# Patient Record
Sex: FEMALE | Race: WHITE | NOT HISPANIC OR LATINO | Employment: UNEMPLOYED | URBAN - NONMETROPOLITAN AREA
[De-identification: names, ages, dates, MRNs, and addresses within clinical notes are randomized per-mention and may not be internally consistent; named-entity substitution may affect disease eponyms.]

---

## 2017-01-17 ENCOUNTER — OFFICE VISIT (OUTPATIENT)
Dept: PODIATRY | Facility: CLINIC | Age: 57
End: 2017-01-17

## 2017-01-17 VITALS
HEART RATE: 81 BPM | DIASTOLIC BLOOD PRESSURE: 90 MMHG | WEIGHT: 220 LBS | OXYGEN SATURATION: 94 % | BODY MASS INDEX: 40.48 KG/M2 | HEIGHT: 62 IN | SYSTOLIC BLOOD PRESSURE: 139 MMHG | TEMPERATURE: 98.5 F

## 2017-01-17 DIAGNOSIS — L60.2 ONYCHOGRYPHOSIS: Primary | ICD-10-CM

## 2017-01-17 PROCEDURE — 99203 OFFICE O/P NEW LOW 30 MIN: CPT | Performed by: PODIATRIST

## 2017-01-17 RX ORDER — CYCLOBENZAPRINE HCL 10 MG
TABLET ORAL
Refills: 1 | COMMUNITY
Start: 2016-12-20

## 2017-01-17 RX ORDER — ERGOCALCIFEROL 1.25 MG/1
CAPSULE ORAL
COMMUNITY
Start: 2017-01-16 | End: 2021-07-23

## 2017-01-17 RX ORDER — SIMVASTATIN 20 MG
TABLET ORAL
Refills: 0 | COMMUNITY
Start: 2016-12-15 | End: 2021-07-23 | Stop reason: ALTCHOICE

## 2017-01-17 RX ORDER — FLUTICASONE PROPIONATE 50 MCG
SPRAY, SUSPENSION (ML) NASAL
Refills: 2 | COMMUNITY
Start: 2016-10-25 | End: 2022-08-26

## 2017-01-17 RX ORDER — GABAPENTIN 300 MG/1
CAPSULE ORAL
Refills: 1 | COMMUNITY
Start: 2016-12-20 | End: 2021-10-07

## 2017-01-17 RX ORDER — CETIRIZINE HYDROCHLORIDE 10 MG/1
TABLET ORAL
COMMUNITY
Start: 2017-01-16 | End: 2022-08-26

## 2017-01-17 RX ORDER — MONTELUKAST SODIUM 10 MG/1
TABLET ORAL
COMMUNITY
Start: 2017-01-16 | End: 2021-07-23

## 2017-01-17 RX ORDER — NABUMETONE 500 MG/1
TABLET, FILM COATED ORAL
Refills: 1 | COMMUNITY
Start: 2016-12-20 | End: 2018-05-03
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
## 2018-04-13 ENCOUNTER — TRANSCRIBE ORDERS (OUTPATIENT)
Dept: ORTHOPEDIC SURGERY | Facility: CLINIC | Age: 58
End: 2018-04-13

## 2018-04-13 DIAGNOSIS — G89.29 CHRONIC PAIN OF RIGHT KNEE: Primary | ICD-10-CM

## 2018-04-13 DIAGNOSIS — M25.561 CHRONIC PAIN OF RIGHT KNEE: Primary | ICD-10-CM

## 2018-05-02 DIAGNOSIS — M25.561 RIGHT KNEE PAIN, UNSPECIFIED CHRONICITY: Primary | ICD-10-CM

## 2018-05-03 ENCOUNTER — OFFICE VISIT (OUTPATIENT)
Dept: ORTHOPEDIC SURGERY | Facility: CLINIC | Age: 58
End: 2018-05-03

## 2018-05-03 VITALS — HEIGHT: 62 IN | WEIGHT: 227 LBS | BODY MASS INDEX: 41.77 KG/M2

## 2018-05-03 DIAGNOSIS — M54.10 RADICULOPATHY OF LEG: Primary | ICD-10-CM

## 2018-05-03 DIAGNOSIS — M25.561 RIGHT KNEE PAIN, UNSPECIFIED CHRONICITY: ICD-10-CM

## 2018-05-03 DIAGNOSIS — G62.9 NEUROPATHY: ICD-10-CM

## 2018-05-03 DIAGNOSIS — G89.29 CHRONIC LOW BACK PAIN, UNSPECIFIED BACK PAIN LATERALITY, WITH SCIATICA PRESENCE UNSPECIFIED: ICD-10-CM

## 2018-05-03 DIAGNOSIS — M54.16 LUMBAR RADICULOPATHY: ICD-10-CM

## 2018-05-03 DIAGNOSIS — E11.9 DIET-CONTROLLED DIABETES MELLITUS (HCC): ICD-10-CM

## 2018-05-03 DIAGNOSIS — M54.5 CHRONIC LOW BACK PAIN, UNSPECIFIED BACK PAIN LATERALITY, WITH SCIATICA PRESENCE UNSPECIFIED: ICD-10-CM

## 2018-05-03 PROCEDURE — 99204 OFFICE O/P NEW MOD 45 MIN: CPT | Performed by: ORTHOPAEDIC SURGERY

## 2018-05-03 RX ORDER — HYDROCODONE BITARTRATE AND ACETAMINOPHEN 7.5; 325 MG/1; MG/1
1 TABLET ORAL EVERY 6 HOURS PRN
COMMUNITY
End: 2021-07-23 | Stop reason: ALTCHOICE

## 2018-05-03 RX ORDER — GABAPENTIN 400 MG/1
400 CAPSULE ORAL 3 TIMES DAILY
COMMUNITY

## 2018-05-06 RX ORDER — MELOXICAM 15 MG/1
TABLET ORAL
Qty: 30 TABLET | Refills: 3 | Status: SHIPPED | OUTPATIENT
Start: 2018-05-06 | End: 2021-07-23

## 2018-05-16 DIAGNOSIS — G62.9 NEUROPATHY: ICD-10-CM

## 2018-05-16 DIAGNOSIS — M54.10 RADICULOPATHY OF LEG: ICD-10-CM

## 2018-05-16 DIAGNOSIS — M54.16 LUMBAR RADICULOPATHY: ICD-10-CM

## 2018-05-18 ENCOUNTER — OFFICE VISIT (OUTPATIENT)
Dept: ORTHOPEDIC SURGERY | Facility: CLINIC | Age: 58
End: 2018-05-18

## 2018-05-18 VITALS — WEIGHT: 226 LBS | HEIGHT: 62 IN | BODY MASS INDEX: 41.59 KG/M2

## 2018-05-18 DIAGNOSIS — G62.9 NEUROPATHY: Primary | ICD-10-CM

## 2018-05-18 DIAGNOSIS — M54.10 RADICULOPATHY OF LEG: ICD-10-CM

## 2018-05-18 DIAGNOSIS — M54.16 LUMBAR RADICULOPATHY: ICD-10-CM

## 2018-05-18 DIAGNOSIS — E66.01 MORBID OBESITY WITH BMI OF 40.0-44.9, ADULT (HCC): ICD-10-CM

## 2018-05-18 PROCEDURE — 99213 OFFICE O/P EST LOW 20 MIN: CPT | Performed by: ORTHOPAEDIC SURGERY

## 2018-05-18 NOTE — PROGRESS NOTES
"Stephanie Bradshaw is a 58 y.o. female returns for     Chief Complaint   Patient presents with   • Right Leg - Results     MRI  [facility] 5/14/18       HISTORY OF PRESENT ILLNESS:  Patient complaining of swelling in both knees.  No specific injury.  Occasional numbness and tingling down the side of her leg.  No fever or chills.    CONCURRENT MEDICAL HISTORY:    Past Medical History:   Diagnosis Date   • Controlled type 2 diabetes mellitus without complication, without long-term current use of insulin    • Hyperlipemia, mixed        Allergies   Allergen Reactions   • Penicillins      Patient states she is allergic to all \"cillins\".         Current Outpatient Prescriptions:   •  cetirizine (zyrTEC) 10 MG tablet, , Disp: , Rfl:   •  cyclobenzaprine (FLEXERIL) 10 MG tablet, TK 1 T PO TID, Disp: , Rfl: 1  •  fluticasone (FLONASE) 50 MCG/ACT nasal spray, INSTILL 2 PUFFS INTO THE NOSTRILS D UTD, Disp: , Rfl: 2  •  gabapentin (NEURONTIN) 300 MG capsule, TK 1 C PO TID PRN, Disp: , Rfl: 1  •  gabapentin (NEURONTIN) 400 MG capsule, Take 400 mg by mouth 3 (Three) Times a Day., Disp: , Rfl:   •  HYDROcodone-acetaminophen (NORCO) 7.5-325 MG per tablet, Take 1 tablet by mouth Every 6 (Six) Hours As Needed for Moderate Pain ., Disp: , Rfl:   •  meloxicam (MOBIC) 15 MG tablet, 1 PO Daily with food., Disp: 30 tablet, Rfl: 3  •  montelukast (SINGULAIR) 10 MG tablet, , Disp: , Rfl:   •  simvastatin (ZOCOR) 20 MG tablet, TK 1 T PO HS, Disp: , Rfl: 0  •  VENTOLIN  (90 BASE) MCG/ACT inhaler, , Disp: , Rfl:   •  vitamin D (ERGOCALCIFEROL) 86058 UNITS capsule capsule, , Disp: , Rfl:     No past surgical history on file.    ROS  No fevers or chills.  No chest pain or shortness of air.  No GI or  disturbances.    PHYSICAL EXAMINATION:       Ht 157.5 cm (62\")   Wt 103 kg (226 lb)   BMI 41.34 kg/m²     Physical Exam   Constitutional: She is oriented to person, place, and time. She appears well-developed and well-nourished. "   Neurological: She is alert and oriented to person, place, and time.   Psychiatric: She has a normal mood and affect. Her behavior is normal. Judgment and thought content normal.       GAIT:     [x]  Normal  []  Antalgic    Assistive device: [x]  None  []  Walker     []  Crutches  []  Cane     []  Wheelchair  []  Stretcher    Right Hip Exam     Tenderness   The patient is experiencing no tenderness.         Range of Motion   The patient has normal right hip ROM.    Muscle Strength   Flexion: 4/5     Tests   QUINN: negative  Fadir:  Negative FADIR test    Other   Erythema: absent  Sensation: normal  Pulse: present      Left Hip Exam     Tenderness   The patient is experiencing no tenderness.         Range of Motion   The patient has normal left hip ROM.    Muscle Strength   Flexion: 5/5     Tests   QUINN: negative  Fadir:  Negative FADIR test    Other   Erythema: absent  Sensation: normal  Pulse: present      Back Exam     Comments:  Mild limitation in low back mobility.  Pain and some recreation of symptoms with femoral nerve stretch.  Patellar reflex is less on right than left.                        ASSESSMENT:    Diagnoses and all orders for this visit:    Neuropathy    Radiculopathy of leg    Lumbar radiculopathy    Morbid obesity with BMI of 40.0-44.9, adult          PLAN    Will start PT for ROM/STR exercises.  Also will eval for modalities for pain and swelling control  Possible aquatics - also possible spinal traction.  No surgical lesion identified.      Patient's Body mass index is 41.34 kg/m². BMI is above normal parameters. Recommendations include: exercise counseling and nutrition counseling.      Return if symptoms worsen or fail to improve, for recheck.    Davide Marrufo MD

## 2018-06-07 ENCOUNTER — TRANSCRIBE ORDERS (OUTPATIENT)
Dept: PHYSICAL THERAPY | Facility: HOSPITAL | Age: 58
End: 2018-06-07

## 2018-06-07 DIAGNOSIS — M25.561 RIGHT KNEE PAIN, UNSPECIFIED CHRONICITY: Primary | ICD-10-CM

## 2018-06-12 ENCOUNTER — APPOINTMENT (OUTPATIENT)
Dept: PHYSICAL THERAPY | Facility: HOSPITAL | Age: 58
End: 2018-06-12

## 2018-06-13 ENCOUNTER — HOSPITAL ENCOUNTER (OUTPATIENT)
Dept: PHYSICAL THERAPY | Facility: HOSPITAL | Age: 58
Setting detail: THERAPIES SERIES
Discharge: HOME | End: 2018-06-13

## 2018-06-13 DIAGNOSIS — M25.561 CHRONIC PAIN OF RIGHT KNEE: Primary | ICD-10-CM

## 2018-06-13 DIAGNOSIS — G89.29 CHRONIC PAIN OF RIGHT KNEE: Primary | ICD-10-CM

## 2018-06-13 PROCEDURE — 97161 PT EVAL LOW COMPLEX 20 MIN: CPT | Performed by: PHYSICAL THERAPIST

## 2018-06-13 NOTE — THERAPY EVALUATION
Outpatient Physical Therapy Ortho Initial Evaluation  [facility]     Patient Name: Stephanie Bradshaw  : 1960  MRN: [id number]  Today's Date: 2018      Visit Date: 2018         Past Medical History:   Diagnosis Date   • Controlled type 2 diabetes mellitus without complication, without long-term current use of insulin    • Hyperlipemia, mixed         No past surgical history on file. Unremarkable  Medications (Admitted on 2018)    cetirizine (zyrTEC) 10 MG tablet    cyclobenzaprine (FLEXERIL) 10 MG tablet    fluticasone (FLONASE) 50 MCG/ACT nasal spray    gabapentin (NEURONTIN) 300 MG capsule    gabapentin (NEURONTIN) 400 MG capsule    HYDROcodone-acetaminophen (NORCO) 7.5-325 MG per tablet    meloxicam (MOBIC) 15 MG tablet    montelukast (SINGULAIR) 10 MG tablet    simvastatin (ZOCOR) 20 MG tablet    VENTOLIN  (90 BASE) MCG/ACT inhaler    vitamin D (ERGOCALCIFEROL) 27846 UNITS capsule capsule   ALLERGIES: Penicillins  Visit Dx:     ICD-10-CM ICD-9-CM   1. Chronic pain of right knee M25.561 719.46    G89.29 338.29             Patient History     Row Name 18 1600             History    Chief Complaint Pain  -DD      Type of Pain Knee pain  -DD      Date Current Problem(s) Began --   Mid March  -DD      Brief Description of Current Complaint Awoke with swollen knee. no Hx of injury.   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  -DD      What clinical tests have you had for this problem? MRI;X-ray  -DD      Results of Clinical Tests negative in lumbar and knee, Xrays show some spurs in the lumbar area per pt report.  -DD         Pain     Pain Location Knee  -DD      Pain  at Present 8   no meds  -DD      Pain at Best 4  -DD      Pain at Worst 10  -DD      Pain Frequency Constant/continuous  -DD      Pain Description Tingling;Numbness;Pins and needles  -DD      What Performance Factors Make the Current Problem(s) WORSE? standing and walking  -DD      Is your sleep disturbed? Yes  -DD        User Key  (r) = Recorded By, (t) = Taken By, (c) = Cosigned By    Initials Name Provider Type    DD Isabel Finn, COLE Physical Therapist                PT Ortho     Row Name 06/13/18 1600       Subjective Pain    Able to rate subjective pain? yes  -DD    Pre-Treatment Pain Level 8  -DD       Posture/Observations    Posture/Observations Comments obese female, no AD or brace  -DD       DTR- Lower Quarter Clearing    Patellar tendon (L2-4) 1- Minimal response;Bilateral:  -DD    Achilles tendon (S1-2) Bilateral:;0- No response  -DD       Neural Tension Signs- Lower Quarter Clearing    SLR Bilateral:;Negative  -DD    Prone knee flexion Right:;Positive  -DD       Myotomal Screen- Lower Quarter Clearing    Hip flexion (L2) 4 (Good)  -DD    Knee extension (L3) 5 (Normal)  -DD    Ankle DF (L4) 5 (Normal)  -DD    Knee flexion (S2) 5 (Normal)  -DD       Special Tests/Palpation    Special Tests/Palpation --   MJL tender, crepitus in patella  -DD       General ROM    GENERAL ROM COMMENTS 0-130 bilaterally  -DD       MMT (Manual Muscle Testing)    Additional Documentation --   5/5 quads and distal, Hip flexors 4 R, 4+ L, abd 4 Jesse,   -DD       Sensation    Sensation WNL? WNL  -DD    Light Touch No apparent deficits  -DD    Additional Comments decreased bilaterlly DTRs Patella and achilles  -DD      User Key  (r) = Recorded By, (t) = Taken By, (c) = Cosigned By    Initials Name Provider Type    COLIN Finn PT Physical Therapist                      Therapy Education  Education Details: Lt exercises until Insurance approved.  Given: HEP  Program: New  How Provided: Verbal, Written  Provided  to: Patient  Level of Understanding: Verbalized, Demonstrated           PT OP Goals     Row Name 06/13/18 1601          PT Short Term Goals    STG Date to Achieve 07/04/18  -DD     STG 1 Patient will be independent in home exercise program  -DD     STG 2 Patient will ambulate with nonantalgic gait  -DD     STG 3 Patient had decreased pain in right knee to 5 on numerical analogue scale intermittently  -DD     STG 4 Patient have decreased neural tension in the right lower extremity  -DD     STG 5 Patient will have LEFS score of   30/80  -DD        Long Term Goals    LTG Date to Achieve 07/04/18  -DD     LTG 1 Pt would like to be able to return to work.  -DD        Time Calculation    PT Goal Re-Cert Due Date 07/04/18  -DD       User Key  (r) = Recorded By, (t) = Taken By, (c) = Cosigned By    Initials Name Provider Type    DD Isabel Finn, PT Physical Therapist                PT Assessment/Plan     Row Name 06/13/18 1647          PT Assessment    Functional Limitations Impaired gait;Limitation in home management;Performance in leisure activities;Performance in self-care ADL;Performance in work activities  -DD     Impairments Gait;Endurance;Muscle strength;Pain;Posture;Poor body mechanics  -DD     Assessment Comments Patient presents with chronic knee pain also deals with bilateral neuropathies of the lower extremities prior to onset of this knee pain.  She presents with some weakness and gait abnormality.  She is a good candidate for conservative physical therapy intervention  -DD     Please refer to paper survey for additional self-reported information Yes  -DD     Rehab Potential Good  -DD     Patient/caregiver participated in establishment of treatment plan and goals Yes  -DD     Patient would benefit from skilled therapy intervention Yes  -DD        PT Plan    PT Frequency 2x/week  -DD     Predicted Duration of Therapy Intervention (Therapy Eval) 6 weeks  -DD     Planned CPT's? PT EVAL MOD COMPLELITY:  67988;PT THER PROC EA 15 MIN: 83951;PT MANUAL THERAPY EA 15 MIN: 49328;PT HOT/COLD PACK WC NONMCARE: 26491;PT ELECTRICAL STIM UNATTEND: ;PT AQUATIC THERAPY EA 15 MIN: 61753  -DD     Physical Therapy Interventions (Optional Details) aquatics exercise;home exercise program;manual therapy techniques;modalities;strengthening;stretching  -DD     PT Plan Comments Lower extremity hip core activities for standing endurance and gait activities, will include aquatic activities, and HEP.  Modalities when necessary to include ice and e-stim  -DD       User Key  (r) = Recorded By, (t) = Taken By, (c) = Cosigned By    Initials Name Provider Type    COLIN Finn PT Physical Therapist                  Exercises     Row Name 06/13/18 1600             Subjective Pain    Able to rate subjective pain? yes  -DD      Pre-Treatment Pain Level 8  -DD         Exercise 1    Exercise Name 1 SLR flexion  -DD         Exercise 2    Exercise Name 2 SLR abd  -DD         Exercise 3    Exercise Name 3 supine hamstring stretch  -DD         Exercise 4    Exercise Name 4 supine piriformis stretch  -DD         Exercise 5    Exercise Name 5 LTR  -DD         Exercise 6    Exercise Name 6 clam shells  -DD      Reps 6 10  -DD         Exercise 7    Exercise Name 7 iso add  -DD      Reps 7 10  -DD         Exercise 8    Exercise Name 8 quad sets  -DD      Reps 8 10  -DD        User Key  (r) = Recorded By, (t) = Taken By, (c) = Cosigned By    Initials Name Provider Type    COLIN Finn, COLE Physical Therapist                        Outcome Measure Options: Lower Extremity Functional Scale (LEFS)  Lower Extremity Functional Index  Any of your usual work, housework or school activities: Quite a bit of difficulty  Your usual hobbies, recreational or sporting activities: Quite a bit of difficulty  Getting into or out of the bath: Quite a bit of difficulty  Walking between rooms: A little bit of difficulty  Putting on your shoes or  socks: Extreme difficulty or unable to perform activity  Squatting: Quite a bit of difficulty  Lifting an object, like a bag of groceries from the floor: Extreme difficulty or unable to perform activity  Performing light activities around your home: Quite a bit of difficulty  Performing heavy activities around your home: Extreme difficulty or unable to perform activity  Getting into or out of a car: Extreme difficulty or unable to perform activity  Walking 2 blocks: Extreme difficulty or unable to perform activity  Walking a mile: Extreme difficulty or unable to perform activity  Going up or down 10 stairs (about 1 flight of stairs): Extreme difficulty or unable to perform activity  Standing for 1 hour: Extreme difficulty or unable to perform activity  Sitting for 1 hour: Extreme difficulty or unable to perform activity  Running on even ground: Extreme difficulty or unable to perform activity  Running on uneven ground: Extreme difficulty or unable to perform activity  Making sharp turns while running fast: Extreme difficulty or unable to perform activity  Hopping: Extreme difficulty or unable to perform activity  Rolling over in bed: Quite a bit of difficulty  Total: 9      Time Calculation:     Therapy Suggested Charges     Code   Minutes Charges    None             Start Time: 1600  Stop Time: 1639  Time Calculation (min): 39 min  Total Timed Code Minutes- PT: 0 minute(s)     Therapy Charges for Today     Code Description Service Date Service Provider Modifiers Qty    98092801998 HC PT EVAL LOW COMPLEXITY 3 6/13/2018 Isabel Finn, PT GP 1          PT G-Codes  Outcome Measure Options: Lower Extremity Functional Scale (LEFS)         Isabel Finn, PT, ATC  6/13/2018

## 2018-06-18 ENCOUNTER — APPOINTMENT (OUTPATIENT)
Dept: PHYSICAL THERAPY | Facility: HOSPITAL | Age: 58
End: 2018-06-18

## 2018-06-21 ENCOUNTER — HOSPITAL ENCOUNTER (OUTPATIENT)
Dept: PHYSICAL THERAPY | Facility: HOSPITAL | Age: 58
Setting detail: THERAPIES SERIES
Discharge: HOME | End: 2018-06-21

## 2018-06-21 DIAGNOSIS — M25.561 CHRONIC PAIN OF RIGHT KNEE: Primary | ICD-10-CM

## 2018-06-21 DIAGNOSIS — G89.29 CHRONIC PAIN OF RIGHT KNEE: Primary | ICD-10-CM

## 2018-06-21 PROCEDURE — 97113 AQUATIC THERAPY/EXERCISES: CPT

## 2018-06-21 NOTE — THERAPY TREATMENT NOTE
Outpatient Physical Therapy Ortho Treatment Note  [facility]   Earnestine MARTINEZ Whitney       Patient Name: Stephanie Bradshaw  : 1960  MRN: [id number]  Today's Date: 2018      Visit Date: 2018   Pt reports 6/10 pain pre treatment, 6/10 pain post treatment  Reports 0% of improvement.  Attended 2/2 visits.  /Insurance available: 6 visits 6/15-7/15  Next MD appt: ?? .  Recertification: 2018.    Visit Dx:    ICD-10-CM ICD-9-CM   1. Chronic pain of right knee M25.561 719.46    G89.29 338.29       There is no problem list on file for this patient.       Past Medical History:   Diagnosis Date   • Controlled type 2 diabetes mellitus without complication, without long-term current use of insulin    • Hyperlipemia, mixed         No past surgical history on file.                          PT Assessment/Plan     Row Name 18 1200          PT Assessment    Assessment Comments (P)  Pt displayed good tolerance to initial aquatics visit. Reported decrease in knee pain while in water. Pain returned post-therex. Patient reported increase in back pain with therex.   -        PT Plan    PT Frequency (P)  2x/week  -     Predicted Duration of Therapy Intervention (Therapy Eval) (P)  6 weeks  -     PT Plan Comments (P)  Continue with POC established at initial visit. 1x land/ 1x aquatic weekly. No deep end aquatics due to patient fear.   -       User Key  (r) = Recorded By, (t) = Taken By, (c) = Cosigned By    Initials Name Provider Type     Earnestine MARTINEZ Whitney                     Exercises     Row Name 18 1100             Subjective Comments    Subjective Comments (P)  Patient reports that she is feeling a little better than usual. She had a pain pill this morning.  -         Subjective Pain    Able to rate subjective pain? (P)  yes  -      Pre-Treatment Pain Level (P)  6  -MC      Post-Treatment Pain Level (P)  6  -         Exercise 1    Exercise Name 1 (P)  aqu walk  3 way  -      Time 1 (P)  15 min  -         Exercise 2    Exercise Name 2 (P)  aqu HS curls  -      Reps 2 (P)  20  -      Additional Comments (P)  bilat  -         Exercise 3    Exercise Name 3 (P)  aqu SLR 3 way  -      Sets 3 (P)  2  -      Reps 3 (P)  10  -         Exercise 4    Exercise Name 4 (P)  aqu march in place  -      Reps 4 (P)  20  -         Exercise 5    Exercise Name 5 (P)  aqu CR/TR  -      Reps 5 (P)  20  -         Exercise 6    Exercise Name 6 (P)  aqu heel-toe line walk  -      Reps 6 (P)  2  -MC      Additional Comments (P)  across shallow and back  -        User Key  (r) = Recorded By, (t) = Taken By, (c) = Cosigned By    Initials Name Provider Type     Earnestine Lorenzanagrove                                PT OP Goals     Row Name 06/21/18 1100          PT Short Term Goals    STG Date to Achieve (P)  07/04/18  -     STG 1 (P)  Patient will be independent in home exercise program  -     STG 1 Progress (P)  Not Met  -     STG 2 (P)  Patient will ambulate with nonantalgic gait  -     STG 2 Progress (P)  Not Met  -     STG 3 (P)  Patient had decreased pain in right knee to 5 on numerical analogue scale intermittently  -     STG 3 Progress (P)  Not Met  -     STG 4 (P)  Patient have decreased neural tension in the right lower extremity  -     STG 4 Progress (P)  Not Met  -     STG 5 (P)  Patient will have LEFS score of   30/80  -     STG 5 Progress (P)  Not Met  -        Long Term Goals    LTG Date to Achieve (P)  07/04/18  -     LTG 1 (P)  Pt would like to be able to return to work.  -        Time Calculation    PT Goal Re-Cert Due Date (P)  07/04/18  -       User Key  (r) = Recorded By, (t) = Taken By, (c) = Cosigned By    Initials Name Provider Type     Earnestine Olson           Therapy Education  Education Details: (P) Pt lost HEP sheet, given new one  Given: (P) HEP  Program: (P) Reinforced  How Provided: (P)  Verbal, Written  Provided to: (P) Patient  Level of Understanding: (P) Verbalized              Time Calculation:   Start Time: (P) 1105  Stop Time: (P) 1145  Time Calculation (min): (P) 40 min  Therapy Suggested Charges     Code   Minutes Charges    None           Therapy Charges for Today     Code Description Service Date Service Provider Modifiers Qty    66712839727 HC PT AQUATIC THERAPY EA 15 MIN 6/21/2018 Earnestine Olson GP 3                    Earnestine Olson  6/21/2018

## 2018-08-13 ENCOUNTER — TRANSCRIBE ORDERS (OUTPATIENT)
Dept: PHYSICAL THERAPY | Facility: HOSPITAL | Age: 58
End: 2018-08-13

## 2018-08-13 DIAGNOSIS — M25.561 RIGHT KNEE PAIN, UNSPECIFIED CHRONICITY: Primary | ICD-10-CM

## 2018-08-14 ENCOUNTER — HOSPITAL ENCOUNTER (OUTPATIENT)
Dept: PHYSICAL THERAPY | Facility: HOSPITAL | Age: 58
Setting detail: THERAPIES SERIES
Discharge: HOME | End: 2018-08-14

## 2018-08-14 DIAGNOSIS — M25.561 RIGHT KNEE PAIN, UNSPECIFIED CHRONICITY: Primary | ICD-10-CM

## 2018-08-14 PROCEDURE — 97161 PT EVAL LOW COMPLEX 20 MIN: CPT

## 2018-08-14 NOTE — THERAPY EVALUATION
"    Outpatient Physical Therapy Ortho Initial Evaluation  [facility]     Patient Name: Stephanie Bradshaw  : 1960  MRN: [id number]  Today's Date: 2018      Visit Date: 2018   Recert: 18  Visit:   MD menard/u: HELENA    There is no problem list on file for this patient.       Past Medical History:   Diagnosis Date   • Controlled type 2 diabetes mellitus without complication, without long-term current use of insulin (CMS/[facility])    • Hyperlipemia, mixed         History reviewed. No pertinent surgical history.    Visit Dx:     ICD-10-CM ICD-9-CM   1. Right knee pain, unspecified chronicity M25.561 719.46             Patient History     Row Name 18 1500             History    Chief Complaint Pain  -MW      Type of Pain Knee pain  -MW      Brief Description of Current Complaint The pt is a 59 yo female who comes to PT for knee pain. The pt states her knees have bothered her \"forever\". She states she woke up one day and her R knee was massively swollen. She states that both of her knees swell intermittently. She states the R knee is worse than the L. She states the swelling began 2018. She states they have done both x-rays and MRI for her knees and \"they can't figure out what is wrong\". She states she had an MRI of both her lower back and the knee and there were no significant findings. She states she also has intermittent swelling in her hands and her ankles. She states that her MDs are attributing her knee sxs to her diabetic neuropathy.  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 She is also unable to drive now secondary to not being able to  the pressure she is applying w/ her foot.  -MW      Patient/Caregiver Goals Relieve pain;Return to prior level of function  -MW      Current Tobacco Use no  -MW      Smoking Status current smoker  -MW      Patient's Rating of General Health Good  -MW      Hand " Dominance right-handed  -MW      Occupation/sports/leisure activities unemployed due to knee issues  -MW         Pain     Pain Location Knee  -MW      Pain at Present 9  -MW      Pain at Best 6  -MW      Pain at Worst 10  -MW      Pain Frequency Constant/continuous  -MW      Pain Description Dull;Throbbing  -MW      What Performance Factors Make the Current Problem(s) WORSE? climbing steps, walking, bending, squatting  -MW      What Performance Factors Make the Current Problem(s) BETTER? ice, heat  -MW      Is your sleep disturbed? Yes  -MW      Difficulties with ADL's? yes  -MW      Difficulties with recreational activities? yes  -MW         Fall Risk Assessment    Any falls in the past year: Yes  -MW      Number of falls reported in the last 12 months 8  -MW      Factors that contributed to the fall: --   knees buckled  -MW      Does patient have a fear of falling Yes (comment)  -MW         Daily Activities    Primary Language English  -MW         Safety    Are you being hurt, hit, or frightened by anyone at home or in your life? No  -MW      Are you being neglected by a caregiver No  -MW        User Key  (r) = Recorded By, (t) = Taken By, (c) = Cosigned By    Initials Name Provider Type    MW Oralia Hernandez PT Physical Therapist                PT Ortho     Row Name 08/14/18 1500       Subjective Comments    Subjective Comments See hx  -MW       Precautions and Contraindications    Precautions DM 2, neuropathy  -MW       Posture/Observations    Posture/Observations Comments TTP felt along medial L knee joint and diffusely throughout R knee. Increased swelling noted in B knees.  Ligamentous testing performed and no notable findings were found bilaterally.  Patella mobility hypomobile bilaterally.  -MW       Right Lower Ext    Rt Knee Extension/Flexion AROM 0-125  -MW       Left Lower Ext    Lt Knee Extension/Flexion AROM 0-140  -MW       General Assessment (Manual Muscle Testing)    Comment, General Manual  Muscle Testing (MMT) Assessment MMT as follows: Bilat LE grossly 4/5 for all planes except bilat DF and hip flex 4-/5  -MW       Sensation    Light Touch Partial deficits in the RLE  -MW    Additional Comments L3-5 and S2 dermatome patterns decreased on R LE per pt report  -MW       Gait/Stairs Assessment/Training    Comment (Gait/Stairs) Gait antalgic w/ decreased stance time on R LE and increased bilat trunk sway.  -MW      User Key  (r) = Recorded By, (t) = Taken By, (c) = Cosigned By    Initials Name Provider Type    MW Oralia Hernandez, PT Physical Therapist                                  PT OP Goals     Row Name 08/14/18 1500          PT Short Term Goals    STG 1 STG deferred  -MW        Long Term Goals    LTG Date to Achieve 09/11/18  -MW     LTG 1 Pt will be independent w/ HEP  -MW     LTG 1 Progress New  -MW     LTG 2 Pt will report pain <4/10  -MW     LTG 2 Progress New  -MW     LTG 3 Pt will perform active ther ex >30 min w/o increased knee pain  -MW     LTG 3 Progress New  -MW     LTG 4 Pt will have MMT of B LE 5/5 for all LE planes tested at eval  -MW     LTG 4 Progress New  -MW     LTG 5 Pt will have R knee AROM w/in 4 deg of L knee AROM measured at eval  -MW     LTG 5 Progress New  -MW        Time Calculation    PT Goal Re-Cert Due Date 09/04/18  -MW       User Key  (r) = Recorded By, (t) = Taken By, (c) = Cosigned By    Initials Name Provider Type    MW Oralia Hernandez, PT Physical Therapist                PT Assessment/Plan     Row Name 08/14/18 1500          PT Assessment    Functional Limitations Decreased safety during functional activities;Impaired gait;Impaired locomotion;Limitation in home management;Limitations in community activities;Limitations in functional capacity and performance;Performance in self-care ADL  -MW     Impairments Balance;Edema;Endurance;Gait;Locomotion;Muscle strength;Pain;Range of motion;Sensation  -MW     Assessment Comments The pt presented today for eval and tx of  knee pain and responded well. Sxs/signs consistent w/ pt's referral diagnosis. The pt has decreased ROM, decreased strength, and pain that are impacting their functional ability at this time. The pt has significant gait deviations that seem habitual secondary to pt's chronic knee pain.  The pt would benefit from PT services to address these deficits and to ensure pt's safe recovery to PLOF and improved QOL. Time spent discussing POC expectations this date. HEP implemented today and pt demonstrated understanding of HEP. Based on today's session and pt's motivation to improve, I anticipate she will do well w/ rehab. Secondary to pt's pain w/ prolonged WB activities it is my impression the pt is an appropriate candidate for pool therapy at this time for strength and mobility training.  Pt stating no contraindications to aquatic therapy.   -MW     Please refer to paper survey for additional self-reported information Yes  -MW     Rehab Potential Good  -MW     Patient/caregiver participated in establishment of treatment plan and goals Yes  -MW     Patient would benefit from skilled therapy intervention Yes  -MW        PT Plan    PT Frequency 2x/week  -MW     Predicted Duration of Therapy Intervention (Therapy Eval) 4 weeks  -MW     Planned CPT's? PT EVAL LOW COMPLEXITY: 19834;PT RE-EVAL: 40803;PT THER PROC EA 15 MIN: 10067;PT THER ACT EA 15 MIN: 16854;PT MANUAL THERAPY EA 15 MIN: 33621;PT NEUROMUSC RE-EDUCATION EA 15 MIN: 44692;PT GAIT TRAINING EA 15 MIN: 07521;PT AQUATIC THERAPY EA 15 MIN: 13668;PT SELF CARE/HOME MGMT/TRAIN EA 15: 94429;PT HOT OR COLD PACK TREAT MCARE;PT ELECTRICAL STIM UNATTEND:   -MW     PT Plan Comments Will attempt aquatic therapy 2x/week until next reassessment. At reassessment will determine pt's need for continued therapy services and possible transition to land therapy at that time if indicated.  -MW       User Key  (r) = Recorded By, (t) = Taken By, (c) = Cosigned By    Initials Name  Provider Type    Oralia Rick PT Physical Therapist                Modalities     Row Name 08/14/18 1500             Ice    Ice Applied Yes  -MW      Location B knee  -MW      Rx Minutes 15 mins  -MW      Ice S/P Rx Yes  -MW        User Key  (r) = Recorded By, (t) = Taken By, (c) = Cosigned By    Initials Name Provider Type    Oralia Rick PT Physical Therapist              Exercises     Row Name 08/14/18 1500             Subjective Comments    Subjective Comments See hx  -MW        User Key  (r) = Recorded By, (t) = Taken By, (c) = Cosigned By    Initials Name Provider Type    Oralia Rick PT Physical Therapist                        Outcome Measure Options: Lower Extremity Functional Scale (LEFS)  Lower Extremity Functional Index  Any of your usual work, housework or school activities: Quite a bit of difficulty  Your usual hobbies, recreational or sporting activities: A little bit of difficulty  Getting into or out of the bath: Quite a bit of difficulty  Walking between rooms: Moderate difficulty  Putting on your shoes or socks: Quite a bit of difficulty  Squatting: Quite a bit of difficulty  Lifting an object, like a bag of groceries from the floor: Quite a bit of difficulty  Performing light activities around your home: Moderate difficulty  Performing heavy activities around your home: Quite a bit of difficulty  Getting into or out of a car: Moderate difficulty  Walking 2 blocks: Extreme difficulty or unable to perform activity  Walking a mile: Extreme difficulty or unable to perform activity  Going up or down 10 stairs (about 1 flight of stairs): Quite a bit of difficulty  Standing for 1 hour: Extreme difficulty or unable to perform activity  Sitting for 1 hour: Extreme difficulty or unable to perform activity  Running on even ground: Extreme difficulty or unable to perform activity  Running on uneven ground: Extreme difficulty or unable to perform activity  Making sharp turns while  running fast: Extreme difficulty or unable to perform activity  Hopping: Extreme difficulty or unable to perform activity  Rolling over in bed: Quite a bit of difficulty  Total: 17      Time Calculation:     Therapy Suggested Charges     Code   Minutes Charges    None             Start Time: 1500  Stop Time: 1545  Time Calculation (min): 45 min     Therapy Charges for Today     Code Description Service Date Service Provider Modifiers Qty    9196252 HC PT EVAL LOW COMPLEXITY 3 8/14/2018 Oralia Hernandez, PT GP 1    [id number]  PT THER SUPP EA 15 MIN 8/14/2018 Oralia Hernandez, PT GP 1              Oralia Hernandez PT  8/14/2018

## 2018-09-12 ENCOUNTER — HOSPITAL ENCOUNTER (OUTPATIENT)
Dept: PHYSICAL THERAPY | Facility: HOSPITAL | Age: 58
Setting detail: THERAPIES SERIES
Discharge: HOME | End: 2018-09-12

## 2018-09-12 DIAGNOSIS — G89.29 CHRONIC PAIN OF RIGHT KNEE: ICD-10-CM

## 2018-09-12 DIAGNOSIS — M25.561 RIGHT KNEE PAIN, UNSPECIFIED CHRONICITY: Primary | ICD-10-CM

## 2018-09-12 DIAGNOSIS — M25.561 CHRONIC PAIN OF RIGHT KNEE: ICD-10-CM

## 2018-09-12 PROCEDURE — 97110 THERAPEUTIC EXERCISES: CPT | Performed by: PHYSICAL THERAPIST

## 2018-09-12 PROCEDURE — 97110 THERAPEUTIC EXERCISES: CPT

## 2018-09-12 NOTE — THERAPY PROGRESS REPORT/RE-CERT
Outpatient Physical Therapy Ortho Progress Note  [facility]     Patient Name: Stephanie Bradshaw  : 1960  MRN: [id number]  Today's Date: 2018      Visit Date: 2018      Attendance    Authorized 8 exp    Pre Rx pain 10   Post Rx pain 8   % improvement none   MD follow up PRN   Recert date 10/3/18           Visit Dx:    ICD-10-CM ICD-9-CM   1. Right knee pain, unspecified chronicity M25.561 719.46   2. Chronic pain of right knee M25.561 719.46    G89.29 338.29       There is no problem list on file for this patient.       Past Medical History:   Diagnosis Date   • Controlled type 2 diabetes mellitus without complication, without long-term current use of insulin (CMS/[facility])    • Hyperlipemia, mixed                   PT Ortho     Row Name 18 1500       Subjective Pain    Pre-Treatment Pain Level 10  -DD    Post-Treatment Pain Level 8  -DD    Subjective Pain Comment iced after exercise  -DD       Posture/Observations    Posture/Observations Comments Antalgic gait with no AD.  TTP MJL. Diffuse soft tissue tenderness  -DD       Right Lower Ext    Rt Knee Extension/Flexion AROM Heel slide position 0-88  -DD       MMT (Manual Muscle Testing)    General MMT Comments 4/5 hip flex and abd,  Quad with give way 4/5 due to pain, 4/5 hamstrings.  -DD      User Key  (r) = Recorded By, (t) = Taken By, (c) = Cosigned By    Initials Name Provider Type    DD Isabel Finn PT Physical Therapist                            PT Assessment/Plan     Row Name 18 1519          PT Assessment    Functional Limitations Impaired gait;Limitation in home management;Performance in self-care ADL  -DD     Impairments Gait;Endurance;Muscle strength;Pain;Range of motion  -DD     Assessment Comments Patient presents today for reevaluation and has missed the last 3 weeks of therapy due to transportation issues.  She is ready to restart therapy with aquatics to help strengthen gait and improvement in the  pain.  Patient initiated therapy in June for 2 appointments as well.  We'll start with isometrics this date and schedule aquatic therapy at follow-up treatments  -DD     Please refer to paper survey for additional self-reported information Yes  -DD     Rehab Potential Good  -DD     Patient/caregiver participated in establishment of treatment plan and goals Yes  -DD     Patient would benefit from skilled therapy intervention Yes  -DD        PT Plan    PT Frequency 2x/week  -DD     Predicted Duration of Therapy Intervention (Therapy Eval) 4 weeks  -DD     Planned CPT's? PT THER PROC EA 15 MIN: 89422;PT AQUATIC THERAPY EA 15 MIN: 26070;PT ELECTRICAL STIM UNATTEND: ;PT HOT/COLD PACK WC NONMCARE: 95186  -DD     Physical Therapy Interventions (Optional Details) aquatics exercise;gait training;home exercise program;ROM (Range of Motion);strengthening  -DD     PT Plan Comments Will schedule for aquatic therapy 2 times a week for approximately 3 weeks with reevaluation on progress at that time maintained transition to land exercises as well  -DD       User Key  (r) = Recorded By, (t) = Taken By, (c) = Cosigned By    Initials Name Provider Type    DD Isabel Finn, PT Physical Therapist                Modalities     Row Name 09/12/18 1300             Ice    Ice Applied Yes  -JW      Location R Knee  -JW      Rx Minutes 10 mins  -JW      Ice S/P Rx Yes  -JW        User Key  (r) = Recorded By, (t) = Taken By, (c) = Cosigned By    Initials Name Provider Type    JW Rudy Johnson, PTA Physical Therapy Assistant                Exercises     Row Name 09/12/18 1500 09/12/18 1400          Subjective Pain    Pre-Treatment Pain Level 10  -DD  --     Post-Treatment Pain Level 8  -DD  --     Subjective Pain Comment iced after exercise  -DD  --        Exercise 1    Exercise Name 1  -- QS  -JW     Sets 1  -- 2  -JW     Reps 1  -- 10  -JW        Exercise 2    Exercise Name 2  -- SAQ  -JW     Sets 2  -- 2  -JW     Reps 2  --  7   c/o pain  -JW        Exercise 3    Exercise Name 3  -- SLR w/ assist  -JW     Sets 3  -- 2  -JW     Reps 3  -- 5   c/o pain  -JW        Exercise 4    Exercise Name 4  -- Hip add squeeze  -JW     Sets 4  -- 2  -JW     Reps 4  -- 10  -JW        Exercise 5    Exercise Name 5  -- Ham set  -JW     Sets 5  -- 2  -JW     Reps 5  -- 10  -JW        Exercise 6    Exercise Name 6  -- Hip abd iso  -JW     Sets 6  -- 2  -JW     Reps 6  -- 10  -JW       User Key  (r) = Recorded By, (t) = Taken By, (c) = Cosigned By    Initials Name Provider Type    Isabel Quintana, PT Physical Therapist    Rudy Guzman, PTA Physical Therapy Assistant                               PT OP Goals     Row Name 09/12/18 1345          PT Short Term Goals    STG 1 STG deferred  -DD        Long Term Goals    LTG Date to Achieve 09/11/18  -     LTG 1 Pt will be independent w/ HEP  -DD     LTG 1 Progress New;Ongoing  -DD     LTG 2 Pt will report pain <4/10  -DD     LTG 2 Progress New  -DD     LTG 3 Pt will perform active ther ex >30 min w/o increased knee pain  -DD     LTG 3 Progress New  -DD     LTG 4 Pt will have MMT of B LE 5/5 for all LE planes tested at [facility]     LT 4 Progress New  -DD     LTG 5 Pt will have R knee AROM w/in 4 deg of L knee AROM measured at [facility] 5 Progress New  -       User Key  (r) = Recorded By, (t) = Taken By, (c) = Cosigned By    Initials Name Provider Type    Isabel Quintana, PT Physical Therapist          Therapy Education  Given: HEP  Program: New  How Provided: Verbal, Written  Provided to: Patient  Level of Understanding: Verbalized, Demonstrated    Outcome Measure Options: Lower Extremity Functional Scale (LEFS)  Lower Extremity Functional Index  Any of your usual work, housework or school activities: Quite a bit of difficulty  Your usual hobbies, recreational or sporting activities: Extreme difficulty or unable to perform activity  Getting into or out of the bath: Quite a  bit of difficulty  Walking between rooms: Moderate difficulty  Putting on your shoes or socks: Moderate difficulty  Squatting: Extreme difficulty or unable to perform activity  Lifting an object, like a bag of groceries from the floor: Moderate difficulty  Performing light activities around your home: Quite a bit of difficulty  Performing heavy activities around your home: Extreme difficulty or unable to perform activity  Getting into or out of a car: Moderate difficulty  Walking 2 blocks: Extreme difficulty or unable to perform activity  Walking a mile: Extreme difficulty or unable to perform activity  Going up or down 10 stairs (about 1 flight of stairs): Quite a bit of difficulty  Standing for 1 hour: Extreme difficulty or unable to perform activity  Sitting for 1 hour: Extreme difficulty or unable to perform activity  Running on even ground: Extreme difficulty or unable to perform activity  Running on uneven ground: Extreme difficulty or unable to perform activity  Making sharp turns while running fast: Extreme difficulty or unable to perform activity  Hopping: Extreme difficulty or unable to perform activity  Rolling over in bed: Quite a bit of difficulty  Total: 13      Time Calculation:   Start Time: 1345  Stop Time: 1433  Therapy Suggested Charges     Code   Minutes Charges    None           Therapy Charges for Today     Code Description Service Date Service Provider Modifiers Qty    53810422602 HC PT THER PROC EA 15 MIN 9/12/2018 Isabel Finn, PT GP 1      Jorge Johnson  PTA 2 units exercise    PT G-Codes  Outcome Measure Options: Lower Extremity Functional Scale (LEFS)  Total: 13         Isabel Finn, PT  9/12/2018

## 2018-09-25 ENCOUNTER — APPOINTMENT (OUTPATIENT)
Dept: PHYSICAL THERAPY | Facility: HOSPITAL | Age: 58
End: 2018-09-25

## 2018-09-27 ENCOUNTER — HOSPITAL ENCOUNTER (OUTPATIENT)
Dept: PHYSICAL THERAPY | Facility: HOSPITAL | Age: 58
Setting detail: THERAPIES SERIES
Discharge: HOME | End: 2018-09-27

## 2018-09-27 DIAGNOSIS — M25.561 CHRONIC PAIN OF RIGHT KNEE: ICD-10-CM

## 2018-09-27 DIAGNOSIS — M25.561 RIGHT KNEE PAIN, UNSPECIFIED CHRONICITY: Primary | ICD-10-CM

## 2018-09-27 DIAGNOSIS — G89.29 CHRONIC PAIN OF RIGHT KNEE: ICD-10-CM

## 2018-09-27 PROCEDURE — 97110 THERAPEUTIC EXERCISES: CPT

## 2018-09-27 NOTE — THERAPY TREATMENT NOTE
Outpatient Physical Therapy Ortho Treatment Note  [facility]     Patient Name: Stephanie Bradshaw  : 1960  MRN: [id number]  Today's Date: 2018      Visit Date: 2018  Subjective Improvement: 0%  Visit Number:   3/4  Recert Date:   10/3/18  MD Visit:   PRN  Total Approved Visits:  8 visits from 18 to 10/27/18      Visit Dx:    ICD-10-CM ICD-9-CM   1. Right knee pain, unspecified chronicity M25.561 719.46   2. Chronic pain of right knee M25.561 719.46    G89.29 338.29       There is no problem list on file for this patient.       Past Medical History:   Diagnosis Date   • Controlled type 2 diabetes mellitus without complication, without long-term current use of insulin (CMS/[facility])    • Hyperlipemia, mixed         No past surgical history on file.          PT Ortho     Row Name 18 1800       Precautions and Contraindications    Precautions DM 2, neuropathy  -BB       Posture/Observations    Posture/Observations Comments Antalgic. Used HR entering and exiting pool steps.   -BB      User Key  (r) = Recorded By, (t) = Taken By, (c) = Cosigned By    Initials Name Provider Type    Myesha Griffin PTA Physical Therapy Assistant                            PT Assessment/Plan     Row Name 18 1835          PT Assessment    Assessment Comments Today was first visit since last recheck.  Pt is having transportation issues getting to PT.  Good tolerance to aquatics with no increased pain after.   -BB        PT Plan    PT Frequency 2x/week  -BB     Predicted Duration of Therapy Intervention (Therapy Eval) x 4 weeks  -BB     PT Plan Comments Recheck next week.   -BB       User Key  (r) = Recorded By, (t) = Taken By, (c) = Cosigned By    Initials Name Provider Type    Myesha Griffin PTA Physical Therapy Assistant                    Exercises     Row Name 18 1800             Subjective Comments    Subjective Comments States she has been unable to attend due to transportation  issues.   -BB         Subjective Pain    Able to rate subjective pain? yes  -BB      Pre-Treatment Pain Level 7  -BB      Post-Treatment Pain Level 7  -BB         Exercise 1    Exercise Name 1 AQU FWD/LAT WALK  -BB      Time 1 5 MIN EA  -BB         Exercise 2    Exercise Name 2 AQU CR/TR  -BB      Reps 2 10  -BB         Exercise 3    Exercise Name 3 AQU MS  -BB      Reps 3 10  -BB         Exercise 4    Exercise Name 4 AQU SLR 3 WAY  -BB      Reps 4 10  -BB         Exercise 5    Exercise Name 5 AQU FWD WALK  -BB      Time 5 5  -BB        User Key  (r) = Recorded By, (t) = Taken By, (c) = Cosigned By    Initials Name Provider Type    Myesha Griffin PTA Physical Therapy Assistant                               PT OP Goals     Row Name 09/27/18 1300          PT Short Term Goals    STG 1 STG deferred  -BB        Long Term Goals    LTG Date to Achieve 09/11/18  -     LTG 1 Pt will be independent w/ HEP  -BB     LTG 1 Progress Not Met  -BB     LTG 2 Pt will report pain <4/10  -BB     LTG 2 Progress Not Met  -BB     LTG 3 Pt will perform active ther ex >30 min w/o increased knee pain  -BB     LTG 3 Progress Not Met  -BB     LTG 4 Pt will have MMT of B LE 5/5 for all LE planes tested at [facility]     LT 4 Progress Not Met  -BB     LTG 5 Pt will have R knee AROM w/in 4 deg of L knee AROM measured at [facility]     LT 5 Progress Not Met  -BB        Time Calculation    PT Goal Re-Cert Due Date 10/03/18  -       User Key  (r) = Recorded By, (t) = Taken By, (c) = Cosigned By    Initials Name Provider Type    Myesha Griffin PTA Physical Therapy Assistant                         Time Calculation:   Start Time: 1405 (pt 15 min late)  Stop Time: 1430  Time Calculation (min): 25 min  Total Timed Code Minutes- PT: 25 minute(s)  Therapy Suggested Charges     Code   Minutes Charges    None           Therapy Charges for Today     Code Description Service Date Service Provider Modifiers Qty    97509867909 HC PT THER PROC  EA 15 MIN 9/27/2018 Myesha Coles, PTA GP 2                    Myesha Coles, PTA  9/27/2018

## 2018-10-01 ENCOUNTER — HOSPITAL ENCOUNTER (OUTPATIENT)
Dept: PHYSICAL THERAPY | Facility: HOSPITAL | Age: 58
Setting detail: THERAPIES SERIES
Discharge: HOME | End: 2018-10-01

## 2018-10-01 DIAGNOSIS — G89.29 CHRONIC PAIN OF RIGHT KNEE: ICD-10-CM

## 2018-10-01 DIAGNOSIS — M25.561 CHRONIC PAIN OF RIGHT KNEE: ICD-10-CM

## 2018-10-01 DIAGNOSIS — M25.561 RIGHT KNEE PAIN, UNSPECIFIED CHRONICITY: Primary | ICD-10-CM

## 2018-10-01 PROCEDURE — 97110 THERAPEUTIC EXERCISES: CPT

## 2018-10-01 NOTE — THERAPY PROGRESS REPORT/RE-CERT
Outpatient Physical Therapy Ortho Progress Note  [facility]     Patient Name: Stephanie Bradshaw  : 1960  MRN: [id number]  Today's Date: 10/1/2018      Visit Date: 10/01/2018    There is no problem list on file for this patient.       Past Medical History:   Diagnosis Date   • Controlled type 2 diabetes mellitus without complication, without long-term current use of insulin (CMS/[facility])    • Hyperlipemia, mixed         No past surgical history on file.    Visit Dx:     ICD-10-CM ICD-9-CM   1. Right knee pain, unspecified chronicity M25.561 719.46   2. Chronic pain of right knee M25.561 719.46    G89.29 338.29                 PT Ortho     Row Name 10/01/18 1600       Subjective Comments    Subjective Comments patient states that she has transportation issues and needs to rely on other people to bring her here. Patient has enjoyed her  two aquatic therapy session which left her sore but she felt better overall. Patiuent complain of pain much greater in the right knee but both her knee are weal  -MS       Precautions and Contraindications    Precautions DM 2, neuropathy  -MS       Subjective Pain    Able to rate subjective pain? yes  -MS       Posture/Observations    Posture/Observations Comments slightly antalgic  -MS       Right Lower Ext    Rt Knee Extension/Flexion AROM 0-118  -MS       Left Lower Ext    Lt Knee Extension/Flexion AROM 0-125  -MS       MMT (Manual Muscle Testing)    General MMT Comments right hip flexion 4/5, left hip flexion -4/5; knee ext -4/5 B; knee flexion 4/5 B; ankle dorsiflexion 4+/5 B   -MS       Sensation    Light Touch Partial deficits in the LLE   LLE actualy worse than right, but right knee hurts more  -MS       RLE Quick Girth (cm)    Tib tuberosity 42 cm  -MS    Mid patella 43.5 cm  -MS    Distal thigh 49 cm  -MS       LLE Quick Girth (cm)    Tib tuberosity 42 cm  -MS    Mid patella 42 cm  -MS    Distal thigh 48 cm  -MS      User Key  (r) = Recorded By, (t) = Taken By,  (c) = Cosigned By    Initials Name Provider Type    MS NeumannestrellitakellyromuloJean Paul, PT Physical Therapist                                      PT Assessment/Plan     Row Name 10/01/18 1137          PT Assessment    Assessment Comments Patient has not progressed much due to transportation issues and inability to attend. This issue is now resolved and patient will benefit from additional aquatic therapy session plus the addition of land based sessions to imporve LE strength and AROM  -MS        PT Plan    PT Plan Comments continue with 2 aquatic therapy session for each land based session. Focus on hip/knee/core strenghening  -MS       User Key  (r) = Recorded By, (t) = Taken By, (c) = Cosigned By    Initials Name Provider Type    MS Emyromulo Jean Paul, PT Physical Therapist                  Exercises     Row Name 10/01/18 1600             Subjective Comments    Subjective Comments patient states that she has transportation issues and needs to rely on other people to bring her here. Patient has enjoyed her  two aquatic therapy session which left her sore but she felt better overall. Patiuent complain of pain much greater in the right knee but both her knee are weal  -MS         Subjective Pain    Able to rate subjective pain? yes  -MS      Pre-Treatment Pain Level 8  -MS         Exercise 1    Exercise Name 1 AQU forwards walking  -MS      Time 1 4 min  -MS         Exercise 2    Exercise Name 2 AQU marching in place  -MS      Time 2 4 min  -MS         Exercise 3    Exercise Name 3 AQU standing HS flex  -MS      Reps 3 5  -MS         Exercise 4    Exercise Name 4 AQU LAQ in sitting  -MS      Reps 4 5  -MS         Exercise 5    Exercise Name 5 AQU sitting hip ABD  -MS      Time 5 5  -MS        User Key  (r) = Recorded By, (t) = Taken By, (c) = Cosigned By    Initials Name Provider Type    Jean Paul Joshi, PT Physical Therapist                        Outcome Measure Options: Lower Extremity Functional Scale (LEFS)  Lower  Extremity Functional Index  Any of your usual work, housework or school activities: Quite a bit of difficulty  Your usual hobbies, recreational or sporting activities: Quite a bit of difficulty  Getting into or out of the bath: Quite a bit of difficulty  Walking between rooms: Quite a bit of difficulty  Putting on your shoes or socks: Extreme difficulty or unable to perform activity  Squatting: Extreme difficulty or unable to perform activity  Lifting an object, like a bag of groceries from the floor: Quite a bit of difficulty  Performing light activities around your home: Quite a bit of difficulty  Performing heavy activities around your home: Extreme difficulty or unable to perform activity  Getting into or out of a car: Quite a bit of difficulty  Walking 2 blocks: Extreme difficulty or unable to perform activity  Walking a mile: Extreme difficulty or unable to perform activity  Going up or down 10 stairs (about 1 flight of stairs): Extreme difficulty or unable to perform activity  Standing for 1 hour: Extreme difficulty or unable to perform activity  Sitting for 1 hour: Extreme difficulty or unable to perform activity  Running on even ground: Extreme difficulty or unable to perform activity  Running on uneven ground: Extreme difficulty or unable to perform activity  Making sharp turns while running fast: Extreme difficulty or unable to perform activity  Hopping: Extreme difficulty or unable to perform activity  Rolling over in bed: Quite a bit of difficulty  Total: 8      Time Calculation:     Therapy Suggested Charges     Code   Minutes Charges    None             Start Time: 1600  Stop Time: 1645  Time Calculation (min): 45 min     Therapy Charges for Today     Code Description Service Date Service Provider Modifiers Qty    25691118476 HC PT THER PROC EA 15 MIN 10/1/2018 Jean Paul Pierre, PT GP 3          PT G-Codes  Outcome Measure Options: Lower Extremity Functional Scale (LEFS)  Total: 8         Jean Paul  Gabby, PT  10/1/2018

## 2018-10-04 ENCOUNTER — HOSPITAL ENCOUNTER (OUTPATIENT)
Dept: PHYSICAL THERAPY | Facility: HOSPITAL | Age: 58
Setting detail: THERAPIES SERIES
Discharge: HOME | End: 2018-10-04

## 2018-10-04 DIAGNOSIS — M25.561 RIGHT KNEE PAIN, UNSPECIFIED CHRONICITY: Primary | ICD-10-CM

## 2018-10-04 DIAGNOSIS — G89.29 CHRONIC PAIN OF RIGHT KNEE: ICD-10-CM

## 2018-10-04 DIAGNOSIS — M25.561 CHRONIC PAIN OF RIGHT KNEE: ICD-10-CM

## 2018-10-04 PROCEDURE — 97110 THERAPEUTIC EXERCISES: CPT

## 2018-10-04 NOTE — THERAPY TREATMENT NOTE
Outpatient Physical Therapy Ortho Treatment Note  [facility]     Patient Name: Stephanie Bradshaw  : 1960  MRN: [id number]  Today's Date: 10/4/2018      Visit Date: 10/04/2018     Subjective Improvement: 0%  Attendance:  5/6 (Eval + 8 visits until 10/27/18)  Next MD Visit : PRN  Recert Date:  10/18/18      Therapy Diagnosis:  R Knee Pain         Visit Dx:    ICD-10-CM ICD-9-CM   1. Right knee pain, unspecified chronicity M25.561 719.46   2. Chronic pain of right knee M25.561 719.46    G89.29 338.29       There is no problem list on file for this patient.       Past Medical History:   Diagnosis Date   • Controlled type 2 diabetes mellitus without complication, without long-term current use of insulin (CMS/[facility])    • Hyperlipemia, mixed         No past surgical history on file.          PT Ortho     Row Name 10/04/18 1345       Precautions and Contraindications    Precautions DM 2, neuropathy  -KH       Posture/Observations    Posture/Observations Comments mildly antalgic  -KH    Row Name 10/01/18 1600       Subjective Comments    Subjective Comments patient states that she has transportation issues and needs to rely on other people to bring her here. Patient has enjoyed her  two aquatic therapy session which left her sore but she felt better overall. Patiuent complain of pain much greater in the right knee but both her knee are weal  -MS       Precautions and Contraindications    Precautions DM 2, neuropathy  -MS       Subjective Pain    Able to rate subjective pain? yes  -MS       Posture/Observations    Posture/Observations Comments slightly antalgic  -MS       Right Lower Ext    Rt Knee Extension/Flexion AROM 0-118  -MS       Left Lower Ext    Lt Knee Extension/Flexion AROM 0-125  -MS       MMT (Manual Muscle Testing)    General MMT Comments right hip flexion 4/5, left hip flexion -4/5; knee ext -4/5 B; knee flexion 4/5 B; ankle dorsiflexion 4+/5 B   -MS       Sensation    Light Touch Partial  "deficits in the LLE   LLE actualy worse than right, but right knee hurts more  -MS       RLE Quick Girth (cm)    Tib tuberosity 42 cm  -MS    Mid patella 43.5 cm  -MS    Distal thigh 49 cm  -MS       LLE Quick Girth (cm)    Tib tuberosity 42 cm  -MS    Mid patella 42 cm  -MS    Distal thigh 48 cm  -MS      User Key  (r) = Recorded By, (t) = Taken By, (c) = Cosigned By    Initials Name Provider Type    [place]iana Ghotra PTA Physical Therapy Assistant    MS EmyromuloJean Paul, PT Physical Therapist                            PT Assessment/Plan     Row Name 10/04/18 1343          PT Assessment    Assessment Comments good tolerance to increased aquatic exercises without increased pain noted by tana.   -        PT Plan    PT Frequency 2x/week  -     Predicted Duration of Therapy Intervention (Therapy Eval) 4 weeks  -     PT Plan Comments Continue with aquatics and possbile progression to land base therapy as pt allows;  -       User Key  (r) = Recorded By, (t) = Taken By, (c) = Cosigned By    Initials Name Provider Type    Indiana Ghotra PTA Physical Therapy Assistant                    Exercises     Row Name 10/04/18 3094             Subjective Comments    Subjective Comments Pt reports that she took pain meds this morning. So pain right now not to bad.    -         Subjective Pain    Able to rate subjective pain? yes  -      Pre-Treatment Pain Level 7  -KH      Post-Treatment Pain Level --   \"heavy\"  -         Aquatics    Aquatics performed? Yes  -         Exercise 1    Exercise Name 1 aqu: water walking 3 way  -KH      Time 1 5' each fwd.bkd.lat  -KH         Exercise 2    Exercise Name 2 aqu: CR/TR  -KH      Reps 2 20  -KH         Exercise 3    Exercise Name 3 aqu: Hip 3 way SLR  -KH      Sets 3 2  -KH      Reps 3 10  -KH      Additional Comments bilatera  -KH         Exercise 4    Exercise Name 4 aqu: mini squats  -KH      Reps 4 20  -KH         Exercise 5    Exercise Name 5 aqu: core stab shoulder " 3 way   -      Sets 5 2  -      Reps 5 10  -KH         Exercise 6    Exercise Name 6 aqu: trunk rotation  -      Sets 6 2  -      Reps 6 10  -KH         Exercise 7    Exercise Name 7 aqu: cool down walk   -      Time 7 3'  -        User Key  (r) = Recorded By, (t) = Taken By, (c) = Cosigned By    Initials Name Provider Type     Indiana Jiménez PTA Physical Therapy Assistant                               PT OP Goals     Row Name 10/04/18 1345          PT Short Term Goals    STG 1 STG deferred  -        Long Term Goals    LTG Date to Achieve 09/11/18  -     LTG 1 Pt will be independent w/ HEP  -     LTG 1 Progress Not Met  [facility]     LTG 2 Pt will report pain <4/10  -     LTG 2 Progress Not Met  [facility]     LTG 3 Pt will perform active ther ex >30 min w/o increased knee pain  -     LTG 3 Progress Not Met  -     LTG 4 Pt will have MMT of B LE 5/5 for all LE planes tested at [facility] 4 Progress Not Met  [facility]     LT 5 Pt will have R knee AROM w/in 4 deg of L knee AROM measured at [facility] 5 Progress Not Met  [facility]        Time Calculation    PT Goal Re-Cert Due Date 10/18/18  [facility]       User Key  (r) = Recorded By, (t) = Taken By, (c) = Cosigned By    Initials Name Provider Type    Indiana Ghotra PTA Physical Therapy Assistant                         Time Calculation:   Start Time: 1345  Stop Time: 1423  Time Calculation (min): 38 min  Total Timed Code Minutes- PT: 38 minute(s)  Therapy Suggested Charges     Code   Minutes Charges    None           Therapy Charges for Today     Code Description Service Date Service Provider Modifiers Qty    85840759072 HC PT THER PROC EA 15 MIN 10/4/2018 [place] Jiménez PTA GP 3                    Indiana Jiménez PTA  10/4/2018

## 2018-10-10 ENCOUNTER — HOSPITAL ENCOUNTER (OUTPATIENT)
Dept: PHYSICAL THERAPY | Facility: HOSPITAL | Age: 58
Setting detail: THERAPIES SERIES
Discharge: HOME | End: 2018-10-10

## 2018-10-10 DIAGNOSIS — G89.29 CHRONIC PAIN OF RIGHT KNEE: ICD-10-CM

## 2018-10-10 DIAGNOSIS — M25.561 CHRONIC PAIN OF RIGHT KNEE: ICD-10-CM

## 2018-10-10 DIAGNOSIS — M25.561 RIGHT KNEE PAIN, UNSPECIFIED CHRONICITY: Primary | ICD-10-CM

## 2018-10-10 PROCEDURE — 97113 AQUATIC THERAPY/EXERCISES: CPT

## 2018-10-10 NOTE — THERAPY TREATMENT NOTE
"    Outpatient Physical Therapy Ortho Treatment Note  [facility]   Earnestine Olson       Patient Name: Stephanie Bradshaw  : 1960  MRN: [id number]  Today's Date: 10/10/2018      Visit Date: 10/10/2018   Pt reports 7/10 pain pre treatment, 0/10 pain post treatment  Reports 0% of improvement.  Attended 6/7 visits.  Insurance available: 8 visits -10/27  Next MD appt: PRN  Recertification: 10/18/2018.    Visit Dx:    ICD-10-CM ICD-9-CM   1. Right knee pain, unspecified chronicity M25.561 719.46   2. Chronic pain of right knee M25.561 719.46    G89.29 338.29       There is no problem list on file for this patient.       Past Medical History:   Diagnosis Date   • Controlled type 2 diabetes mellitus without complication, without long-term current use of insulin (CMS/[facility])    • Hyperlipemia, mixed         No past surgical history on file.                          PT Assessment/Plan     Row Name 10/10/18 1300          PT Assessment    Assessment Comments (P)  Good tolerance of aqautics this visit. No pain post therex. Patient reports that pain relief usually lasts a couple of hours   -        PT Plan    PT Frequency (P)  2x/week  -     Predicted Duration of Therapy Intervention (Therapy Eval) (P)  4 weeks  -     PT Plan Comments (P)  Continue with aqauatic therex  -       User Key  (r) = Recorded By, (t) = Taken By, (c) = Cosigned By    Initials Name Provider Type     Earnestine Olson                     Exercises     Row Name 10/10/18 1300             Subjective Comments    Subjective Comments (P)  Patient states that her pain is \"not bad\" today  -         Subjective Pain    Able to rate subjective pain? (P)  yes  -      Pre-Treatment Pain Level (P)  7  -MC      Post-Treatment Pain Level (P)  0  -         Aquatics    Aquatics performed? (P)  Yes  -         Exercise 1    Exercise Name 1 (P)  aqu walk 3 way  -      Time 1 (P)  5 min ea  -         Exercise 2    Exercise " "Name 2 (P)  aqu CR /TR  -MC      Reps 2 (P)  20  -MC         Exercise 3    Exercise Name 3 (P)  aqu SLR 3-way  -MC      Sets 3 (P)  2  -MC      Reps 3 (P)  10  -MC      Additional Comments (P)  bilat  -MC         Exercise 4    Exercise Name 4 (P)  aqu MS  -MC      Reps 4 (P)  20  -MC         Exercise 5    Exercise Name 5 (P)  aqu core stab w/ shoulder 3 way  -MC      Reps 5 (P)  15  -MC         Exercise 6    Exercise Name 6 (P)  aqu float steps   -MC      Reps 6 (P)  15  -MC      Additional Comments (P)  bilat; yellow float  -MC         Exercise 7    Exercise Name 7 (P)  aqu DL balance   -MC      Sets 7 (P)  2  -MC      Time 7 (P)  30'  -MC         Exercise 8    Exercise Name 8 (P)  aqu tandem stance balance  -MC      Sets 8 (P)  2  -MC      Time 8 (P)  30\"  -        User Key  (r) = Recorded By, (t) = Taken By, (c) = Cosigned By    Initials Name Provider Type    Earnestine Olivera                                PT OP Goals     Row Name 10/10/18 1300          PT Short Term Goals    STG 1 (P)  STG deferred  -        Long Term Goals    LTG Date to Achieve (P)  09/11/18  -     LTG 1 (P)  Pt will be independent w/ HEP  -     LTG 1 Progress (P)  Not Met  -     LTG 2 (P)  Pt will report pain <4/10  -     LTG 2 Progress (P)  Not Met  -     LTG 3 (P)  Pt will perform active ther ex >30 min w/o increased knee pain  -     LTG 3 Progress (P)  Not Met  -     LTG 4 (P)  Pt will have MMT of B LE 5/5 for all LE planes tested at [facility]     LT 4 Progress (P)  Not Met  -     LTG 5 (P)  Pt will have R knee AROM w/in 4 deg of L knee AROM measured at [facility] 5 Progress (P)  Not Met  -        Time Calculation    PT Goal Re-Cert Due Date (P)  10/18/18  -       User Key  (r) = Recorded By, (t) = Taken By, (c) = Cosigned By    Initials Name Provider Type    Earnestine Olivera                          Time Calculation:   Start Time: (P) 1305  Stop Time: (P) 1344  Time " Calculation (min): (P) 39 min  Therapy Suggested Charges     Code   Minutes Charges    None           Therapy Charges for Today     Code Description Service Date Service Provider Modifiers Qty    65774536277 HC PT AQUATIC THERAPY EA 15 MIN 10/10/2018 Earnestine Olson GP 3                    Earnestine Olson  10/10/2018

## 2018-10-12 ENCOUNTER — HOSPITAL ENCOUNTER (OUTPATIENT)
Dept: PHYSICAL THERAPY | Facility: HOSPITAL | Age: 58
Setting detail: THERAPIES SERIES
Discharge: HOME | End: 2018-10-12

## 2018-10-12 DIAGNOSIS — M25.561 RIGHT KNEE PAIN, UNSPECIFIED CHRONICITY: Primary | ICD-10-CM

## 2018-10-12 DIAGNOSIS — G89.29 CHRONIC PAIN OF RIGHT KNEE: ICD-10-CM

## 2018-10-12 DIAGNOSIS — M25.561 CHRONIC PAIN OF RIGHT KNEE: ICD-10-CM

## 2018-10-12 PROCEDURE — 97113 AQUATIC THERAPY/EXERCISES: CPT

## 2018-10-12 NOTE — THERAPY TREATMENT NOTE
Outpatient Physical Therapy Ortho Treatment Note  [facility]   Earnestine Olson       Patient Name: Stephanie Bradshaw  : 1960  MRN: [id number]  Today's Date: 10/12/2018      Visit Date: 10/12/2018   Pt reports 7/10 pain pre treatment, 0/10 pain post treatment  Reports 0% of improvement.  Attended 7/8 visits.  Insurance available: 8 visits -10/27  Next MD appt: PRN  Recertification: 10/18/2018.    Visit Dx:    ICD-10-CM ICD-9-CM   1. Right knee pain, unspecified chronicity M25.561 719.46   2. Chronic pain of right knee M25.561 719.46    G89.29 338.29       There is no problem list on file for this patient.       Past Medical History:   Diagnosis Date   • Controlled type 2 diabetes mellitus without complication, without long-term current use of insulin (CMS/[facility])    • Hyperlipemia, mixed         No past surgical history on file.                          PT Assessment/Plan     Row Name 10/12/18 1300          PT Assessment    Assessment Comments (P)  Patient demonstrated difficulty with tandem balance.Good tolerance of aquatic therex with reported no pain after  -        PT Plan    PT Frequency (P)  2x/week  -     Predicted Duration of Therapy Intervention (Therapy Eval) (P)  4 weeks  -     PT Plan Comments (P)  Continue with POC for aquatics. Patient will call beginning of next week to schedule more appt once she knows her ride's work sched  -       User Key  (r) = Recorded By, (t) = Taken By, (c) = Cosigned By    Initials Name Provider Type    Earnestine Olivera                     Exercises     Row Name 10/12/18 1300             Subjective Comments    Subjective Comments (P)  Patient reports that that her pain is about the same.   -         Subjective Pain    Able to rate subjective pain? (P)  yes  -MC      Pre-Treatment Pain Level (P)  7  -MC      Post-Treatment Pain Level (P)  0  -         Aquatics    Aquatics performed? (P)  Yes  -MC         Exercise 1     "Exercise Name 1 (P)  aqu walk 3 way   -      Time 1 (P)  5 min ea   -         Exercise 2    Exercise Name 2 (P)  aqu CR/TR  -MC      Reps 2 (P)  20  -MC         Exercise 3    Exercise Name 3 (P)  aqu MS  -MC      Reps 3 (P)  20  -MC         Exercise 4    Exercise Name 4 (P)  aqu SLR 3 way  -      Sets 4 (P)  2  -MC      Reps 4 (P)  10  -MC         Exercise 5    Exercise Name 5 (P)  aqu HS curls--R only   -MC      Reps 5 (P)  20  -MC         Exercise 6    Exercise Name 6 (P)  aqu float steps   -MC      Reps 6 (P)  20  -MC      Additional Comments (P)  yellow float  -         Exercise 7    Exercise Name 7 (P)  aqu alt march in place   -      Reps 7 (P)  20  -MC         Exercise 8    Exercise Name 8 (P)  aqu tandem stance balance   -      Sets 8 (P)  6  -MC      Time 8 (P)  20\"  -        User Key  (r) = Recorded By, (t) = Taken By, (c) = Cosigned By    Initials Name Provider Type     Earnestine Olson                                PT OP Goals     Row Name 10/12/18 1300          PT Short Term Goals    STG 1 (P)  STG deferred  -        Long Term Goals    LTG Date to Achieve (P)  09/11/18  -     LTG 1 (P)  Pt will be independent w/ HEP  -     LTG 1 Progress (P)  Not Met  -     LTG 2 (P)  Pt will report pain <4/10  -     LTG 2 Progress (P)  Not Met  -     LTG 3 (P)  Pt will perform active ther ex >30 min w/o increased knee pain  -     LTG 3 Progress (P)  Not Met  -     LTG 4 (P)  Pt will have MMT of B LE 5/5 for all LE planes tested at [facility]     LT 4 Progress (P)  Not Met  -     LT 5 (P)  Pt will have R knee AROM w/in 4 deg of L knee AROM measured at [facility] 5 Progress (P)  Not Met  -        Time Calculation    PT Goal Re-Cert Due Date (P)  10/18/18  -       User Key  (r) = Recorded By, (t) = Taken By, (c) = Cosigned By    Initials Name Provider Type    Earnestine Olivera                          Time Calculation:   Start Time: (P) " 1308  Stop Time: (P) 1348  Time Calculation (min): (P) 40 min  Therapy Suggested Charges     Code   Minutes Charges    None           Therapy Charges for Today     Code Description Service Date Service Provider Modifiers Qty    75829165083 HC PT AQUATIC THERAPY EA 15 MIN 10/12/2018 Earnestine Olson GP 3                    Earnestine Olson  10/12/2018

## 2018-10-15 ENCOUNTER — APPOINTMENT (OUTPATIENT)
Dept: PHYSICAL THERAPY | Facility: HOSPITAL | Age: 58
End: 2018-10-15

## 2018-10-17 ENCOUNTER — APPOINTMENT (OUTPATIENT)
Dept: PHYSICAL THERAPY | Facility: HOSPITAL | Age: 58
End: 2018-10-17

## 2018-10-23 ENCOUNTER — HOSPITAL ENCOUNTER (OUTPATIENT)
Dept: PHYSICAL THERAPY | Facility: HOSPITAL | Age: 58
Setting detail: THERAPIES SERIES
Discharge: HOME | End: 2018-10-23

## 2018-10-23 DIAGNOSIS — M25.561 RIGHT KNEE PAIN, UNSPECIFIED CHRONICITY: Primary | ICD-10-CM

## 2018-10-23 DIAGNOSIS — G89.29 CHRONIC PAIN OF RIGHT KNEE: ICD-10-CM

## 2018-10-23 DIAGNOSIS — M25.561 CHRONIC PAIN OF RIGHT KNEE: ICD-10-CM

## 2018-10-23 PROCEDURE — 97110 THERAPEUTIC EXERCISES: CPT

## 2018-10-23 NOTE — THERAPY TREATMENT NOTE
"    Outpatient Physical Therapy Ortho Treatment Note  [facility]     Patient Name: Stephanie Bradshaw  : 1960  MRN: [id number]  Today's Date: 10/23/2018      Visit Date: 10/23/2018  Visits 811. Recert next visit. MD f/u PRN. ?% improvement.   Visit Dx:    ICD-10-CM ICD-9-CM   1. Right knee pain, unspecified chronicity M25.561 719.46   2. Chronic pain of right knee M25.561 719.46    G89.29 338.29       There is no problem list on file for this patient.       Past Medical History:   Diagnosis Date   • Controlled type 2 diabetes mellitus without complication, without long-term current use of insulin (CMS/[facility])    • Hyperlipemia, mixed         No past surgical history on file.                          PT Assessment/Plan     Row Name 10/23/18 0900          PT Assessment    Assessment Comments Fair key of today's treatment. Good demo of exercises dispite pain level. No goals met today.   -JW        PT Plan    PT Frequency 2x/week  -JW     Predicted Duration of Therapy Intervention (Therapy Eval) 4 weeks  -JW     PT Plan Comments Cont per POC  -JW       User Key  (r) = Recorded By, (t) = Taken By, (c) = Cosigned By    Initials Name Provider Type    Rudy Guzman PTA Physical Therapy Assistant                    Exercises     Row Name 10/23/18 0800             Subjective Comments    Subjective Comments Pt c/o continued knee pain, but she reports overall improvement.   -JW         Subjective Pain    Able to rate subjective pain? yes  -JW      Pre-Treatment Pain Level 8  -JW      Post-Treatment Pain Level --   \" still sore \"  -JW         Aquatics    Aquatics performed? Yes  -JW         Exercise 1    Exercise Name 1 AQ: amb fwd/rev  -JW      Time 1 3'  -JW         Exercise 2    Exercise Name 2 AQ: sidestep  -JW      Time 2 3'  -JW         Exercise 3    Exercise Name 3 AQ: marching  -JW      Time 3 3'  -JW         Exercise 4    Exercise Name 4 AQ: HS S on step  -JW      Reps 4 2  -JW      Time 4 30\"  -JW   " "      Exercise 5    Exercise Name 5 AQ: step ups  -      Reps 5 8 ( c/o knee pain )  -JW         Exercise 6    Exercise Name 6 AQ: CR  -JW      Reps 6 20  -JW         Exercise 7    Exercise Name 7 AQ: MS  -JW      Reps 7 20  -JW         Exercise 8    Exercise Name 8 AQ: HS curls  -JW      Reps 8 20  -JW         Exercise 9    Exercise Name 9 AQ: hip 3-way  -JW      Reps 9 10x ea  -JW      Additional Comments green/white float  -JW         Exercise 10    Exercise Name 10 AQ: tandem stance  -JW      Reps 10 2  -JW      Time 10 20\" ea  -JW        User Key  (r) = Recorded By, (t) = Taken By, (c) = Cosigned By    Initials Name Provider Type    Rudy Guzman PTA Physical Therapy Assistant                               PT OP Goals     Row Name 10/23/18 0800          PT Short Term Goals    STG 1 STG deferred  -        Long Term Goals    LTG Date to Achieve 09/11/18  -     LTG 1 Pt will be independent w/ HEP  -     LTG 1 Progress Not Met  -     LTG 2 Pt will report pain <4/10  -     LT 2 Progress Not Met  -     LTG 3 Pt will perform active ther ex >30 min w/o increased knee pain  -     LT 3 Progress Not Met  -     LTG 4 Pt will have MMT of B LE 5/5 for all LE planes tested at [facility] 4 Progress Not Met  -     LT 5 Pt will have R knee AROM w/in 4 deg of L knee AROM measured at [facility] 5 Progress Not Met  -       User Key  (r) = Recorded By, (t) = Taken By, (c) = Cosigned By    Initials Name Provider Type    Rudy Guzman PTA Physical Therapy Assistant                         Time Calculation:   Start Time: 0835  Stop Time: 0915  Time Calculation (min): 40 min  Total Timed Code Minutes- PT: 40 minute(s)  Therapy Suggested Charges     Code   Minutes Charges    None           Therapy Charges for Today     Code Description Service Date Service Provider Modifiers Qty    24939485362 HC PT THER PROC EA 15 MIN 10/23/2018 Rudy Johnson PTA GP 3                    Rudy SORTO. " Alex, TIMMY  10/23/2018

## 2018-10-25 ENCOUNTER — HOSPITAL ENCOUNTER (OUTPATIENT)
Dept: PHYSICAL THERAPY | Facility: HOSPITAL | Age: 58
Setting detail: THERAPIES SERIES
Discharge: HOME | End: 2018-10-25

## 2018-10-25 DIAGNOSIS — G89.29 CHRONIC PAIN OF RIGHT KNEE: ICD-10-CM

## 2018-10-25 DIAGNOSIS — M25.561 RIGHT KNEE PAIN, UNSPECIFIED CHRONICITY: Primary | ICD-10-CM

## 2018-10-25 DIAGNOSIS — M25.561 CHRONIC PAIN OF RIGHT KNEE: ICD-10-CM

## 2018-10-25 PROCEDURE — 97110 THERAPEUTIC EXERCISES: CPT

## 2018-10-25 NOTE — THERAPY PROGRESS REPORT/RE-CERT
Outpatient Physical Therapy Ortho Progress Note  [facility]     Patient Name: Stephanie Bradshaw  : 1960  MRN: [id number]  Today's Date: 10/25/2018      Visit Date: 10/25/2018    There is no problem list on file for this patient.       Past Medical History:   Diagnosis Date   • Controlled type 2 diabetes mellitus without complication, without long-term current use of insulin (CMS/[facility])    • Hyperlipemia, mixed         No past surgical history on file.    Visit Dx:     ICD-10-CM ICD-9-CM   1. Right knee pain, unspecified chronicity M25.561 719.46   2. Chronic pain of right knee M25.561 719.46    G89.29 338.29                 PT Ortho     Row Name 10/25/18 0800       Posture/Observations    Posture/Observations Comments mildly antalgic gait  -MS       Right Lower Ext    Rt Knee Extension/Flexion AROM 0-117  -MS       Left Lower Ext    Lt Knee Extension/Flexion AROM 0-123  -MS       MMT (Manual Muscle Testing)    General MMT Comments right hip flexion in sitting -4/5, L -4/5; right knee flexion ext 4/5, left knee flexion ext 4+/5, ankles 5/5 B.  -MS       Sensation    Light Touch Partial deficits in the RLE;Partial deficits in the LLE   patient reports numbness and tingling that is B and variable  -MS       RLE Quick Girth (cm)    Tib tuberosity 43 cm  -MS    Mid patella 44 cm  -MS    Distal thigh 48 cm  -MS       LLE Quick Girth (cm)    Tib tuberosity 41 cm  -MS    Mid patella 41 cm  -MS    Distal thigh 47 cm  -MS      User Key  (r) = Recorded By, (t) = Taken By, (c) = Cosigned By    Initials Name Provider Type    Jean Paul Joshi, PT Physical Therapist                      Therapy Education  Education Details: 4 exercise HEP given to patient  Given: HEP  Program: New  How Provided: Verbal, Demonstration, Written  Provided to: Patient  Level of Understanding: Verbalized           PT OP Goals     Row Name 10/25/18 0800          PT Short Term Goals    STG 1 STG deferred  -MS        Long Term Goals     LTG Date to Achieve 09/11/18  -MS     LTG 1 Pt will be independent w/ HEP  -MS     LTG 1 Progress Ongoing  -MS     LTG 2 Pt will report pain <4/10  -MS     LTG 2 Progress Not Met  -MS     LTG 3 Pt will perform active ther ex >30 min w/o increased knee pain  -MS     LTG 3 Progress Met  -MS     LTG 4 Pt will have MMT of B LE 5/5 for all LE planes tested at [facility]  -MS     LTG 4 Progress Ongoing  -MS     LTG 5 Pt will have R knee AROM w/in 4 deg of L knee AROM measured at [facility]  -MS     LTG 5 Progress Ongoing  -MS       User Key  (r) = Recorded By, (t) = Taken By, (c) = Cosigned By    Initials Name Provider Type    Jean Paul Joshi, PT Physical Therapist                PT Assessment/Plan     Row Name 10/25/18 0839          PT Assessment    Assessment Comments Almaz presents with minor improvements in knee pain and LE strength after 5+ pool sessions which she tolerated well. Patient's activity tolerance is increasing and she is ready to begin land based therex program supported by HEP.   -MS     Please refer to paper survey for additional self-reported information Yes  -MS     Patient/caregiver participated in establishment of treatment plan and goals Yes  -MS     Patient would benefit from skilled therapy intervention Yes  -MS        PT Plan    PT Frequency 2x/week  -MS     Predicted Duration of Therapy Intervention (Therapy Eval) 6 weeks  -MS     PT Plan Comments Patient should alternate between land-based and aquatic therapy sessions. Continue to expand land-based therex program and reinforce HEP. Use modalities to control pain and inflammation as needed.  -MS       User Key  (r) = Recorded By, (t) = Taken By, (c) = Cosigned By    Initials Name Provider Type    Jean Paul Joshi, PT Physical Therapist                  Exercises     Row Name 10/25/18 0800             Subjective Comments    Subjective Comments Patient reports that she likes the aqautic therapy session but agress with plan to alternative  "between pool and land based therapy for subsequent visits. Patient however reports very little improvement at least in terms of pain  -MS         Subjective Pain    Able to rate subjective pain? yes  -MS      Pre-Treatment Pain Level 8  -MS         Aquatics    Aquatics performed? No  -MS         Exercise 1    Exercise Name 1 PPT  -MS      Reps 1 3  -MS      Time 1 30\"  -MS      Additional Comments HEP  -MS         Exercise 2    Exercise Name 2 ADD ball squeeze  -MS      Sets 2 2  -MS      Reps 2 10  -MS      Time 2 5\" hold  -MS      Additional Comments HEP  -MS         Exercise 3    Exercise Name 3 supine clams  -MS      Sets 3 2  -MS      Reps 3 10  -MS      Time 3 5\" hold  -MS      Additional Comments HEP  -MS         Exercise 4    Exercise Name 4 Calf towel stretch  -MS      Reps 4 3  -MS      Time 4 30\"  -MS      Additional Comments HEP  -MS         Exercise 5    Exercise Name 5 supine HS stretch  -MS      Additional Comments not tolerated due to strap/ CTS; switch to sitting without strap  -MS        User Key  (r) = Recorded By, (t) = Taken By, (c) = Cosigned By    Initials Name Provider Type    Jean Paul Joshi, PT Physical Therapist                        Outcome Measure Options: Lower Extremity Functional Scale (LEFS)  Lower Extremity Functional Index  Any of your usual work, housework or school activities: Quite a bit of difficulty  Your usual hobbies, recreational or sporting activities: Moderate difficulty  Getting into or out of the bath: Quite a bit of difficulty  Walking between rooms: Moderate difficulty  Putting on your shoes or socks: Extreme difficulty or unable to perform activity  Squatting: Extreme difficulty or unable to perform activity  Lifting an object, like a bag of groceries from the floor: Extreme difficulty or unable to perform activity  Performing light activities around your home: Quite a bit of difficulty  Performing heavy activities around your home: Extreme difficulty or " unable to perform activity  Getting into or out of a car: Quite a bit of difficulty  Walking 2 blocks: Extreme difficulty or unable to perform activity  Walking a mile: Extreme difficulty or unable to perform activity  Going up or down 10 stairs (about 1 flight of stairs): Quite a bit of difficulty  Standing for 1 hour: Extreme difficulty or unable to perform activity  Sitting for 1 hour: Extreme difficulty or unable to perform activity  Running on even ground: Extreme difficulty or unable to perform activity  Running on uneven ground: Extreme difficulty or unable to perform activity  Making sharp turns while running fast: Extreme difficulty or unable to perform activity  Hopping: Extreme difficulty or unable to perform activity  Rolling over in bed: Quite a bit of difficulty  Total: 10      Time Calculation:     Therapy Suggested Charges     Code   Minutes Charges    None             Start Time: 0800  Stop Time: 0845  Time Calculation (min): 45 min     Therapy Charges for Today     Code Description Service Date Service Provider Modifiers Qty    89222243229 HC PT THER PROC EA 15 MIN 10/25/2018 Jean Paul Pierre, PT GP 3          PT G-Codes  Outcome Measure Options: Lower Extremity Functional Scale (LEFS)  Total: 10         Jean Paul Pierre, PT  10/25/2018

## 2019-01-10 ENCOUNTER — TRANSCRIBE ORDERS (OUTPATIENT)
Dept: MRI IMAGING | Facility: HOSPITAL | Age: 59
End: 2019-01-10

## 2019-01-10 DIAGNOSIS — M54.2 CERVICALGIA: Primary | ICD-10-CM

## 2021-06-09 DIAGNOSIS — R60.9 PERIPHERAL EDEMA: Primary | ICD-10-CM

## 2021-06-09 DIAGNOSIS — I51.7 CARDIOMEGALY: ICD-10-CM

## 2021-06-09 NOTE — PROGRESS NOTES
LE Edema. I suspect the etiology is {Blank multiple:71377} . There {ARE / ARE NO:91710} signs/symptoms of HF on examination today. There {is/is not:45246} a history of PE/DVT.   Compression stockings  LE venous Duplex  TTE, BNP, TSH, CBC, CMP

## 2021-07-23 ENCOUNTER — OFFICE VISIT (OUTPATIENT)
Dept: CARDIAC SURGERY | Facility: CLINIC | Age: 61
End: 2021-07-23

## 2021-07-23 VITALS
DIASTOLIC BLOOD PRESSURE: 90 MMHG | SYSTOLIC BLOOD PRESSURE: 134 MMHG | BODY MASS INDEX: 48.97 KG/M2 | TEMPERATURE: 98 F | WEIGHT: 276.4 LBS | OXYGEN SATURATION: 96 % | HEIGHT: 63 IN | HEART RATE: 83 BPM

## 2021-07-23 DIAGNOSIS — I89.0 LYMPHEDEMA: ICD-10-CM

## 2021-07-23 DIAGNOSIS — R60.0 LEG EDEMA: ICD-10-CM

## 2021-07-23 DIAGNOSIS — I87.2 VENOUS STASIS DERMATITIS OF BOTH LOWER EXTREMITIES: ICD-10-CM

## 2021-07-23 DIAGNOSIS — E66.01 MORBID OBESITY WITH BMI OF 45.0-49.9, ADULT (HCC): ICD-10-CM

## 2021-07-23 DIAGNOSIS — E78.2 MIXED HYPERLIPIDEMIA: Primary | ICD-10-CM

## 2021-07-23 PROBLEM — R73.03 BORDERLINE DIABETIC: Status: ACTIVE | Noted: 2021-07-23

## 2021-07-23 PROCEDURE — 99204 OFFICE O/P NEW MOD 45 MIN: CPT | Performed by: NURSE PRACTITIONER

## 2021-07-23 NOTE — PROGRESS NOTES
CVTS Office Progress Note     7/23/2021    Stephanie Bradshaw  1960    Chief Complaint:    Chief Complaint   Patient presents with   • Leg Pain   • Leg Swelling       HPI:      PCP:  Jenny Isaacs MD  Cardiology:  Mavis Avelar APRSAMRA    61 y.o. female with Hyperlipidemia(stable, increased risk cardiovascular events), Obesity(uncontrolled, increased risk cardiovascular events), Smoker(uncontrolled, increased risk cardiovascular events) and COPD(stable, increased risk pulmonary complications) borderline diabetes.  Prior surgical history includes tubal ligation  smokes 1 PPD.  Establishes with vascular surgery for evaluation of peripheral edema, symptoms began approx last year, some weight gain during winter, decrease in activity, edema has not improved with conservative measures at home.  Returns today with bilateral venous.  No other associated signs, symptoms or modifying factors.    7/2021 Bilateral venous: Negative for DVT or GSV reflux.  She has deep system reflux bilaterally at the saphenofemoral junction    The following portions of the patient's history were reviewed and updated as appropriate: allergies, current medications, past family history, past medical history, past social history, past surgical history and problem list.  Recent images independently reviewed.  Available laboratory values reviewed.    PMH:  Past Medical History:   Diagnosis Date   • Controlled type 2 diabetes mellitus without complication, without long-term current use of insulin (CMS/[facility])    • Hyperlipemia, mixed      History reviewed. No pertinent surgical history.  Family History   Problem Relation Age of Onset   • COPD Other    • Heart disease Other    • Hypertension Other    • Diabetes Other      Social History     Tobacco Use   • Smoking status: Current Every Day Smoker     Packs/day: 1.00     Types: Cigarettes   • Smokeless tobacco: Never Used   Substance Use Topics   • Alcohol use: Yes   • Drug use: Not on file  "      ALLERGIES:  Allergies   Allergen Reactions   • Penicillins      Patient states she is allergic to all \"cillins\".         MEDICATIONS:    Current Outpatient Medications:   •  cetirizine (zyrTEC) 10 MG tablet, , Disp: , Rfl:   •  cyclobenzaprine (FLEXERIL) 10 MG tablet, TK 1 T PO TID, Disp: , Rfl: 1  •  fluticasone (FLONASE) 50 MCG/ACT nasal spray, INSTILL 2 PUFFS INTO THE NOSTRILS D UTD, Disp: , Rfl: 2  •  Furosemide (LASIX PO), Take  by mouth Daily., Disp: , Rfl:   •  gabapentin (NEURONTIN) 300 MG capsule, TK 1 C PO TID PRN, Disp: , Rfl: 1  •  gabapentin (NEURONTIN) 400 MG capsule, Take 400 mg by mouth 3 (Three) Times a Day., Disp: , Rfl:   •  VENTOLIN  (90 BASE) MCG/ACT inhaler, , Disp: , Rfl:       Review of Systems   Constitutional: Negative for chills, decreased appetite, fever and weight loss.   HENT: Negative for congestion, nosebleeds and sore throat.    Eyes: Negative for blurred vision, visual disturbance and visual halos.   Cardiovascular: Positive for leg swelling. Negative for chest pain and dyspnea on exertion.   Respiratory: Negative for cough, shortness of breath, sputum production and wheezing.    Endocrine: Negative for cold intolerance and polyuria.   Hematologic/Lymphatic: Negative for bleeding problem. Does not bruise/bleed easily.   Skin: Negative for flushing, nail changes and unusual hair distribution.   Musculoskeletal: Positive for arthritis, back pain and joint pain.   Gastrointestinal: Negative for bloating, abdominal pain, hematemesis, melena, nausea and vomiting.   Genitourinary: Negative for flank pain and hematuria.   Neurological: Negative for brief paralysis, difficulty with concentration, focal weakness, light-headedness, loss of balance, numbness, paresthesias and weakness.   Psychiatric/Behavioral: Negative for altered mental status, depression, substance abuse and suicidal ideas.   Allergic/Immunologic: Negative for hives and persistent infections.         Vitals:    " "07/23/21 1400   BP: 134/90   BP Location: Left arm   Pulse: 83   Temp: 98 °F (36.7 °C)   TempSrc: Infrared   SpO2: 96%   Weight: 125 kg (276 lb 6.4 oz)   Height: 158.8 cm (62.5\")     Physical Exam  Constitutional:       Appearance: She is obese.   HENT:      Head: Normocephalic.      Nose: Nose normal.      Mouth/Throat:      Mouth: Mucous membranes are moist.   Eyes:      Pupils: Pupils are equal, round, and reactive to light.   Cardiovascular:      Rate and Rhythm: Normal rate.      Pulses: Normal pulses.   Pulmonary:      Effort: Pulmonary effort is normal.   Abdominal:      General: Abdomen is flat.      Palpations: Abdomen is soft.   Musculoskeletal:         General: Normal range of motion.      Cervical back: Normal range of motion.      Right lower leg: Edema (pitting) present.      Left lower leg: Edema (pittting) present.   Skin:     General: Skin is warm and dry.      Capillary Refill: Capillary refill takes 2 to 3 seconds.      Comments: Venous stasis rash R shin,   Evidence of thickening, hyperpigmentation   Neurological:      General: No focal deficit present.      Mental Status: She is alert and oriented to person, place, and time.   Psychiatric:         Mood and Affect: Mood normal.         Assessment & Plan     Independent Review of Studies  7/2021 Bilateral venous: Negative for DVT or GSV reflux.  She has deep system reflux bilaterally at the saphenofemoral junction    1. Mixed hyperlipidemia  Lipid-lowering therapy has been proven beneficial in patients with cardio-vascular disease. Current guidelines recommend statin treatment for all patients with PAD,CAD and carotid stenosis. Statins are beneficial in preventing cardiovascular events, increasing functional capacity and lower the risk of adverse limb loss in PAD. Statins decrease the progression of plaque formation and may improve peripheral vessel lining, and aid in reversing atherosclerosis.    2. Morbid obesity with BMI of 45.0-49.9, adult " (CMS/[facility])  We have discussed the benefits of weight loss as it relates to long term morbidity and disease prevention.  Interventions discussed included self-monitoring of caloric intake, increasing physical activity/exercise, goal setting, stimulus control, consultation with dietitian, and non-food rewards.      3. Leg edema      4. Venous stasis dermatitis of both lower extremities  Patient placed in bilateral calamine compression wrap to control edema.  Followed by graduated compression with co-band.  Will plan to change justin boots in two days, patient instructed to remove justin-boots prior to visit and shower.  No open lesions or ulcerations noted.      Remove justin boots on Monday, RX sent for JOBST stockings with patient.  Transition to stockings on Monday, return in 3 months for reevaluation, if not improved can consider pneumatic therapy    5. Lymphedema  As above    Detailed discussion regarding risks, benefits, and treatment plan. Images independently reviewed. Patient understands, agrees, and wishes to proceed with plan.       This document has been electronically signed by Dorian Cisse, AGACNP-BC @  On July 23, 2021 15:55 CDT      EMR Dragon/Transcription disclaimer:   Part of this note may be an electronic transcription/translation of spoken language to printed text using the Dragon Dictation System.

## 2021-07-23 NOTE — PATIENT INSTRUCTIONS
Recommend continued use of compression stockings 20-30mmHg daily, may remove at night.  Advised elevation of legs while at rest.  Encouraged daily exercise.      Return in three months, if no improvement with compression stockings may consider pneumatic leg pump therapy.

## 2021-07-26 ENCOUNTER — LAB (OUTPATIENT)
Dept: LAB | Facility: HOSPITAL | Age: 61
End: 2021-07-26

## 2021-07-26 ENCOUNTER — OFFICE VISIT (OUTPATIENT)
Dept: CARDIOLOGY | Facility: CLINIC | Age: 61
End: 2021-07-26

## 2021-07-26 VITALS
HEIGHT: 63 IN | TEMPERATURE: 97.8 F | BODY MASS INDEX: 48.69 KG/M2 | OXYGEN SATURATION: 96 % | SYSTOLIC BLOOD PRESSURE: 142 MMHG | DIASTOLIC BLOOD PRESSURE: 86 MMHG | WEIGHT: 274.8 LBS | HEART RATE: 80 BPM

## 2021-07-26 DIAGNOSIS — R60.9 PERIPHERAL EDEMA: ICD-10-CM

## 2021-07-26 DIAGNOSIS — R03.0 ELEVATED BLOOD PRESSURE READING WITHOUT DIAGNOSIS OF HYPERTENSION: ICD-10-CM

## 2021-07-26 DIAGNOSIS — I51.7 CARDIOMEGALY: Primary | ICD-10-CM

## 2021-07-26 DIAGNOSIS — R06.09 DYSPNEA ON EXERTION: ICD-10-CM

## 2021-07-26 DIAGNOSIS — I51.7 CARDIOMEGALY: ICD-10-CM

## 2021-07-26 LAB
ALBUMIN SERPL-MCNC: 4.1 G/DL (ref 3.5–5.2)
ALBUMIN/GLOB SERPL: 1.5 G/DL
ALP SERPL-CCNC: 79 U/L (ref 39–117)
ALT SERPL W P-5'-P-CCNC: 26 U/L (ref 1–33)
ANION GAP SERPL CALCULATED.3IONS-SCNC: 11.3 MMOL/L (ref 5–15)
AST SERPL-CCNC: 25 U/L (ref 1–32)
BILIRUB SERPL-MCNC: 0.2 MG/DL (ref 0–1.2)
BUN SERPL-MCNC: 6 MG/DL (ref 6–20)
BUN/CREAT SERPL: 10.3 (ref 7–25)
CALCIUM SPEC-SCNC: 9.2 MG/DL (ref 8.6–10.5)
CHLORIDE SERPL-SCNC: 102 MMOL/L (ref 98–107)
CHOLEST SERPL-MCNC: 243 MG/DL (ref 0–200)
CO2 SERPL-SCNC: 26.7 MMOL/L (ref 22–29)
CREAT SERPL-MCNC: 0.58 MG/DL (ref 0.57–1)
DEPRECATED RDW RBC AUTO: 43 FL (ref 37–54)
EOSINOPHIL # BLD MANUAL: 0.08 10*3/MM3 (ref 0–0.4)
EOSINOPHIL NFR BLD MANUAL: 1 % (ref 0.3–6.2)
ERYTHROCYTE [DISTWIDTH] IN BLOOD BY AUTOMATED COUNT: 12.9 % (ref 12.3–15.4)
GFR SERPL CREATININE-BSD FRML MDRD: 106 ML/MIN/1.73
GLOBULIN UR ELPH-MCNC: 2.7 GM/DL
GLUCOSE SERPL-MCNC: 109 MG/DL (ref 65–99)
HCT VFR BLD AUTO: 46 % (ref 34–46.6)
HDLC SERPL-MCNC: 34 MG/DL (ref 40–60)
HGB BLD-MCNC: 16 G/DL (ref 12–15.9)
LDLC SERPL CALC-MCNC: 176 MG/DL (ref 0–100)
LDLC/HDLC SERPL: 5.12 {RATIO}
LYMPHOCYTES # BLD MANUAL: 2.17 10*3/MM3 (ref 0.7–3.1)
LYMPHOCYTES NFR BLD MANUAL: 28 % (ref 19.6–45.3)
LYMPHOCYTES NFR BLD MANUAL: 9 % (ref 5–12)
MCH RBC QN AUTO: 32.1 PG (ref 26.6–33)
MCHC RBC AUTO-ENTMCNC: 34.8 G/DL (ref 31.5–35.7)
MCV RBC AUTO: 92.4 FL (ref 79–97)
MONOCYTES # BLD AUTO: 0.7 10*3/MM3 (ref 0.1–0.9)
NEUTROPHILS # BLD AUTO: 4.81 10*3/MM3 (ref 1.7–7)
NEUTROPHILS NFR BLD MANUAL: 62 % (ref 42.7–76)
NT-PROBNP SERPL-MCNC: 57.8 PG/ML (ref 0–900)
PLAT MORPH BLD: NORMAL
PMV BLD AUTO: 13.3 FL (ref 6–12)
POTASSIUM SERPL-SCNC: 4.4 MMOL/L (ref 3.5–5.2)
PROT SERPL-MCNC: 6.8 G/DL (ref 6–8.5)
QT INTERVAL: 422 MS
QTC INTERVAL: 490 MS
RBC # BLD AUTO: 4.98 10*6/MM3 (ref 3.77–5.28)
RBC MORPH BLD: NORMAL
SODIUM SERPL-SCNC: 140 MMOL/L (ref 136–145)
TRIGL SERPL-MCNC: 175 MG/DL (ref 0–150)
TSH SERPL DL<=0.05 MIU/L-ACNC: 1.29 UIU/ML (ref 0.27–4.2)
VLDLC SERPL-MCNC: 33 MG/DL (ref 5–40)
WBC # BLD AUTO: 7.75 10*3/MM3 (ref 3.4–10.8)
WBC MORPH BLD: NORMAL

## 2021-07-26 PROCEDURE — 80061 LIPID PANEL: CPT

## 2021-07-26 PROCEDURE — 80050 GENERAL HEALTH PANEL: CPT

## 2021-07-26 PROCEDURE — 93000 ELECTROCARDIOGRAM COMPLETE: CPT | Performed by: INTERNAL MEDICINE

## 2021-07-26 PROCEDURE — 83880 ASSAY OF NATRIURETIC PEPTIDE: CPT

## 2021-07-26 PROCEDURE — 36415 COLL VENOUS BLD VENIPUNCTURE: CPT

## 2021-07-26 PROCEDURE — 99214 OFFICE O/P EST MOD 30 MIN: CPT | Performed by: NURSE PRACTITIONER

## 2021-07-26 PROCEDURE — 85007 BL SMEAR W/DIFF WBC COUNT: CPT

## 2021-07-26 NOTE — PROGRESS NOTES
Results (Chief Complaint )      History of Present Illness    Stephanie Bradshaw is a 61-year-old  female with past medical history of hyperlipidemia, type 2 diabetes, obesity, current tobacco use, COPD, peripheral edema/venous stasis. She recently seen ROSANNE Barrett with CT vascular surgery for leg pain and swelling.  Bilateral venous negative for DVT or GSV reflux.  She has deep system reflux bilaterally at the saphenofemoral junction. She was started on compression therapy with Unna boots.    Today patient presents after chest x-ray finding of mild cardiomegaly.  Patient reports she has been having peripheral edema and weeping of her lower extremities, chronic cough however is worse than her baseline, dyspnea on exertion x several weeks and her primary care was concerned this could be related to her heart.  Chest x-ray was clear no evidence of fluid however did reveal mild cardiomegaly.  Patient denies any previous cardiac history of previous cardiac work-up.  She denies chest pain, palpitations, PND, orthopnea.  Her leg edema is improving with diuretics of 20 mg Lasix daily and compression therapy.    EKG today in office showing NSR, RBBB, QRS duration 126 ms. No previous EKG's for comparison.     Past Medical History:   Diagnosis Date   • Controlled type 2 diabetes mellitus without complication, without long-term current use of insulin (CMS/[facility])    • Hyperlipemia, mixed      History reviewed. No pertinent surgical history.  Social History     Socioeconomic History   • Marital status: Single     Spouse name: Not on file   • Number of children: Not on file   • Years of education: Not on file   • Highest education level: Not on file   Tobacco Use   • Smoking status: Current Every Day Smoker     Packs/day: 1.00     Types: Cigarettes   • Smokeless tobacco: Never Used   Substance and Sexual Activity   • Alcohol use: Yes   • Sexual activity: Defer     Family History   Problem Relation Age of Onset   • COPD  "Other    • Heart disease Other    • Hypertension Other    • Diabetes Other        ALLERGIES:  Allergies   Allergen Reactions   • Penicillins      Patient states she is allergic to all \"cillins\".         Review of Systems   Constitutional: Negative for chills, fever, malaise/fatigue and weight gain.   HENT: Negative for nosebleeds and tinnitus.    Eyes: Negative for blurred vision and double vision.   Cardiovascular: Positive for dyspnea on exertion and leg swelling. Negative for chest pain, irregular heartbeat, palpitations and syncope.   Respiratory: Positive for cough and shortness of breath. Negative for sleep disturbances due to breathing and snoring.    Endocrine: Negative for polydipsia, polyphagia and polyuria.   Hematologic/Lymphatic: Negative for bleeding problem. Does not bruise/bleed easily.   Skin: Negative for color change and suspicious lesions.   Musculoskeletal: Negative for falls and myalgias.   Gastrointestinal: Negative for bloating, heartburn and hematochezia.   Genitourinary: Negative for dysuria and hematuria.   Neurological: Negative for dizziness, headaches, seizures, vertigo and weakness.   Psychiatric/Behavioral: Negative for altered mental status and depression. The patient does not have insomnia and is not nervous/anxious.    Allergic/Immunologic: Negative for environmental allergies and persistent infections.       Current Outpatient Medications   Medication Sig Dispense Refill   • cetirizine (zyrTEC) 10 MG tablet      • cyclobenzaprine (FLEXERIL) 10 MG tablet TK 1 T PO TID  1   • fluticasone (FLONASE) 50 MCG/ACT nasal spray INSTILL 2 PUFFS INTO THE NOSTRILS D UTD  2   • Furosemide (LASIX PO) Take  by mouth Daily.     • gabapentin (NEURONTIN) 300 MG capsule TK 1 C PO TID PRN  1   • gabapentin (NEURONTIN) 400 MG capsule Take 400 mg by mouth 3 (Three) Times a Day.     • VENTOLIN  (90 BASE) MCG/ACT inhaler        No current facility-administered medications for this visit. " "      OBJECTIVE:    Physical Exam:   Vitals reviewed.   Constitutional:       General: Not in acute distress.     Appearance: Normal appearance. Well-developed. Not toxic-appearing or diaphoretic.   Eyes:      General: Lids are normal.      Conjunctiva/sclera: Conjunctivae normal.   HENT:      Head: Normocephalic and atraumatic.      Right Ear: External ear normal.      Left Ear: External ear normal.   Neck:      Vascular: No JVD.   Pulmonary:      Effort: Pulmonary effort is normal. No respiratory distress.      Breath sounds: Decreased breath sounds present. No wheezing. No rales.   Chest:      Chest wall: Not tender to palpatation.   Cardiovascular:      5th ICS   Normal rate. Regular rhythm. Normal S1 with normal intensity. s1 greater than s2   Normal S2 with normal intensity.      Murmurs: There is no murmur.      No gallop. No S3 and S4 gallop. No click. No rub.   Pulses:     Intact distal pulses. No decreased pulses.   Edema:     Peripheral edema present.     Pretibial: bilateral 1+ edema of the pretibial area.     Ankle: bilateral 1+ edema of the ankle.     Feet: bilateral 1+ edema of the feet.  Abdominal:      General: Bowel sounds are normal. There is no distension.      Palpations: Abdomen is soft.      Tenderness: There is no abdominal tenderness.   Musculoskeletal:      Cervical back: Normal range of motion and neck supple. Skin:     General: Skin is warm and dry.      Coloration: Skin is not pale.      Findings: No erythema or rash.        Neurological:      Mental Status: Alert and oriented to person, place, and time.      Gait: Gait normal.   Psychiatric:         Behavior: Behavior normal.         Thought Content: Thought content normal.         Judgment: Judgment normal.       Vitals:    07/26/21 1308   BP: 142/86   BP Location: Left arm   Patient Position: Sitting   Cuff Size: Adult   Pulse: 80   Temp: 97.8 °F (36.6 °C)   SpO2: 96%   Weight: 125 kg (274 lb 12.8 oz)   Height: 158.8 cm (62.5\") "       DATA REVIEWED:       No radiology results for the last 30 days.  CXR:     CT:     Labs: BMP, CBC, LIPID, TSH  No results found for: GLUCOSE, CALCIUM, NA, K, CO2, CL, BUN, CREATININE, EGFRIFAFRI, EGFRIFNONA, BCR, ANIONGAP  No results found for: WBC, HGB, HCT, MCV, PLT  No results found for: CHOL  No results found for: TRIG  No results found for: HDL  No components found for: LDLCALC  No results found for: LDL  No results found for: HDLLDLRATIO  No components found for: CHOLHDL  No results found for: TSH  No results found for: PROBNP  EKG:         TTE:     Venous Duplex BLE:  Appears negative for DVT of the lower extremities  · Appears negative for reflux of the GSV bilaterally  · Duplicated LT GSV  · Limited views of calf veins due to obesity and body habitus  · RT SFJ REFLUX 1.97sec  · LT SFJ REFLUX 1.57 sec           The following portions of the patient's history were reviewed and updated as appropriate: allergies, current medications, past family history, past medical history, past social history, past surgical history and problem list.  Old records reviewed and pertinent information is included in the above objective data.     ASSESSMENT/PLAN:       Diagnosis Plan   1. Cardiomegaly  ECG 12 Lead    Adult Transthoracic Echo Complete w/ Color, Spectral and Contrast if Necessary Per Protocol    proBNP   2. Dyspnea on exertion  Adult Transthoracic Echo Complete w/ Color, Spectral and Contrast if Necessary Per Protocol    proBNP   3. Elevated blood pressure reading without diagnosis of hypertension         #1 and #2. Cardiomegaly noted on CXR with associated symptoms of dyspnea on exertion, shortness of breath and leg edema.   EKG today in office showing RBBB    TTE to evaluate for structural HD, there are some symptoms and signs of heart failure upon exam today.   ProBNP today  Lab results and CXR reviewed from PCP office.    #3. Elevated b/p today in office without diagnosis of HTN: 142/86 today. Will continue  to monitor.       Follow up: 1 month              This document has been electronically signed by ROSANNE Swanson on July 26, 2021 13:54 CDT

## 2021-07-27 ENCOUNTER — TELEPHONE (OUTPATIENT)
Dept: CARDIOLOGY | Facility: CLINIC | Age: 61
End: 2021-07-27

## 2021-07-27 DIAGNOSIS — E78.2 MIXED HYPERLIPIDEMIA: ICD-10-CM

## 2021-07-27 DIAGNOSIS — E78.2 MIXED HYPERLIPIDEMIA: Primary | ICD-10-CM

## 2021-07-27 RX ORDER — ATORVASTATIN CALCIUM 20 MG/1
20 TABLET, FILM COATED ORAL NIGHTLY
Qty: 90 TABLET | Refills: 1 | Status: SHIPPED | OUTPATIENT
Start: 2021-07-27 | End: 2022-08-26

## 2021-07-27 RX ORDER — ATORVASTATIN CALCIUM 20 MG/1
20 TABLET, FILM COATED ORAL NIGHTLY
Qty: 90 TABLET | Refills: 1 | Status: SHIPPED | OUTPATIENT
Start: 2021-07-27 | End: 2021-07-27 | Stop reason: SDUPTHER

## 2021-07-27 NOTE — TELEPHONE ENCOUNTER
----- Message from ROSANNE Swanson sent at 7/27/2021  8:21 AM CDT -----  Please call patient with lab results: All labs normal except cholesterol. Total chol 243, high is above 200. Triglycerides are 175, needs to be below 150, LDL is the bad cholestrol needs to be below 100, yours is elevated at 176. Recommend starting cholesterol pill at bedtime.     Will start Atorvastatin 20mg. Recommend she decrease fatty intake of foods and eat more fresh veg./fruit, lean meats when possible.

## 2021-07-29 ENCOUNTER — PATIENT ROUNDING (BHMG ONLY) (OUTPATIENT)
Dept: CARDIOLOGY | Facility: CLINIC | Age: 61
End: 2021-07-29

## 2021-07-29 NOTE — PROGRESS NOTES
July 29, 2021    Hello, may I speak with Stephanie Bradshaw?    My name is Nino De Anda, clinical coordinator    I am  with [facility] CARDIOLOGY [facility] CARDIOLOGY  [facility]   [place] [address] [phone].    Before we get started may I verify your date of birth? 1960    I am calling to officially welcome you to our practice and ask about your recent visit. Is this a good time to talk? yes    Tell me about your visit with us. What things went well?  everything went good       We're always looking for ways to make our patients' experiences even better. Do you have recommendations on ways we may improve?  no    Overall were you satisfied with your first visit to our practice? yes       I appreciate you taking the time to speak with me today. Is there anything else I can do for you? no      Thank you, and have a great day.

## 2021-10-04 ENCOUNTER — OFFICE VISIT (OUTPATIENT)
Dept: CARDIOLOGY | Facility: CLINIC | Age: 61
End: 2021-10-04

## 2021-10-04 VITALS
WEIGHT: 281.6 LBS | SYSTOLIC BLOOD PRESSURE: 120 MMHG | DIASTOLIC BLOOD PRESSURE: 82 MMHG | BODY MASS INDEX: 49.89 KG/M2 | HEIGHT: 63 IN | OXYGEN SATURATION: 98 % | HEART RATE: 102 BPM

## 2021-10-04 DIAGNOSIS — I77.810 ASCENDING AORTA DILATION (HCC): ICD-10-CM

## 2021-10-04 DIAGNOSIS — Q20.8 RIGHT VENTRICULAR CARDIAC ABNORMALITY: ICD-10-CM

## 2021-10-04 DIAGNOSIS — R06.09 DYSPNEA ON EXERTION: ICD-10-CM

## 2021-10-04 DIAGNOSIS — I51.7 CARDIOMEGALY: Primary | ICD-10-CM

## 2021-10-04 DIAGNOSIS — G47.10 HYPERSOMNIA: ICD-10-CM

## 2021-10-04 PROCEDURE — 99214 OFFICE O/P EST MOD 30 MIN: CPT | Performed by: NURSE PRACTITIONER

## 2021-10-04 NOTE — PROGRESS NOTES
Cardiomegaly (Chief Complaint)      History of Present Illness    Stephanie rBadshaw is a 61-year-old  female with past medical history of hyperlipidemia, type 2 diabetes, obesity, current tobacco use, COPD, peripheral edema/venous stasis. She recently seen ROSANNE Barrett with CT vascular surgery for leg pain and swelling.  Bilateral venous negative for DVT or GSV reflux.  She has deep system reflux bilaterally at the saphenofemoral junction. She was started on compression therapy.    Patient presented to cardiology after chest x-ray finding of mild cardiomegaly.  Patient reports she has been having peripheral edema and weeping of her lower extremities, chronic cough however is worse than her baseline, dyspnea on exertion x several weeks and her primary care was concerned this could be related to her heart.  Chest x-ray was clear no evidence of fluid however did reveal mild cardiomegaly.  Patient denies any previous cardiac history of previous cardiac work-up.  She denies chest pain, palpitations, PND, orthopnea.  Her leg edema is improving with diuretics of 20 mg Lasix daily and compression therapy.    EKG today in office showing NSR, RBBB, QRS duration 126 ms. No previous EKG's for comparison.     Since last visit patient completed labs which were fairly unremarkable. Normal proBNP.  Echocardiogram showing normal biventricular function, grade 1 DD, right ventricular cavity is mildly dilated, trace MR, trace TR, there is mild dilation of the proximal ascending aorta at 3.9cm. Patient reports hx of asthma and COPD. She is on ventolin inhaler. Likely has SNEHA which is undiagnosed. She does have hypersomnia and snoring.         Past Medical History:   Diagnosis Date   • Controlled type 2 diabetes mellitus without complication, without long-term current use of insulin (HCC)    • Hyperlipemia, mixed      History reviewed. No pertinent surgical history.  Social History     Socioeconomic History   • Marital  "status: Single     Spouse name: Not on file   • Number of children: Not on file   • Years of education: Not on file   • Highest education level: Not on file   Tobacco Use   • Smoking status: Current Every Day Smoker     Packs/day: 1.00     Types: Cigarettes   • Smokeless tobacco: Never Used   Substance and Sexual Activity   • Alcohol use: Yes     Comment: once in awhile   • Sexual activity: Defer     Family History   Problem Relation Age of Onset   • COPD Other    • Heart disease Other    • Hypertension Other    • Diabetes Other        ALLERGIES:  Allergies   Allergen Reactions   • Penicillins      Patient states she is allergic to all \"cillins\".         Review of Systems   Constitutional: Negative for chills, fever, malaise/fatigue and weight gain.   HENT: Negative for nosebleeds and tinnitus.    Eyes: Negative for blurred vision and double vision.   Cardiovascular: Positive for dyspnea on exertion. Negative for chest pain, irregular heartbeat, leg swelling, palpitations and syncope.   Respiratory: Positive for shortness of breath. Negative for cough, sleep disturbances due to breathing and snoring.    Endocrine: Negative for polydipsia, polyphagia and polyuria.   Hematologic/Lymphatic: Negative for bleeding problem. Does not bruise/bleed easily.   Skin: Negative for color change and suspicious lesions.   Musculoskeletal: Negative for falls and myalgias.   Gastrointestinal: Negative for bloating, heartburn and hematochezia.   Genitourinary: Negative for dysuria and hematuria.   Neurological: Negative for dizziness, headaches, seizures, vertigo and weakness.   Psychiatric/Behavioral: Negative for altered mental status and depression. The patient does not have insomnia and is not nervous/anxious.    Allergic/Immunologic: Negative for environmental allergies and persistent infections.       Current Outpatient Medications   Medication Sig Dispense Refill   • atorvastatin (LIPITOR) 20 MG tablet Take 1 tablet by mouth " Every Night. 90 tablet 1   • cetirizine (zyrTEC) 10 MG tablet      • cyclobenzaprine (FLEXERIL) 10 MG tablet TK 1 T PO TID  1   • fluticasone (FLONASE) 50 MCG/ACT nasal spray INSTILL 2 PUFFS INTO THE NOSTRILS D UTD  2   • Furosemide (LASIX PO) Take  by mouth Daily.     • gabapentin (NEURONTIN) 300 MG capsule TK 1 C PO TID PRN  1   • gabapentin (NEURONTIN) 400 MG capsule Take 400 mg by mouth 3 (Three) Times a Day.     • VENTOLIN  (90 BASE) MCG/ACT inhaler        No current facility-administered medications for this visit.       OBJECTIVE:    Physical Exam:   Vitals reviewed.   Constitutional:       General: Not in acute distress.     Appearance: Normal appearance. Well-developed. Not toxic-appearing or diaphoretic.   Eyes:      General: Lids are normal.      Conjunctiva/sclera: Conjunctivae normal.   HENT:      Head: Normocephalic and atraumatic.      Right Ear: External ear normal.      Left Ear: External ear normal.   Neck:      Vascular: No JVD.   Pulmonary:      Effort: Pulmonary effort is normal. No respiratory distress.      Breath sounds: Decreased breath sounds present. No wheezing. No rales.   Chest:      Chest wall: Not tender to palpatation.   Cardiovascular:      5th ICS   Normal rate. Regular rhythm. Normal S1 with normal intensity. s1 greater than s2   Normal S2 with normal intensity.      Murmurs: There is no murmur.      No gallop. No S3 and S4 gallop. No click. No rub.   Pulses:     Intact distal pulses. No decreased pulses.   Edema:     Peripheral edema absent.   Abdominal:      General: Bowel sounds are normal. There is no distension.      Palpations: Abdomen is soft.      Tenderness: There is no abdominal tenderness.   Musculoskeletal:      Cervical back: Normal range of motion and neck supple. Skin:     General: Skin is warm and dry.      Coloration: Skin is not pale.      Findings: No erythema or rash.        Neurological:      Mental Status: Alert and oriented to person, place, and time.  "     Gait: Gait normal.   Psychiatric:         Behavior: Behavior normal.         Thought Content: Thought content normal.         Judgment: Judgment normal.       Vitals:    10/04/21 1450   BP: 120/82   BP Location: Left arm   Patient Position: Sitting   Cuff Size: Adult   Pulse: 102   SpO2: 98%   Weight: 128 kg (281 lb 9.6 oz)   Height: 158.8 cm (62.52\")       DATA REVIEWED:   Results for orders placed in visit on 09/23/21    Adult Transthoracic Echo Complete w/ Color, Spectral and Contrast if Necessary Per Protocol    Interpretation Summary  · Estimated left ventricular EF = 57% Left ventricular ejection fraction appears to be 56 - 60%. Left ventricular systolic function is normal.  · Left ventricular diastolic function is consistent with (grade I) impaired relaxation.  · The right ventricular cavity is mildly dilated.  · Mild dilation of the proximal aorta is present.      No radiology results for the last 30 days.  CXR:     CT:     Labs: BMP, CBC, LIPID, TSH  Lab Results   Component Value Date    GLUCOSE 109 (H) 07/26/2021    CALCIUM 9.2 07/26/2021     07/26/2021    K 4.4 07/26/2021    CO2 26.7 07/26/2021     07/26/2021    BUN 6 07/26/2021    CREATININE 0.58 07/26/2021    EGFRIFNONA 106 07/26/2021    BCR 10.3 07/26/2021    ANIONGAP 11.3 07/26/2021     Lab Results   Component Value Date    WBC 7.75 07/26/2021    HGB 16.0 (H) 07/26/2021    HCT 46.0 07/26/2021    MCV 92.4 07/26/2021     Lab Results   Component Value Date    CHOL 243 (H) 07/26/2021     Lab Results   Component Value Date    TRIG 175 (H) 07/26/2021     Lab Results   Component Value Date    HDL 34 (L) 07/26/2021     No components found for: LDLCALC  Lab Results   Component Value Date     (H) 07/26/2021     No results found for: HDLLDLRATIO  No components found for: CHOLHDL  Lab Results   Component Value Date    TSH 1.290 07/26/2021     Lab Results   Component Value Date    PROBNP 57.8 07/26/2021     EKG:         TTE:     Venous " Duplex BLE:  Appears negative for DVT of the lower extremities  · Appears negative for reflux of the GSV bilaterally  · Duplicated LT GSV  · Limited views of calf veins due to obesity and body habitus  · RT SFJ REFLUX 1.97sec  · LT SFJ REFLUX 1.57 sec           The following portions of the patient's history were reviewed and updated as appropriate: allergies, current medications, past family history, past medical history, past social history, past surgical history and problem list.  Old records reviewed and pertinent information is included in the above objective data.     ASSESSMENT/PLAN:       Diagnosis Plan   1. Cardiomegaly  Adult Transthoracic Echo Complete w/ Color, Spectral and Contrast if Necessary Per Protocol   2. Dyspnea on exertion     3. Right ventricular cardiac abnormality  Pulmonary Function Test    Ambulatory Referral to Sleep Medicine    Adult Transthoracic Echo Complete w/ Color, Spectral and Contrast if Necessary Per Protocol   4. Hypersomnia  Ambulatory Referral to Sleep Medicine   5. Ascending aorta dilation (HCC)         #1 and #2. Cardiomegaly noted on CXR with associated symptoms of dyspnea on exertion, shortness of breath and leg edema. EKG today in office showing RBBB .  Dyspnea likely multifactorial in nature. Echocardiogram showing normal biventricular function, grade 1 DD, right ventricular cavity is mildly dilated, trace MR, trace TR, there is mild dilation of the proximal acensending aorta at 3.9cm.  -proBNP normal. There is no definitive evidence of CHF on labs, echo or exam.     3. Right ventricular abnormality: mild RVC dilation. RVEF is normal. No evidence of PAH. Have recommended patient for PFT and SNEHA evaluation.     4. Ascending aorta dilation: mild dilation of the proximal ascending aorta at 3.9cm. Patient blood pressure in wnl and currently not on antihypertensive therapy. She will continue lipitor. Have recommended a repeat 2D TTE in 1 year. Then likely will arrange CT  chest if needed.    Follow up: 1 year with echo prior.    Stephanie Bradshaw  reports that she has been smoking cigarettes. She has been smoking about 1.00 pack per day. She has never used smokeless tobacco. I have educated her on the risk of diseases from using tobacco products such as cancer, COPD and heart disease.     I advised her to quit and she is not willing to quit.    I spent 3  minutes counseling the patient.                     This document has been electronically signed by ROSANNE Swanson on October 6, 2021 13:12 CDT

## 2021-10-06 PROBLEM — I77.810 ASCENDING AORTA DILATION (HCC): Status: ACTIVE | Noted: 2021-10-06

## 2021-10-06 PROBLEM — G47.10 HYPERSOMNIA: Status: ACTIVE | Noted: 2021-10-06

## 2021-10-06 PROBLEM — Q20.8 RIGHT VENTRICULAR CARDIAC ABNORMALITY: Status: ACTIVE | Noted: 2021-10-06

## 2021-10-07 ENCOUNTER — OFFICE VISIT (OUTPATIENT)
Dept: SLEEP MEDICINE | Facility: HOSPITAL | Age: 61
End: 2021-10-07

## 2021-10-07 VITALS
HEART RATE: 90 BPM | BODY MASS INDEX: 48.85 KG/M2 | WEIGHT: 275.7 LBS | HEIGHT: 63 IN | SYSTOLIC BLOOD PRESSURE: 154 MMHG | DIASTOLIC BLOOD PRESSURE: 87 MMHG | OXYGEN SATURATION: 96 %

## 2021-10-07 DIAGNOSIS — E66.01 MORBID OBESITY WITH BMI OF 45.0-49.9, ADULT (HCC): ICD-10-CM

## 2021-10-07 DIAGNOSIS — I51.7 CARDIOMEGALY: ICD-10-CM

## 2021-10-07 DIAGNOSIS — G47.30 HYPERSOMNIA WITH SLEEP APNEA: Primary | ICD-10-CM

## 2021-10-07 DIAGNOSIS — G47.10 HYPERSOMNIA WITH SLEEP APNEA: Primary | ICD-10-CM

## 2021-10-07 DIAGNOSIS — R06.09 DYSPNEA ON EXERTION: ICD-10-CM

## 2021-10-07 PROCEDURE — 99203 OFFICE O/P NEW LOW 30 MIN: CPT | Performed by: INTERNAL MEDICINE

## 2021-10-07 NOTE — PROGRESS NOTES
"      [facility]  [address] [place] [address] Drive  [place], [place] [address]  Phone: [phone]  Fax: [phone]      New Patient Sleep Medicine Consultation  Sleep Visit Only    Encounter Date: 10/7/2021         Patient's PCP: Danielle Vaughn APRN  Referring provider: Mavis Avelar APRN  Reason for consultation chief complaint: excessive daytime sleepiness    CHIEF COMPLAINT:  :   Chief Complaint   Patient presents with   • Fatigue     neck----- 17\"   • Unable To Fall Asleep   • Unable To Stay Asleep   • Daytime Sleepiness   • Morning Headaches   • Leg/body Jerks During Sleep   • Dry Mouth   • Snoring   • Frequent Awakenings   • Nocturia        Encounter Date: 10/7/2021           HISTORY OF PRESENT ILLNESS:  Referred by Provider for Sleep Evaluation for SNEHA  Patient was referred for a sleep apnea evaluation, patient with multiple past medical problems with chronic daytime sleepiness for about 15 years, poor to sleep and wake habits, with history of chronic muscle pain and arthritis on Flexeril and gabapentin, shortness of breath on Ventolin HFA in a patient with a heavy smoking history, hyperlipidemia, cardiomegaly with mild right-sided enlargement on diuretics seen recently by cardiologist.    Stephanie Bradshaw is a 61 y.o. female whose bedtime is ~ 10-12 am. She  falls asleep after 15-30 minutes, and is up 2-3 times per night. She wakes up ~ 7am. She endorses 7 hours of sleep. She drinks 1 cups of coffee, No teas, and 2 sodas per day. She drinks No alcoholic beverages per week.    Stephanie Bradshaw admits to snoring, unrestful sleep, gasping during sleep, excessive daytime sleepiness, morning headaches, frequent unexplained arousals, Disturbed or restless sleep, memory loss, choking duing sleep, Up to the bathroom at night, night sweats, difficulty falling asleep, difficulty staying asleep and takes medicine to help go to sleep. She denies cataplexy, sleep paralysis, or hypnagogic " "hallucinations. She does not take sedatives or hypnotics. She has No sleepiness with driving, but She does as a passanger. She naps when She can 120-180 m  She has gained about 24 pounds last 6 months, she does not exercise.    Family history SNEHA: unknown    Brief Social History:  She continues to smoke. Smoking history: smoked 1 ppds for 32 years  Nicotine vaping: No  No POT smoker: No    Occupation:  Housekeeping    PAST MEDICAL AND SURGICAL HISTORIES:    Past Medical History:   Diagnosis Date   • Controlled type 2 diabetes mellitus without complication, without long-term current use of insulin (HCC)    • Hyperlipemia, mixed      No past surgical history on file.    Allergies   Allergen Reactions   • Penicillins      Patient states she is allergic to all \"cillins\".         Family History   Problem Relation Age of Onset   • COPD Other    • Heart disease Other    • Hypertension Other    • Diabetes Other      Social History     Socioeconomic History   • Marital status: Single     Spouse name: Not on file   • Number of children: Not on file   • Years of education: Not on file   • Highest education level: Not on file   Tobacco Use   • Smoking status: Current Every Day Smoker     Packs/day: 1.00     Types: Cigarettes   • Smokeless tobacco: Never Used   Substance and Sexual Activity   • Alcohol use: Yes     Comment: once in awhile   • Sexual activity: Defer       MEDICATIONS:    Current Outpatient Medications:   •  atorvastatin (LIPITOR) 20 MG tablet, Take 1 tablet by mouth Every Night., Disp: 90 tablet, Rfl: 1  •  cetirizine (zyrTEC) 10 MG tablet, , Disp: , Rfl:   •  cyclobenzaprine (FLEXERIL) 10 MG tablet, TK 1 T PO TID, Disp: , Rfl: 1  •  fluticasone (FLONASE) 50 MCG/ACT nasal spray, INSTILL 2 PUFFS INTO THE NOSTRILS D UTD, Disp: , Rfl: 2  •  Furosemide (LASIX PO), Take  by mouth Daily., Disp: , Rfl:   •  gabapentin (NEURONTIN) 400 MG capsule, Take 400 mg by mouth 3 (Three) Times a Day., Disp: , Rfl:   •  VENTOLIN HFA " "108 (90 BASE) MCG/ACT inhaler, , Disp: , Rfl:       Review of Systems   Constitutional: Positive for fatigue.   HENT: Positive for congestion.    Eyes: Negative for pain.   Respiratory: Positive for cough and shortness of breath. Negative for wheezing.    Cardiovascular: Positive for leg swelling.   Gastrointestinal: Negative for blood in stool and nausea.   Endocrine: Negative for polydipsia.   Musculoskeletal: Positive for arthralgias, myalgias, neck pain and neck stiffness.   Skin: Negative for rash.   Allergic/Immunologic: Negative for food allergies.   Neurological: Positive for headaches. Negative for seizures.   Hematological: Does not bruise/bleed easily.   Psychiatric/Behavioral: Positive for sleep disturbance.         OBJECTIVE:    Vitals:    10/07/21 1400   BP: 154/87   Pulse: 90   SpO2: 96%         10/07/21  1400   Weight: 125 kg (275 lb 11.2 oz)     Body mass index is 49.59 kg/m². Patient's Body mass index is 49.59 kg/m².   Neck circumference: 17\"  ESS: 20    PHYSICAL EXAM:          General: Alert. Cooperative. No acute distress.             Head:  Normocephalic. Symmetrical. Atraumatic.              Eyes: Sclera clear. No icterus.              Nose: No septal deviation. No drainage. No nasal Polyps, No Skin Ulcers             Neck:  Trachea midline, No Stridor, No Supraclavicular Adenopathy          Throat: No oral lesions. No thrush. Moist mucous membranes.    Tongue is normal    Dentition is fair       Pharynx: Posterior pharyngeal pillars are narrow    Mallampati score of IV (only hard palate visible)    Pharynx is nonerythematous                     Lungs:  Distant breath sounds, mild increased AP diameter, no tenderness, no wheezing, no crackles            Heart:  Regular rhythm and normal rate. Normal S1 and S2. No murmur.     Abdomen:  Soft, non-tender and non-distended. Normal bowel sounds, obese  Extremities:  Mild pitting edema edema, no Clubbing, No Cyanosis, upper extremity pulses " palpable              Skin: No jaundice or rash, no nasal bridge ulceration           Neuro: Moves all 4 extremities  Psychiatric: Normal mood and affect.      IMPRESSION AND RECOMMENDATIONS:  61-year-old female recently seen by cardiologist, patient with longstanding history of daytime sleepiness with Horn[place] Sleepiness Scale of 20, neck circumference 17, BMI 49.59 with short neck and Mallampati 4, found to have elevated blood pressure without hypertension diagnosis.    1. Hypersomnia with suspected sleep apnea, hypersomnia could be related to undiagnosed sleep disordered breathing, poor to sleep hygiene, medication issues such as gabapentin, insufficient sleep syndrome, obesity, lack of exercise among others.  - Normal TSH  - We will request diagnostic PSG  - Polysomnography 4 or More Parameters; Future    Sleep apnea implications such as including but not limited to: Uncontrolled hypertension, sudden death, massive heart attack, stroke risk, sleep deprived motor vehicle accident, even death has been discussed and patient verbalized understanding.  Printed material regarding sleep better has been provided to the patient  Patient has been advised not to drive when feeling sleepy    2. Morbid obesity with BMI of 45.0-49.9, adult (HCC)  - Polysomnography 4 or More Parameters; Future    3. Dyspnea on exertion multifactorial could be related to lack of exercise, obstructive with disease such as COPD in a patient with a heavy smoking history, diastolic heart failure among others.  Patient has been referred for pulmonary function test as per PCP    4. Cardiomegaly  Followed by cardiologist  Recent echocardiogram with ejection fraction between 56 and 60%    5.  Chronic pain syndrome, on Neurontin diet as per patient information she is just taking as needed and it makes her sleeping    6.  Smoker 1 pack/day for 32 years actively smoking with elevated hematocrit  Strongly advised to quit smoking  Suspected SNEHA COPD  overlapping syndrome  Suspect nocturnal oxygen desaturation    Contraindications to home sleep test: Moderate or severe COPD, Morbid obesity with BMI greater than 50 and Suspected obesity hypoventilation    FOLLOW UP:  Return in about 4 weeks (around 11/4/2021) for Follow up after study.    Thank you for letting us to participate in the care of your patient  Ravi Guillermo MD, FACP, FCCP  Diplomate in Pulmonary & Sleep Medicine    This document has been electronically signed by Ravi Guillermo MD on October 7, 2021 15:26 CDT      Instructions provided as documented in the After Visit Summary.  The patient indicated understanding of the diagnosis and agreed with the plan of care    EMR Dragon/Transcription disclaimer:   Some of this note may be an electronic transcription/translation of spoken language to printed text. The electronic translation of spoken language may permit erroneous, or at times, nonsensical words or phrases to be inadvertently transcribed; Although I have reviewed the note for such errors, some may still exist.      CC: Danielle Vaughn APRN Kelley, Michelle L, APRN

## 2021-10-11 ENCOUNTER — PROCEDURE VISIT (OUTPATIENT)
Dept: PULMONOLOGY | Facility: CLINIC | Age: 61
End: 2021-10-11

## 2021-10-11 DIAGNOSIS — Q20.8 RIGHT VENTRICULAR CARDIAC ABNORMALITY: ICD-10-CM

## 2021-10-11 PROCEDURE — 94060 EVALUATION OF WHEEZING: CPT | Performed by: INTERNAL MEDICINE

## 2021-10-11 PROCEDURE — 94729 DIFFUSING CAPACITY: CPT | Performed by: INTERNAL MEDICINE

## 2021-10-11 PROCEDURE — 94727 GAS DIL/WSHOT DETER LNG VOL: CPT | Performed by: INTERNAL MEDICINE

## 2021-10-11 NOTE — PROGRESS NOTES
Full PFT with bronchodilator performed.     5-6 second exhalation on pre spirometry.   5-6 second exhalation on post spirometry.     4 puffs Albuterol given, tolerated well.     Fair effort and cooperation from patient; pt kept opening her mouth during spirometry.     Ordered by ROSANNE Chew; read by Dr. Leeann Omalley

## 2021-11-05 ENCOUNTER — TELEPHONE (OUTPATIENT)
Dept: CARDIOLOGY | Facility: CLINIC | Age: 61
End: 2021-11-05

## 2021-11-05 DIAGNOSIS — J41.1 MUCOPURULENT CHRONIC BRONCHITIS (HCC): Primary | ICD-10-CM

## 2021-11-05 DIAGNOSIS — J44.9 COPD, MODERATE (HCC): ICD-10-CM

## 2021-11-05 NOTE — TELEPHONE ENCOUNTER
----- Message from ROSANNE Swanson sent at 11/5/2021  1:32 PM CDT -----  Please call patient result PFT results. PFT showing moderate restriction and mildly reduced lung volumes consistent with Stage II COPD. I have called in a maintenance inhaler which she will do 2 puffs every day. Looks like she already has albuterol inhaler PRN. Smoking cessation is encouraged as well.       ----- Message -----  From: Constanza Thompson MA  Sent: 11/5/2021   1:16 PM CDT  To: ROSANNE Swanson      ----- Message -----  From: Kymberly Cruz  Sent: 11/5/2021   1:11 PM CDT  To: Constanza Thompson MA    Pt has asked if someone could call her with her pft results. [phone]

## 2021-11-08 ENCOUNTER — OFFICE VISIT (OUTPATIENT)
Dept: CARDIAC SURGERY | Facility: CLINIC | Age: 61
End: 2021-11-08

## 2021-11-08 VITALS
OXYGEN SATURATION: 98 % | WEIGHT: 280 LBS | DIASTOLIC BLOOD PRESSURE: 68 MMHG | SYSTOLIC BLOOD PRESSURE: 132 MMHG | HEART RATE: 94 BPM | BODY MASS INDEX: 51.53 KG/M2 | HEIGHT: 62 IN

## 2021-11-08 DIAGNOSIS — E66.01 MORBID OBESITY WITH BMI OF 45.0-49.9, ADULT (HCC): Primary | ICD-10-CM

## 2021-11-08 DIAGNOSIS — R60.0 LEG EDEMA: ICD-10-CM

## 2021-11-08 DIAGNOSIS — I87.2 VENOUS STASIS DERMATITIS OF BOTH LOWER EXTREMITIES: ICD-10-CM

## 2021-11-08 PROCEDURE — 99213 OFFICE O/P EST LOW 20 MIN: CPT | Performed by: NURSE PRACTITIONER

## 2021-11-08 RX ORDER — PENTOXIFYLLINE 400 MG/1
400 TABLET, EXTENDED RELEASE ORAL 2 TIMES DAILY
Qty: 60 TABLET | Refills: 5 | Status: SHIPPED | OUTPATIENT
Start: 2021-11-08 | End: 2022-08-29 | Stop reason: SDUPTHER

## 2021-11-08 NOTE — PATIENT INSTRUCTIONS
Recommend continued use of compression stockings 20-30mmHg daily, may remove at night.  Advised elevation of legs while at rest.  Encouraged daily exercise.  If symptoms progress may consider venous reflux study.    RX for farrow wrap given if unable to use compression stockings.      Return in one year or sooner if symptoms do not improve.

## 2021-11-08 NOTE — PROGRESS NOTES
CVTS Office Progress Note     11/8/2021    Stephanie Bradshaw  1960    Chief Complaint:    Chief Complaint   Patient presents with   • Leg Swelling       HPI:      PCP:  Danielle Vaughn APRN  Cardiology:  Mavis LAY    61 y.o. female with Hyperlipidemia(stable, increased risk cardiovascular events), Obesity(uncontrolled, increased risk cardiovascular events), Smoker(uncontrolled, increased risk cardiovascular events) and COPD(stable, increased risk pulmonary complications) borderline diabetes.  Prior surgical history includes tubal ligation  smokes 1 PPD.  Establishes with vascular surgery for evaluation of peripheral edema, symptoms began approx last year, some weight gain during winter, decrease in activity, edema has not improved with conservative measures at home.  Returns today in follow up.  No other associated signs, symptoms or modifying factors.    7/2021 Bilateral venous: Negative for DVT or GSV reflux.  She has deep system reflux bilaterally at the saphenofemoral junction    The following portions of the patient's history were reviewed and updated as appropriate: allergies, current medications, past family history, past medical history, past social history, past surgical history and problem list.  Recent images independently reviewed.  Available laboratory values reviewed.    PMH:  Past Medical History:   Diagnosis Date   • Controlled type 2 diabetes mellitus without complication, without long-term current use of insulin (HCC)    • Hyperlipemia, mixed      History reviewed. No pertinent surgical history.  Family History   Problem Relation Age of Onset   • COPD Other    • Heart disease Other    • Hypertension Other    • Diabetes Other      Social History     Tobacco Use   • Smoking status: Current Every Day Smoker     Packs/day: 1.00     Types: Cigarettes   • Smokeless tobacco: Never Used   Substance Use Topics   • Alcohol use: Yes     Comment: once in awhile   • Drug use: Not on file  "      ALLERGIES:  Allergies   Allergen Reactions   • Penicillins      Patient states she is allergic to all \"cillins\".         MEDICATIONS:    Current Outpatient Medications:   •  atorvastatin (LIPITOR) 20 MG tablet, Take 1 tablet by mouth Every Night., Disp: 90 tablet, Rfl: 1  •  cetirizine (zyrTEC) 10 MG tablet, , Disp: , Rfl:   •  cyclobenzaprine (FLEXERIL) 10 MG tablet, TK 1 T PO TID, Disp: , Rfl: 1  •  fluticasone (FLONASE) 50 MCG/ACT nasal spray, INSTILL 2 PUFFS INTO THE NOSTRILS D UTD, Disp: , Rfl: 2  •  Furosemide (LASIX PO), Take  by mouth Daily., Disp: , Rfl:   •  gabapentin (NEURONTIN) 400 MG capsule, Take 400 mg by mouth 3 (Three) Times a Day., Disp: , Rfl:   •  tiotropium bromide-olodaterol (STIOLTO RESPIMAT) 2.5-2.5 MCG/ACT aerosol solution inhaler, Inhale 2 puffs Daily., Disp: 4 g, Rfl: 11  •  VENTOLIN  (90 BASE) MCG/ACT inhaler, , Disp: , Rfl:   •  pentoxifylline (TRENtal) 400 MG CR tablet, Take 1 tablet by mouth 2 (Two) Times a Day., Disp: 60 tablet, Rfl: 5      Review of Systems   Constitutional: Negative for chills, decreased appetite, fever and weight loss.   HENT: Negative for congestion, nosebleeds and sore throat.    Eyes: Negative for blurred vision, visual disturbance and visual halos.   Cardiovascular: Positive for leg swelling. Negative for chest pain and dyspnea on exertion.   Respiratory: Negative for cough, shortness of breath, sputum production and wheezing.    Endocrine: Negative for cold intolerance and polyuria (improved).   Hematologic/Lymphatic: Negative for bleeding problem. Does not bruise/bleed easily.   Skin: Negative for flushing, nail changes and unusual hair distribution.   Musculoskeletal: Positive for arthritis, back pain and joint pain.   Gastrointestinal: Negative for bloating, abdominal pain, hematemesis, melena, nausea and vomiting.   Genitourinary: Negative for flank pain and hematuria.   Neurological: Negative for brief paralysis, difficulty with " "concentration, focal weakness, light-headedness, loss of balance, numbness, paresthesias and weakness.   Psychiatric/Behavioral: Negative for altered mental status, depression, substance abuse and suicidal ideas.   Allergic/Immunologic: Negative for hives and persistent infections.         Vitals:    11/08/21 1310   BP: 132/68   BP Location: Left arm   Patient Position: Sitting   Cuff Size: Adult   Pulse: 94   SpO2: 98%   Weight: 127 kg (280 lb)   Height: 157.5 cm (62\")     Physical Exam  Constitutional:       Appearance: She is obese.   HENT:      Head: Normocephalic.      Nose: Nose normal.      Mouth/Throat:      Mouth: Mucous membranes are moist.   Eyes:      Pupils: Pupils are equal, round, and reactive to light.   Cardiovascular:      Rate and Rhythm: Normal rate.      Pulses: Normal pulses.   Pulmonary:      Effort: Pulmonary effort is normal.   Abdominal:      General: Abdomen is flat.      Palpations: Abdomen is soft.   Musculoskeletal:         General: Normal range of motion.      Cervical back: Normal range of motion.      Right lower leg: Edema (+2) present.      Left lower leg: Edema (+2) present.   Skin:     General: Skin is warm and dry.      Capillary Refill: Capillary refill takes 2 to 3 seconds.      Comments: Venous stasis rash R shin,   Evidence of thickening, hyperpigmentation   Neurological:      General: No focal deficit present.      Mental Status: She is alert and oriented to person, place, and time.   Psychiatric:         Mood and Affect: Mood normal.         Assessment & Plan     Independent Review of Studies    1. Morbid obesity with BMI of 45.0-49.9, adult (HCC)  We have discussed the benefits of weight loss as it relates to long term morbidity and disease prevention.  Interventions discussed included self-monitoring of caloric intake, increasing physical activity/exercise, goal setting, stimulus control, consultation with dietitian, and non-food rewards.      2. Leg edema  Having " difficulty putting compression stockings on.  We discussed some strategies to assist with this.     If unable to use compression stockings, I have sent in RX for farrow wrap to bilateral lower extremities daily.      Pneumatic therapy reserved for refractory edema despite compression.    3. Venous stasis dermatitis of both lower extremities  Trial trental BID      Detailed discussion regarding risks, benefits, and treatment plan. Images independently reviewed. Patient understands, agrees, and wishes to proceed with plan.       This document has been electronically signed by Dorian Cisse AGACNP-BC @  On November 8, 2021 15:46 CST      EMR Dragon/Transcription disclaimer:   Part of this note may be an electronic transcription/translation of spoken language to printed text using the Dragon Dictation System.

## 2021-11-15 ENCOUNTER — TELEPHONE (OUTPATIENT)
Dept: SLEEP MEDICINE | Facility: HOSPITAL | Age: 61
End: 2021-11-15

## 2021-12-01 ENCOUNTER — APPOINTMENT (OUTPATIENT)
Dept: SLEEP MEDICINE | Facility: HOSPITAL | Age: 61
End: 2021-12-01

## 2022-01-02 ENCOUNTER — HOSPITAL ENCOUNTER (OUTPATIENT)
Dept: SLEEP MEDICINE | Facility: HOSPITAL | Age: 62
Discharge: HOME | End: 2022-01-02

## 2022-08-09 ENCOUNTER — HOSPITAL ENCOUNTER (INPATIENT)
Facility: HOSPITAL | Age: 62
LOS: 10 days | Discharge: HOME HEALTH | End: 2022-08-19
Attending: FAMILY MEDICINE | Admitting: INTERNAL MEDICINE

## 2022-08-09 ENCOUNTER — APPOINTMENT (OUTPATIENT)
Dept: CT IMAGING | Facility: HOSPITAL | Age: 62
End: 2022-08-09

## 2022-08-09 ENCOUNTER — APPOINTMENT (OUTPATIENT)
Dept: GENERAL RADIOLOGY | Facility: HOSPITAL | Age: 62
End: 2022-08-09

## 2022-08-09 DIAGNOSIS — I26.94 MULTIPLE SUBSEGMENTAL PULMONARY EMBOLI WITHOUT ACUTE COR PULMONALE: Primary | ICD-10-CM

## 2022-08-09 DIAGNOSIS — I10 BENIGN HYPERTENSION: ICD-10-CM

## 2022-08-09 DIAGNOSIS — I26.99 ACUTE PULMONARY EMBOLISM, UNSPECIFIED PULMONARY EMBOLISM TYPE, UNSPECIFIED WHETHER ACUTE COR PULMONALE PRESENT: ICD-10-CM

## 2022-08-09 DIAGNOSIS — Z74.09 IMPAIRED MOBILITY AND ADLS: ICD-10-CM

## 2022-08-09 DIAGNOSIS — R31.0 FRANK HEMATURIA: ICD-10-CM

## 2022-08-09 DIAGNOSIS — R09.02 HYPOXIA: ICD-10-CM

## 2022-08-09 DIAGNOSIS — N93.9 VAGINAL BLEEDING: ICD-10-CM

## 2022-08-09 DIAGNOSIS — Z74.09 IMPAIRED FUNCTIONAL MOBILITY, BALANCE, GAIT, AND ENDURANCE: ICD-10-CM

## 2022-08-09 DIAGNOSIS — Z78.9 IMPAIRED MOBILITY AND ADLS: ICD-10-CM

## 2022-08-09 DIAGNOSIS — R06.00 DYSPNEA, UNSPECIFIED TYPE: ICD-10-CM

## 2022-08-09 PROBLEM — L03.90 CELLULITIS: Status: ACTIVE | Noted: 2022-08-09

## 2022-08-09 LAB
ALBUMIN SERPL-MCNC: 2.9 G/DL (ref 3.5–5.2)
ALBUMIN/GLOB SERPL: 1 G/DL
ALP SERPL-CCNC: 94 U/L (ref 39–117)
ALT SERPL W P-5'-P-CCNC: 12 U/L (ref 1–33)
ANION GAP SERPL CALCULATED.3IONS-SCNC: 5 MMOL/L (ref 5–15)
AST SERPL-CCNC: 20 U/L (ref 1–32)
BASOPHILS # BLD AUTO: 0.04 10*3/MM3 (ref 0–0.2)
BASOPHILS NFR BLD AUTO: 0.5 % (ref 0–1.5)
BILIRUB SERPL-MCNC: 1.4 MG/DL (ref 0–1.2)
BUN SERPL-MCNC: 9 MG/DL (ref 8–23)
BUN/CREAT SERPL: 12.9 (ref 7–25)
CALCIUM SPEC-SCNC: 8.4 MG/DL (ref 8.6–10.5)
CHLORIDE SERPL-SCNC: 91 MMOL/L (ref 98–107)
CK SERPL-CCNC: 39 U/L (ref 20–180)
CO2 SERPL-SCNC: 39 MMOL/L (ref 22–29)
CREAT SERPL-MCNC: 0.7 MG/DL (ref 0.57–1)
D-DIMER, QUANTITATIVE (MAD,POW, STR): 2487 NG/ML (FEU) (ref 0–470)
D-LACTATE SERPL-SCNC: 1.8 MMOL/L (ref 0.5–2)
D-LACTATE SERPL-SCNC: 2.1 MMOL/L (ref 0.5–2)
DEPRECATED RDW RBC AUTO: 48.9 FL (ref 37–54)
EGFRCR SERPLBLD CKD-EPI 2021: 99.2 ML/MIN/1.73
EOSINOPHIL # BLD AUTO: 0.02 10*3/MM3 (ref 0–0.4)
EOSINOPHIL NFR BLD AUTO: 0.2 % (ref 0.3–6.2)
ERYTHROCYTE [DISTWIDTH] IN BLOOD BY AUTOMATED COUNT: 14.8 % (ref 12.3–15.4)
FLUAV SUBTYP SPEC NAA+PROBE: NOT DETECTED
FLUBV RNA ISLT QL NAA+PROBE: NOT DETECTED
GLOBULIN UR ELPH-MCNC: 3 GM/DL
GLUCOSE SERPL-MCNC: 189 MG/DL (ref 65–99)
HCT VFR BLD AUTO: 49.4 % (ref 34–46.6)
HGB BLD-MCNC: 15.2 G/DL (ref 12–15.9)
HOLD SPECIMEN: NORMAL
HOLD SPECIMEN: NORMAL
IMM GRANULOCYTES # BLD AUTO: 0.03 10*3/MM3 (ref 0–0.05)
IMM GRANULOCYTES NFR BLD AUTO: 0.4 % (ref 0–0.5)
LYMPHOCYTES # BLD AUTO: 1.46 10*3/MM3 (ref 0.7–3.1)
LYMPHOCYTES NFR BLD AUTO: 17.5 % (ref 19.6–45.3)
MAGNESIUM SERPL-MCNC: 2 MG/DL (ref 1.6–2.4)
MCH RBC QN AUTO: 27.5 PG (ref 26.6–33)
MCHC RBC AUTO-ENTMCNC: 30.8 G/DL (ref 31.5–35.7)
MCV RBC AUTO: 89.5 FL (ref 79–97)
MONOCYTES # BLD AUTO: 0.65 10*3/MM3 (ref 0.1–0.9)
MONOCYTES NFR BLD AUTO: 7.8 % (ref 5–12)
NEUTROPHILS NFR BLD AUTO: 6.13 10*3/MM3 (ref 1.7–7)
NEUTROPHILS NFR BLD AUTO: 73.6 % (ref 42.7–76)
NRBC BLD AUTO-RTO: 0 /100 WBC (ref 0–0.2)
NT-PROBNP SERPL-MCNC: 1544 PG/ML (ref 0–900)
PLATELET # BLD AUTO: 155 10*3/MM3 (ref 140–450)
PMV BLD AUTO: 11.9 FL (ref 6–12)
POTASSIUM SERPL-SCNC: 4.2 MMOL/L (ref 3.5–5.2)
PROT SERPL-MCNC: 5.9 G/DL (ref 6–8.5)
QT INTERVAL: 366 MS
QTC INTERVAL: 450 MS
RBC # BLD AUTO: 5.52 10*6/MM3 (ref 3.77–5.28)
SARS-COV-2 RNA PNL SPEC NAA+PROBE: NOT DETECTED
SODIUM SERPL-SCNC: 135 MMOL/L (ref 136–145)
TROPONIN T SERPL-MCNC: <0.01 NG/ML (ref 0–0.03)
WBC NRBC COR # BLD: 8.33 10*3/MM3 (ref 3.4–10.8)
WHOLE BLOOD HOLD COAG: NORMAL

## 2022-08-09 PROCEDURE — 93005 ELECTROCARDIOGRAM TRACING: CPT

## 2022-08-09 PROCEDURE — 84484 ASSAY OF TROPONIN QUANT: CPT | Performed by: FAMILY MEDICINE

## 2022-08-09 PROCEDURE — 85379 FIBRIN DEGRADATION QUANT: CPT | Performed by: FAMILY MEDICINE

## 2022-08-09 PROCEDURE — G0378 HOSPITAL OBSERVATION PER HR: HCPCS

## 2022-08-09 PROCEDURE — 87040 BLOOD CULTURE FOR BACTERIA: CPT | Performed by: FAMILY MEDICINE

## 2022-08-09 PROCEDURE — 93005 ELECTROCARDIOGRAM TRACING: CPT | Performed by: FAMILY MEDICINE

## 2022-08-09 PROCEDURE — 83735 ASSAY OF MAGNESIUM: CPT | Performed by: FAMILY MEDICINE

## 2022-08-09 PROCEDURE — 85025 COMPLETE CBC W/AUTO DIFF WBC: CPT | Performed by: FAMILY MEDICINE

## 2022-08-09 PROCEDURE — 25010000002 VANCOMYCIN 10 G RECONSTITUTED SOLUTION: Performed by: FAMILY MEDICINE

## 2022-08-09 PROCEDURE — 93010 ELECTROCARDIOGRAM REPORT: CPT | Performed by: INTERNAL MEDICINE

## 2022-08-09 PROCEDURE — 0 IOPAMIDOL PER 1 ML: Performed by: FAMILY MEDICINE

## 2022-08-09 PROCEDURE — 99285 EMERGENCY DEPT VISIT HI MDM: CPT

## 2022-08-09 PROCEDURE — 25010000002 ENOXAPARIN PER 10 MG

## 2022-08-09 PROCEDURE — 80053 COMPREHEN METABOLIC PANEL: CPT | Performed by: FAMILY MEDICINE

## 2022-08-09 PROCEDURE — 82550 ASSAY OF CK (CPK): CPT | Performed by: FAMILY MEDICINE

## 2022-08-09 PROCEDURE — 83605 ASSAY OF LACTIC ACID: CPT | Performed by: FAMILY MEDICINE

## 2022-08-09 PROCEDURE — 71045 X-RAY EXAM CHEST 1 VIEW: CPT

## 2022-08-09 PROCEDURE — 85610 PROTHROMBIN TIME: CPT | Performed by: STUDENT IN AN ORGANIZED HEALTH CARE EDUCATION/TRAINING PROGRAM

## 2022-08-09 PROCEDURE — 85730 THROMBOPLASTIN TIME PARTIAL: CPT | Performed by: STUDENT IN AN ORGANIZED HEALTH CARE EDUCATION/TRAINING PROGRAM

## 2022-08-09 PROCEDURE — 87636 SARSCOV2 & INF A&B AMP PRB: CPT | Performed by: FAMILY MEDICINE

## 2022-08-09 PROCEDURE — 36415 COLL VENOUS BLD VENIPUNCTURE: CPT | Performed by: FAMILY MEDICINE

## 2022-08-09 PROCEDURE — 71275 CT ANGIOGRAPHY CHEST: CPT

## 2022-08-09 PROCEDURE — 83880 ASSAY OF NATRIURETIC PEPTIDE: CPT | Performed by: FAMILY MEDICINE

## 2022-08-09 RX ORDER — TRAMADOL HYDROCHLORIDE 50 MG/1
50 TABLET ORAL EVERY 6 HOURS PRN
Status: DISCONTINUED | OUTPATIENT
Start: 2022-08-09 | End: 2022-08-19 | Stop reason: HOSPADM

## 2022-08-09 RX ORDER — HEPARIN SODIUM 5000 [USP'U]/ML
40 INJECTION, SOLUTION INTRAVENOUS; SUBCUTANEOUS AS NEEDED
Status: DISCONTINUED | OUTPATIENT
Start: 2022-08-09 | End: 2022-08-09

## 2022-08-09 RX ORDER — SODIUM CHLORIDE 0.9 % (FLUSH) 0.9 %
10 SYRINGE (ML) INJECTION AS NEEDED
Status: DISCONTINUED | OUTPATIENT
Start: 2022-08-09 | End: 2022-08-19 | Stop reason: HOSPADM

## 2022-08-09 RX ORDER — SODIUM CHLORIDE 0.9 % (FLUSH) 0.9 %
10 SYRINGE (ML) INJECTION EVERY 12 HOURS SCHEDULED
Status: DISCONTINUED | OUTPATIENT
Start: 2022-08-09 | End: 2022-08-19 | Stop reason: HOSPADM

## 2022-08-09 RX ORDER — ONDANSETRON 2 MG/ML
4 INJECTION INTRAMUSCULAR; INTRAVENOUS EVERY 6 HOURS PRN
Status: DISCONTINUED | OUTPATIENT
Start: 2022-08-09 | End: 2022-08-18 | Stop reason: SDUPTHER

## 2022-08-09 RX ORDER — HEPARIN SODIUM 10000 [USP'U]/100ML
10.7 INJECTION, SOLUTION INTRAVENOUS
Status: DISCONTINUED | OUTPATIENT
Start: 2022-08-09 | End: 2022-08-09

## 2022-08-09 RX ORDER — HEPARIN SODIUM 5000 [USP'U]/ML
10000 INJECTION, SOLUTION INTRAVENOUS; SUBCUTANEOUS AS NEEDED
Status: DISCONTINUED | OUTPATIENT
Start: 2022-08-09 | End: 2022-08-09

## 2022-08-09 RX ORDER — SULFAMETHOXAZOLE AND TRIMETHOPRIM 800; 160 MG/1; MG/1
1 TABLET ORAL EVERY 12 HOURS SCHEDULED
Status: DISCONTINUED | OUTPATIENT
Start: 2022-08-09 | End: 2022-08-10

## 2022-08-09 RX ORDER — ACETAMINOPHEN 325 MG/1
650 TABLET ORAL EVERY 6 HOURS PRN
Status: DISCONTINUED | OUTPATIENT
Start: 2022-08-09 | End: 2022-08-19 | Stop reason: HOSPADM

## 2022-08-09 RX ORDER — ENOXAPARIN SODIUM 150 MG/ML
1 INJECTION SUBCUTANEOUS EVERY 12 HOURS
Status: DISCONTINUED | OUTPATIENT
Start: 2022-08-09 | End: 2022-08-13

## 2022-08-09 RX ORDER — METOCLOPRAMIDE HYDROCHLORIDE 5 MG/ML
10 INJECTION INTRAMUSCULAR; INTRAVENOUS EVERY 8 HOURS PRN
Status: DISCONTINUED | OUTPATIENT
Start: 2022-08-09 | End: 2022-08-19 | Stop reason: HOSPADM

## 2022-08-09 RX ORDER — SODIUM CHLORIDE 0.9 % (FLUSH) 0.9 %
10 SYRINGE (ML) INJECTION AS NEEDED
Status: DISCONTINUED | OUTPATIENT
Start: 2022-08-09 | End: 2022-08-09

## 2022-08-09 RX ADMIN — IOPAMIDOL 80 ML: 755 INJECTION, SOLUTION INTRAVENOUS at 19:21

## 2022-08-09 RX ADMIN — SULFAMETHOXAZOLE AND TRIMETHOPRIM 1 TABLET: 800; 160 TABLET ORAL at 23:53

## 2022-08-09 RX ADMIN — ENOXAPARIN SODIUM 140 MG: 150 INJECTION SUBCUTANEOUS at 23:53

## 2022-08-09 RX ADMIN — Medication 10 ML: at 23:54

## 2022-08-09 RX ADMIN — VANCOMYCIN HYDROCHLORIDE 2500 MG: 10 INJECTION, POWDER, LYOPHILIZED, FOR SOLUTION INTRAVENOUS at 19:49

## 2022-08-09 NOTE — ED NOTES
Patient states she does not currently take medicine and is unsure of what the names are that she is supposed to take. States she has 400mg gabapentin but unable to take them due to making her too drowsy.

## 2022-08-09 NOTE — ED NOTES
Per EMS the patient was picked up at [facility] for resp distress, diagnosed with pneumonia 2 weeks ago, edema bilateral lower legs.   /86, HR 96  O2 88% to 97% after duo neb treatment.

## 2022-08-10 ENCOUNTER — APPOINTMENT (OUTPATIENT)
Dept: ULTRASOUND IMAGING | Facility: HOSPITAL | Age: 62
End: 2022-08-10

## 2022-08-10 LAB
APTT PPP: 26.9 SECONDS (ref 20–40.3)
INR PPP: 1.2 (ref 0.8–1.2)
PROTHROMBIN TIME: 15 SECONDS (ref 11.1–15.3)

## 2022-08-10 PROCEDURE — G0378 HOSPITAL OBSERVATION PER HR: HCPCS

## 2022-08-10 PROCEDURE — 25010000002 LEVOFLOXACIN PER 250 MG: Performed by: INTERNAL MEDICINE

## 2022-08-10 PROCEDURE — 93970 EXTREMITY STUDY: CPT

## 2022-08-10 PROCEDURE — 25010000002 ENOXAPARIN PER 10 MG

## 2022-08-10 RX ORDER — CLINDAMYCIN PHOSPHATE 600 MG/50ML
600 INJECTION INTRAVENOUS EVERY 8 HOURS
Status: DISCONTINUED | OUTPATIENT
Start: 2022-08-10 | End: 2022-08-12

## 2022-08-10 RX ORDER — NYSTATIN 100000 [USP'U]/G
POWDER TOPICAL EVERY 12 HOURS SCHEDULED
Status: DISCONTINUED | OUTPATIENT
Start: 2022-08-10 | End: 2022-08-19 | Stop reason: HOSPADM

## 2022-08-10 RX ORDER — NICOTINE 21 MG/24HR
1 PATCH, TRANSDERMAL 24 HOURS TRANSDERMAL
Status: DISCONTINUED | OUTPATIENT
Start: 2022-08-11 | End: 2022-08-19 | Stop reason: HOSPADM

## 2022-08-10 RX ORDER — NICOTINE 21 MG/24HR
1 PATCH, TRANSDERMAL 24 HOURS TRANSDERMAL
Status: DISCONTINUED | OUTPATIENT
Start: 2022-08-11 | End: 2022-08-10

## 2022-08-10 RX ORDER — LEVOFLOXACIN 5 MG/ML
750 INJECTION, SOLUTION INTRAVENOUS EVERY 24 HOURS
Status: DISCONTINUED | OUTPATIENT
Start: 2022-08-10 | End: 2022-08-12

## 2022-08-10 RX ADMIN — ENOXAPARIN SODIUM 140 MG: 150 INJECTION SUBCUTANEOUS at 12:04

## 2022-08-10 RX ADMIN — LEVOFLOXACIN 750 MG: 5 INJECTION, SOLUTION INTRAVENOUS at 17:45

## 2022-08-10 RX ADMIN — CLINDAMYCIN IN 5 PERCENT DEXTROSE 600 MG: 12 INJECTION, SOLUTION INTRAVENOUS at 17:45

## 2022-08-10 RX ADMIN — SULFAMETHOXAZOLE AND TRIMETHOPRIM 1 TABLET: 800; 160 TABLET ORAL at 08:32

## 2022-08-10 RX ADMIN — Medication 10 ML: at 08:33

## 2022-08-10 RX ADMIN — NYSTATIN: 100000 POWDER TOPICAL at 08:33

## 2022-08-10 NOTE — PLAN OF CARE
Problem: Adult Inpatient Plan of Care  Goal: Plan of Care Review  Outcome: Ongoing, Not Progressing  Flowsheets (Taken 8/10/2022 7748)  Plan of Care Reviewed With: patient  Outcome Evaluation: began education on weight mgt. gave handwritten goals pt agreed upon. RDN staff will continue to monitor.   Goal Outcome Evaluation:  Plan of Care Reviewed With: patient           Outcome Evaluation: began education on weight mgt. gave handwritten goals pt agreed upon. RDN staff will continue to monitor.

## 2022-08-10 NOTE — PROGRESS NOTES
[facility] Medicine Services  INPATIENT PROGRESS NOTE    Length of Stay: 0  Date of Admission: 8/9/2022  Primary Care Physician: Danielle Vaughn APRN    Subjective   Patient seen and evaluated, resting comfortably on oxygen supplementation, and not in distress.         Objective    Temp:  [96.6 °F (35.9 °C)-97.6 °F (36.4 °C)] 97.6 °F (36.4 °C)  Heart Rate:  [78-93] 83  Resp:  [18-22] 18  BP: ()/(57-76) 119/64    Physical Exam:   Temp:  [96.6 °F (35.9 °C)-97.6 °F (36.4 °C)] 97.6 °F (36.4 °C)  Heart Rate:  [78-93] 83  Resp:  [18-22] 18  BP: ()/(57-76) 119/64  Physical Exam    General: NC/AT, appears stated age, alert and oriented x3  HEENT: PERRLA, EOMI, no scleral icterus, no conjunctival injection,   NECK:  Neck is supple, no JVD, no supraclavicular lymphadenopathy  CV: S1 S2 RRR, no murmurs, no gallops, no heaves, pulses palpable.  LUNG: CTA, no wheezing crackles or rhonchi  ABD: Obesed, nondistended, nontender, bowel sounds present, no guarding or rebound, organomegaly not appreciated.   EXT: FROM, erythematous and tender, with weeping, and cellulitic changesPulses palpable  Neuro: CN 2-12, grossly intact,no  focal weakness appreciated  Skin: As mentioned in extremity exam      Results Review:  I have reviewed the labs, radiology results, and diagnostic studies.    Laboratory Data:   Results from last 7 days   Lab Units 08/09/22  1515   SODIUM mmol/L 135*   POTASSIUM mmol/L 4.2   CHLORIDE mmol/L 91*   CO2 mmol/L 39.0*   BUN mg/dL 9   CREATININE mg/dL 0.70   GLUCOSE mg/dL 189*   CALCIUM mg/dL 8.4*   BILIRUBIN mg/dL 1.4*   ALK PHOS U/L 94   ALT (SGPT) U/L 12   AST (SGOT) U/L 20   ANION GAP mmol/L 5.0     CrCl cannot be calculated (Unknown ideal weight.).  Results from last 7 days   Lab Units 08/09/22  1515   MAGNESIUM mg/dL 2.0         Results from last 7 days   Lab Units 08/09/22  1515   WBC 10*3/mm3 8.33   HEMOGLOBIN g/dL 15.2   HEMATOCRIT % 49.4*    PLATELETS 10*3/mm3 155     Results from last 7 days   Lab Units 08/09/22  2358   INR  1.20       Culture Data:   No results found for: BLOODCX  No results found for: URINECX  No results found for: RESPCX  No results found for: WOUNDCX  No results found for: STOOLCX  No components found for: BODYFLD    Radiology Data:   Imaging Results (Last 24 Hours)     Procedure Component Value Units Date/Time    US Venous Doppler Lower Extremity Bilateral (duplex) [[id number]] Collected: 08/10/22 1316     Updated: 08/10/22 1518    Narrative:      ULTRASOUND VENOUS DOPPLER BILATERAL LOWER EXTREMITIES    INDICATION: dvt eval, I26.94 Multiple subsegmental pulmonary  emboli without acute cor pulmonale R09.02 Hypoxemia    COMPARISON: None    TECHNIQUE: Grayscale and color Doppler sonographic images of  bilateral lower extremity veins were obtained.     FINDINGS:  RIGHT:  CFV: Patent, compressible, and augments.  GSV: Patent, compressible, and augments.  SFV: Patent, compressible, and augments.  Popliteal: Patent, compressible, and augments.  Subcutaneous edema is noted.    LEFT:  CFV: Patent, compressible, and augments.  GSV: Patent, compressible, and augments.  SFV: Patent, compressible, and augments.  Popliteal: Noncompressible suggesting thrombus. Thrombus of  heterogeneous echogenicity is visualized in the left popliteal  vein.  There is subcutaneous edema.    Additional Findings: Bilateral lower extremity subcutaneous  edema.      Impression:      1. DVT of left popliteal vein.  2. Bilateral lower extremity edema.    Numerous attempts were made to contact Dr. Burr. The patient is  already fully anticoagulated for PE and report of findings was  given to the patient's nurse Kartik Jesus RN at 3:12 PM on  8/10/2022.    Electronically signed by:  Fouzia Tan MD  8/10/2022 3:12 PM CDT  Workstation: [id number]    CT Angiogram Chest [[id number]] Collected: 08/09/22 1913     Updated: 08/10/22 0817    Addenda:         ADDENDUM    ADDENDUM #1       Findings discussed with referring physician Dr. Barnes at 2030    Electronically signed by:  Gene Stephens MD  8/10/2022 8:15 AM CDT  Workstation: [id number]    Signed: 08/10/22 0815 by Gene Stephens MD    Narrative:      EXAM:  CT CHEST ANGIOGRAPHY WITH IV CONTRAST    ORDERING PROVIDER:  CHANTELL BARAHONA    CLINICAL HISTORY:  Shortness of breath    COMPARISON:      TECHNIQUE:   Chest CT was performed using a high resolution pulmonary  angiogram protocol with 80ml of Isovue 370 contrast and  reformatted in the sagittal and coronal planes.     3-dimensional images also acquired with special processing of the  CT scan data with a specialized workstation for evaluation.    This examination was performed according to our departmental dose  optimization program which includes automated exposure control,  adjustment of the MA and kV according to patient size, and/or use  of iterative reconstruction technique.     FINDINGS:     LUNGS AND PLEURA: Scattered ground glass opacities in the  bilateral lower lobes which may be due to nonspecific  pneumonitis. No focal consolidation or masses.No pleural effusion  or plaques.  Unremarkable interstitium.    HEART: Prominent size and unremarkable configuration. No  pericardial effusion.     MEDIASTINUM AND ADDISON: No significant adenopathy.     AORTA AND GREAT VESSELS: No aneurysm or dissection.     PULMONARY ARTERIES: Bilateral filling defects in branches  supplying the upper lobes, lower lobes and right middle lobe.  There is no evidence of saddle embolism. Main pulmonary artery  measuring 4.4 cm concerning for pulmonary arterial hypertension.    UPPER ABDOMEN: Unremarkable.    MUSCULOSKELETAL:  No aggressive osseous lesion. Unremarkable  vertebral body height and alignment. No lytic or sclerotic  lesion.     EXTRATHORACIC SOFT TISSUES: No axillary adenopathy. No mass.  Unremarkable supraclavicular soft tissues.       Impression:      Bilateral filling defects in  branches supplying the upper lobes,  lower lobes and right middle lobe consistent with pulmonary  embolism. There is no evidence of saddle embolism.   Main pulmonary artery measuring 4.4 cm concerning for pulmonary  arterial hypertension.  Scattered ground glass opacities in the bilateral lower lobes  which may be due to nonspecific pneumonitis.     Electronically signed by:  Gene Stephens MD  8/9/2022 8:10 PM CDT  Workstation: [id number]          I have reviewed the patient's current medications.     Assessment/Plan     Active Hospital Problems    Diagnosis    • Multiple subsegmental pulmonary emboli without acute cor pulmonale (HCC)    • Cellulitis    • Cellulitis    Left Polpilteal DVT        Plan:    Patient Doppler showed DVT of Left leg,   -Continue IV heparin  -Switch abx from po Doyxcycline to IV Clindamycin + Levaquin for weeping B/L lower extremity cellulitis.   -Get wound care consult  -Pain control  -Continue other treatment for comorbid conditions    Consider switching to po anticoagulation after a few days of IV heparin due to significant PE and DVT.           Discharge Planning: Pending improvement and response to treatment.       Fern Burr MD

## 2022-08-10 NOTE — H&P
[facility] Medicine Admission      Date of Admission: 8/9/2022      Primary Care Physician: Danielle Vaughn APRN    Chief Complaint:   Dyspnea    HPI:  3 weeks ago this patient was diagnosed with pneumonia and completed a course of antibiotics, however, patient was less mobile than usual and subsequently developed leg swelling (right greater than left) as well as erythema of her lower limbs bilaterally over the course of the last 2 weeks.  The erythema is more significant on the right leg than the left and affects the anterior and less so the lateral aspect of the distal lower limbs.  Patient denies any chest pain, palpitations, hemoptysis, or other cardiorespiratory symptoms, except dry, nonproductive cough.    Patient's lab work reveals a proBNP of 1544, and albumin of 2.9, total bilirubin 1.4, lactate of 2.1, D-dimer of 2487.  CT angiogram of the chest demonstrates significant burden of bilateral PEs.  There is pulmonary hypertension evident.  There is also concern for pneumonitis.      Concurrent Medical History:  has a past medical history of Controlled type 2 diabetes mellitus without complication, without long-term current use of insulin (HCC), COPD (chronic obstructive pulmonary disease) (HCC), DVT (deep venous thrombosis) ([facility]) (08/2022), Hyperlipemia, mixed, and Pulmonary embolism (HCC) (08/2022).    Past Surgical History:  has a past surgical history that includes Tubal ligation and Cystoscopy (N/A, 8/18/2022).    Family History: family history includes Alcohol abuse in her maternal grandmother; COPD in an other family member; Diabetes in an other family member; Heart disease in an other family member; Hypertension in an other family member; Liver cancer in her maternal grandmother; Lung cancer in her maternal aunt, maternal aunt, and mother; Prostate cancer in her father; Throat cancer in her paternal grandfather; Uterine cancer in her mother.     Social  History:  reports that she has been smoking cigarettes. She has smoked for the past 30.00 years. She has never used smokeless tobacco. She reports current alcohol use. She reports that she does not use drugs.    Allergies:   Allergies   Allergen Reactions   • Penicillins Rash       Medications:   ** Patient is on no medications at home **    Review of Systems:  Review of Systems   Constitutional: Positive for activity change and fatigue. Negative for chills, diaphoresis and fever.   HENT: Negative for congestion, ear pain, rhinorrhea, sinus pressure, sneezing and sore throat.    Respiratory: Positive for cough, shortness of breath and wheezing. Negative for choking and stridor.    Cardiovascular: Positive for leg swelling. Negative for chest pain and palpitations.   Gastrointestinal: Negative for abdominal distention, abdominal pain, constipation, diarrhea, nausea and vomiting.   Genitourinary: Negative for difficulty urinating, dysuria, frequency, hematuria and urgency.   Musculoskeletal: Negative for arthralgias, joint swelling and myalgias.   Skin: Negative for color change, pallor, rash and wound.   Neurological: Negative for dizziness, syncope, light-headedness and headaches.   Psychiatric/Behavioral: Negative for agitation, confusion and hallucinations.      Otherwise complete ROS is negative except as mentioned above.    Physical Exam:   Temp:  [98.4 °F (36.9 °C)] 98.4 °F (36.9 °C)  Heart Rate:  [78-89] 82  Resp:  [20-22] 20  BP: (104-125)/(57-73) 104/57  Physical Exam  HENT:      Head: Normocephalic and atraumatic.      Nose: Nose normal. No congestion or rhinorrhea.      Mouth/Throat:      Mouth: Mucous membranes are moist.      Pharynx: Oropharynx is clear. No oropharyngeal exudate or posterior oropharyngeal erythema.   Eyes:      General:         Right eye: No discharge.         Left eye: No discharge.      Extraocular Movements: Extraocular movements intact.      Conjunctiva/sclera: Conjunctivae normal.       Pupils: Pupils are equal, round, and reactive to light.   Cardiovascular:      Rate and Rhythm: Normal rate and regular rhythm.      Pulses: Normal pulses.      Heart sounds: Normal heart sounds. No murmur heard.    No gallop.   Pulmonary:      Effort: Pulmonary effort is normal. No respiratory distress.      Breath sounds: No stridor. Wheezing (throughout but more pronounced in bases) and rhonchi (bilateral bases, minor) present. No rales.   Abdominal:      General: Abdomen is flat. There is no distension.      Palpations: Abdomen is soft. There is no mass.      Tenderness: There is no abdominal tenderness.      Hernia: No hernia is present.   Musculoskeletal:         General: Tenderness (Rt lower limb) present. No swelling.      Right lower leg: Edema present.      Left lower leg: Edema present.   Skin:     Capillary Refill: Capillary refill takes less than 2 seconds.      Coloration: Skin is not jaundiced.      Findings: Erythema (Rt > Lt) present. No bruising or rash.   Neurological:      General: No focal deficit present.      Mental Status: She is oriented to person, place, and time. Mental status is at baseline.      Cranial Nerves: No cranial nerve deficit.   Psychiatric:         Mood and Affect: Mood normal.         Behavior: Behavior normal.         Thought Content: Thought content normal.         Judgment: Judgment normal.         Results Reviewed:  I have personally reviewed current lab, radiology, and data and agree with results.  Lab Results (last 24 hours)     Procedure Component Value Units Date/Time    aPTT [[id number]] Updated: 08/09/22 2116    Specimen: Blood from Arm, Left     Protime-INR [[id number]] Updated: 08/09/22 2116    Specimen: Blood from Arm, Left     Blood Culture - Blood, Arm, Left [[id number]] Collected: 08/09/22 1944    Specimen: Blood from Arm, Left Updated: 08/09/22 1950    Extra Tubes [[id number]] Collected: 08/09/22 1522    Specimen: Blood Updated: 08/09/22 1932    Narrative:       The following orders were created for panel order Extra Tubes.  Procedure                               Abnormality         Status                     ---------                               -----------         ------                     Gold Top - SST[[id number]]                                   Final result               Gray Top[[id number]]                                         Final result               Light Blue Top[[id number]]                                   Final result                 Please view results for these tests on the individual orders.    Gray Top [[id number]] Collected: 08/09/22 1522    Specimen: Blood Updated: 08/09/22 1932     Extra Tube Hold for add-ons.     Comment: Auto resulted.       STAT Lactic Acid, Reflex [[id number]]  (Normal) Collected: 08/09/22 1831    Specimen: Blood Updated: 08/09/22 1847     Lactate 1.8 mmol/L     D-dimer, Quantitative [[id number]]  (Abnormal) Collected: 08/09/22 1522    Specimen: Blood Updated: 08/09/22 1716     D-Dimer, Quantitative 2,487 ng/mL (FEU)     Narrative:      Dimer values <500 ng/ml FEU are FDA approved as aid in diagnosis of deep venous thrombosis and pulmonary embolism.  This test should not be used in an exclusion strategy with pretest probability alone.    A recent guideline regarding diagnosis for pulmonary thromboembolism recommends an adjusted exclusion criterion of age x 10 ng/ml FEU for patients >50 years of age ([employer] Intern Med 2015; 163: 701-711).      COVID-19 and FLU A/B PCR - Swab, Nasopharynx [[id number]]  (Normal) Collected: 08/09/22 1621    Specimen: Swab from Nasopharynx Updated: 08/09/22 1649     COVID19 Not Detected     Influenza A PCR Not Detected     Influenza B PCR Not Detected    Narrative:      Fact sheet for providers: https://www.fda.gov/media/901490/download    Fact sheet for patients: https://www.fda.gov/media/234788/download    Test performed by PCR.    Gold Top - SST [[id number]] Collected: 08/09/22 1522    Specimen:  Blood Updated: 08/09/22 1633     Extra Tube Hold for add-ons.     Comment: Auto resulted.       Light Blue Top [[id number]] Collected: 08/09/22 1522    Specimen: Blood Updated: 08/09/22 1633     Extra Tube Hold for add-ons.     Comment: Auto resulted       CK [[id number]]  (Normal) Collected: 08/09/22 1515    Specimen: Blood Updated: 08/09/22 1618     Creatine Kinase 39 U/L     Magnesium [[id number]]  (Normal) Collected: 08/09/22 1515    Specimen: Blood Updated: 08/09/22 1618     Magnesium 2.0 mg/dL     Lactic Acid, Plasma [[id number]]  (Abnormal) Collected: 08/09/22 1522    Specimen: Blood Updated: 08/09/22 1614     Lactate 2.1 mmol/L     Comprehensive Metabolic Panel [[id number]]  (Abnormal) Collected: 08/09/22 1515    Specimen: Blood Updated: 08/09/22 1546     Glucose 189 mg/dL      BUN 9 mg/dL      Creatinine 0.70 mg/dL      Sodium 135 mmol/L      Potassium 4.2 mmol/L      Chloride 91 mmol/L      CO2 39.0 mmol/L      Calcium 8.4 mg/dL      Total Protein 5.9 g/dL      Albumin 2.90 g/dL      ALT (SGPT) 12 U/L      AST (SGOT) 20 U/L      Alkaline Phosphatase 94 U/L      Total Bilirubin 1.4 mg/dL      Globulin 3.0 gm/dL      A/G Ratio 1.0 g/dL      BUN/Creatinine Ratio 12.9     Anion Gap 5.0 mmol/L      eGFR 99.2 mL/min/1.73      Comment: National Kidney Foundation and American Society of Nephrology (ASN) Task Force recommended calculation based on the Chronic Kidney Disease Epidemiology Collaboration (CKD-EPI) equation refit without adjustment for race.       Narrative:      GFR Normal >60  Chronic Kidney Disease <60  Kidney Failure <15      Troponin [[id number]]  (Normal) Collected: 08/09/22 1515    Specimen: Blood Updated: 08/09/22 1546     Troponin T <0.010 ng/mL     Narrative:      Troponin T Reference Range:  <= 0.03 ng/mL-   Negative for AMI  >0.03 ng/mL-     Abnormal for myocardial necrosis.  Clinicians would have to utilize clinical acumen, EKG, Troponin and serial changes to determine if it is an Acute  Myocardial Infarction or myocardial injury due to an underlying chronic condition.       Results may be falsely decreased if patient taking Biotin.      BNP [[id number]]  (Abnormal) Collected: 08/09/22 1515    Specimen: Blood Updated: 08/09/22 1544     proBNP 1,544.0 pg/mL     Narrative:      Among patients with dyspnea, NT-proBNP is highly sensitive for the detection of acute congestive heart failure. In addition NT-proBNP of <300 pg/ml effectively rules out acute congestive heart failure with 99% negative predictive value.    Results may be falsely decreased if patient taking Biotin.      CBC & Differential [[id number]]  (Abnormal) Collected: 08/09/22 1515    Specimen: Blood Updated: 08/09/22 1528    Narrative:      The following orders were created for panel order CBC & Differential.  Procedure                               Abnormality         Status                     ---------                               -----------         ------                     CBC Auto Differential[[id number]]        Abnormal            Final result                 Please view results for these tests on the individual orders.    CBC Auto Differential [[id number]]  (Abnormal) Collected: 08/09/22 1515    Specimen: Blood Updated: 08/09/22 1528     WBC 8.33 10*3/mm3      RBC 5.52 10*6/mm3      Hemoglobin 15.2 g/dL      Hematocrit 49.4 %      MCV 89.5 fL      MCH 27.5 pg      MCHC 30.8 g/dL      RDW 14.8 %      RDW-SD 48.9 fl      MPV 11.9 fL      Platelets 155 10*3/mm3      Neutrophil % 73.6 %      Lymphocyte % 17.5 %      Monocyte % 7.8 %      Eosinophil % 0.2 %      Basophil % 0.5 %      Immature Grans % 0.4 %      Neutrophils, Absolute 6.13 10*3/mm3      Lymphocytes, Absolute 1.46 10*3/mm3      Monocytes, Absolute 0.65 10*3/mm3      Eosinophils, Absolute 0.02 10*3/mm3      Basophils, Absolute 0.04 10*3/mm3      Immature Grans, Absolute 0.03 10*3/mm3      nRBC 0.0 /100 WBC         Imaging Results (Last 24 Hours)     Procedure Component  Value Units Date/Time    CT Angiogram Chest [[id number]] Collected: 08/09/22 1913     Updated: 08/09/22 2012    Narrative:      EXAM:  CT CHEST ANGIOGRAPHY WITH IV CONTRAST    ORDERING PROVIDER:  CHANTELL BARAHONA    CLINICAL HISTORY:  Shortness of breath    COMPARISON:      TECHNIQUE:   Chest CT was performed using a high resolution pulmonary  angiogram protocol with 80ml of Isovue 370 contrast and  reformatted in the sagittal and coronal planes.     3-dimensional images also acquired with special processing of the  CT scan data with a specialized workstation for evaluation.    This examination was performed according to our departmental dose  optimization program which includes automated exposure control,  adjustment of the MA and kV according to patient size, and/or use  of iterative reconstruction technique.     FINDINGS:     LUNGS AND PLEURA: Scattered ground glass opacities in the  bilateral lower lobes which may be due to nonspecific  pneumonitis. No focal consolidation or masses.No pleural effusion  or plaques.  Unremarkable interstitium.    HEART: Prominent size and unremarkable configuration. No  pericardial effusion.     MEDIASTINUM AND ADDISON: No significant adenopathy.     AORTA AND GREAT VESSELS: No aneurysm or dissection.     PULMONARY ARTERIES: Bilateral filling defects in branches  supplying the upper lobes, lower lobes and right middle lobe.  There is no evidence of saddle embolism. Main pulmonary artery  measuring 4.4 cm concerning for pulmonary arterial hypertension.    UPPER ABDOMEN: Unremarkable.    MUSCULOSKELETAL:  No aggressive osseous lesion. Unremarkable  vertebral body height and alignment. No lytic or sclerotic  lesion.     EXTRATHORACIC SOFT TISSUES: No axillary adenopathy. No mass.  Unremarkable supraclavicular soft tissues.       Impression:      Bilateral filling defects in branches supplying the upper lobes,  lower lobes and right middle lobe consistent with pulmonary  embolism.  There is no evidence of saddle embolism.   Main pulmonary artery measuring 4.4 cm concerning for pulmonary  arterial hypertension.  Scattered ground glass opacities in the bilateral lower lobes  which may be due to nonspecific pneumonitis.     Electronically signed by:  Gene Stephens MD  8/9/2022 8:10 PM CDT  Workstation: [id number]    XR Chest 1 View [[id number]] Collected: 08/09/22 1453     Updated: 08/09/22 1551    Narrative:      PROCEDURE: XR CHEST 1 VW    VIEWS:Single    INDICATION: Shortness of breath    COMPARISON: CXR: None    FINDINGS:       - lines/tubes: None    - cardiac: Size within normal limits.    - mediastinum: Contour within normal limits.     - lungs: No evidence of a focal air space process. Mild  interstitial prominence and indistinctness.     - pleura: No evidence of  fluid.      - osseous: Unremarkable for age.        Impression:      Interstitial prominence and indistinctness, could represent an  infectious process or mild edema.      Electronically signed by:  Talia Garduno MD  8/9/2022 3:49 PM CDT  Workstation: [id number]            Assessment:    Active Hospital Problems    Diagnosis    • Multiple subsegmental pulmonary emboli without acute cor pulmonale (HCC)    • Cellulitis    • Cellulitis            Plan:  1.  I have stopped the heparin infusion and will switch to Lovenox therapeutic dose we can then transition to an outpatient regimen  2.  Home med reconciliation  3.  Daily lab work  4.  Supplemental oxygen to maintain saturations equal to or greater than 92%  5.  Antibiotics for cellulitis      I confirmed that the patient's Advance Care Plan is present, code status is documented, or surrogate decision maker is listed in the patient's medical record.     I have utilized all available immediate resources to obtain, update, or review the patient's current medications.     I discussed the patient's findings and my recommendations with: The patient    Amandeep Bowen MD    35 minutes of  dedicated clinical time was devoted to this patient's admission H&P and acute medical management    Electronically signed by Amandeep Bowen MD, 08/09/22, 10:24 PM CDT.

## 2022-08-10 NOTE — CONSULTS
"Adult Nutrition  Assessment    Patient Name:  Stephanie Bradshaw  YOB: 1960  MRN: [id number]  Admit Date:  8/9/2022    Assessment Date:  8/10/2022    Comments:  Pt was admitted with multiple subsegmental pulmonary emboli without cor pulmonale. Regular diet.  Pt ate well at noon. Pt complained about constipation, diarrhea, nausea, and vomiting then she had a large bowel movement and she feels better. Received consult for education for bmi. Pt only eats 2 meals per day and drinks sweetened soda and sweet tea. Encouraged pt to eat within 1 hour of awaking in the am to fuel her body and speed up her metabolic rate AND to choose diet soda and choose unsweetened tea at home vs sweet tea in a restaurant. Pt can use Splenda to caleb her tea made at home. Pt agreed to these two goals and RDN wrote them on paper with my phone number and name if pt has questions or needs more info re weight mgt. Provided an handout called \"Healthy Eating for a Healthy Weight\"   RDN staff will continue to monitor.     Reason for Assessment     Row Name 08/10/22 1549          Reason for Assessment    Reason For Assessment physician consult     Diagnosis pulmonary disease     Identified At Risk by Screening Criteria BMI;need for education                Nutrition/Diet History     Row Name 08/10/22 1540          Nutrition/Diet History    Typical Intake (Food/Fluid/EN/PN) pt said her lunch was delicious.     Food Preferences usually drinks sweetened tea from [employer] and regular soda.                  Labs/Tests/Procedures/Meds     Row Name 08/10/22 1544          Labs/Procedures/Meds    Lab Results Reviewed reviewed, pertinent            Medications    Pertinent Medications Reviewed reviewed, pertinent                Physical Findings     Row Name 08/10/22 1544          Physical Findings    Overall Physical Appearance obese                  Nutrition Prescription Ordered     Row Name 08/10/22 9305          Nutrition " Prescription PO    Current PO Diet Regular                Evaluation of Received Nutrient/Fluid Intake     Row Name 08/10/22 1546          PO Evaluation    Number of Meals 1     % PO Intake 100                     Electronically signed by:  Camilla Garcia RD  08/10/22 15:47 CDT

## 2022-08-10 NOTE — PLAN OF CARE
Goal Outcome Evaluation:  Plan of Care Reviewed With: patient        Progress: no change  Outcome Evaluation: new admission, vss, significant BLE edema with blisters, no complaints of pain, lovenox started,resting between care, will continue to monitor

## 2022-08-10 NOTE — PLAN OF CARE
Problem: Adult Inpatient Plan of Care  Goal: Plan of Care Review  Outcome: Ongoing, Progressing  Flowsheets  Taken 8/10/2022 1639 by Kartik Jesus, RN  Progress: no change  Outcome Evaluation: ultrasound showed dvts, no sob reported. continue to monitor. abx changed today, awating wound care  Taken 8/10/2022 1553 by Camilla Garcia RD  Plan of Care Reviewed With: patient  Goal: Patient-Specific Goal (Individualized)  Outcome: Ongoing, Progressing  Goal: Absence of Hospital-Acquired Illness or Injury  Outcome: Ongoing, Progressing  Intervention: Identify and Manage Fall Risk  Recent Flowsheet Documentation  Taken 8/10/2022 1425 by Kartik Jesus RN  Safety Promotion/Fall Prevention: safety round/check completed  Taken 8/10/2022 1024 by Kartik Jesus RN  Safety Promotion/Fall Prevention: safety round/check completed  Taken 8/10/2022 0756 by Kartik Jesus RN  Safety Promotion/Fall Prevention: safety round/check completed  Taken 8/10/2022 0750 by Kartik Jesus RN  Safety Promotion/Fall Prevention: safety round/check completed  Intervention: Prevent and Manage VTE (Venous Thromboembolism) Risk  Recent Flowsheet Documentation  Taken 8/10/2022 0756 by Kartik Jesus RN  VTE Prevention/Management: (lovonex) other (see comments)  Goal: Optimal Comfort and Wellbeing  Outcome: Ongoing, Progressing  Intervention: Provide Person-Centered Care  Recent Flowsheet Documentation  Taken 8/10/2022 0756 by Kartik Jesus RN  Trust Relationship/Rapport: care explained  Goal: Readiness for Transition of Care  Outcome: Ongoing, Progressing     Problem: Fall Injury Risk  Goal: Absence of Fall and Fall-Related Injury  Outcome: Ongoing, Progressing  Intervention: Promote Injury-Free Environment  Recent Flowsheet Documentation  Taken 8/10/2022 1425 by Kartik Jesus RN  Safety Promotion/Fall Prevention: safety round/check completed  Taken 8/10/2022 1024 by Kartik Jesus RN  Safety  Promotion/Fall Prevention: safety round/check completed  Taken 8/10/2022 0756 by Kartik Jesus, RN  Safety Promotion/Fall Prevention: safety round/check completed  Taken 8/10/2022 0750 by Kartik Jesus, RN  Safety Promotion/Fall Prevention: safety round/check completed     Problem: Skin Injury Risk Increased  Goal: Skin Health and Integrity  Outcome: Ongoing, Progressing     Problem: VTE (Venous Thromboembolism)  Goal: VTE (Venous Thromboembolism) Symptom Resolution  Outcome: Ongoing, Progressing  Intervention: Prevent or Manage VTE (Venous Thromboembolism)  Recent Flowsheet Documentation  Taken 8/10/2022 0756 by Kartik Jesus, RN  VTE Prevention/Management: (lovonex) other (see comments)     Problem: Skin or Soft Tissue Infection  Goal: Absence of Infection Signs and Symptoms  Outcome: Ongoing, Progressing   Goal Outcome Evaluation:           Progress: no change  Outcome Evaluation: ultrasound showed dvts, no sob reported. continue to monitor. abx changed today, awating wound care

## 2022-08-10 NOTE — ED PROVIDER NOTES
Subjective   Patient presents to the emergency department with shortness of breath and leg swelling.  Patient was recently diagnosed with a pneumonia 2 weeks ago and was discharged home on oral antibiotics.  She was seen in the clinic today and noted to have an O2 sat in the upper 80s and sent to the emergency department for further assessment.  Patient is normally not oxygen dependent.  In the emergency department today her O2 sats are 85% on arrival, and improved to 93% with 2 L nasal cannula oxygen.      Shortness of Breath  Severity:  Moderate  Duration:  2 weeks  Timing:  Constant  Progression:  Waxing and waning  Chronicity:  Recurrent  Relieved by:  Oxygen  Associated symptoms: no abdominal pain, no chest pain, no cough, no diaphoresis, no ear pain, no fever, no headaches, no neck pain, no rash, no sore throat, no vomiting and no wheezing    Risk factors: obesity    Leg Swelling  Associated symptoms: shortness of breath    Associated symptoms: no abdominal pain, no chest pain, no congestion, no cough, no diarrhea, no ear pain, no fatigue, no fever, no headaches, no myalgias, no nausea, no rash, no rhinorrhea, no sore throat, no vomiting and no wheezing        Review of Systems   Constitutional: Positive for activity change. Negative for appetite change, chills, diaphoresis, fatigue and fever.   HENT: Negative for congestion, ear discharge, ear pain, nosebleeds, rhinorrhea, sinus pressure, sore throat and trouble swallowing.    Eyes: Negative for discharge and redness.   Respiratory: Positive for shortness of breath. Negative for apnea, cough, chest tightness and wheezing.    Cardiovascular: Negative for chest pain.   Gastrointestinal: Negative for abdominal pain, diarrhea, nausea and vomiting.   Endocrine: Negative for polyuria.   Genitourinary: Negative for dysuria, frequency and urgency.   Musculoskeletal: Negative for myalgias and neck pain.   Skin: Positive for color change. Negative for rash.    Allergic/Immunologic: Negative for immunocompromised state.   Neurological: Negative for dizziness, seizures, syncope, weakness, light-headedness and headaches.   Hematological: Negative for adenopathy. Does not bruise/bleed easily.   Psychiatric/Behavioral: Negative for behavioral problems and confusion.   All other systems reviewed and are negative.      Past Medical History:   Diagnosis Date   • COPD (chronic obstructive pulmonary disease) (HCC)    • Hyperlipidemia        Allergies   Allergen Reactions   • Penicillins Rash       History reviewed. No pertinent surgical history.    History reviewed. No pertinent family history.    Social History     Socioeconomic History   • Marital status:    Tobacco Use   • Smoking status: Former Smoker   • Smokeless tobacco: Never Used   Substance and Sexual Activity   • Drug use: Never   • Sexual activity: Defer           Objective   Physical Exam  Vitals and nursing note reviewed.   Constitutional:       Appearance: She is well-developed.   HENT:      Head: Normocephalic and atraumatic.      Nose: Nose normal.   Eyes:      General: No scleral icterus.        Right eye: No discharge.         Left eye: No discharge.      Conjunctiva/sclera: Conjunctivae normal.      Pupils: Pupils are equal, round, and reactive to light.   Neck:      Trachea: No tracheal deviation.   Cardiovascular:      Rate and Rhythm: Normal rate and regular rhythm.      Heart sounds: Normal heart sounds. No murmur heard.  Pulmonary:      Effort: Pulmonary effort is normal. No respiratory distress.      Breath sounds: Normal breath sounds. No stridor. No wheezing or rales.   Abdominal:      General: Bowel sounds are normal. There is no distension.      Palpations: Abdomen is soft. There is no mass.      Tenderness: There is no abdominal tenderness. There is no guarding or rebound.   Musculoskeletal:      Cervical back: Normal range of motion and neck supple.        Legs:       Comments: Bilateral  lower extremity erythema, warmth, tenderness, and edema.   Skin:     General: Skin is warm and dry.      Findings: No erythema or rash.   Neurological:      Mental Status: She is alert and oriented to person, place, and time.      Coordination: Coordination normal.   Psychiatric:         Behavior: Behavior normal.         Thought Content: Thought content normal.         Procedures           ED Course  ED Course as of 08/09/22 2035   Tue Aug 09, 2022   2035 Patient checked out to me by Dr. Key.  Oxygen improved on couple liters nasal cannula.  D-dimer was elevated so CTA chest was obtained.  CTA shows bilateral PEs with no saddle.  No heart strain noted.  Patient started on heparin drip.  Spoke with the on-call hospitalist who agrees to admit.  Hospitalist requests a telemetry bed floor. [BH]      ED Course User Index  [BH] Brando Barnes MD            Results for orders placed or performed during the hospital encounter of 08/09/22   COVID-19 and FLU A/B PCR - Swab, Nasopharynx    Specimen: Nasopharynx; Swab   Result Value Ref Range    COVID19 Not Detected Not Detected - Ref. Range    Influenza A PCR Not Detected Not Detected    Influenza B PCR Not Detected Not Detected   Comprehensive Metabolic Panel    Specimen: Blood   Result Value Ref Range    Glucose 189 (H) 65 - 99 mg/dL    BUN 9 8 - 23 mg/dL    Creatinine 0.70 0.57 - 1.00 mg/dL    Sodium 135 (L) 136 - 145 mmol/L    Potassium 4.2 3.5 - 5.2 mmol/L    Chloride 91 (L) 98 - 107 mmol/L    CO2 39.0 (H) 22.0 - 29.0 mmol/L    Calcium 8.4 (L) 8.6 - 10.5 mg/dL    Total Protein 5.9 (L) 6.0 - 8.5 g/dL    Albumin 2.90 (L) 3.50 - 5.20 g/dL    ALT (SGPT) 12 1 - 33 U/L    AST (SGOT) 20 1 - 32 U/L    Alkaline Phosphatase 94 39 - 117 U/L    Total Bilirubin 1.4 (H) 0.0 - 1.2 mg/dL    Globulin 3.0 gm/dL    A/G Ratio 1.0 g/dL    BUN/Creatinine Ratio 12.9 7.0 - 25.0    Anion Gap 5.0 5.0 - 15.0 mmol/L    eGFR 99.2 >60.0 mL/min/1.73   BNP    Specimen: Blood   Result Value Ref  Range    proBNP 1,544.0 (H) 0.0 - 900.0 pg/mL   Troponin    Specimen: Blood   Result Value Ref Range    Troponin T <0.010 0.000 - 0.030 ng/mL   CBC Auto Differential    Specimen: Blood   Result Value Ref Range    WBC 8.33 3.40 - 10.80 10*3/mm3    RBC 5.52 (H) 3.77 - 5.28 10*6/mm3    Hemoglobin 15.2 12.0 - 15.9 g/dL    Hematocrit 49.4 (H) 34.0 - 46.6 %    MCV 89.5 79.0 - 97.0 fL    MCH 27.5 26.6 - 33.0 pg    MCHC 30.8 (L) 31.5 - 35.7 g/dL    RDW 14.8 12.3 - 15.4 %    RDW-SD 48.9 37.0 - 54.0 fl    MPV 11.9 6.0 - 12.0 fL    Platelets 155 140 - 450 10*3/mm3    Neutrophil % 73.6 42.7 - 76.0 %    Lymphocyte % 17.5 (L) 19.6 - 45.3 %    Monocyte % 7.8 5.0 - 12.0 %    Eosinophil % 0.2 (L) 0.3 - 6.2 %    Basophil % 0.5 0.0 - 1.5 %    Immature Grans % 0.4 0.0 - 0.5 %    Neutrophils, Absolute 6.13 1.70 - 7.00 10*3/mm3    Lymphocytes, Absolute 1.46 0.70 - 3.10 10*3/mm3    Monocytes, Absolute 0.65 0.10 - 0.90 10*3/mm3    Eosinophils, Absolute 0.02 0.00 - 0.40 10*3/mm3    Basophils, Absolute 0.04 0.00 - 0.20 10*3/mm3    Immature Grans, Absolute 0.03 0.00 - 0.05 10*3/mm3    nRBC 0.0 0.0 - 0.2 /100 WBC   Lactic Acid, Plasma    Specimen: Blood   Result Value Ref Range    Lactate 2.1 (C) 0.5 - 2.0 mmol/L   CK    Specimen: Blood   Result Value Ref Range    Creatine Kinase 39 20 - 180 U/L   Magnesium    Specimen: Blood   Result Value Ref Range    Magnesium 2.0 1.6 - 2.4 mg/dL   STAT Lactic Acid, Reflex    Specimen: Blood   Result Value Ref Range    Lactate 1.8 0.5 - 2.0 mmol/L   D-dimer, Quantitative    Specimen: Blood   Result Value Ref Range    D-Dimer, Quantitative 2,487 (H) 0 - 470 ng/mL (FEU)   ECG 12 Lead   Result Value Ref Range    QT Interval 366 ms    QTC Interval 450 ms   Gold Top - SST   Result Value Ref Range    Extra Tube Hold for add-ons.    Gray Top   Result Value Ref Range    Extra Tube Hold for add-ons.    Light Blue Top   Result Value Ref Range    Extra Tube Hold for add-ons.      CT Angiogram Chest   Final Result    Bilateral filling defects in branches supplying the upper lobes,   lower lobes and right middle lobe consistent with pulmonary   embolism. There is no evidence of saddle embolism.    Main pulmonary artery measuring 4.4 cm concerning for pulmonary   arterial hypertension.   Scattered ground glass opacities in the bilateral lower lobes   which may be due to nonspecific pneumonitis.       Electronically signed by:  Gene Stephens MD  8/9/2022 8:10 PM CDT   Workstation: [id number]      XR Chest 1 View   Final Result   Interstitial prominence and indistinctness, could represent an   infectious process or mild edema.         Electronically signed by:  Talia Garduno MD  8/9/2022 3:49 PM CDT   Workstation: [id number]                                          MDM    Final diagnoses:   Multiple subsegmental pulmonary emboli without acute cor pulmonale (HCC)   Hypoxia       ED Disposition  ED Disposition     ED Disposition   Decision to Admit    Condition   --    Comment   Level of Care: Telemetry [5]   Diagnosis: Multiple subsegmental pulmonary emboli without acute cor pulmonale (HCC) [[id number]]   Admitting Physician: KEMAR PARIS [[id number]]   Attending Physician: KEMAR PARIS [[id number]]               No follow-up provider specified.       Medication List      No changes were made to your prescriptions during this visit.          Brando Barnes MD  08/09/22 2035

## 2022-08-11 ENCOUNTER — APPOINTMENT (OUTPATIENT)
Dept: CARDIOLOGY | Facility: HOSPITAL | Age: 62
End: 2022-08-11

## 2022-08-11 ENCOUNTER — APPOINTMENT (OUTPATIENT)
Dept: ULTRASOUND IMAGING | Facility: HOSPITAL | Age: 62
End: 2022-08-11

## 2022-08-11 ENCOUNTER — APPOINTMENT (OUTPATIENT)
Dept: INTERVENTIONAL RADIOLOGY/VASCULAR | Facility: HOSPITAL | Age: 62
End: 2022-08-11

## 2022-08-11 LAB
ANION GAP SERPL CALCULATED.3IONS-SCNC: 5 MMOL/L (ref 5–15)
BASOPHILS # BLD AUTO: 0.04 10*3/MM3 (ref 0–0.2)
BASOPHILS NFR BLD AUTO: 0.5 % (ref 0–1.5)
BUN SERPL-MCNC: 6 MG/DL (ref 8–23)
BUN/CREAT SERPL: 9.8 (ref 7–25)
CALCIUM SPEC-SCNC: 7.9 MG/DL (ref 8.6–10.5)
CHLORIDE SERPL-SCNC: 93 MMOL/L (ref 98–107)
CO2 SERPL-SCNC: 38 MMOL/L (ref 22–29)
CREAT SERPL-MCNC: 0.61 MG/DL (ref 0.57–1)
DEPRECATED RDW RBC AUTO: 49.1 FL (ref 37–54)
EGFRCR SERPLBLD CKD-EPI 2021: 102.5 ML/MIN/1.73
EOSINOPHIL # BLD AUTO: 0.09 10*3/MM3 (ref 0–0.4)
EOSINOPHIL NFR BLD AUTO: 1.1 % (ref 0.3–6.2)
ERYTHROCYTE [DISTWIDTH] IN BLOOD BY AUTOMATED COUNT: 15 % (ref 12.3–15.4)
GLUCOSE SERPL-MCNC: 124 MG/DL (ref 65–99)
HCT VFR BLD AUTO: 43.3 % (ref 34–46.6)
HGB BLD-MCNC: 13.3 G/DL (ref 12–15.9)
IMM GRANULOCYTES # BLD AUTO: 0.03 10*3/MM3 (ref 0–0.05)
IMM GRANULOCYTES NFR BLD AUTO: 0.4 % (ref 0–0.5)
LYMPHOCYTES # BLD AUTO: 1.3 10*3/MM3 (ref 0.7–3.1)
LYMPHOCYTES NFR BLD AUTO: 16.4 % (ref 19.6–45.3)
MCH RBC QN AUTO: 27.3 PG (ref 26.6–33)
MCHC RBC AUTO-ENTMCNC: 30.7 G/DL (ref 31.5–35.7)
MCV RBC AUTO: 88.9 FL (ref 79–97)
MONOCYTES # BLD AUTO: 0.6 10*3/MM3 (ref 0.1–0.9)
MONOCYTES NFR BLD AUTO: 7.6 % (ref 5–12)
NEUTROPHILS NFR BLD AUTO: 5.86 10*3/MM3 (ref 1.7–7)
NEUTROPHILS NFR BLD AUTO: 74 % (ref 42.7–76)
NRBC BLD AUTO-RTO: 0 /100 WBC (ref 0–0.2)
PLATELET # BLD AUTO: 179 10*3/MM3 (ref 140–450)
PMV BLD AUTO: 10.9 FL (ref 6–12)
POTASSIUM SERPL-SCNC: 4.1 MMOL/L (ref 3.5–5.2)
RBC # BLD AUTO: 4.87 10*6/MM3 (ref 3.77–5.28)
SODIUM SERPL-SCNC: 136 MMOL/L (ref 136–145)
WBC NRBC COR # BLD: 7.92 10*3/MM3 (ref 3.4–10.8)

## 2022-08-11 PROCEDURE — 36410 VNPNXR 3YR/> PHY/QHP DX/THER: CPT

## 2022-08-11 PROCEDURE — C1751 CATH, INF, PER/CENT/MIDLINE: HCPCS

## 2022-08-11 PROCEDURE — 25010000002 PERFLUTREN (DEFINITY) 8.476 MG IN SODIUM CHLORIDE (PF) 0.9 % 10 ML INJECTION: Performed by: INTERNAL MEDICINE

## 2022-08-11 PROCEDURE — 25010000002 ENOXAPARIN PER 10 MG

## 2022-08-11 PROCEDURE — 76937 US GUIDE VASCULAR ACCESS: CPT

## 2022-08-11 PROCEDURE — 25010000002 LEVOFLOXACIN PER 250 MG: Performed by: INTERNAL MEDICINE

## 2022-08-11 PROCEDURE — 25010000002 FUROSEMIDE PER 20 MG: Performed by: INTERNAL MEDICINE

## 2022-08-11 PROCEDURE — 80048 BASIC METABOLIC PNL TOTAL CA: CPT | Performed by: INTERNAL MEDICINE

## 2022-08-11 PROCEDURE — 85025 COMPLETE CBC W/AUTO DIFF WBC: CPT | Performed by: INTERNAL MEDICINE

## 2022-08-11 PROCEDURE — 93306 TTE W/DOPPLER COMPLETE: CPT

## 2022-08-11 RX ORDER — FUROSEMIDE 10 MG/ML
60 INJECTION INTRAMUSCULAR; INTRAVENOUS ONCE
Status: COMPLETED | OUTPATIENT
Start: 2022-08-11 | End: 2022-08-11

## 2022-08-11 RX ADMIN — NICOTINE 1 PATCH: 21 PATCH, EXTENDED RELEASE TRANSDERMAL at 00:11

## 2022-08-11 RX ADMIN — CLINDAMYCIN IN 5 PERCENT DEXTROSE 600 MG: 12 INJECTION, SOLUTION INTRAVENOUS at 11:33

## 2022-08-11 RX ADMIN — NYSTATIN: 100000 POWDER TOPICAL at 10:48

## 2022-08-11 RX ADMIN — ENOXAPARIN SODIUM 140 MG: 150 INJECTION SUBCUTANEOUS at 22:19

## 2022-08-11 RX ADMIN — NICOTINE 1 PATCH: 21 PATCH, EXTENDED RELEASE TRANSDERMAL at 22:20

## 2022-08-11 RX ADMIN — ENOXAPARIN SODIUM 140 MG: 150 INJECTION SUBCUTANEOUS at 00:09

## 2022-08-11 RX ADMIN — NYSTATIN: 100000 POWDER TOPICAL at 00:11

## 2022-08-11 RX ADMIN — ENOXAPARIN SODIUM 140 MG: 150 INJECTION SUBCUTANEOUS at 10:48

## 2022-08-11 RX ADMIN — Medication 10 ML: at 22:20

## 2022-08-11 RX ADMIN — LEVOFLOXACIN 750 MG: 5 INJECTION, SOLUTION INTRAVENOUS at 17:22

## 2022-08-11 RX ADMIN — CLINDAMYCIN IN 5 PERCENT DEXTROSE 600 MG: 12 INJECTION, SOLUTION INTRAVENOUS at 18:52

## 2022-08-11 RX ADMIN — PERFLUTREN 4 ML: 6.52 INJECTION, SUSPENSION INTRAVENOUS at 17:05

## 2022-08-11 RX ADMIN — Medication 10 ML: at 00:09

## 2022-08-11 RX ADMIN — CLINDAMYCIN IN 5 PERCENT DEXTROSE 600 MG: 12 INJECTION, SOLUTION INTRAVENOUS at 00:10

## 2022-08-11 RX ADMIN — FUROSEMIDE 60 MG: 10 INJECTION, SOLUTION INTRAMUSCULAR; INTRAVENOUS at 17:22

## 2022-08-11 RX ADMIN — NYSTATIN: 100000 POWDER TOPICAL at 22:20

## 2022-08-11 NOTE — PROGRESS NOTES
[facility] Medicine Services  INPATIENT PROGRESS NOTE    Length of Stay: 0  Date of Admission: 8/9/2022  Primary Care Physician: Danielle Vaughn APRN    Subjective   Patient seen and evaluated today, reports no issues or complaints.        Objective    Temp:  [96.1 °F (35.6 °C)-98.2 °F (36.8 °C)] 98.2 °F (36.8 °C)  Heart Rate:  [] 87  Resp:  [18] 18  BP: (104-127)/(60-75) 124/75    Physical Exam:   Temp:  [96.1 °F (35.6 °C)-98.2 °F (36.8 °C)] 98.2 °F (36.8 °C)  Heart Rate:  [] 87  Resp:  [18] 18  BP: (104-127)/(60-75) 124/75  Physical Exam    General: NC/AT, appears stated age, alert and oriented x3  HEENT: PERRLA, EOMI, no scleral icterus, no conjunctival injection,   NECK:  Neck is supple, no JVD, no supraclavicular lymphadenopathy  CV: S1 S2 RRR, no murmurs, no gallops, no heaves, pulses palpable.  LUNG: CTA, no wheezing crackles or rhonchi  ABD: Obesed, nondistended, nontender, bowel sounds present, no guarding or rebound, organomegaly not appreciated.   EXT: FROM, erythematous and tender, pitting edema with lymphedema, pulses palpable  Neuro: CN 2-12, grossly intact,no  focal weakness appreciated  Skin: As mentioned in extremity exam      Results Review:  I have reviewed the labs, radiology results, and diagnostic studies.    Laboratory Data:   Results from last 7 days   Lab Units 08/11/22  0539 08/09/22  1515   SODIUM mmol/L 136 135*   POTASSIUM mmol/L 4.1 4.2   CHLORIDE mmol/L 93* 91*   CO2 mmol/L 38.0* 39.0*   BUN mg/dL 6* 9   CREATININE mg/dL 0.61 0.70   GLUCOSE mg/dL 124* 189*   CALCIUM mg/dL 7.9* 8.4*   BILIRUBIN mg/dL  --  1.4*   ALK PHOS U/L  --  94   ALT (SGPT) U/L  --  12   AST (SGOT) U/L  --  20   ANION GAP mmol/L 5.0 5.0     Estimated Creatinine Clearance: 134.5 mL/min (by C-G formula based on SCr of 0.61 mg/dL).  Results from last 7 days   Lab Units 08/09/22  1515   MAGNESIUM mg/dL 2.0         Results from last 7 days   Lab Units  08/11/22  0539 08/09/22  1515   WBC 10*3/mm3 7.92 8.33   HEMOGLOBIN g/dL 13.3 15.2   HEMATOCRIT % 43.3 49.4*   PLATELETS 10*3/mm3 179 155     Results from last 7 days   Lab Units 08/09/22  2358   INR  1.20       Culture Data:   Blood Culture   Date Value Ref Range Status   08/09/2022 No growth at 24 hours  Preliminary   08/09/2022 No growth at 24 hours  Preliminary     No results found for: URINECX  No results found for: RESPCX  No results found for: WOUNDCX  No results found for: STOOLCX  No components found for: BODYFLD    Radiology Data:   Imaging Results (Last 24 Hours)     Procedure Component Value Units Date/Time    US Guided Vascular Access [[id number]] Resulted: 08/11/22 1101     Updated: 08/11/22 1122    IR Insert Midline Without Port Pump 5 Plus [[id number]] Resulted: 08/11/22 1119     Updated: 08/11/22 1119    Narrative:      This procedure was auto-finalized with no dictation required.    US Venous Doppler Lower Extremity Bilateral (duplex) [[id number]] Collected: 08/10/22 1316     Updated: 08/10/22 1518    Narrative:      ULTRASOUND VENOUS DOPPLER BILATERAL LOWER EXTREMITIES    INDICATION: dvt eval, I26.94 Multiple subsegmental pulmonary  emboli without acute cor pulmonale R09.02 Hypoxemia    COMPARISON: None    TECHNIQUE: Grayscale and color Doppler sonographic images of  bilateral lower extremity veins were obtained.     FINDINGS:  RIGHT:  CFV: Patent, compressible, and augments.  GSV: Patent, compressible, and augments.  SFV: Patent, compressible, and augments.  Popliteal: Patent, compressible, and augments.  Subcutaneous edema is noted.    LEFT:  CFV: Patent, compressible, and augments.  GSV: Patent, compressible, and augments.  SFV: Patent, compressible, and augments.  Popliteal: Noncompressible suggesting thrombus. Thrombus of  heterogeneous echogenicity is visualized in the left popliteal  vein.  There is subcutaneous edema.    Additional Findings: Bilateral lower extremity subcutaneous  edema.       Impression:      1. DVT of left popliteal vein.  2. Bilateral lower extremity edema.    Numerous attempts were made to contact Dr. Burr. The patient is  already fully anticoagulated for PE and report of findings was  given to the patient's nurse Kartik Jesus RN at 3:12 PM on  8/10/2022.    Electronically signed by:  Fouzia Tan MD  8/10/2022 3:12 PM CDT  Workstation: [id number]          I have reviewed the patient's current medications.     Assessment/Plan     Active Hospital Problems    Diagnosis    • Multiple subsegmental pulmonary emboli without acute cor pulmonale (HCC)    • Cellulitis    • Cellulitis    Left Polpilteal DVT      08/10    Plan:    Patient Doppler showed DVT of Left leg,   -Continue IV heparin  -Switch abx from po Doyxcycline to IV Clindamycin + Levaquin for weeping B/L lower extremity cellulitis.   -Get wound care consult  -Pain control  -Continue other treatment for comorbid conditions    Consider switching to po anticoagulation after a few days of IV heparin due to significant PE and DVT.   ______________________________________________________    08/11    Plan:   - Continue IV heparin, plan to transition to p.o. anticoagulation tomorrow.  - Patient given a dose of Lasix 60 mg x 1 for lower extremity edema.  -We will obtain echocardiogram for CHF evaluation.  - Continue IV antibiotics with clindamycin and Levaquin, bilateral lower extremity cellulitis improving resolution of the weeping.  Possible transition to p.o. antibiotics tomorrow in anticipation for discharge.  -We will wean off oxygen as patient does not require oxygen, will ambulate and evaluate for home oxygen if required.    Continue other home medications management of comorbid condition.  -Possible discharge in 24 to 48 hours if patient continues to improve.        Fern Burr MD

## 2022-08-11 NOTE — PLAN OF CARE
Problem: Adult Inpatient Plan of Care  Goal: Plan of Care Review  Outcome: Ongoing, Not Progressing  Flowsheets (Taken 8/11/2022 1644)  Progress: improving  Outcome Evaluation: vss, echo ordered today, lasix given, oon, continue to monitor , midline place today  Goal: Patient-Specific Goal (Individualized)  Outcome: Ongoing, Not Progressing  Goal: Absence of Hospital-Acquired Illness or Injury  Outcome: Ongoing, Not Progressing  Intervention: Identify and Manage Fall Risk  Recent Flowsheet Documentation  Taken 8/11/2022 0906 by Kartik Jesus, RN  Safety Promotion/Fall Prevention: safety round/check completed  Intervention: Prevent and Manage VTE (Venous Thromboembolism) Risk  Recent Flowsheet Documentation  Taken 8/11/2022 0906 by Kartik Jesus RN  VTE Prevention/Management: (lovonex) other (see comments)  Goal: Optimal Comfort and Wellbeing  Outcome: Ongoing, Not Progressing  Intervention: Provide Person-Centered Care  Recent Flowsheet Documentation  Taken 8/11/2022 0906 by Kartik Jesus RN  Trust Relationship/Rapport: care explained  Goal: Readiness for Transition of Care  Outcome: Ongoing, Not Progressing     Problem: Fall Injury Risk  Goal: Absence of Fall and Fall-Related Injury  Outcome: Ongoing, Not Progressing  Intervention: Promote Injury-Free Environment  Recent Flowsheet Documentation  Taken 8/11/2022 0906 by Kartik Jesus RN  Safety Promotion/Fall Prevention: safety round/check completed     Problem: Skin Injury Risk Increased  Goal: Skin Health and Integrity  Outcome: Ongoing, Not Progressing     Problem: VTE (Venous Thromboembolism)  Goal: VTE (Venous Thromboembolism) Symptom Resolution  Outcome: Ongoing, Not Progressing  Intervention: Prevent or Manage VTE (Venous Thromboembolism)  Recent Flowsheet Documentation  Taken 8/11/2022 0906 by Kartik Jesus RN  VTE Prevention/Management: (lovonex) other (see comments)     Problem: Skin or Soft Tissue Infection  Goal: Absence of  Infection Signs and Symptoms  Outcome: Ongoing, Not Progressing   Goal Outcome Evaluation:           Progress: improving  Outcome Evaluation: vss, echo ordered today, lasix given, oon, continue to monitor , midline place today

## 2022-08-11 NOTE — PROGRESS NOTES
TWO PATIENT IDENTIFIERS WERE USED. THE PATIENT WAS DRAPED WITH A FULL BODY DRAPE AND THE PATIENT'S RIGHT ARM WAS PREPPED WITH CHLORA PREP. ULTRASOUND WAS USED TO LOCALIZE THERIGHT BASILIC VEIN. SUBCUTANEOUS TISSUE AT THE CATHETER SITE WAS INFILTRATED WITH 2% LIDOCAINE. UNDER ULTRASOUND GUIDANCE, THE VEIN WAS ACCESSED WITH A 21 GAUGE  NEEDLE. AN 0.018 WIRE WAS THEN THREADED THROUGH THE NEEDLE. THE 21 GAUGE NEEDLE WAS REMOVED AND A 4 Indonesian SHEATH WAS PLACED OVER THE WIRE INTO THE VEIN.THE MIDLINE CATHETER WAS TRIMMED TO 20CM. THE MIDLINE CATHETER WAS THEN PLACED OVER THE WIRE INTO THE VEIN, THE SHEATH WAS PEELED AWAY, WIRE WAS REMOVED. CATHETER WAS FLUSHED WITH NORMAL SALINE AND CATHETER TIP APPLIED. BIOPATCH PLACED. CATHETER SECURED WITH STAT LOCK AND TEGADERM. PATIENT TOLERATED PROCEDURE WELL. THIS WAS DONE IN THE ANGIOSUITE      IMPRESSION:SUCCESSFUL PLACEMENT OF DUAL LUMEN MIDLINE.           Sheba Gastelum  8/11/2022  11:18 CDT

## 2022-08-11 NOTE — PAYOR COMM NOTE
"  [facility]  Case Managment Extender   Maine Guzmán  (P) [phone]  (F) [phone]      [employer] Provider ID#[id number]     OBV DOS 8- to 08-    Changed to Inpatient 8-  Stephanie Munoz (62 y.o. Female)             Date of Birth   1960    Social Security Number       Address   [address] [place] [address] [place] [address] [place], [place] [id number]    Home Phone   [phone]    MRN   [id number]       Worship   None    Marital Status                               Admission Date   8/9/22    Admission Type   Emergency    Admitting Provider   Fern Burr MD    Attending Provider   Fern Burr MD    Department, Room/Bed   [address], 428/1       Discharge Date       Discharge Disposition       Discharge Destination                               Attending Provider: Fern Burr MD    Allergies: Penicillins    Isolation: None   Infection: COVID (rule out) (08/09/22)   Code Status: CPR   Advance Care Planning Activity    Ht: 157.5 cm (62\")   Wt: 142 kg (313 lb 9.6 oz)    Admission Cmt: None   Principal Problem: None                Active Insurance as of 8/9/2022     Primary Coverage     Payor Plan Insurance Group Employer/Plan Group    [employer] [place] [employer]      Payor Plan Address Payor Plan Phone Number Payor Plan Fax Number Effective Dates    [address] [address] [phone]  8/9/2022 - None Entered    [facility] [address]       Subscriber Name Subscriber Birth Date Member ID       STEPHANIE MUNOZ 1960 [id number]                 Emergency Contacts      (Rel.) Home Phone Work Phone Mobile Phone    Sonia Correia (Friend) [phone] -- [phone]    Hieu Neumann (Son) [phone] -- [phone]               History & Physical      Amandeep Bowen MD at 08/09/22 2223                [facility] ER Medicine Admission      Date of Admission: " 8/9/2022      Primary Care Physician: Danielle Vaughn APRN    Chief Complaint:   Dyspnea    HPI:  3 weeks ago this patient was diagnosed with pneumonia and completed a course of antibiotics, however, patient was less mobile than usual and subsequently developed leg swelling (right greater than left) as well as erythema of her lower limbs bilaterally over the course of the last 2 weeks.  The erythema is more significant on the right leg than the left and affects the anterior and less so the lateral aspect of the distal lower limbs.  Patient denies any chest pain, palpitations, hemoptysis, or other cardiorespiratory symptoms, except dry, nonproductive cough.    Patient's lab work reveals a proBNP of 1544, and albumin of 2.9, total bilirubin 1.4, lactate of 2.1, D-dimer of 2487.  CT angiogram of the chest demonstrates significant burden of bilateral PEs.  There is pulmonary hypertension evident.  There is also concern for pneumonitis.      Concurrent Medical History:  has a past medical history of COPD (chronic obstructive pulmonary disease) (HCC) and Hyperlipidemia.    Past Surgical History:  has no past surgical history on file.    Family History: family history is not on file.     Social History:  reports that she has quit smoking. She has never used smokeless tobacco. She reports that she does not use drugs.    Allergies:   Allergies   Allergen Reactions   • Penicillins Rash       Medications:   Prior to Admission medications    Not on File       Review of Systems:  Review of Systems   Constitutional: Positive for activity change and fatigue. Negative for chills, diaphoresis and fever.   HENT: Negative for congestion, ear pain, rhinorrhea, sinus pressure, sneezing and sore throat.    Respiratory: Positive for cough, shortness of breath and wheezing. Negative for choking and stridor.    Cardiovascular: Positive for leg swelling. Negative for chest pain and palpitations.   Gastrointestinal: Negative for abdominal  distention, abdominal pain, constipation, diarrhea, nausea and vomiting.   Genitourinary: Negative for difficulty urinating, dysuria, frequency, hematuria and urgency.   Musculoskeletal: Negative for arthralgias, joint swelling and myalgias.   Skin: Negative for color change, pallor, rash and wound.   Neurological: Negative for dizziness, syncope, light-headedness and headaches.   Psychiatric/Behavioral: Negative for agitation, confusion and hallucinations.      Otherwise complete ROS is negative except as mentioned above.    Physical Exam:   Temp:  [98.4 °F (36.9 °C)] 98.4 °F (36.9 °C)  Heart Rate:  [78-89] 82  Resp:  [20-22] 20  BP: (104-125)/(57-73) 104/57  Physical Exam  HENT:      Head: Normocephalic and atraumatic.      Nose: Nose normal. No congestion or rhinorrhea.      Mouth/Throat:      Mouth: Mucous membranes are moist.      Pharynx: Oropharynx is clear. No oropharyngeal exudate or posterior oropharyngeal erythema.   Eyes:      General:         Right eye: No discharge.         Left eye: No discharge.      Extraocular Movements: Extraocular movements intact.      Conjunctiva/sclera: Conjunctivae normal.      Pupils: Pupils are equal, round, and reactive to light.   Cardiovascular:      Rate and Rhythm: Normal rate and regular rhythm.      Pulses: Normal pulses.      Heart sounds: Normal heart sounds. No murmur heard.    No gallop.   Pulmonary:      Effort: Pulmonary effort is normal. No respiratory distress.      Breath sounds: No stridor. Wheezing (throughout but more pronounced in bases) and rhonchi (bilateral bases, minor) present. No rales.   Abdominal:      General: Abdomen is flat. There is no distension.      Palpations: Abdomen is soft. There is no mass.      Tenderness: There is no abdominal tenderness.      Hernia: No hernia is present.   Musculoskeletal:         General: Tenderness (Rt lower limb) present. No swelling.      Right lower leg: Edema present.      Left lower leg: Edema present.    Skin:     Capillary Refill: Capillary refill takes less than 2 seconds.      Coloration: Skin is not jaundiced.      Findings: Erythema (Rt > Lt) present. No bruising or rash.   Neurological:      General: No focal deficit present.      Mental Status: She is oriented to person, place, and time. Mental status is at baseline.      Cranial Nerves: No cranial nerve deficit.   Psychiatric:         Mood and Affect: Mood normal.         Behavior: Behavior normal.         Thought Content: Thought content normal.         Judgment: Judgment normal.         Results Reviewed:  I have personally reviewed current lab, radiology, and data and agree with results.  Lab Results (last 24 hours)     Procedure Component Value Units Date/Time    aPTT [[id number]] Updated: 08/09/22 2116    Specimen: Blood from Arm, Left     Protime-INR [[id number]] Updated: 08/09/22 2116    Specimen: Blood from Arm, Left     Blood Culture - Blood, Arm, Left [[id number]] Collected: 08/09/22 1944    Specimen: Blood from Arm, Left Updated: 08/09/22 1950    Extra Tubes [[id number]] Collected: 08/09/22 1522    Specimen: Blood Updated: 08/09/22 1932    Narrative:      The following orders were created for panel order Extra Tubes.  Procedure                               Abnormality         Status                     ---------                               -----------         ------                     Gold Top - SST[[id number]]                                   Final result               Gray Top[[id number]]                                         Final result               Light Blue Top[[id number]]                                   Final result                 Please view results for these tests on the individual orders.    Gray Top [[id number]] Collected: 08/09/22 1522    Specimen: Blood Updated: 08/09/22 1932     Extra Tube Hold for add-ons.     Comment: Auto resulted.       STAT Lactic Acid, Reflex [[id number]]  (Normal) Collected: 08/09/22 1831    Specimen:  Blood Updated: 08/09/22 1847     Lactate 1.8 mmol/L     D-dimer, Quantitative [[id number]]  (Abnormal) Collected: 08/09/22 1522    Specimen: Blood Updated: 08/09/22 1716     D-Dimer, Quantitative 2,487 ng/mL (FEU)     Narrative:      Dimer values <500 ng/ml FEU are FDA approved as aid in diagnosis of deep venous thrombosis and pulmonary embolism.  This test should not be used in an exclusion strategy with pretest probability alone.    A recent guideline regarding diagnosis for pulmonary thromboembolism recommends an adjusted exclusion criterion of age x 10 ng/ml FEU for patients >50 years of age ([employer] Intern Med 2015; 163: 701-711).      COVID-19 and FLU A/B PCR - Swab, Nasopharynx [[id number]]  (Normal) Collected: 08/09/22 1621    Specimen: Swab from Nasopharynx Updated: 08/09/22 1649     COVID19 Not Detected     Influenza A PCR Not Detected     Influenza B PCR Not Detected    Narrative:      Fact sheet for providers: https://www.fda.gov/media/078997/download    Fact sheet for patients: https://www.fda.gov/media/860116/download    Test performed by PCR.    Gold Top - SST [[id number]] Collected: 08/09/22 1522    Specimen: Blood Updated: 08/09/22 1633     Extra Tube Hold for add-ons.     Comment: Auto resulted.       Light Blue Top [[id number]] Collected: 08/09/22 1522    Specimen: Blood Updated: 08/09/22 1633     Extra Tube Hold for add-ons.     Comment: Auto resulted       CK [[id number]]  (Normal) Collected: 08/09/22 1515    Specimen: Blood Updated: 08/09/22 1618     Creatine Kinase 39 U/L     Magnesium [[id number]]  (Normal) Collected: 08/09/22 1515    Specimen: Blood Updated: 08/09/22 1618     Magnesium 2.0 mg/dL     Lactic Acid, Plasma [[id number]]  (Abnormal) Collected: 08/09/22 1522    Specimen: Blood Updated: 08/09/22 1614     Lactate 2.1 mmol/L     Comprehensive Metabolic Panel [[id number]]  (Abnormal) Collected: 08/09/22 1515    Specimen: Blood Updated: 08/09/22 1546     Glucose 189 mg/dL      BUN 9 mg/dL       Creatinine 0.70 mg/dL      Sodium 135 mmol/L      Potassium 4.2 mmol/L      Chloride 91 mmol/L      CO2 39.0 mmol/L      Calcium 8.4 mg/dL      Total Protein 5.9 g/dL      Albumin 2.90 g/dL      ALT (SGPT) 12 U/L      AST (SGOT) 20 U/L      Alkaline Phosphatase 94 U/L      Total Bilirubin 1.4 mg/dL      Globulin 3.0 gm/dL      A/G Ratio 1.0 g/dL      BUN/Creatinine Ratio 12.9     Anion Gap 5.0 mmol/L      eGFR 99.2 mL/min/1.73      Comment: National Kidney Foundation and American Society of Nephrology (ASN) Task Force recommended calculation based on the Chronic Kidney Disease Epidemiology Collaboration (CKD-EPI) equation refit without adjustment for race.       Narrative:      GFR Normal >60  Chronic Kidney Disease <60  Kidney Failure <15      Troponin [[id number]]  (Normal) Collected: 08/09/22 1515    Specimen: Blood Updated: 08/09/22 1546     Troponin T <0.010 ng/mL     Narrative:      Troponin T Reference Range:  <= 0.03 ng/mL-   Negative for AMI  >0.03 ng/mL-     Abnormal for myocardial necrosis.  Clinicians would have to utilize clinical acumen, EKG, Troponin and serial changes to determine if it is an Acute Myocardial Infarction or myocardial injury due to an underlying chronic condition.       Results may be falsely decreased if patient taking Biotin.      BNP [[id number]]  (Abnormal) Collected: 08/09/22 1515    Specimen: Blood Updated: 08/09/22 1544     proBNP 1,544.0 pg/mL     Narrative:      Among patients with dyspnea, NT-proBNP is highly sensitive for the detection of acute congestive heart failure. In addition NT-proBNP of <300 pg/ml effectively rules out acute congestive heart failure with 99% negative predictive value.    Results may be falsely decreased if patient taking Biotin.      CBC & Differential [[id number]]  (Abnormal) Collected: 08/09/22 1515    Specimen: Blood Updated: 08/09/22 1528    Narrative:      The following orders were created for panel order CBC & Differential.  Procedure                                Abnormality         Status                     ---------                               -----------         ------                     CBC Auto Differential[[id number]]        Abnormal            Final result                 Please view results for these tests on the individual orders.    CBC Auto Differential [[id number]]  (Abnormal) Collected: 08/09/22 1515    Specimen: Blood Updated: 08/09/22 1528     WBC 8.33 10*3/mm3      RBC 5.52 10*6/mm3      Hemoglobin 15.2 g/dL      Hematocrit 49.4 %      MCV 89.5 fL      MCH 27.5 pg      MCHC 30.8 g/dL      RDW 14.8 %      RDW-SD 48.9 fl      MPV 11.9 fL      Platelets 155 10*3/mm3      Neutrophil % 73.6 %      Lymphocyte % 17.5 %      Monocyte % 7.8 %      Eosinophil % 0.2 %      Basophil % 0.5 %      Immature Grans % 0.4 %      Neutrophils, Absolute 6.13 10*3/mm3      Lymphocytes, Absolute 1.46 10*3/mm3      Monocytes, Absolute 0.65 10*3/mm3      Eosinophils, Absolute 0.02 10*3/mm3      Basophils, Absolute 0.04 10*3/mm3      Immature Grans, Absolute 0.03 10*3/mm3      nRBC 0.0 /100 WBC         Imaging Results (Last 24 Hours)     Procedure Component Value Units Date/Time    CT Angiogram Chest [[id number]] Collected: 08/09/22 1913     Updated: 08/09/22 2012    Narrative:      EXAM:  CT CHEST ANGIOGRAPHY WITH IV CONTRAST    ORDERING PROVIDER:  CHANTELL BARAHONA    CLINICAL HISTORY:  Shortness of breath    COMPARISON:      TECHNIQUE:   Chest CT was performed using a high resolution pulmonary  angiogram protocol with 80ml of Isovue 370 contrast and  reformatted in the sagittal and coronal planes.     3-dimensional images also acquired with special processing of the  CT scan data with a specialized workstation for evaluation.    This examination was performed according to our departmental dose  optimization program which includes automated exposure control,  adjustment of the MA and kV according to patient size, and/or use  of iterative reconstruction  technique.     FINDINGS:     LUNGS AND PLEURA: Scattered ground glass opacities in the  bilateral lower lobes which may be due to nonspecific  pneumonitis. No focal consolidation or masses.No pleural effusion  or plaques.  Unremarkable interstitium.    HEART: Prominent size and unremarkable configuration. No  pericardial effusion.     MEDIASTINUM AND ADDISON: No significant adenopathy.     AORTA AND GREAT VESSELS: No aneurysm or dissection.     PULMONARY ARTERIES: Bilateral filling defects in branches  supplying the upper lobes, lower lobes and right middle lobe.  There is no evidence of saddle embolism. Main pulmonary artery  measuring 4.4 cm concerning for pulmonary arterial hypertension.    UPPER ABDOMEN: Unremarkable.    MUSCULOSKELETAL:  No aggressive osseous lesion. Unremarkable  vertebral body height and alignment. No lytic or sclerotic  lesion.     EXTRATHORACIC SOFT TISSUES: No axillary adenopathy. No mass.  Unremarkable supraclavicular soft tissues.       Impression:      Bilateral filling defects in branches supplying the upper lobes,  lower lobes and right middle lobe consistent with pulmonary  embolism. There is no evidence of saddle embolism.   Main pulmonary artery measuring 4.4 cm concerning for pulmonary  arterial hypertension.  Scattered ground glass opacities in the bilateral lower lobes  which may be due to nonspecific pneumonitis.     Electronically signed by:  Gene Stephens MD  8/9/2022 8:10 PM CDT  Workstation: [id number]    XR Chest 1 View [[id number]] Collected: 08/09/22 1453     Updated: 08/09/22 1551    Narrative:      PROCEDURE: XR CHEST 1 VW    VIEWS:Single    INDICATION: Shortness of breath    COMPARISON: CXR: None    FINDINGS:       - lines/tubes: None    - cardiac: Size within normal limits.    - mediastinum: Contour within normal limits.     - lungs: No evidence of a focal air space process. Mild  interstitial prominence and indistinctness.     - pleura: No evidence of  fluid.      - osseous:  Unremarkable for age.        Impression:      Interstitial prominence and indistinctness, could represent an  infectious process or mild edema.      Electronically signed by:  Talia Garduno MD  8/9/2022 3:49 PM CDT  Workstation: [id number]            Assessment:    Active Hospital Problems    Diagnosis    • Multiple subsegmental pulmonary emboli without acute cor pulmonale (HCC)    • Cellulitis    • Cellulitis            Plan:  1.  I have stopped the heparin infusion and will switch to Lovenox therapeutic dose we can then transition to an outpatient regimen  2.  Home med reconciliation  3.  Daily lab work  4.  Supplemental oxygen to maintain saturations equal to or greater than 92%  5.  Antibiotics for cellulitis      I confirmed that the patient's Advance Care Plan is present, code status is documented, or surrogate decision maker is listed in the patient's medical record.     I have utilized all available immediate resources to obtain, update, or review the patient's current medications.     I discussed the patient's findings and my recommendations with: The patient    Amandeep Bowen MD    35 minutes of dedicated clinical time was devoted to this patient's admission H&P and acute medical management    Electronically signed by Amandeep Bowen MD, 08/09/22, 10:24 PM CDT.                Electronically signed by Amandeep Bowen MD at 08/09/22 2242          Emergency Department Notes      Temitope Sanchez, RN at 08/09/22 1429        Per EMS the patient was picked up at [facility] for resp distress, diagnosed with pneumonia 2 weeks ago, edema bilateral lower legs.   /86, HR 96  O2 88% to 97% after duo neb treatment.     Electronically signed by Temitope Sanchez RN at 08/09/22 1430     Temitope Sanchez RN at 08/09/22 1544        Patient states she does not currently take medicine and is unsure of what the names are that she is supposed to take. States she has 400mg gabapentin but unable to take them due to  making her too drowsy.     Electronically signed by Temitope Sanchez RN at 08/09/22 1549     Temitope Sanchez RN at 08/09/22 1622        Lab called to collect blood cultures    Electronically signed by Temitope Sanchez RN at 08/09/22 1623     Nayla Washington LPN at 08/09/22 1727        Provider at the bedside.     Electronically signed by Nayla Washington LPN at 08/09/22 1728     Nayla Washington LPN at 08/09/22 1734        Lab called again for blood cultures at this time.     Electronically signed by Nayla Washington LPN at 08/09/22 1735     Brando Barnes MD at 08/09/22 1921          Subjective   Patient presents to the emergency department with shortness of breath and leg swelling.  Patient was recently diagnosed with a pneumonia 2 weeks ago and was discharged home on oral antibiotics.  She was seen in the clinic today and noted to have an O2 sat in the upper 80s and sent to the emergency department for further assessment.  Patient is normally not oxygen dependent.  In the emergency department today her O2 sats are 85% on arrival, and improved to 93% with 2 L nasal cannula oxygen.      Shortness of Breath  Severity:  Moderate  Duration:  2 weeks  Timing:  Constant  Progression:  Waxing and waning  Chronicity:  Recurrent  Relieved by:  Oxygen  Associated symptoms: no abdominal pain, no chest pain, no cough, no diaphoresis, no ear pain, no fever, no headaches, no neck pain, no rash, no sore throat, no vomiting and no wheezing    Risk factors: obesity    Leg Swelling  Associated symptoms: shortness of breath    Associated symptoms: no abdominal pain, no chest pain, no congestion, no cough, no diarrhea, no ear pain, no fatigue, no fever, no headaches, no myalgias, no nausea, no rash, no rhinorrhea, no sore throat, no vomiting and no wheezing        Review of Systems   Constitutional: Positive for activity change. Negative for appetite change, chills, diaphoresis, fatigue and fever.   HENT: Negative for  congestion, ear discharge, ear pain, nosebleeds, rhinorrhea, sinus pressure, sore throat and trouble swallowing.    Eyes: Negative for discharge and redness.   Respiratory: Positive for shortness of breath. Negative for apnea, cough, chest tightness and wheezing.    Cardiovascular: Negative for chest pain.   Gastrointestinal: Negative for abdominal pain, diarrhea, nausea and vomiting.   Endocrine: Negative for polyuria.   Genitourinary: Negative for dysuria, frequency and urgency.   Musculoskeletal: Negative for myalgias and neck pain.   Skin: Positive for color change. Negative for rash.   Allergic/Immunologic: Negative for immunocompromised state.   Neurological: Negative for dizziness, seizures, syncope, weakness, light-headedness and headaches.   Hematological: Negative for adenopathy. Does not bruise/bleed easily.   Psychiatric/Behavioral: Negative for behavioral problems and confusion.   All other systems reviewed and are negative.      Past Medical History:   Diagnosis Date   • COPD (chronic obstructive pulmonary disease) ([facility])    • Hyperlipidemia        Allergies   Allergen Reactions   • Penicillins Rash       History reviewed. No pertinent surgical history.    History reviewed. No pertinent family history.    Social History     Socioeconomic History   • Marital status:    Tobacco Use   • Smoking status: Former Smoker   • Smokeless tobacco: Never Used   Substance and Sexual Activity   • Drug use: Never   • Sexual activity: Defer           Objective   Physical Exam  Vitals and nursing note reviewed.   Constitutional:       Appearance: She is well-developed.   HENT:      Head: Normocephalic and atraumatic.      Nose: Nose normal.   Eyes:      General: No scleral icterus.        Right eye: No discharge.         Left eye: No discharge.      Conjunctiva/sclera: Conjunctivae normal.      Pupils: Pupils are equal, round, and reactive to light.   Neck:      Trachea: No tracheal deviation.   Cardiovascular:       Rate and Rhythm: Normal rate and regular rhythm.      Heart sounds: Normal heart sounds. No murmur heard.  Pulmonary:      Effort: Pulmonary effort is normal. No respiratory distress.      Breath sounds: Normal breath sounds. No stridor. No wheezing or rales.   Abdominal:      General: Bowel sounds are normal. There is no distension.      Palpations: Abdomen is soft. There is no mass.      Tenderness: There is no abdominal tenderness. There is no guarding or rebound.   Musculoskeletal:      Cervical back: Normal range of motion and neck supple.        Legs:       Comments: Bilateral lower extremity erythema, warmth, tenderness, and edema.   Skin:     General: Skin is warm and dry.      Findings: No erythema or rash.   Neurological:      Mental Status: She is alert and oriented to person, place, and time.      Coordination: Coordination normal.   Psychiatric:         Behavior: Behavior normal.         Thought Content: Thought content normal.         Procedures          ED Course  ED Course as of 08/09/22 2035   Tue Aug 09, 2022   2035 Patient checked out to me by Dr. Key.  Oxygen improved on couple liters nasal cannula.  D-dimer was elevated so CTA chest was obtained.  CTA shows bilateral PEs with no saddle.  No heart strain noted.  Patient started on heparin drip.  Spoke with the on-call hospitalist who agrees to admit.  Hospitalist requests a telemetry bed floor. [BH]      ED Course User Index  [BH] Brando Barnes MD            Results for orders placed or performed during the hospital encounter of 08/09/22   COVID-19 and FLU A/B PCR - Swab, Nasopharynx    Specimen: Nasopharynx; Swab   Result Value Ref Range    COVID19 Not Detected Not Detected - Ref. Range    Influenza A PCR Not Detected Not Detected    Influenza B PCR Not Detected Not Detected   Comprehensive Metabolic Panel    Specimen: Blood   Result Value Ref Range    Glucose 189 (H) 65 - 99 mg/dL    BUN 9 8 - 23 mg/dL    Creatinine 0.70 0.57 - 1.00  mg/dL    Sodium 135 (L) 136 - 145 mmol/L    Potassium 4.2 3.5 - 5.2 mmol/L    Chloride 91 (L) 98 - 107 mmol/L    CO2 39.0 (H) 22.0 - 29.0 mmol/L    Calcium 8.4 (L) 8.6 - 10.5 mg/dL    Total Protein 5.9 (L) 6.0 - 8.5 g/dL    Albumin 2.90 (L) 3.50 - 5.20 g/dL    ALT (SGPT) 12 1 - 33 U/L    AST (SGOT) 20 1 - 32 U/L    Alkaline Phosphatase 94 39 - 117 U/L    Total Bilirubin 1.4 (H) 0.0 - 1.2 mg/dL    Globulin 3.0 gm/dL    A/G Ratio 1.0 g/dL    BUN/Creatinine Ratio 12.9 7.0 - 25.0    Anion Gap 5.0 5.0 - 15.0 mmol/L    eGFR 99.2 >60.0 mL/min/1.73   BNP    Specimen: Blood   Result Value Ref Range    proBNP 1,544.0 (H) 0.0 - 900.0 pg/mL   Troponin    Specimen: Blood   Result Value Ref Range    Troponin T <0.010 0.000 - 0.030 ng/mL   CBC Auto Differential    Specimen: Blood   Result Value Ref Range    WBC 8.33 3.40 - 10.80 10*3/mm3    RBC 5.52 (H) 3.77 - 5.28 10*6/mm3    Hemoglobin 15.2 12.0 - 15.9 g/dL    Hematocrit 49.4 (H) 34.0 - 46.6 %    MCV 89.5 79.0 - 97.0 fL    MCH 27.5 26.6 - 33.0 pg    MCHC 30.8 (L) 31.5 - 35.7 g/dL    RDW 14.8 12.3 - 15.4 %    RDW-SD 48.9 37.0 - 54.0 fl    MPV 11.9 6.0 - 12.0 fL    Platelets 155 140 - 450 10*3/mm3    Neutrophil % 73.6 42.7 - 76.0 %    Lymphocyte % 17.5 (L) 19.6 - 45.3 %    Monocyte % 7.8 5.0 - 12.0 %    Eosinophil % 0.2 (L) 0.3 - 6.2 %    Basophil % 0.5 0.0 - 1.5 %    Immature Grans % 0.4 0.0 - 0.5 %    Neutrophils, Absolute 6.13 1.70 - 7.00 10*3/mm3    Lymphocytes, Absolute 1.46 0.70 - 3.10 10*3/mm3    Monocytes, Absolute 0.65 0.10 - 0.90 10*3/mm3    Eosinophils, Absolute 0.02 0.00 - 0.40 10*3/mm3    Basophils, Absolute 0.04 0.00 - 0.20 10*3/mm3    Immature Grans, Absolute 0.03 0.00 - 0.05 10*3/mm3    nRBC 0.0 0.0 - 0.2 /100 WBC   Lactic Acid, Plasma    Specimen: Blood   Result Value Ref Range    Lactate 2.1 (C) 0.5 - 2.0 mmol/L   CK    Specimen: Blood   Result Value Ref Range    Creatine Kinase 39 20 - 180 U/L   Magnesium    Specimen: Blood   Result Value Ref Range    Magnesium  2.0 1.6 - 2.4 mg/dL   STAT Lactic Acid, Reflex    Specimen: Blood   Result Value Ref Range    Lactate 1.8 0.5 - 2.0 mmol/L   D-dimer, Quantitative    Specimen: Blood   Result Value Ref Range    D-Dimer, Quantitative 2,487 (H) 0 - 470 ng/mL (FEU)   ECG 12 Lead   Result Value Ref Range    QT Interval 366 ms    QTC Interval 450 ms   Gold Top - SST   Result Value Ref Range    Extra Tube Hold for add-ons.    Gray Top   Result Value Ref Range    Extra Tube Hold for add-ons.    Light Blue Top   Result Value Ref Range    Extra Tube Hold for add-ons.      CT Angiogram Chest   Final Result   Bilateral filling defects in branches supplying the upper lobes,   lower lobes and right middle lobe consistent with pulmonary   embolism. There is no evidence of saddle embolism.    Main pulmonary artery measuring 4.4 cm concerning for pulmonary   arterial hypertension.   Scattered ground glass opacities in the bilateral lower lobes   which may be due to nonspecific pneumonitis.       Electronically signed by:  Gene Stephens MD  8/9/2022 8:10 PM CDT   Workstation: [id number]      XR Chest 1 View   Final Result   Interstitial prominence and indistinctness, could represent an   infectious process or mild edema.         Electronically signed by:  Talia Garduno MD  8/9/2022 3:49 PM CDT   Workstation: [id number]                                          MDM    Final diagnoses:   Multiple subsegmental pulmonary emboli without acute cor pulmonale (HCC)   Hypoxia       ED Disposition  ED Disposition     ED Disposition   Decision to Admit    Condition   --    Comment   Level of Care: Telemetry [5]   Diagnosis: Multiple subsegmental pulmonary emboli without acute cor pulmonale (HCC) [[id number]]   Admitting Physician: KEMAR PARIS [[id number]]   Attending Physician: KEMAR PARIS [[id number]]               No follow-up provider specified.       Medication List      No changes were made to your prescriptions during this visit.          Cameron  MD Brando  08/09/22 2035      Electronically signed by Brando Barnes MD at 08/09/22 2035     Chela Aguirre, RN at 08/09/22 1949        First set blood cultures collected. ATB hung to not further delay care.    Electronically signed by Chela Aguirre, RN at 08/09/22 1949         Lab Results (last 24 hours)     Procedure Component Value Units Date/Time    Basic Metabolic Panel [[id number]]  (Abnormal) Collected: 08/11/22 0539    Specimen: Blood Updated: 08/11/22 0624     Glucose 124 mg/dL      BUN 6 mg/dL      Creatinine 0.61 mg/dL      Sodium 136 mmol/L      Potassium 4.1 mmol/L      Chloride 93 mmol/L      CO2 38.0 mmol/L      Calcium 7.9 mg/dL      BUN/Creatinine Ratio 9.8     Anion Gap 5.0 mmol/L      eGFR 102.5 mL/min/1.73      Comment: National Kidney Foundation and American Society of Nephrology (ASN) Task Force recommended calculation based on the Chronic Kidney Disease Epidemiology Collaboration (CKD-EPI) equation refit without adjustment for race.       Narrative:      GFR Normal >60  Chronic Kidney Disease <60  Kidney Failure <15      CBC & Differential [[id number]]  (Abnormal) Collected: 08/11/22 0539    Specimen: Blood Updated: 08/11/22 0603    Narrative:      The following orders were created for panel order CBC & Differential.  Procedure                               Abnormality         Status                     ---------                               -----------         ------                     CBC Auto Differential[[id number]]        Abnormal            Final result                 Please view results for these tests on the individual orders.    CBC Auto Differential [[id number]]  (Abnormal) Collected: 08/11/22 0539    Specimen: Blood Updated: 08/11/22 0603     WBC 7.92 10*3/mm3      RBC 4.87 10*6/mm3      Hemoglobin 13.3 g/dL      Hematocrit 43.3 %      MCV 88.9 fL      MCH 27.3 pg      MCHC 30.7 g/dL      RDW 15.0 %      RDW-SD 49.1 fl      MPV 10.9 fL      Platelets 179 10*3/mm3       Neutrophil % 74.0 %      Lymphocyte % 16.4 %      Monocyte % 7.6 %      Eosinophil % 1.1 %      Basophil % 0.5 %      Immature Grans % 0.4 %      Neutrophils, Absolute 5.86 10*3/mm3      Lymphocytes, Absolute 1.30 10*3/mm3      Monocytes, Absolute 0.60 10*3/mm3      Eosinophils, Absolute 0.09 10*3/mm3      Basophils, Absolute 0.04 10*3/mm3      Immature Grans, Absolute 0.03 10*3/mm3      nRBC 0.0 /100 WBC     Blood Culture - Blood, Hand, Right [[id number]]  (Normal) Collected: 08/09/22 2358    Specimen: Blood from Hand, Right Updated: 08/11/22 0017     Blood Culture No growth at 24 hours    Blood Culture - Blood, Arm, Left [[id number]]  (Normal) Collected: 08/09/22 1944    Specimen: Blood from Arm, Left Updated: 08/10/22 2003     Blood Culture No growth at 24 hours        Imaging Results (Last 24 Hours)     Procedure Component Value Units Date/Time    US Guided Vascular Access [[id number]] Resulted: 08/11/22 1101     Updated: 08/11/22 1122    IR Insert Midline Without Port Pump 5 Plus [[id number]] Resulted: 08/11/22 1119     Updated: 08/11/22 1119    Narrative:      This procedure was auto-finalized with no dictation required.    US Venous Doppler Lower Extremity Bilateral (duplex) [[id number]] Collected: 08/10/22 1316     Updated: 08/10/22 1518    Narrative:      ULTRASOUND VENOUS DOPPLER BILATERAL LOWER EXTREMITIES    INDICATION: dvt eval, I26.94 Multiple subsegmental pulmonary  emboli without acute cor pulmonale R09.02 Hypoxemia    COMPARISON: None    TECHNIQUE: Grayscale and color Doppler sonographic images of  bilateral lower extremity veins were obtained.     FINDINGS:  RIGHT:  CFV: Patent, compressible, and augments.  GSV: Patent, compressible, and augments.  SFV: Patent, compressible, and augments.  Popliteal: Patent, compressible, and augments.  Subcutaneous edema is noted.    LEFT:  CFV: Patent, compressible, and augments.  GSV: Patent, compressible, and augments.  SFV: Patent, compressible, and  augments.  Popliteal: Noncompressible suggesting thrombus. Thrombus of  heterogeneous echogenicity is visualized in the left popliteal  vein.  There is subcutaneous edema.    Additional Findings: Bilateral lower extremity subcutaneous  edema.      Impression:      1. DVT of left popliteal vein.  2. Bilateral lower extremity edema.    Numerous attempts were made to contact Dr. Burr. The patient is  already fully anticoagulated for PE and report of findings was  given to the patient's nurse Kartik Jesus RN at 3:12 PM on  8/10/2022.    Electronically signed by:  Fouzia Tan MD  8/10/2022 3:12 PM CDT  Workstation: [id number]        ECG/EMG Results (last 24 hours)     Procedure Component Value Units Date/Time    SCANNED EKG [[id number]] Resulted: 08/09/22     Updated: 08/10/22 2005    SCANNED - TELEMETRY   [[id number]] Resulted: 08/09/22     Updated: 08/11/22 1300    SCANNED - TELEMETRY   [[id number]] Resulted: 08/09/22     Updated: 08/11/22 1306             Physician Progress Notes (last 24 hours)      Fern Burr MD at 08/10/22 1620              [facility] Medicine Services  INPATIENT PROGRESS NOTE    Length of Stay: 0  Date of Admission: 8/9/2022  Primary Care Physician: Danielle Vaughn APRN    Subjective   Patient seen and evaluated, resting comfortably on oxygen supplementation, and not in distress.         Objective    Temp:  [96.6 °F (35.9 °C)-97.6 °F (36.4 °C)] 97.6 °F (36.4 °C)  Heart Rate:  [78-93] 83  Resp:  [18-22] 18  BP: ()/(57-76) 119/64    Physical Exam:   Temp:  [96.6 °F (35.9 °C)-97.6 °F (36.4 °C)] 97.6 °F (36.4 °C)  Heart Rate:  [78-93] 83  Resp:  [18-22] 18  BP: ()/(57-76) 119/64  Physical Exam    General: NC/AT, appears stated age, alert and oriented x3  HEENT: PERRLA, EOMI, no scleral icterus, no conjunctival injection,   NECK:  Neck is supple, no JVD, no supraclavicular lymphadenopathy  CV: S1 S2 RRR, no murmurs, no gallops,  no heaves, pulses palpable.  LUNG: CTA, no wheezing crackles or rhonchi  ABD: Obesed, nondistended, nontender, bowel sounds present, no guarding or rebound, organomegaly not appreciated.   EXT: FROM, erythematous and tender, with weeping, and cellulitic changesPulses palpable  Neuro: CN 2-12, grossly intact,no  focal weakness appreciated  Skin: As mentioned in extremity exam      Results Review:  I have reviewed the labs, radiology results, and diagnostic studies.    Laboratory Data:   Results from last 7 days   Lab Units 08/09/22  1515   SODIUM mmol/L 135*   POTASSIUM mmol/L 4.2   CHLORIDE mmol/L 91*   CO2 mmol/L 39.0*   BUN mg/dL 9   CREATININE mg/dL 0.70   GLUCOSE mg/dL 189*   CALCIUM mg/dL 8.4*   BILIRUBIN mg/dL 1.4*   ALK PHOS U/L 94   ALT (SGPT) U/L 12   AST (SGOT) U/L 20   ANION GAP mmol/L 5.0     CrCl cannot be calculated (Unknown ideal weight.).  Results from last 7 days   Lab Units 08/09/22  1515   MAGNESIUM mg/dL 2.0         Results from last 7 days   Lab Units 08/09/22  1515   WBC 10*3/mm3 8.33   HEMOGLOBIN g/dL 15.2   HEMATOCRIT % 49.4*   PLATELETS 10*3/mm3 155     Results from last 7 days   Lab Units 08/09/22  2358   INR  1.20       Culture Data:   No results found for: BLOODCX  No results found for: URINECX  No results found for: RESPCX  No results found for: WOUNDCX  No results found for: STOOLCX  No components found for: BODYFLD    Radiology Data:   Imaging Results (Last 24 Hours)     Procedure Component Value Units Date/Time    US Venous Doppler Lower Extremity Bilateral (duplex) [[id number]] Collected: 08/10/22 1316     Updated: 08/10/22 1518    Narrative:      ULTRASOUND VENOUS DOPPLER BILATERAL LOWER EXTREMITIES    INDICATION: dvt eval, I26.94 Multiple subsegmental pulmonary  emboli without acute cor pulmonale R09.02 Hypoxemia    COMPARISON: None    TECHNIQUE: Grayscale and color Doppler sonographic images of  bilateral lower extremity veins were obtained.     FINDINGS:  RIGHT:  CFV: Patent,  compressible, and augments.  GSV: Patent, compressible, and augments.  SFV: Patent, compressible, and augments.  Popliteal: Patent, compressible, and augments.  Subcutaneous edema is noted.    LEFT:  CFV: Patent, compressible, and augments.  GSV: Patent, compressible, and augments.  SFV: Patent, compressible, and augments.  Popliteal: Noncompressible suggesting thrombus. Thrombus of  heterogeneous echogenicity is visualized in the left popliteal  vein.  There is subcutaneous edema.    Additional Findings: Bilateral lower extremity subcutaneous  edema.      Impression:      1. DVT of left popliteal vein.  2. Bilateral lower extremity edema.    Numerous attempts were made to contact Dr. Burr. The patient is  already fully anticoagulated for PE and report of findings was  given to the patient's nurse Kartik Jesus RN at 3:12 PM on  8/10/2022.    Electronically signed by:  Fouzia Tan MD  8/10/2022 3:12 PM CDT  Workstation: [id number]    CT Angiogram Chest [[id number]] Collected: 08/09/22 1913     Updated: 08/10/22 0817    Addenda:         ADDENDUM   ADDENDUM #1       Findings discussed with referring physician Dr. Barnes at 2030    Electronically signed by:  Gene Stephens MD  8/10/2022 8:15 AM CDT  Workstation: [id number]    Signed: 08/10/22 0815 by Gene Stephens MD    Narrative:      EXAM:  CT CHEST ANGIOGRAPHY WITH IV CONTRAST    ORDERING PROVIDER:  CHANTELL BARAHONA    CLINICAL HISTORY:  Shortness of breath    COMPARISON:      TECHNIQUE:   Chest CT was performed using a high resolution pulmonary  angiogram protocol with 80ml of Isovue 370 contrast and  reformatted in the sagittal and coronal planes.     3-dimensional images also acquired with special processing of the  CT scan data with a specialized workstation for evaluation.    This examination was performed according to our departmental dose  optimization program which includes automated exposure control,  adjustment of the MA and kV according to patient size,  and/or use  of iterative reconstruction technique.     FINDINGS:     LUNGS AND PLEURA: Scattered ground glass opacities in the  bilateral lower lobes which may be due to nonspecific  pneumonitis. No focal consolidation or masses.No pleural effusion  or plaques.  Unremarkable interstitium.    HEART: Prominent size and unremarkable configuration. No  pericardial effusion.     MEDIASTINUM AND ADDISON: No significant adenopathy.     AORTA AND GREAT VESSELS: No aneurysm or dissection.     PULMONARY ARTERIES: Bilateral filling defects in branches  supplying the upper lobes, lower lobes and right middle lobe.  There is no evidence of saddle embolism. Main pulmonary artery  measuring 4.4 cm concerning for pulmonary arterial hypertension.    UPPER ABDOMEN: Unremarkable.    MUSCULOSKELETAL:  No aggressive osseous lesion. Unremarkable  vertebral body height and alignment. No lytic or sclerotic  lesion.     EXTRATHORACIC SOFT TISSUES: No axillary adenopathy. No mass.  Unremarkable supraclavicular soft tissues.       Impression:      Bilateral filling defects in branches supplying the upper lobes,  lower lobes and right middle lobe consistent with pulmonary  embolism. There is no evidence of saddle embolism.   Main pulmonary artery measuring 4.4 cm concerning for pulmonary  arterial hypertension.  Scattered ground glass opacities in the bilateral lower lobes  which may be due to nonspecific pneumonitis.     Electronically signed by:  Gene Stephens MD  8/9/2022 8:10 PM CDT  Workstation: [id number]          I have reviewed the patient's current medications.     Assessment/Plan     Active Hospital Problems    Diagnosis    • Multiple subsegmental pulmonary emboli without acute cor pulmonale (HCC)    • Cellulitis    • Cellulitis    Left Polpilteal DVT        Plan:    Patient Doppler showed DVT of Left leg,   -Continue IV heparin  -Switch abx from po Doyxcycline to IV Clindamycin + Levaquin for weeping B/L lower extremity cellulitis.   -Get  wound care consult  -Pain control  -Continue other treatment for comorbid conditions    Consider switching to po anticoagulation after a few days of IV heparin due to significant PE and DVT.           Discharge Planning: Pending improvement and response to treatment.       Fern Burr MD    Electronically signed by Fern Burr MD at 08/10/22 1635       Medical Student Notes (last 24 hours)  Notes from 08/10/22 1426 through 08/11/22 1426   No notes of this type exist for this encounter.         Consult Notes (last 24 hours)  Notes from 08/10/22 1426 through 08/11/22 1426   No notes of this type exist for this encounter.

## 2022-08-11 NOTE — PLAN OF CARE
Goal Outcome Evaluation:  Plan of Care Reviewed With: patient        Progress: no change  Outcome Evaluation: vss, no complaints of pain, SOB with exertion, resting between care, would benefit from a midline, will continue to monitor

## 2022-08-12 LAB
BH CV ECHO MEAS - AO MAX PG: 1.59 MMHG
BH CV ECHO MEAS - AO MEAN PG: 1.19 MMHG
BH CV ECHO MEAS - AO V2 MAX: 63 CM/SEC
BH CV ECHO MEAS - AO V2 VTI: 15.3 CM
BH CV ECHO MEAS - AVA(I,D): 3.3 CM2
BH CV ECHO MEAS - EDV(CUBED): 50.1 ML
BH CV ECHO MEAS - ESV(CUBED): 17.2 ML
BH CV ECHO MEAS - FS: 30 %
BH CV ECHO MEAS - IVS/LVPW: 1.14 CM
BH CV ECHO MEAS - IVSD: 1.66 CM
BH CV ECHO MEAS - LA DIMENSION: 4.1 CM
BH CV ECHO MEAS - LAT PEAK E' VEL: 12.7 CM/SEC
BH CV ECHO MEAS - LV MASS(C)D: 221.6 GRAMS
BH CV ECHO MEAS - LV MAX PG: 1.57 MMHG
BH CV ECHO MEAS - LV MEAN PG: 0.89 MMHG
BH CV ECHO MEAS - LV V1 MAX: 62.6 CM/SEC
BH CV ECHO MEAS - LV V1 VTI: 12.9 CM
BH CV ECHO MEAS - LVIDD: 3.7 CM
BH CV ECHO MEAS - LVIDS: 2.6 CM
BH CV ECHO MEAS - LVOT AREA: 3.9 CM2
BH CV ECHO MEAS - LVOT DIAM: 2.24 CM
BH CV ECHO MEAS - LVPWD: 1.46 CM
BH CV ECHO MEAS - MED PEAK E' VEL: 6.5 CM/SEC
BH CV ECHO MEAS - MR MAX PG: 19.9 MMHG
BH CV ECHO MEAS - MR MAX VEL: 223 CM/SEC
BH CV ECHO MEAS - MV A MAX VEL: 54.1 CM/SEC
BH CV ECHO MEAS - MV E MAX VEL: 70.2 CM/SEC
BH CV ECHO MEAS - MV E/A: 1.3
BH CV ECHO MEAS - MV MAX PG: 2.31 MMHG
BH CV ECHO MEAS - MV MEAN PG: 0.87 MMHG
BH CV ECHO MEAS - MV V2 VTI: 18.8 CM
BH CV ECHO MEAS - MVA(VTI): 2.7 CM2
BH CV ECHO MEAS - PA V2 MAX: 83.6 CM/SEC
BH CV ECHO MEAS - PI END-D VEL: 79.3 CM/SEC
BH CV ECHO MEAS - RAP SYSTOLE: 3 MMHG
BH CV ECHO MEAS - RVDD: 3.2 CM
BH CV ECHO MEAS - RVSP: 14.4 MMHG
BH CV ECHO MEAS - SV(LVOT): 50.5 ML
BH CV ECHO MEAS - TR MAX PG: 11.4 MMHG
BH CV ECHO MEAS - TR MAX VEL: 168.7 CM/SEC
BH CV ECHO MEASUREMENTS AVERAGE E/E' RATIO: 7.31
LEFT ATRIUM VOLUME INDEX: 12.2 ML/M2
MAXIMAL PREDICTED HEART RATE: 160 BPM
STJ: 2.7 CM
STRESS TARGET HR: 136 BPM

## 2022-08-12 PROCEDURE — 25010000002 ENOXAPARIN PER 10 MG

## 2022-08-12 PROCEDURE — 25010000002 VANCOMYCIN 10 G RECONSTITUTED SOLUTION: Performed by: INTERNAL MEDICINE

## 2022-08-12 PROCEDURE — 25010000002 FUROSEMIDE PER 20 MG: Performed by: INTERNAL MEDICINE

## 2022-08-12 RX ORDER — FUROSEMIDE 10 MG/ML
40 INJECTION INTRAMUSCULAR; INTRAVENOUS EVERY 8 HOURS
Status: DISCONTINUED | OUTPATIENT
Start: 2022-08-12 | End: 2022-08-14

## 2022-08-12 RX ORDER — FUROSEMIDE 10 MG/ML
40 INJECTION INTRAMUSCULAR; INTRAVENOUS EVERY 12 HOURS
Status: DISCONTINUED | OUTPATIENT
Start: 2022-08-12 | End: 2022-08-12

## 2022-08-12 RX ADMIN — VANCOMYCIN HYDROCHLORIDE 2500 MG: 10 INJECTION, POWDER, LYOPHILIZED, FOR SOLUTION INTRAVENOUS at 17:07

## 2022-08-12 RX ADMIN — Medication 10 ML: at 09:30

## 2022-08-12 RX ADMIN — AZTREONAM 2 G: 2 INJECTION, POWDER, LYOPHILIZED, FOR SOLUTION INTRAMUSCULAR; INTRAVENOUS at 17:06

## 2022-08-12 RX ADMIN — NICOTINE 1 PATCH: 21 PATCH, EXTENDED RELEASE TRANSDERMAL at 23:05

## 2022-08-12 RX ADMIN — CLINDAMYCIN IN 5 PERCENT DEXTROSE 600 MG: 12 INJECTION, SOLUTION INTRAVENOUS at 09:15

## 2022-08-12 RX ADMIN — NYSTATIN: 100000 POWDER TOPICAL at 09:20

## 2022-08-12 RX ADMIN — CLINDAMYCIN IN 5 PERCENT DEXTROSE 600 MG: 12 INJECTION, SOLUTION INTRAVENOUS at 02:06

## 2022-08-12 RX ADMIN — ENOXAPARIN SODIUM 140 MG: 150 INJECTION SUBCUTANEOUS at 09:15

## 2022-08-12 RX ADMIN — Medication 10 ML: at 23:05

## 2022-08-12 RX ADMIN — FUROSEMIDE 40 MG: 10 INJECTION, SOLUTION INTRAMUSCULAR; INTRAVENOUS at 17:07

## 2022-08-12 NOTE — PROGRESS NOTES
[facility] Medicine Services  INPATIENT PROGRESS NOTE    Length of Stay: 1  Date of Admission: 8/9/2022  Primary Care Physician: Danielle Vaughn APRN    Subjective   Patient seen and evaluated today, reports no issues or complaints.  She had good diuresing overnight, but still has significant swelling of the lower extremities.   She also still requiring O2 2L and not much improvement in her cellulitis.         Objective    Temp:  [96.4 °F (35.8 °C)-97.2 °F (36.2 °C)] 97 °F (36.1 °C)  Heart Rate:  [74-88] 84  Resp:  [18-20] 20  BP: (114-130)/(62-72) 130/72    Physical Exam:   Temp:  [96.4 °F (35.8 °C)-97.2 °F (36.2 °C)] 97 °F (36.1 °C)  Heart Rate:  [74-88] 84  Resp:  [18-20] 20  BP: (114-130)/(62-72) 130/72  Physical Exam    General: NC/AT, appears stated age, alert and oriented x3  HEENT: PERRLA, EOMI, no scleral icterus, no conjunctival injection,   NECK:  Neck is supple, no JVD, no supraclavicular lymphadenopathy  CV: S1 S2 RRR, no murmurs, no gallops, no heaves, pulses palpable.  LUNG: CTA, no wheezing crackles or rhonchi  ABD: Obesed, nondistended, nontender, bowel sounds present, no guarding or rebound, organomegaly not appreciated.   EXT: FROM, erythematous and tender, pitting edema with lymphedema, pulses palpable  Neuro: CN 2-12, grossly intact,no  focal weakness appreciated  Skin: As mentioned in extremity exam      Results Review:  I have reviewed the labs, radiology results, and diagnostic studies.    Laboratory Data:   Results from last 7 days   Lab Units 08/11/22  0539 08/09/22  1515   SODIUM mmol/L 136 135*   POTASSIUM mmol/L 4.1 4.2   CHLORIDE mmol/L 93* 91*   CO2 mmol/L 38.0* 39.0*   BUN mg/dL 6* 9   CREATININE mg/dL 0.61 0.70   GLUCOSE mg/dL 124* 189*   CALCIUM mg/dL 7.9* 8.4*   BILIRUBIN mg/dL  --  1.4*   ALK PHOS U/L  --  94   ALT (SGPT) U/L  --  12   AST (SGOT) U/L  --  20   ANION GAP mmol/L 5.0 5.0     Estimated Creatinine Clearance: 134.5  mL/min (by C-G formula based on SCr of 0.61 mg/dL).  Results from last 7 days   Lab Units 08/09/22  1515   MAGNESIUM mg/dL 2.0         Results from last 7 days   Lab Units 08/11/22  0539 08/09/22  1515   WBC 10*3/mm3 7.92 8.33   HEMOGLOBIN g/dL 13.3 15.2   HEMATOCRIT % 43.3 49.4*   PLATELETS 10*3/mm3 179 155     Results from last 7 days   Lab Units 08/09/22  2358   INR  1.20       Culture Data:   Blood Culture   Date Value Ref Range Status   08/09/2022 No growth at 2 days  Preliminary   08/09/2022 No growth at 2 days  Preliminary     No results found for: URINECX  No results found for: RESPCX  No results found for: WOUNDCX  No results found for: STOOLCX  No components found for: BODYFLD    Radiology Data:   Imaging Results (Last 24 Hours)     Procedure Component Value Units Date/Time    US Guided Vascular Access [[id number]] Collected: 08/11/22 1051     Updated: 08/11/22 1608    Narrative:      ULTRASOUND-GUIDED VASCULAR ACCESS    INDICATION: access, I26.94 Multiple subsegmental pulmonary emboli  without acute cor pulmonale R09.02 Hypoxemia    TECHNIQUE: Real-time ultrasound imaging was utilized to visualize  needle entry.     FINDINGS:  Realtime ultrasound imaging was utilized to visualize needle  entry into a patent right basilic vein during midline catheter   placement and a permanent image was stored for permanent  recording and reporting.       Impression:      Imaging obtained during vascular access device placement under  ultrasound guidance.    Electronically signed by:  Fouzia Tan MD  8/11/2022 4:06 PM CDT  Workstation: [id number]          I have reviewed the patient's current medications.     Assessment/Plan     Active Hospital Problems    Diagnosis    • Multiple subsegmental pulmonary emboli without acute cor pulmonale (HCC)    • Cellulitis    • Cellulitis    Left Polpilteal DVT      08/10    Plan:    Patient Doppler showed DVT of Left leg,   -Continue IV heparin  -Switch abx from po Doyxcycline to IV  Clindamycin + Levaquin for weeping B/L lower extremity cellulitis.   -Get wound care consult  -Pain control  -Continue other treatment for comorbid conditions    Consider switching to po anticoagulation after a few days of IV heparin due to significant PE and DVT.   ______________________________________________________    08/11    Plan:   - Continue Lovenox 1mg/kg, plan to transition to p.o. anticoagulation tomorrow.  - Patient given a dose of Lasix 60 mg x 1 for lower extremity edema.  -We will obtain echocardiogram for CHF evaluation.  - Continue IV antibiotics with clindamycin and Levaquin, bilateral lower extremity cellulitis improving resolution of the weeping.  Possible transition to p.o. antibiotics tomorrow in anticipation for discharge.  -We will wean off oxygen as patient does not require oxygen, will ambulate and evaluate for home oxygen if required.        ______________________________________________________    08/12    Plan:   --Patient on Lovenox 1mg/kg q12hrs.   --Patient still requiring O2 2L  --Will change to Vancomycin and Aztreonam, minimal improvement on her cellulitis after 2 days of Clindamycin and Levaquin. Although Bcx is negative thus far.   ---Will increase aggressive diuresing to lasix 40mg TID  ---Perhaps to more days of aggressive treatment, patient might be weaned off O2 and have better response to IV abx and diuresing.   Continue following.    Patient is a full code.     Fern Burr MD

## 2022-08-12 NOTE — PLAN OF CARE
Goal Outcome Evaluation:  Plan of Care Reviewed With: patient        Progress: no change  Outcome Evaluation: vss, good results from lasix, resting good through the night, no complaints of pain, will continue to montior

## 2022-08-12 NOTE — NURSING NOTE
o2 off for 10 min at rest, pso2 dropped to 81% on RA at EOB and no activity, replaced 2L NC back on and pso2 increased to 93% on 2L EOB.

## 2022-08-13 LAB
BACTERIA UR QL AUTO: ABNORMAL /HPF
BILIRUB UR QL STRIP: NEGATIVE
CLARITY UR: ABNORMAL
COLOR UR: ABNORMAL
GLUCOSE BLDC GLUCOMTR-MCNC: 145 MG/DL (ref 70–130)
GLUCOSE BLDC GLUCOMTR-MCNC: 179 MG/DL (ref 70–130)
GLUCOSE BLDC GLUCOMTR-MCNC: 259 MG/DL (ref 70–130)
GLUCOSE UR STRIP-MCNC: NEGATIVE MG/DL
HGB UR QL STRIP.AUTO: ABNORMAL
HYALINE CASTS UR QL AUTO: ABNORMAL /LPF
KETONES UR QL STRIP: NEGATIVE
LEUKOCYTE ESTERASE UR QL STRIP.AUTO: ABNORMAL
NITRITE UR QL STRIP: NEGATIVE
PH UR STRIP.AUTO: 7.5 [PH] (ref 5–9)
POTASSIUM SERPL-SCNC: 4.4 MMOL/L (ref 3.5–5.2)
PROT UR QL STRIP: ABNORMAL
RBC # UR STRIP: ABNORMAL /HPF
REF LAB TEST METHOD: ABNORMAL
SP GR UR STRIP: 1.02 (ref 1–1.03)
SQUAMOUS #/AREA URNS HPF: ABNORMAL /HPF
UROBILINOGEN UR QL STRIP: ABNORMAL
WBC # UR STRIP: ABNORMAL /HPF

## 2022-08-13 PROCEDURE — 25010000002 FUROSEMIDE PER 20 MG: Performed by: INTERNAL MEDICINE

## 2022-08-13 PROCEDURE — 25010000002 ENOXAPARIN PER 10 MG

## 2022-08-13 PROCEDURE — 94799 UNLISTED PULMONARY SVC/PX: CPT

## 2022-08-13 PROCEDURE — 25010000002 VANCOMYCIN 10 G RECONSTITUTED SOLUTION: Performed by: INTERNAL MEDICINE

## 2022-08-13 PROCEDURE — 82962 GLUCOSE BLOOD TEST: CPT

## 2022-08-13 PROCEDURE — 81001 URINALYSIS AUTO W/SCOPE: CPT

## 2022-08-13 PROCEDURE — 94760 N-INVAS EAR/PLS OXIMETRY 1: CPT

## 2022-08-13 PROCEDURE — 84132 ASSAY OF SERUM POTASSIUM: CPT | Performed by: INTERNAL MEDICINE

## 2022-08-13 RX ADMIN — VANCOMYCIN HYDROCHLORIDE 2250 MG: 10 INJECTION, POWDER, LYOPHILIZED, FOR SOLUTION INTRAVENOUS at 16:41

## 2022-08-13 RX ADMIN — Medication 10 ML: at 20:45

## 2022-08-13 RX ADMIN — Medication 10 ML: at 08:09

## 2022-08-13 RX ADMIN — FUROSEMIDE 40 MG: 10 INJECTION, SOLUTION INTRAMUSCULAR; INTRAVENOUS at 00:26

## 2022-08-13 RX ADMIN — NICOTINE 1 PATCH: 21 PATCH, EXTENDED RELEASE TRANSDERMAL at 08:09

## 2022-08-13 RX ADMIN — APIXABAN 10 MG: 5 TABLET, FILM COATED ORAL at 20:45

## 2022-08-13 RX ADMIN — AZTREONAM 1 G: 1 INJECTION, POWDER, LYOPHILIZED, FOR SOLUTION INTRAMUSCULAR; INTRAVENOUS at 00:29

## 2022-08-13 RX ADMIN — FUROSEMIDE 40 MG: 10 INJECTION, SOLUTION INTRAMUSCULAR; INTRAVENOUS at 08:09

## 2022-08-13 RX ADMIN — NYSTATIN: 100000 POWDER TOPICAL at 09:16

## 2022-08-13 RX ADMIN — VANCOMYCIN HYDROCHLORIDE 2250 MG: 10 INJECTION, POWDER, LYOPHILIZED, FOR SOLUTION INTRAVENOUS at 04:37

## 2022-08-13 RX ADMIN — AZTREONAM 1 G: 1 INJECTION, POWDER, LYOPHILIZED, FOR SOLUTION INTRAMUSCULAR; INTRAVENOUS at 09:16

## 2022-08-13 RX ADMIN — NYSTATIN: 100000 POWDER TOPICAL at 20:45

## 2022-08-13 RX ADMIN — FUROSEMIDE 40 MG: 10 INJECTION, SOLUTION INTRAMUSCULAR; INTRAVENOUS at 16:41

## 2022-08-13 RX ADMIN — AZTREONAM 1 G: 1 INJECTION, POWDER, LYOPHILIZED, FOR SOLUTION INTRAMUSCULAR; INTRAVENOUS at 16:41

## 2022-08-13 RX ADMIN — ENOXAPARIN SODIUM 140 MG: 150 INJECTION SUBCUTANEOUS at 09:26

## 2022-08-13 NOTE — PAYOR COMM NOTE
"Stephanie Bradshaw (62 y.o. Female)             Date of Birth   1960    Social Security Number       Address   [address] [place] [address] [place] [address] [place] [address]    Home Phone   [phone]    MRN   [id number]       Zoroastrianism   None    Marital Status                               Admission Date   8/9/22    Admission Type   Emergency    Admitting Provider   Fern Burr MD    Attending Provider   Fern Burr MD    Department, Room/Bed   [address], 320/1       Discharge Date       Discharge Disposition       Discharge Destination                               Attending Provider: Fern Burr MD    Allergies: Penicillins    Isolation: None   Infection: COVID (rule out) (08/09/22)   Code Status: CPR   Advance Care Planning Activity    Ht: 157.5 cm (62\")   Wt: 142 kg (313 lb 9.6 oz)    Admission Cmt: None   Principal Problem: None                Active Insurance as of 8/9/2022     Primary Coverage     Payor Plan Insurance Group Employer/Plan Group    [employer] [place] [employer] [place]ICA[place]      Payor Plan Address Payor Plan Phone Number Payor Plan Fax Number Effective Dates    [address] [address] [phone]  8/9/2022 - None Entered    [facility] [id number]       Subscriber Name Subscriber Birth Date Member ID       STEPHANIE BRADSHAW 1960 [id number]                 Emergency Contacts      (Rel.) Home Phone Work Phone Mobile Phone    Sonia Correia (Friend) [phone] -- [phone]    Hieu Neumann (Son) [phone] -- [phone]            Operative/Procedure Notes (last 24 hours)  Notes from 08/12/22 1854 through 08/13/22 1854   No notes of this type exist for this encounter.            Physician Progress Notes (last 24 hours)      Alphonso Martin MD at 08/13/22 1110              [facility] Medicine Services  INPATIENT PROGRESS NOTE    Length of Stay: 2  Date of Admission: 8/9/2022  Primary " Care Physician: Danielle Vaughn APRN    Subjective     Continues to have leg swelling and redness.  Feels that the swelling has improved.    Review of Systems   All pertinent negatives and positives are as above. All other systems have been reviewed and are negative unless otherwise stated.     Objective    Temp:  [96.2 °F (35.7 °C)-98.2 °F (36.8 °C)] 97.9 °F (36.6 °C)  Heart Rate:  [78-94] 94  Resp:  [18-20] 18  BP: (109-154)/(61-78) 116/61    Physical Exam  Constitutional:       General: She is not in acute distress.  HENT:      Head: Normocephalic and atraumatic.   Eyes:      Extraocular Movements: Extraocular movements intact.   Pulmonary:      Effort: Pulmonary effort is normal. No respiratory distress.   Abdominal:      General: There is no distension.      Tenderness: There is no abdominal tenderness.   Musculoskeletal:      Right lower leg: Edema present.      Left lower leg: Edema present.      Comments: Bilateral lower extremity erythema   Neurological:      General: No focal deficit present.             Results Review:  I have reviewed the labs, radiology results, and diagnostic studies.    Laboratory Data:   Results from last 7 days   Lab Units 08/13/22  0131 08/11/22  0539 08/09/22  1515   SODIUM mmol/L  --  136 135*   POTASSIUM mmol/L 4.4 4.1 4.2   CHLORIDE mmol/L  --  93* 91*   CO2 mmol/L  --  38.0* 39.0*   BUN mg/dL  --  6* 9   CREATININE mg/dL  --  0.61 0.70   GLUCOSE mg/dL  --  124* 189*   CALCIUM mg/dL  --  7.9* 8.4*   BILIRUBIN mg/dL  --   --  1.4*   ALK PHOS U/L  --   --  94   ALT (SGPT) U/L  --   --  12   AST (SGOT) U/L  --   --  20   ANION GAP mmol/L  --  5.0 5.0     Estimated Creatinine Clearance: 134.5 mL/min (by C-G formula based on SCr of 0.61 mg/dL).  Results from last 7 days   Lab Units 08/09/22  1515   MAGNESIUM mg/dL 2.0         Results from last 7 days   Lab Units 08/11/22  0539 08/09/22  1515   WBC 10*3/mm3 7.92 8.33   HEMOGLOBIN g/dL 13.3 15.2   HEMATOCRIT % 43.3 49.4*    PLATELETS 10*3/mm3 179 155     Results from last 7 days   Lab Units 08/09/22  2358   INR  1.20       Culture Data:   No results found for: BLOODCX  No results found for: URINECX  No results found for: RESPCX  No results found for: WOUNDCX  No results found for: STOOLCX  No components found for: BODYFLD    Radiology Data:   Imaging Results (Last 24 Hours)     ** No results found for the last 24 hours. **          I have reviewed the patient's current medications.     Assessment/Plan     Active Hospital Problems    Diagnosis    • Multiple subsegmental pulmonary emboli without acute cor pulmonale (HCC)    • Cellulitis    • Cellulitis        Plan:      Bilateral lower extremity edema.  Continue IV Lasix.    Possible cellulitis.  On vancomycin and aztreonam.  Continues to have bilateral erythema.    DVT.  Left popliteal vein.  On Lovenox.  We will switch her to Eliquis.        The patient was evaluated during the global COVID-19 pandemic, and the diagnosis was suspected/considered upon their initial presentation.  Evaluation, treatment, and testing were consistent with current guidelines for patients who present with complaints or symptoms that may be related to COVID-19.      Alphonso Martin MD    Electronically signed by Alphonso Martin MD at 08/13/22 4080

## 2022-08-13 NOTE — PROGRESS NOTES
"Pharmacokinetics by Pharmacy - Vancomycin    Stephanie Bradshaw is a 62 y.o. female receiving vancomycin 2250 mg IV q12hr (day 2) for SSTI.  Patient is also receiving aztreonam.    Objective:    Temp Readings from Last 1 Encounters:   08/13/22 97.9 °F (36.6 °C) (Temporal)     WBC   Date Value Ref Range Status   08/11/2022 7.92 3.40 - 10.80 10*3/mm3 Final    No results found for: CRP, LACTATE   Creatinine   Date Value Ref Range Status   08/11/2022 0.61 0.57 - 1.00 mg/dL Final       No results found for: VANCOPEAK, VANCOTROUGH, VANCORANDOM    Culture Results:  Microbiology Results (last 10 days)     Procedure Component Value - Date/Time    Blood Culture - Blood, Hand, Right [[id number]]  (Normal) Collected: 08/09/22 2358    Lab Status: Preliminary result Specimen: Blood from Hand, Right Updated: 08/13/22 0015     Blood Culture No growth at 3 days    Blood Culture - Blood, Arm, Left [[id number]]  (Normal) Collected: 08/09/22 1944    Lab Status: Preliminary result Specimen: Blood from Arm, Left Updated: 08/12/22 2000     Blood Culture No growth at 3 days    COVID-19 and FLU A/B PCR - Swab, Nasopharynx [[id number]]  (Normal) Collected: 08/09/22 1621    Lab Status: Final result Specimen: Swab from Nasopharynx Updated: 08/09/22 1649     COVID19 Not Detected     Influenza A PCR Not Detected     Influenza B PCR Not Detected    Narrative:      Fact sheet for providers: https://www.fda.gov/media/187110/download    Fact sheet for patients: https://www.fda.gov/media/593200/download    Test performed by PCR.        No results found for: RESPCX    [Ht: 157.5 cm (62\"); Wt: (!) 142 kg (313 lb 9.6 oz)]   Body mass index is 57.36 kg/m².  Ideal body weight: 50.1 kg (110 lb 7.2 oz)  Adjusted ideal body weight: 87 kg (191 lb 11.4 oz)      Assessment:  • WBCs WNL, afebrile  • Creatinine WNL and appears stable  • Cultures  o 8/9 blood culture NGD3  • AUC and trough goals are 400-600 and 10-20 mcg/mL, respectively.     Plan:  1. Continue " Vancomycin 2250 mg IV q12hrs, estimated AUC and trough of 388 and 8.7 mcg/mL, respectively. However, given patient's size and age expect some accumulation.   2. Vancomycin trough to be collected prior to the 4th dose on 8/14 @0430. Consulted until 8/15/22.  3. Pharmacy will monitor renal function and adjust dose accordingly.    Thank you for this consult.     Mode Byrd [facility]   08/13/22 12:06 CDT

## 2022-08-13 NOTE — PROGRESS NOTES
[facility] Medicine Services  INPATIENT PROGRESS NOTE    Length of Stay: 2  Date of Admission: 8/9/2022  Primary Care Physician: Danielle Vaughn APRN    Subjective     Continues to have leg swelling and redness.  Feels that the swelling has improved.    Review of Systems   All pertinent negatives and positives are as above. All other systems have been reviewed and are negative unless otherwise stated.     Objective    Temp:  [96.2 °F (35.7 °C)-98.2 °F (36.8 °C)] 97.9 °F (36.6 °C)  Heart Rate:  [78-94] 94  Resp:  [18-20] 18  BP: (109-154)/(61-78) 116/61    Physical Exam  Constitutional:       General: She is not in acute distress.  HENT:      Head: Normocephalic and atraumatic.   Eyes:      Extraocular Movements: Extraocular movements intact.   Pulmonary:      Effort: Pulmonary effort is normal. No respiratory distress.   Abdominal:      General: There is no distension.      Tenderness: There is no abdominal tenderness.   Musculoskeletal:      Right lower leg: Edema present.      Left lower leg: Edema present.      Comments: Bilateral lower extremity erythema   Neurological:      General: No focal deficit present.             Results Review:  I have reviewed the labs, radiology results, and diagnostic studies.    Laboratory Data:   Results from last 7 days   Lab Units 08/13/22  0131 08/11/22  0539 08/09/22  1515   SODIUM mmol/L  --  136 135*   POTASSIUM mmol/L 4.4 4.1 4.2   CHLORIDE mmol/L  --  93* 91*   CO2 mmol/L  --  38.0* 39.0*   BUN mg/dL  --  6* 9   CREATININE mg/dL  --  0.61 0.70   GLUCOSE mg/dL  --  124* 189*   CALCIUM mg/dL  --  7.9* 8.4*   BILIRUBIN mg/dL  --   --  1.4*   ALK PHOS U/L  --   --  94   ALT (SGPT) U/L  --   --  12   AST (SGOT) U/L  --   --  20   ANION GAP mmol/L  --  5.0 5.0     Estimated Creatinine Clearance: 134.5 mL/min (by C-G formula based on SCr of 0.61 mg/dL).  Results from last 7 days   Lab Units 08/09/22  1515   MAGNESIUM mg/dL 2.0          Results from last 7 days   Lab Units 08/11/22  0539 08/09/22  1515   WBC 10*3/mm3 7.92 8.33   HEMOGLOBIN g/dL 13.3 15.2   HEMATOCRIT % 43.3 49.4*   PLATELETS 10*3/mm3 179 155     Results from last 7 days   Lab Units 08/09/22  2358   INR  1.20       Culture Data:   No results found for: BLOODCX  No results found for: URINECX  No results found for: RESPCX  No results found for: WOUNDCX  No results found for: STOOLCX  No components found for: BODYFLD    Radiology Data:   Imaging Results (Last 24 Hours)     ** No results found for the last 24 hours. **          I have reviewed the patient's current medications.     Assessment/Plan     Active Hospital Problems    Diagnosis    • Multiple subsegmental pulmonary emboli without acute cor pulmonale (HCC)    • Cellulitis    • Cellulitis        Plan:      Bilateral lower extremity edema.  Continue IV Lasix.    Possible cellulitis.  On vancomycin and aztreonam.  Continues to have bilateral erythema.    Pulmonary embolism/DVT.  Left popliteal vein.  On Lovenox.  We will switch her to Eliquis.        The patient was evaluated during the global COVID-19 pandemic, and the diagnosis was suspected/considered upon their initial presentation.  Evaluation, treatment, and testing were consistent with current guidelines for patients who present with complaints or symptoms that may be related to COVID-19.      Alphonso Martin MD

## 2022-08-13 NOTE — PLAN OF CARE
Goal Outcome Evaluation:   Patient IV antibiotics continued; PO eliquis to start tonight.         Progress: improving

## 2022-08-13 NOTE — NURSING NOTE
Patient urine has been pink since start of shift. Last urination a couple of small clots were noted in the urine. Orders to hold lovenox tonight and obtain UA

## 2022-08-14 LAB
ANION GAP SERPL CALCULATED.3IONS-SCNC: 2 MMOL/L (ref 5–15)
BACTERIA SPEC AEROBE CULT: NORMAL
BASOPHILS # BLD AUTO: 0.05 10*3/MM3 (ref 0–0.2)
BASOPHILS NFR BLD AUTO: 0.8 % (ref 0–1.5)
BUN SERPL-MCNC: 6 MG/DL (ref 8–23)
BUN/CREAT SERPL: 9.8 (ref 7–25)
CALCIUM SPEC-SCNC: 8.2 MG/DL (ref 8.6–10.5)
CHLORIDE SERPL-SCNC: 92 MMOL/L (ref 98–107)
CO2 SERPL-SCNC: 44 MMOL/L (ref 22–29)
CREAT SERPL-MCNC: 0.61 MG/DL (ref 0.57–1)
DEPRECATED RDW RBC AUTO: 48.4 FL (ref 37–54)
EGFRCR SERPLBLD CKD-EPI 2021: 102.5 ML/MIN/1.73
EOSINOPHIL # BLD AUTO: 0.14 10*3/MM3 (ref 0–0.4)
EOSINOPHIL NFR BLD AUTO: 2.1 % (ref 0.3–6.2)
ERYTHROCYTE [DISTWIDTH] IN BLOOD BY AUTOMATED COUNT: 15.1 % (ref 12.3–15.4)
GLUCOSE SERPL-MCNC: 141 MG/DL (ref 65–99)
HCT VFR BLD AUTO: 45.9 % (ref 34–46.6)
HGB BLD-MCNC: 14.1 G/DL (ref 12–15.9)
IMM GRANULOCYTES # BLD AUTO: 0.02 10*3/MM3 (ref 0–0.05)
IMM GRANULOCYTES NFR BLD AUTO: 0.3 % (ref 0–0.5)
LYMPHOCYTES # BLD AUTO: 1.47 10*3/MM3 (ref 0.7–3.1)
LYMPHOCYTES NFR BLD AUTO: 22.3 % (ref 19.6–45.3)
MCH RBC QN AUTO: 27.1 PG (ref 26.6–33)
MCHC RBC AUTO-ENTMCNC: 30.7 G/DL (ref 31.5–35.7)
MCV RBC AUTO: 88.3 FL (ref 79–97)
MONOCYTES # BLD AUTO: 0.73 10*3/MM3 (ref 0.1–0.9)
MONOCYTES NFR BLD AUTO: 11.1 % (ref 5–12)
NEUTROPHILS NFR BLD AUTO: 4.18 10*3/MM3 (ref 1.7–7)
NEUTROPHILS NFR BLD AUTO: 63.4 % (ref 42.7–76)
NRBC BLD AUTO-RTO: 0 /100 WBC (ref 0–0.2)
PLATELET # BLD AUTO: 217 10*3/MM3 (ref 140–450)
PMV BLD AUTO: 10 FL (ref 6–12)
POTASSIUM SERPL-SCNC: 4.1 MMOL/L (ref 3.5–5.2)
RBC # BLD AUTO: 5.2 10*6/MM3 (ref 3.77–5.28)
SODIUM SERPL-SCNC: 138 MMOL/L (ref 136–145)
VANCOMYCIN TROUGH SERPL-MCNC: 18.8 MCG/ML (ref 5–20)
WBC NRBC COR # BLD: 6.59 10*3/MM3 (ref 3.4–10.8)

## 2022-08-14 PROCEDURE — 94760 N-INVAS EAR/PLS OXIMETRY 1: CPT

## 2022-08-14 PROCEDURE — 25010000002 FUROSEMIDE PER 20 MG: Performed by: INTERNAL MEDICINE

## 2022-08-14 PROCEDURE — 80048 BASIC METABOLIC PNL TOTAL CA: CPT | Performed by: INTERNAL MEDICINE

## 2022-08-14 PROCEDURE — 85025 COMPLETE CBC W/AUTO DIFF WBC: CPT | Performed by: INTERNAL MEDICINE

## 2022-08-14 PROCEDURE — 94799 UNLISTED PULMONARY SVC/PX: CPT

## 2022-08-14 PROCEDURE — 80202 ASSAY OF VANCOMYCIN: CPT | Performed by: INTERNAL MEDICINE

## 2022-08-14 PROCEDURE — 25010000002 VANCOMYCIN 10 G RECONSTITUTED SOLUTION: Performed by: INTERNAL MEDICINE

## 2022-08-14 RX ORDER — DOXYCYCLINE 100 MG/1
100 CAPSULE ORAL EVERY 12 HOURS SCHEDULED
Status: DISCONTINUED | OUTPATIENT
Start: 2022-08-14 | End: 2022-08-19 | Stop reason: HOSPADM

## 2022-08-14 RX ORDER — FUROSEMIDE 40 MG/1
40 TABLET ORAL
Status: DISCONTINUED | OUTPATIENT
Start: 2022-08-14 | End: 2022-08-19 | Stop reason: HOSPADM

## 2022-08-14 RX ADMIN — DOXYCYCLINE 100 MG: 100 CAPSULE ORAL at 20:13

## 2022-08-14 RX ADMIN — Medication 10 ML: at 20:13

## 2022-08-14 RX ADMIN — AZTREONAM 1 G: 1 INJECTION, POWDER, LYOPHILIZED, FOR SOLUTION INTRAMUSCULAR; INTRAVENOUS at 00:46

## 2022-08-14 RX ADMIN — AZTREONAM 1 G: 1 INJECTION, POWDER, LYOPHILIZED, FOR SOLUTION INTRAMUSCULAR; INTRAVENOUS at 08:38

## 2022-08-14 RX ADMIN — APIXABAN 10 MG: 5 TABLET, FILM COATED ORAL at 08:38

## 2022-08-14 RX ADMIN — NICOTINE 1 PATCH: 21 PATCH, EXTENDED RELEASE TRANSDERMAL at 05:39

## 2022-08-14 RX ADMIN — FUROSEMIDE 40 MG: 10 INJECTION, SOLUTION INTRAMUSCULAR; INTRAVENOUS at 08:38

## 2022-08-14 RX ADMIN — DOXYCYCLINE 100 MG: 100 CAPSULE ORAL at 11:09

## 2022-08-14 RX ADMIN — Medication 10 ML: at 08:39

## 2022-08-14 RX ADMIN — Medication: at 05:35

## 2022-08-14 RX ADMIN — NYSTATIN: 100000 POWDER TOPICAL at 08:38

## 2022-08-14 RX ADMIN — VANCOMYCIN HYDROCHLORIDE 2250 MG: 10 INJECTION, POWDER, LYOPHILIZED, FOR SOLUTION INTRAVENOUS at 05:39

## 2022-08-14 RX ADMIN — NYSTATIN: 100000 POWDER TOPICAL at 20:13

## 2022-08-14 RX ADMIN — FUROSEMIDE 40 MG: 10 INJECTION, SOLUTION INTRAMUSCULAR; INTRAVENOUS at 00:46

## 2022-08-14 RX ADMIN — FUROSEMIDE 40 MG: 40 TABLET ORAL at 17:25

## 2022-08-14 RX ADMIN — APIXABAN 10 MG: 5 TABLET, FILM COATED ORAL at 20:13

## 2022-08-14 NOTE — PLAN OF CARE
Problem: Adult Inpatient Plan of Care  Goal: Plan of Care Review  Outcome: Ongoing, Progressing  Flowsheets  Taken 8/14/2022 0620 by Lillian Sagastume RN  Outcome Evaluation: Pt vs stable, gave lasix good output 2000+, legs still red and swollen, no pain overnight.  Taken 8/12/2022 0706 by Mira Ni RN  Plan of Care Reviewed With: patient  Goal: Patient-Specific Goal (Individualized)  Outcome: Ongoing, Progressing  Goal: Absence of Hospital-Acquired Illness or Injury  Outcome: Ongoing, Progressing  Intervention: Identify and Manage Fall Risk  Recent Flowsheet Documentation  Taken 8/14/2022 0618 by Lillian Sagastume RN  Safety Promotion/Fall Prevention:   activity supervised   assistive device/personal items within reach   clutter free environment maintained   fall prevention program maintained   safety round/check completed   nonskid shoes/slippers when out of bed  Taken 8/14/2022 0246 by Lillian Sagastume, RN  Safety Promotion/Fall Prevention:   activity supervised   assistive device/personal items within reach   clutter free environment maintained   fall prevention program maintained   safety round/check completed   nonskid shoes/slippers when out of bed  Taken 8/14/2022 0045 by Lillian Sagastume, RN  Safety Promotion/Fall Prevention:   activity supervised   assistive device/personal items within reach   clutter free environment maintained   fall prevention program maintained   safety round/check completed   nonskid shoes/slippers when out of bed  Taken 8/13/2022 2220 by Lillian Sagastume, RN  Safety Promotion/Fall Prevention:   activity supervised   assistive device/personal items within reach   clutter free environment maintained   fall prevention program maintained   safety round/check completed   nonskid shoes/slippers when out of bed  Taken 8/13/2022 2125 by Lillian Sagastume, RN  Safety Promotion/Fall Prevention:   activity supervised   assistive device/personal items within reach    clutter free environment maintained   fall prevention program maintained   safety round/check completed   nonskid shoes/slippers when out of bed  Taken 8/13/2022 2123 by Lillian Sagastume RN  Safety Promotion/Fall Prevention:   activity supervised   clutter free environment maintained   assistive device/personal items within reach   fall prevention program maintained   safety round/check completed   nonskid shoes/slippers when out of bed  Taken 8/13/2022 2025 by Lillian Sagastume RN  Safety Promotion/Fall Prevention:   activity supervised   assistive device/personal items within reach   clutter free environment maintained   fall prevention program maintained   safety round/check completed   nonskid shoes/slippers when out of bed  Taken 8/13/2022 1954 by Lillian Sagastume RN  Safety Promotion/Fall Prevention:   safety round/check completed   nonskid shoes/slippers when out of bed   activity supervised   assistive device/personal items within reach   clutter free environment maintained  Intervention: Prevent Skin Injury  Recent Flowsheet Documentation  Taken 8/14/2022 0618 by Lillian Sagastume RN  Body Position: dangle, side of bed  Taken 8/14/2022 0246 by Lillian Sagastume RN  Body Position:   position changed independently   right   side-lying  Taken 8/14/2022 0045 by Lillian Sagastume RN  Body Position:   position changed independently   right   side-lying  Taken 8/13/2022 2220 by Lillian Sagastume RN  Body Position: dangle, side of bed  Taken 8/13/2022 2025 by Lillian Sagastume RN  Body Position: dangle, side of bed  Intervention: Prevent and Manage VTE (Venous Thromboembolism) Risk  Recent Flowsheet Documentation  Taken 8/13/2022 2125 by Lillian Sagastume RN  Activity Management: activity adjusted per tolerance  VTE Prevention/Management: (eliquis) other (see comments)  Goal: Optimal Comfort and Wellbeing  Outcome: Ongoing, Progressing  Intervention: Provide Person-Centered Care  Recent  Flowsheet Documentation  Taken 8/13/2022 2125 by Lillian Sagastume, RN  Trust Relationship/Rapport:   care explained   questions answered   thoughts/feelings acknowledged  Goal: Readiness for Transition of Care  Outcome: Ongoing, Progressing   Goal Outcome Evaluation:              Outcome Evaluation: Pt vs stable, gave lasix good output 2000+, legs still red and swollen, no pain overnight.

## 2022-08-14 NOTE — PROGRESS NOTES
[facility] Medicine Services  INPATIENT PROGRESS NOTE    Length of Stay: 3  Date of Admission: 8/9/2022  Primary Care Physician: Danielle Vaughn APRN    Subjective     Patient feels that leg swelling is improving.  No shortness of breath.    Review of Systems     All pertinent negatives and positives are as above. All other systems have been reviewed and are negative unless otherwise stated.     Objective    Temp:  [96 °F (35.6 °C)-97.9 °F (36.6 °C)] 97.9 °F (36.6 °C)  Heart Rate:  [84-94] 89  Resp:  [18-22] 20  BP: (112-123)/(61-76) 117/76    Physical Exam  Constitutional:       General: She is not in acute distress.  HENT:      Head: Normocephalic and atraumatic.   Eyes:      Extraocular Movements: Extraocular movements intact.   Pulmonary:      Effort: Pulmonary effort is normal. No respiratory distress.   Abdominal:      General: There is no distension.      Tenderness: There is no abdominal tenderness.   Musculoskeletal:      Right lower leg: Edema present.      Left lower leg: Edema present.      Comments: Bilateral lower extremity erythema   Neurological:      General: No focal deficit present.       Results Review:  I have reviewed the labs, radiology results, and diagnostic studies.    Laboratory Data:   Results from last 7 days   Lab Units 08/14/22  0501 08/13/22  0131 08/11/22  0539 08/09/22  1515   SODIUM mmol/L 138  --  136 135*   POTASSIUM mmol/L 4.1 4.4 4.1 4.2   CHLORIDE mmol/L 92*  --  93* 91*   CO2 mmol/L 44.0*  --  38.0* 39.0*   BUN mg/dL 6*  --  6* 9   CREATININE mg/dL 0.61  --  0.61 0.70   GLUCOSE mg/dL 141*  --  124* 189*   CALCIUM mg/dL 8.2*  --  7.9* 8.4*   BILIRUBIN mg/dL  --   --   --  1.4*   ALK PHOS U/L  --   --   --  94   ALT (SGPT) U/L  --   --   --  12   AST (SGOT) U/L  --   --   --  20   ANION GAP mmol/L 2.0*  --  5.0 5.0     Estimated Creatinine Clearance: 129.6 mL/min (by C-G formula based on SCr of 0.61 mg/dL).  Results from last 7 days    Lab Units 08/09/22  1515   MAGNESIUM mg/dL 2.0         Results from last 7 days   Lab Units 08/14/22  0501 08/11/22  0539 08/09/22  1515   WBC 10*3/mm3 6.59 7.92 8.33   HEMOGLOBIN g/dL 14.1 13.3 15.2   HEMATOCRIT % 45.9 43.3 49.4*   PLATELETS 10*3/mm3 217 179 155     Results from last 7 days   Lab Units 08/09/22  2358   INR  1.20       Culture Data:   No results found for: BLOODCX  No results found for: URINECX  No results found for: RESPCX  No results found for: WOUNDCX  No results found for: STOOLCX  No components found for: BODYFLD    Radiology Data:   Imaging Results (Last 24 Hours)     ** No results found for the last 24 hours. **          I have reviewed the patient's current medications.     Assessment/Plan     Active Hospital Problems    Diagnosis    • Multiple subsegmental pulmonary emboli without acute cor pulmonale (HCC)    • Cellulitis    • Cellulitis        Plan:      Bilateral lower extremity edema.  We will switch to oral Lasix.  Improving.     Possible cellulitis.  On vancomycin and aztreonam.  Continues to have bilateral erythema.  The erythema could be secondary to the lower extremity edema rather than cellulitis.  We will switch her to doxycycline.     Pulmonary embolism/DVT.  Left popliteal vein.  Switched to Eliquis yesterday.      The patient was evaluated during the global COVID-19 pandemic, and the diagnosis was suspected/considered upon their initial presentation.  Evaluation, treatment, and testing were consistent with current guidelines for patients who present with complaints or symptoms that may be related to COVID-19.      Alphonso Martin MD

## 2022-08-14 NOTE — PROGRESS NOTES
"Pharmacokinetics by Pharmacy - Vancomycin    Stephanie Bradshaw is a 62 y.o. female receiving vancomycin 2250 mg IV q12hr (day 3) for SSTI.  Patient is also receiving aztreonam.    Objective:    Temp Readings from Last 1 Encounters:   08/14/22 97.9 °F (36.6 °C) (Infrared)     WBC   Date Value Ref Range Status   08/14/2022 6.59 3.40 - 10.80 10*3/mm3 Final    No results found for: CRP, LACTATE   Creatinine   Date Value Ref Range Status   08/14/2022 0.61 0.57 - 1.00 mg/dL Final       Vancomycin Trough   Date Value Ref Range Status   08/14/2022 18.80 5.00 - 20.00 mcg/mL Final       Culture Results:  Microbiology Results (last 10 days)     Procedure Component Value - Date/Time    Blood Culture - Blood, Hand, Right [[id number]]  (Normal) Collected: 08/09/22 2358    Lab Status: Preliminary result Specimen: Blood from Hand, Right Updated: 08/14/22 0017     Blood Culture No growth at 4 days    Blood Culture - Blood, Arm, Left [[id number]]  (Normal) Collected: 08/09/22 1944    Lab Status: Preliminary result Specimen: Blood from Arm, Left Updated: 08/13/22 2001     Blood Culture No growth at 4 days    COVID-19 and FLU A/B PCR - Swab, Nasopharynx [[id number]]  (Normal) Collected: 08/09/22 1621    Lab Status: Final result Specimen: Swab from Nasopharynx Updated: 08/09/22 1649     COVID19 Not Detected     Influenza A PCR Not Detected     Influenza B PCR Not Detected    Narrative:      Fact sheet for providers: https://www.fda.gov/media/199940/download    Fact sheet for patients: https://www.fda.gov/media/310037/download    Test performed by PCR.        No results found for: RESPCX    [Ht: 157.5 cm (62\"); Wt: 134 kg (294 lb 6 oz)]   Body mass index is 53.84 kg/m².  Ideal body weight: 50.1 kg (110 lb 7.2 oz)  Adjusted ideal body weight: 83.5 kg (184 lb 0.3 oz)      Assessment:  • WBCs WNL, afebrile  • Creatinine WNL and appears stable  • Cultures  o 8/9 blood culture NGD4  • Vancomycin trough resulted at 18.8 mcg/mL today @ 0501 " after 4 doses. This gives a calculated AUC and trough of 887 and 18 mcg/mL, respectively. The AUC is above goal.   • AUC and trough goals are 400-600 and 10-20 mcg/mL, respectively.     Plan:  1. Reduce vancomycin to 1500 mg IV q12hrs, this gives a new estimated AUC and trough of 591 and 12.5 mcg/mL, respectively.   2. Vancomycin levels when clinically indicated. Consulted until 8/15/22.  3. Pharmacy will monitor renal function and adjust dose accordingly.    Thank you for this consult.     Mode Byrd [facility]   08/14/22 08:37 CDT

## 2022-08-14 NOTE — PLAN OF CARE
Goal Outcome Evaluation:   Patient IV antibiotics discontinued today; started on PO doxycycline.         Progress: improving

## 2022-08-14 NOTE — PLAN OF CARE
Problem: Adult Inpatient Plan of Care  Goal: Plan of Care Review  8/14/2022 0624 by Lillian Sagastume RN  Outcome: Ongoing, Progressing  Flowsheets  Taken 8/14/2022 0624 by Lillian Sagastume RN  Progress: improving  Taken 8/12/2022 0706 by Mira Ni RN  Plan of Care Reviewed With: patient  8/14/2022 0620 by Lillian Sagastume RN  Outcome: Ongoing, Progressing  Flowsheets  Taken 8/14/2022 0620 by Lillian Sagastume RN  Outcome Evaluation: Pt vs stable, gave lasix good output 2000+, legs still red and swollen, no pain overnight.  Taken 8/12/2022 0706 by Mira Ni RN  Plan of Care Reviewed With: patient  Goal: Patient-Specific Goal (Individualized)  8/14/2022 0624 by Lillian Sagastume RN  Outcome: Ongoing, Progressing  8/14/2022 0620 by Lillian Sagastume RN  Outcome: Ongoing, Progressing  Goal: Absence of Hospital-Acquired Illness or Injury  8/14/2022 0624 by Lillian Sagastume RN  Outcome: Ongoing, Progressing  8/14/2022 0620 by Lillian Sagastume RN  Outcome: Ongoing, Progressing  Intervention: Identify and Manage Fall Risk  Recent Flowsheet Documentation  Taken 8/14/2022 0618 by Lillian Sagastume RN  Safety Promotion/Fall Prevention:   activity supervised   assistive device/personal items within reach   clutter free environment maintained   fall prevention program maintained   safety round/check completed   nonskid shoes/slippers when out of bed  Taken 8/14/2022 0246 by Lillian Sagastume RN  Safety Promotion/Fall Prevention:   activity supervised   assistive device/personal items within reach   clutter free environment maintained   fall prevention program maintained   safety round/check completed   nonskid shoes/slippers when out of bed  Taken 8/14/2022 0045 by Lillian Sagastume RN  Safety Promotion/Fall Prevention:   activity supervised   assistive device/personal items within reach   clutter free environment maintained   fall prevention program maintained   safety round/check  completed   nonskid shoes/slippers when out of bed  Taken 8/13/2022 2220 by Lillian Sagastume RN  Safety Promotion/Fall Prevention:   activity supervised   assistive device/personal items within reach   clutter free environment maintained   fall prevention program maintained   safety round/check completed   nonskid shoes/slippers when out of bed  Taken 8/13/2022 2125 by Lillian Sagastume RN  Safety Promotion/Fall Prevention:   activity supervised   assistive device/personal items within reach   clutter free environment maintained   fall prevention program maintained   safety round/check completed   nonskid shoes/slippers when out of bed  Taken 8/13/2022 2123 by Lillian Sagastume RN  Safety Promotion/Fall Prevention:   activity supervised   clutter free environment maintained   assistive device/personal items within reach   fall prevention program maintained   safety round/check completed   nonskid shoes/slippers when out of bed  Taken 8/13/2022 2025 by Lillian Sagastume RN  Safety Promotion/Fall Prevention:   activity supervised   assistive device/personal items within reach   clutter free environment maintained   fall prevention program maintained   safety round/check completed   nonskid shoes/slippers when out of bed  Taken 8/13/2022 1954 by Lillian Sagastume RN  Safety Promotion/Fall Prevention:   safety round/check completed   nonskid shoes/slippers when out of bed   activity supervised   assistive device/personal items within reach   clutter free environment maintained  Intervention: Prevent Skin Injury  Recent Flowsheet Documentation  Taken 8/14/2022 0618 by Lillian Sagastume RN  Body Position: dangle, side of bed  Taken 8/14/2022 0246 by Lillian Sagastume RN  Body Position:   position changed independently   right   side-lying  Taken 8/14/2022 0045 by Lillian Sagastume RN  Body Position:   position changed independently   right   side-lying  Taken 8/13/2022 2220 by Lillian Sagastume RN  Body  Position: dangle, side of bed  Taken 8/13/2022 2025 by Lillian Sagastume RN  Body Position: dangle, side of bed  Intervention: Prevent and Manage VTE (Venous Thromboembolism) Risk  Recent Flowsheet Documentation  Taken 8/13/2022 2125 by Lillian Sagastume RN  Activity Management: activity adjusted per tolerance  VTE Prevention/Management: (eliquis) other (see comments)  Goal: Optimal Comfort and Wellbeing  8/14/2022 0624 by Lillian Sagastume RN  Outcome: Ongoing, Progressing  8/14/2022 0620 by Lillian Sagastume RN  Outcome: Ongoing, Progressing  Intervention: Provide Person-Centered Care  Recent Flowsheet Documentation  Taken 8/13/2022 2125 by Lillian Sagastume RN  Trust Relationship/Rapport:   care explained   questions answered   thoughts/feelings acknowledged  Goal: Readiness for Transition of Care  8/14/2022 0624 by Lillian Sagastume RN  Outcome: Ongoing, Progressing  8/14/2022 0620 by Lillian Sagastume RN  Outcome: Ongoing, Progressing   Goal Outcome Evaluation:           Progress: improving  Outcome Evaluation: Pt vs stable, gave lasix good output 2000+, legs still red and swollen, no pain overnight.

## 2022-08-15 ENCOUNTER — APPOINTMENT (OUTPATIENT)
Dept: GENERAL RADIOLOGY | Facility: HOSPITAL | Age: 62
End: 2022-08-15

## 2022-08-15 LAB
BACTERIA SPEC AEROBE CULT: NORMAL
BACTERIA UR QL AUTO: ABNORMAL /HPF
BILIRUB UR QL STRIP: NEGATIVE
CLARITY UR: ABNORMAL
COLOR UR: ABNORMAL
GLUCOSE UR STRIP-MCNC: NEGATIVE MG/DL
HGB UR QL STRIP.AUTO: ABNORMAL
HYALINE CASTS UR QL AUTO: ABNORMAL /LPF
KETONES UR QL STRIP: NEGATIVE
LEUKOCYTE ESTERASE UR QL STRIP.AUTO: ABNORMAL
NITRITE UR QL STRIP: NEGATIVE
PH UR STRIP.AUTO: 8.5 [PH] (ref 5–9)
PROT UR QL STRIP: ABNORMAL
RBC # UR STRIP: ABNORMAL /HPF
REF LAB TEST METHOD: ABNORMAL
SP GR UR STRIP: 1.01 (ref 1–1.03)
SQUAMOUS #/AREA URNS HPF: ABNORMAL /HPF
UROBILINOGEN UR QL STRIP: ABNORMAL
WBC # UR STRIP: ABNORMAL /HPF

## 2022-08-15 PROCEDURE — 74018 RADEX ABDOMEN 1 VIEW: CPT

## 2022-08-15 PROCEDURE — 81001 URINALYSIS AUTO W/SCOPE: CPT | Performed by: FAMILY MEDICINE

## 2022-08-15 PROCEDURE — 25010000002 CEFTRIAXONE PER 250 MG: Performed by: FAMILY MEDICINE

## 2022-08-15 RX ADMIN — APIXABAN 10 MG: 5 TABLET, FILM COATED ORAL at 09:14

## 2022-08-15 RX ADMIN — FUROSEMIDE 40 MG: 40 TABLET ORAL at 09:14

## 2022-08-15 RX ADMIN — DOXYCYCLINE 100 MG: 100 CAPSULE ORAL at 19:48

## 2022-08-15 RX ADMIN — APIXABAN 10 MG: 5 TABLET, FILM COATED ORAL at 19:48

## 2022-08-15 RX ADMIN — CEFTRIAXONE SODIUM 2 G: 2 INJECTION, POWDER, FOR SOLUTION INTRAMUSCULAR; INTRAVENOUS at 13:15

## 2022-08-15 RX ADMIN — Medication 1 APPLICATION: at 20:33

## 2022-08-15 RX ADMIN — Medication 10 ML: at 09:15

## 2022-08-15 RX ADMIN — DOXYCYCLINE 100 MG: 100 CAPSULE ORAL at 09:19

## 2022-08-15 RX ADMIN — Medication 10 ML: at 21:46

## 2022-08-15 RX ADMIN — NYSTATIN: 100000 POWDER TOPICAL at 19:48

## 2022-08-15 RX ADMIN — NYSTATIN: 100000 POWDER TOPICAL at 09:14

## 2022-08-15 RX ADMIN — FUROSEMIDE 40 MG: 40 TABLET ORAL at 16:55

## 2022-08-15 RX ADMIN — Medication 10 ML: at 19:49

## 2022-08-15 NOTE — PROGRESS NOTES
"Nutrition Services    Patient Name:  Stephanie Bradshaw  YOB: 1960  MRN: [id number]  Admit Date:  8/9/2022    Pt agreed for RDN to come in. Pt said her meals are good, she is eating plenty, and she is getting what she likes to eat \"delicious\". Pt did remember one goal of the two goals discussed she and I made  on a previous visit during this stay-drink diet soda and tea with artifical sweetener. RDN staff will continue to monitor.    Electronically signed by:  Camilla Garcia RD  08/15/22 14:55 CDT   "

## 2022-08-15 NOTE — PROGRESS NOTES
[facility]  INPATIENT PROGRESS NOTE    Length of Stay: 4  Date of Admission: 8/9/2022  Primary Care Physician: Danielle Vaughn APRN    Subjective   Chief Complaint: Shortness of breath/hematuria  HPI: Patient states that her breathing feels about the same.  She also notes that she is having pain in her back whenever she has to urinate and states that this is new since being on Lasix.  She also notes that she has had hematuria since being at the hospital after starting anticoagulation.  She notes some occasional chills but denies any other current concerns.    Review of Systems   Constitutional: Positive for chills. Negative for fever.   HENT: Negative for congestion.    Respiratory: Positive for shortness of breath.    Cardiovascular: Negative for chest pain and palpitations.   Gastrointestinal: Negative for abdominal pain.   Genitourinary: Positive for flank pain and hematuria.   Musculoskeletal: Positive for back pain.   Skin: Negative.    Neurological: Negative.    Psychiatric/Behavioral: Negative.    All other systems reviewed and are negative.     All pertinent negatives and positives are as above. All other systems have been reviewed and are negative unless otherwise stated.     Objective    Temp:  [97.1 °F (36.2 °C)-97.8 °F (36.6 °C)] 97.2 °F (36.2 °C)  Heart Rate:  [83-91] 84  Resp:  [15-18] 18  BP: (108-143)/(55-96) 108/55    Physical Exam  Constitutional:       General: She is not in acute distress.     Appearance: She is not toxic-appearing.   HENT:      Head: Normocephalic and atraumatic.      Right Ear: External ear normal.      Left Ear: External ear normal.      Mouth/Throat:      Mouth: Mucous membranes are moist.      Pharynx: Oropharynx is clear.   Eyes:      Conjunctiva/sclera: Conjunctivae normal.   Cardiovascular:      Rate and Rhythm: Normal rate and regular rhythm.      Pulses: Normal pulses.      Heart sounds: Normal heart sounds.    Pulmonary:      Comments: Diminished but otherwise clear breath sounds bilaterally  Abdominal:      General: Bowel sounds are normal.      Palpations: Abdomen is soft.      Tenderness: There is no abdominal tenderness. There is left CVA tenderness.   Musculoskeletal:      Right lower leg: Edema present.      Left lower leg: Edema present.      Comments: Erythema bilateral lower extremities noted, there does not appear to be any increased warmth though.   Skin:     General: Skin is warm and dry.      Capillary Refill: Capillary refill takes less than 2 seconds.      Findings: Erythema present.   Neurological:      General: No focal deficit present.      Mental Status: She is alert and oriented to person, place, and time. Mental status is at baseline.   Psychiatric:         Behavior: Behavior normal.         Thought Content: Thought content normal.         Results Review:  I have reviewed the labs, radiology results, and diagnostic studies.    Laboratory Data:   Results from last 7 days   Lab Units 08/14/22  0501 08/13/22  0131 08/11/22  0539 08/09/22  1515   SODIUM mmol/L 138  --  136 135*   POTASSIUM mmol/L 4.1 4.4 4.1 4.2   CHLORIDE mmol/L 92*  --  93* 91*   CO2 mmol/L 44.0*  --  38.0* 39.0*   BUN mg/dL 6*  --  6* 9   CREATININE mg/dL 0.61  --  0.61 0.70   GLUCOSE mg/dL 141*  --  124* 189*   CALCIUM mg/dL 8.2*  --  7.9* 8.4*   BILIRUBIN mg/dL  --   --   --  1.4*   ALK PHOS U/L  --   --   --  94   ALT (SGPT) U/L  --   --   --  12   AST (SGOT) U/L  --   --   --  20   ANION GAP mmol/L 2.0*  --  5.0 5.0     Estimated Creatinine Clearance: 129.6 mL/min (by C-G formula based on SCr of 0.61 mg/dL).  Results from last 7 days   Lab Units 08/09/22  1515   MAGNESIUM mg/dL 2.0         Results from last 7 days   Lab Units 08/14/22  0501 08/11/22  0539 08/09/22  1515   WBC 10*3/mm3 6.59 7.92 8.33   HEMOGLOBIN g/dL 14.1 13.3 15.2   HEMATOCRIT % 45.9 43.3 49.4*   PLATELETS 10*3/mm3 217 179 155     Results from last 7 days   Lab  Units 08/09/22  2358   INR  1.20       Culture Data:   No results found for: BLOODCX  No results found for: URINECX  No results found for: RESPCX  No results found for: WOUNDCX  No results found for: STOOLCX  No components found for: BODYFLD    Radiology Data:   Imaging Results (Last 24 Hours)     Procedure Component Value Units Date/Time    XR Abdomen KUB [[id number]] Resulted: 08/15/22 1129     Updated: 08/15/22 1137          I have reviewed the patient's current medications.     Assessment/Plan     Active Problems:    Multiple subsegmental pulmonary emboli without acute cor pulmonale (HCC)    Cellulitis    Cellulitis  Bilateral lower extremity edema  Left popliteal vein DVT  Hematuria    Plan:  - Continue anticoagulation with p.o. Eliquis  - Continue supplemental oxygen wean as tolerated  - She did have some pretty jonnathan hematuria in her bedside commode during my evaluation  - Check KUB to see if there is any signs of kidney stone  - Repeat urinalysis for signs of possible infection  - Continue doxycycline for cellulitic changes of her lower extremities and start Rocephin for urine coverage  - Discussed with urology and they will also see, appreciate their help in advance  - Continue home medications as appropriate  - DVT/PE prophylaxis/treatment with Eliquis  - CODE STATUS: Full      I confirmed that the patient's Advance Care Plan is present, code status is documented, or surrogate decision maker is listed in the patient's medical record.     Discharge Planning: In process.    Dexter Mclean MD

## 2022-08-15 NOTE — MEDICAL STUDENT
"    [facility]  INPATIENT PROGRESS NOTE    Length of Stay: 4  Date of Admission: 8/9/2022  Primary Care Physician: Danielle Vaughn APRN    Subjective   Chief Complaint: shortness of breath and leg swelling  HPI:  JULIO CÉSAR is a 63 yo female who presented with a PE.and leg swelling. She says she is feeling \"about the same as yesterday\" now.  She says as long as she has her oxygen on she will have no trouble breathing. In the past week she did report some palpitations but none today. The patient also reports pain in both feet, continued blood in urine, and pain her back while urinating since starting Lasix.      Review of Systems   (+) occasional fever/chills, occasional wheezing (-) confusion, lightheadedness, chest pain, syncope, palpitations, SOB       All pertinent negatives and positives are as above. All other systems have been reviewed and are negative unless otherwise stated.     Objective    Temp:  [97.1 °F (36.2 °C)-98 °F (36.7 °C)] 97.2 °F (36.2 °C)  Heart Rate:  [83-91] 84  Resp:  [15-20] 18  BP: (108-143)/(55-96) 108/55    Physical Exam  Gen: awake, alert, oriented x3  Cardio: heart was RRR  Pulm: decreased breath sounds  Skin: redness and peeling of skin on both feet, both legs were edematous, with the left being more so     Results Review:  I have reviewed the labs, radiology results, and diagnostic studies.    Laboratory Data:   Results from last 7 days   Lab Units 08/14/22  0501 08/13/22  0131 08/11/22  0539 08/09/22  1515   SODIUM mmol/L 138  --  136 135*   POTASSIUM mmol/L 4.1 4.4 4.1 4.2   CHLORIDE mmol/L 92*  --  93* 91*   CO2 mmol/L 44.0*  --  38.0* 39.0*   BUN mg/dL 6*  --  6* 9   CREATININE mg/dL 0.61  --  0.61 0.70   GLUCOSE mg/dL 141*  --  124* 189*   CALCIUM mg/dL 8.2*  --  7.9* 8.4*   BILIRUBIN mg/dL  --   --   --  1.4*   ALK PHOS U/L  --   --   --  94   ALT (SGPT) U/L  --   --   --  12   AST (SGOT) U/L  --   --   --  20   ANION GAP mmol/L " 2.0*  --  5.0 5.0     Estimated Creatinine Clearance: 129.6 mL/min (by C-G formula based on SCr of 0.61 mg/dL).  Results from last 7 days   Lab Units 08/09/22  1515   MAGNESIUM mg/dL 2.0         Results from last 7 days   Lab Units 08/14/22  0501 08/11/22  0539 08/09/22  1515   WBC 10*3/mm3 6.59 7.92 8.33   HEMOGLOBIN g/dL 14.1 13.3 15.2   HEMATOCRIT % 45.9 43.3 49.4*   PLATELETS 10*3/mm3 217 179 155     Results from last 7 days   Lab Units 08/09/22  2358   INR  1.20       Culture Data:   No results found for: BLOODCX  No results found for: URINECX  No results found for: RESPCX  No results found for: WOUNDCX  No results found for: STOOLCX  No components found for: BODYFLD    Radiology Data:   Imaging Results (Last 24 Hours)     ** No results found for the last 24 hours. **          I have reviewed the patient's current medications.     Assessment/Plan     Active Problems:    Multiple subsegmental pulmonary emboli without acute cor pulmonale (HCC)    Cellulitis    Cellulitis    Possible Cellulitis    -Switched to doxycycline, the redness and swelling could be from lower extremity edema   -Await cultures for growth    PE/DVT  -Continue Eliquis treatment     Bilateral lower extremity edema   -Continue lasix, eGFR and creatine normal      Hematuria   -EVIE screening   -Consult nephro.       Dago Grewal, Medical Student

## 2022-08-15 NOTE — PLAN OF CARE
Problem: Adult Inpatient Plan of Care  Goal: Plan of Care Review  Outcome: Ongoing, Progressing  Flowsheets  Taken 8/15/2022 0247 by Lillian Sagastume RN  Progress: improving  Outcome Evaluation: pt vs stable, on oral lasix and antibiotic now, no acute events overnight.  Taken 8/12/2022 0706 by Mira Ni RN  Plan of Care Reviewed With: patient  Goal: Patient-Specific Goal (Individualized)  Outcome: Ongoing, Progressing  Goal: Absence of Hospital-Acquired Illness or Injury  Outcome: Ongoing, Progressing  Intervention: Identify and Manage Fall Risk  Recent Flowsheet Documentation  Taken 8/15/2022 0226 by Lillian Sagastume RN  Safety Promotion/Fall Prevention:   activity supervised   assistive device/personal items within reach   clutter free environment maintained   fall prevention program maintained   safety round/check completed   nonskid shoes/slippers when out of bed  Taken 8/14/2022 2215 by Lillian Sagastume RN  Safety Promotion/Fall Prevention:   activity supervised   assistive device/personal items within reach   clutter free environment maintained   fall prevention program maintained   safety round/check completed   nonskid shoes/slippers when out of bed  Taken 8/14/2022 2119 by Lillian Sagastume RN  Safety Promotion/Fall Prevention:   activity supervised   assistive device/personal items within reach   clutter free environment maintained   fall prevention program maintained   safety round/check completed   nonskid shoes/slippers when out of bed  Taken 8/14/2022 2012 by Lillian Sagastume RN  Safety Promotion/Fall Prevention:   activity supervised   assistive device/personal items within reach   clutter free environment maintained   fall prevention program maintained   safety round/check completed   nonskid shoes/slippers when out of bed  Taken 8/14/2022 1914 by Lillian Sagastume, RN  Safety Promotion/Fall Prevention:   activity supervised   assistive device/personal items within reach    clutter free environment maintained   fall prevention program maintained   safety round/check completed   nonskid shoes/slippers when out of bed  Intervention: Prevent Skin Injury  Recent Flowsheet Documentation  Taken 8/15/2022 0226 by Lillian Sagastume RN  Body Position: sitting up in bed  Taken 8/14/2022 2215 by Lillian Sagastume RN  Body Position:   position changed independently   dangle, side of bed  Taken 8/14/2022 2012 by Lillian Sagastume RN  Body Position: sitting up in bed  Intervention: Prevent and Manage VTE (Venous Thromboembolism) Risk  Recent Flowsheet Documentation  Taken 8/14/2022 1914 by Lillian Sagastume RN  Activity Management: activity adjusted per tolerance  VTE Prevention/Management: (rliquis) other (see comments)  Goal: Optimal Comfort and Wellbeing  Outcome: Ongoing, Progressing  Intervention: Provide Person-Centered Care  Recent Flowsheet Documentation  Taken 8/14/2022 1914 by Lillian Sagastume RN  Trust Relationship/Rapport:   care explained   questions answered   thoughts/feelings acknowledged  Goal: Readiness for Transition of Care  Outcome: Ongoing, Progressing   Goal Outcome Evaluation:           Progress: improving  Outcome Evaluation: pt vs stable, on oral lasix and antibiotic now, no acute events overnight.

## 2022-08-16 ENCOUNTER — APPOINTMENT (OUTPATIENT)
Dept: CT IMAGING | Facility: HOSPITAL | Age: 62
End: 2022-08-16

## 2022-08-16 ENCOUNTER — APPOINTMENT (OUTPATIENT)
Dept: ULTRASOUND IMAGING | Facility: HOSPITAL | Age: 62
End: 2022-08-16

## 2022-08-16 LAB
GLUCOSE BLDC GLUCOMTR-MCNC: 221 MG/DL (ref 70–130)
GLUCOSE BLDC GLUCOMTR-MCNC: 229 MG/DL (ref 70–130)
GLUCOSE BLDC GLUCOMTR-MCNC: 242 MG/DL (ref 70–130)
GLUCOSE BLDC GLUCOMTR-MCNC: 247 MG/DL (ref 70–130)

## 2022-08-16 PROCEDURE — 25010000002 CEFTRIAXONE PER 250 MG: Performed by: FAMILY MEDICINE

## 2022-08-16 PROCEDURE — 25010000002 IOPAMIDOL 61 % SOLUTION: Performed by: INTERNAL MEDICINE

## 2022-08-16 PROCEDURE — 0 DIATRIZOATE MEGLUMINE & SODIUM PER 1 ML: Performed by: INTERNAL MEDICINE

## 2022-08-16 PROCEDURE — 94760 N-INVAS EAR/PLS OXIMETRY 1: CPT

## 2022-08-16 PROCEDURE — 76830 TRANSVAGINAL US NON-OB: CPT

## 2022-08-16 PROCEDURE — 74177 CT ABD & PELVIS W/CONTRAST: CPT

## 2022-08-16 PROCEDURE — 82962 GLUCOSE BLOOD TEST: CPT

## 2022-08-16 PROCEDURE — 94799 UNLISTED PULMONARY SVC/PX: CPT

## 2022-08-16 PROCEDURE — 97162 PT EVAL MOD COMPLEX 30 MIN: CPT

## 2022-08-16 RX ADMIN — IOPAMIDOL 90 ML: 612 INJECTION, SOLUTION INTRAVENOUS at 16:37

## 2022-08-16 RX ADMIN — FUROSEMIDE 40 MG: 40 TABLET ORAL at 08:34

## 2022-08-16 RX ADMIN — NYSTATIN: 100000 POWDER TOPICAL at 09:13

## 2022-08-16 RX ADMIN — DOXYCYCLINE 100 MG: 100 CAPSULE ORAL at 20:29

## 2022-08-16 RX ADMIN — Medication 1 APPLICATION: at 08:30

## 2022-08-16 RX ADMIN — FUROSEMIDE 40 MG: 40 TABLET ORAL at 17:40

## 2022-08-16 RX ADMIN — Medication 10 ML: at 20:29

## 2022-08-16 RX ADMIN — DIATRIZOATE MEGLUMINE AND DIATRIZOATE SODIUM 30 ML: 660; 100 LIQUID ORAL; RECTAL at 14:36

## 2022-08-16 RX ADMIN — Medication 1 APPLICATION: at 20:29

## 2022-08-16 RX ADMIN — NYSTATIN: 100000 POWDER TOPICAL at 20:00

## 2022-08-16 RX ADMIN — APIXABAN 10 MG: 5 TABLET, FILM COATED ORAL at 08:34

## 2022-08-16 RX ADMIN — DOXYCYCLINE 100 MG: 100 CAPSULE ORAL at 08:41

## 2022-08-16 RX ADMIN — NICOTINE 1 PATCH: 21 PATCH, EXTENDED RELEASE TRANSDERMAL at 08:34

## 2022-08-16 RX ADMIN — APIXABAN 10 MG: 5 TABLET, FILM COATED ORAL at 20:29

## 2022-08-16 RX ADMIN — CEFTRIAXONE SODIUM 2 G: 2 INJECTION, POWDER, FOR SOLUTION INTRAMUSCULAR; INTRAVENOUS at 12:40

## 2022-08-16 RX ADMIN — Medication 10 ML: at 08:41

## 2022-08-16 NOTE — PLAN OF CARE
Goal Outcome Evaluation:  Plan of Care Reviewed With: patient        Progress: improving  Outcome Evaluation: pt weight decreasing.  pt hair remains matted despite RN working on tangles for over an hour.  pt states it just got out of control over 3 weeks ago. pt states she is feeling better.

## 2022-08-16 NOTE — CONSULTS
[facility]   Consult Note    Patient Name: Stephanie Bradshaw  : 1960  MRN: [id number]  Primary Care Physician:  Danielle Vaughn APRN  Referring Physician: Isaiah Key MD  Date of admission: 2022    Inpatient Urology Consult  Consult performed by: She Preston APRN  Consult ordered by: Dexter Mclean MD        Subjective   Subjective     Reason for Consult/ Chief Complaint: hematuria    Shortness of Breath  Associated symptoms include abdominal pain and leg swelling.   Leg Swelling  Associated symptoms include abdominal pain and chills.     Stephanie Bradshaw is a 62 y.o. female consulted to Urology for hematuria, says she has flank pain, low back pain with suprapubic cramping, urgent desire to urinate and cannot tell if she has spots of bloody urine or bloody vaginal discharge. She is a previous tobacco user, medical history of COPD, evaluated in the ED for shortness of breath and leg swelling recently treated in the last 2 weeks for pneumonia, admitted for pulmonary emboli, DVT, cellulitis.     Review of Systems   Constitutional: Positive for chills.   Respiratory: Positive for shortness of breath.    Cardiovascular: Positive for leg swelling.   Gastrointestinal: Positive for abdominal pain.   Genitourinary: Positive for flank pain and hematuria.   Musculoskeletal: Positive for back pain.        Personal History     Past Medical History:   Diagnosis Date   • COPD (chronic obstructive pulmonary disease) (HCC)    • Hyperlipidemia        History reviewed. No pertinent surgical history.    Family History: family history is not on file. Otherwise pertinent FHx was reviewed and not pertinent to current issue.    Social History:  reports that she has quit smoking. Her smoking use included cigarettes. She has a 60.00 pack-year smoking history. She has never used smokeless tobacco. She reports current alcohol use. She reports that she does not use drugs.    Home Medications:         Allergies:  Allergies   Allergen Reactions   • Penicillins Rash       Objective    Objective     Vitals:  Temp:  [96.4 °F (35.8 °C)-98 °F (36.7 °C)] 98 °F (36.7 °C)  Heart Rate:  [87-95] 87  Resp:  [18] 18  BP: (114-128)/(56-79) 127/66  Flow (L/min):  [3] 3    Physical Exam  Constitutional:       General: She is not in acute distress.     Appearance: She is obese.   HENT:      Head: Normocephalic and atraumatic.      Right Ear: External ear normal.      Left Ear: External ear normal.      Mouth/Throat:      Mouth: Mucous membranes are moist.   Eyes:      General:         Right eye: No discharge.         Left eye: No discharge.      Pupils: Pupils are equal, round, and reactive to light.   Cardiovascular:      Pulses: Normal pulses.   Pulmonary:      Effort: Pulmonary effort is normal.   Abdominal:      Palpations: Abdomen is soft.      Tenderness: There is no abdominal tenderness.   Musculoskeletal:         General: No tenderness, deformity or signs of injury.   Skin:     General: Skin is warm and dry.      Coloration: Skin is not jaundiced or pale.      Findings: No bruising.   Neurological:      General: No focal deficit present.      Mental Status: She is alert and oriented to person, place, and time. Mental status is at baseline.   Psychiatric:         Mood and Affect: Mood normal.         Behavior: Behavior normal.         Thought Content: Thought content normal.         Result Review    Result Review:  I have personally reviewed the results from the time of this admission to 8/16/2022 12:09 CDT and agree with these findings:  [x]  Laboratory list / accordion  [x]  Microbiology  [x]  Radiology  []  EKG/Telemetry   []  Cardiology/Vascular   []  Pathology  []  Old records  []  Other:  Most notable findings include:     Imaging Results (Last 48 Hours)     Procedure Component Value Units Date/Time    US Non-ob Transvaginal [[id number]] Collected: 08/16/22 1020     Updated: 08/16/22 1202    Narrative:         Transvaginal pelvic ultrasound non-OB complete    HISTORY: Vaginal bleeding.    Transvaginal ultrasound of the pelvis was performed.    COMPARISON: None.    FINDINGS:  Examination limited by patient's body habitus.    The uterus is normal in position, shape and size.  The uterus measures 6.76 cm in length by 3.88 cm AP by 4.50 cm  transverse.  Uterine volume 61.83 mL.  Endometrium not well delineated.  Small nabothian cysts.    Ovaries obscured by bowel gas.  No adnexal mass.  No free fluid.      Impression:      CONCLUSION:  Examination limited by patient's body habitus.  No uterine masses identified.  Endometrium not well delineated.  Ovaries obscured by bowel gas.    [id number]    Electronically signed by:  Jeremy Calvert MD  8/16/2022 11:59 AM  CDT Workstation: [email or web address]    XR Abdomen KUB [[id number]] Collected: 08/15/22 1129     Updated: 08/15/22 1800    Narrative:      Exam:  KUB    History:  Hematuria. Left flank pain.    Supine film of the abdomen was obtained.    Comparison:  None    Small to moderate amount retained feces in the colon.  No mechanical bowel obstruction.  No organomegaly.  Phleboliths right hemipelvis.  2.3 cm and smaller left gluteal calcifications.  No acute osseous abnormality.  Degenerative disc disease lumbar spine.  Degenerative changes pubic symphysis.      Impression:      Conclusion:  No definite renal or ureteral calculi identified.  Small to moderate amount retained feces in the colon.    [id number]    Electronically signed by:  Jeremy Calvert MD  8/15/2022 5:58 PM CDT  Workstation: [id number]        Lab Results (last 48 hours)     Procedure Component Value Units Date/Time    POC Glucose Once [[id number]]  (Abnormal) Collected: 08/16/22 1009    Specimen: Blood Updated: 08/16/22 1028     Glucose 242 mg/dL      Comment: RN NotifiedOperator: [id number] HOMA Florez ID: [id number]       POC Glucose Once [[id number]]  (Abnormal) Collected: 08/16/22 0602    Specimen: Blood Updated: 08/16/22  0647     Glucose 221 mg/dL      Comment: RN NotifiedOperator: [id number] JOSESITO Rodriguez ID: [id number]       Urinalysis, Microscopic Only - Urine, Clean Catch [[id number]]  (Abnormal) Collected: 08/15/22 1129    Specimen: Urine, Clean Catch Updated: 08/15/22 1148     RBC, UA Too Numerous to Count /HPF      WBC, UA 3-5 /HPF      Comment: Urine culture not indicated.        Bacteria, UA None Seen /HPF      Squamous Epithelial Cells, UA 0-2 /HPF      Hyaline Casts, UA None Seen /LPF      Methodology Automated Microscopy    Urinalysis With Culture If Indicated - Urine, Clean Catch [[id number]]  (Abnormal) Collected: 08/15/22 1129    Specimen: Urine, Clean Catch Updated: 08/15/22 1148     Color, UA [place]     Appearance, UA Cloudy     pH, UA 8.5     Specific Gravity, UA 1.015     Glucose, UA Negative     Ketones, UA Negative     Bilirubin, UA Negative     Blood, UA Large (3+)     Protein, UA 30 mg/dL (1+)     Leuk Esterase, UA Small (1+)     Nitrite, UA Negative     Urobilinogen, UA 1.0 E.U./dL    Narrative:      In absence of clinical symptoms, the presence of pyuria, bacteria, and/or nitrites on the urinalysis result does not correlate with infection.    Blood Culture - Blood, Hand, Right [[id number]]  (Normal) Collected: 08/09/22 2358    Specimen: Blood from Hand, Right Updated: 08/15/22 0017     Blood Culture No growth at 5 days    Blood Culture - Blood, Arm, Left [[id number]]  (Normal) Collected: 08/09/22 1944    Specimen: Blood from Arm, Left Updated: 08/14/22 2002     Blood Culture No growth at 5 days            Assessment & Plan   Assessment / Plan     Brief Patient Summary:  Stephanie Bradshaw is a 62 y.o. female who has jonnathan hematuria, KUB no stones  -Estimated Creatinine Clearance: 128.4 mL/min (by C-G formula based on SCr of 0.61 mg/dL).      Active Hospital Problems:  Active Hospital Problems    Diagnosis    • Multiple subsegmental pulmonary emboli without acute cor pulmonale (HCC)    • Cellulitis    •  Cellulitis      Plan:   CT abdomen/pelvis with possible cystoscopy next    She Preston, APRN

## 2022-08-16 NOTE — NURSING NOTE
This RN spent over and hour working on mats in hair.  Managed to untangle bottom of hair to waist.  Hair is matted to scalp

## 2022-08-16 NOTE — PLAN OF CARE
Goal Outcome Evaluation:  Plan of Care Reviewed With: patient           Outcome Evaluation: Initial PT evaluation complete.  Patient is asleep but arousable and cooperative, c/o fatigue.  She requires SPV with bed mobility, CGA with transfers and gait, ambulating 5'x1 with FWW, fatigues quite easily.  Patient's bed soiled with blood.  Linens changed, RN notified.  Patient has a 4WW at home.  She will likely need HHPT upon return home, alone. Goals established, continue skilled I/P PT.

## 2022-08-16 NOTE — PROGRESS NOTES
[facility] Medicine Services  INPATIENT PROGRESS NOTE    Length of Stay: 5  Date of Admission: 8/9/2022  Primary Care Physician: Danielle Vaughn APRN    Subjective   Chief Complaint: Shortness of breath/hematuria  HPI: Patient seen and examined, she feels some better today.  She notes that she does feel like she is having cramping similar to menstrual cramps and states that she feels like she is noticing some blood from her vaginal vault as well.    Review of Systems   Constitutional: Positive for chills. Negative for fever.   HENT: Negative for congestion.    Respiratory: Positive for shortness of breath.    Cardiovascular: Negative for chest pain and palpitations.   Gastrointestinal: Negative for abdominal pain.   Genitourinary: Positive for flank pain and hematuria.   Musculoskeletal: Positive for back pain.   Skin: Negative.    Neurological: Negative.    Psychiatric/Behavioral: Negative.    All other systems reviewed and are negative.     All pertinent negatives and positives are as above. All other systems have been reviewed and are negative unless otherwise stated.     Objective    Temp:  [96.4 °F (35.8 °C)-98 °F (36.7 °C)] 98 °F (36.7 °C)  Heart Rate:  [87-95] 87  Resp:  [18] 18  BP: (114-128)/(56-79) 127/66    Physical Exam  Constitutional:       General: She is not in acute distress.     Appearance: She is not toxic-appearing.   HENT:      Head: Normocephalic and atraumatic.      Right Ear: External ear normal.      Left Ear: External ear normal.      Mouth/Throat:      Mouth: Mucous membranes are moist.      Pharynx: Oropharynx is clear.   Eyes:      Conjunctiva/sclera: Conjunctivae normal.   Cardiovascular:      Rate and Rhythm: Normal rate and regular rhythm.      Pulses: Normal pulses.      Heart sounds: Normal heart sounds.   Pulmonary:      Comments: Diminished but otherwise clear breath sounds bilaterally  Abdominal:      General: Bowel sounds are normal.       Palpations: Abdomen is soft.      Tenderness: There is no abdominal tenderness. There is left CVA tenderness.   Musculoskeletal:      Right lower leg: Edema present.      Left lower leg: Edema present.      Comments: Erythema bilateral lower extremities noted, there does not appear to be any increased warmth though.   Skin:     General: Skin is warm and dry.      Capillary Refill: Capillary refill takes less than 2 seconds.      Findings: Erythema present.   Neurological:      General: No focal deficit present.      Mental Status: She is alert and oriented to person, place, and time. Mental status is at baseline.   Psychiatric:         Behavior: Behavior normal.         Thought Content: Thought content normal.         Results Review:  I have reviewed the labs, radiology results, and diagnostic studies.    Laboratory Data:   Results from last 7 days   Lab Units 08/14/22  0501 08/13/22  0131 08/11/22  0539 08/09/22  1515   SODIUM mmol/L 138  --  136 135*   POTASSIUM mmol/L 4.1 4.4 4.1 4.2   CHLORIDE mmol/L 92*  --  93* 91*   CO2 mmol/L 44.0*  --  38.0* 39.0*   BUN mg/dL 6*  --  6* 9   CREATININE mg/dL 0.61  --  0.61 0.70   GLUCOSE mg/dL 141*  --  124* 189*   CALCIUM mg/dL 8.2*  --  7.9* 8.4*   BILIRUBIN mg/dL  --   --   --  1.4*   ALK PHOS U/L  --   --   --  94   ALT (SGPT) U/L  --   --   --  12   AST (SGOT) U/L  --   --   --  20   ANION GAP mmol/L 2.0*  --  5.0 5.0     Estimated Creatinine Clearance: 128.4 mL/min (by C-G formula based on SCr of 0.61 mg/dL).  Results from last 7 days   Lab Units 08/09/22  1515   MAGNESIUM mg/dL 2.0         Results from last 7 days   Lab Units 08/14/22  0501 08/11/22  0539 08/09/22  1515   WBC 10*3/mm3 6.59 7.92 8.33   HEMOGLOBIN g/dL 14.1 13.3 15.2   HEMATOCRIT % 45.9 43.3 49.4*   PLATELETS 10*3/mm3 217 179 155     Results from last 7 days   Lab Units 08/09/22  2358   INR  1.20       Culture Data:   No results found for: BLOODCX  No results found for: URINECX  No results found  for: RESPCX  No results found for: WOUNDCX  No results found for: STOOLCX  No components found for: BODYFLD    Radiology Data:   Imaging Results (Last 24 Hours)     Procedure Component Value Units Date/Time    US Non-ob Transvaginal [[id number]] Collected: 08/16/22 1020     Updated: 08/16/22 1202    Narrative:        Transvaginal pelvic ultrasound non-OB complete    HISTORY: Vaginal bleeding.    Transvaginal ultrasound of the pelvis was performed.    COMPARISON: None.    FINDINGS:  Examination limited by patient's body habitus.    The uterus is normal in position, shape and size.  The uterus measures 6.76 cm in length by 3.88 cm AP by 4.50 cm  transverse.  Uterine volume 61.83 mL.  Endometrium not well delineated.  Small nabothian cysts.    Ovaries obscured by bowel gas.  No adnexal mass.  No free fluid.      Impression:      CONCLUSION:  Examination limited by patient's body habitus.  No uterine masses identified.  Endometrium not well delineated.  Ovaries obscured by bowel gas.    [id number]    Electronically signed by:  Jeremy Calvert MD  8/16/2022 11:59 AM  CDT Workstation: [id number]    XR Abdomen KUB [[id number]] Collected: 08/15/22 1129     Updated: 08/15/22 1800    Narrative:      Exam:  KUB    History:  Hematuria. Left flank pain.    Supine film of the abdomen was obtained.    Comparison:  None    Small to moderate amount retained feces in the colon.  No mechanical bowel obstruction.  No organomegaly.  Phleboliths right hemipelvis.  2.3 cm and smaller left gluteal calcifications.  No acute osseous abnormality.  Degenerative disc disease lumbar spine.  Degenerative changes pubic symphysis.      Impression:      Conclusion:  No definite renal or ureteral calculi identified.  Small to moderate amount retained feces in the colon.    [id number]    Electronically signed by:  Jeremy Calvert MD  8/15/2022 5:58 PM CDT  Workstation: [id number]          I have reviewed the patient's current medications.     Assessment/Plan     Active  Problems:    Multiple subsegmental pulmonary emboli without acute cor pulmonale (HCC)    Cellulitis    Cellulitis  Bilateral lower extremity edema  Left popliteal vein DVT  Hematuria    Plan:  - Continue anticoagulation with p.o. Eliquis  - Continue supplemental oxygen wean as tolerated  - Hematuria did appear to be somewhat improved today  - KUB negative for signs of stone  - Repeat urinalysis for signs of possible infection  - Continue doxycycline for cellulitic changes of her lower extremities and continue Rocephin for urine coverage for now  - Appreciate urology assistance  - Check transvaginal ultrasound for any signs of endometrial thickening or tumor, pending results may need further evaluation with CT scan or GYN consultation  - Continue home medications as appropriate  - DVT/PE prophylaxis/treatment with Eliquis  - CODE STATUS: Full      I confirmed that the patient's Advance Care Plan is present, code status is documented, or surrogate decision maker is listed in the patient's medical record.     Discharge Planning: In process.    Dexter Mclean MD

## 2022-08-16 NOTE — THERAPY EVALUATION
Patient Name: Stephanie Bradshaw  : 1960    MRN: [id number]                              Today's Date: 2022       Admit Date: 2022    Visit Dx:     ICD-10-CM ICD-9-CM   1. Multiple subsegmental pulmonary emboli without acute cor pulmonale (HCC)  I26.94 415.19   2. Hypoxia  R09.02 799.02   3. Dyspnea, unspecified type  R06.00 786.09   4. Acute pulmonary embolism, unspecified pulmonary embolism type, unspecified whether acute cor pulmonale present (HCC)  I26.99 415.19   5. Benign hypertension  I10 401.1   6. Impaired functional mobility, balance, gait, and endurance  Z74.09 V49.89     Patient Active Problem List   Diagnosis   • Multiple subsegmental pulmonary emboli without acute cor pulmonale (HCC)   • Cellulitis   • Cellulitis     Past Medical History:   Diagnosis Date   • COPD (chronic obstructive pulmonary disease) ([facility])    • Hyperlipidemia      History reviewed. No pertinent surgical history.   General Information     Row Name 22 1345          Physical Therapy Time and Intention    Document Type evaluation  -CZ     Mode of Treatment physical therapy  -CZ     Row Name 22 1345          General Information    Patient Profile Reviewed yes  -CZ     Prior Level of Function independent:;all household mobility  -CZ     Existing Precautions/Restrictions oxygen therapy device and L/min;fall  -CZ     Row Name 22 134          Living Environment    People in Home alone  -CZ     Row Name 22 1345          Home Main Entrance    Number of Stairs, Main Entrance one  -CZ     Stair Railings, Main Entrance none  -CZ     Row Name 22 1345          Stairs Within Home, Primary    Stairs, Within Home, Primary Apartment, lives alone, has 4WW which patient started using recently.  -CZ     Number of Stairs, Within Home, Primary one  -CZ     Row Name 22 1345          Cognition    Orientation Status (Cognition) oriented x 4  -CZ     Row Name 22 134          Safety Issues, Functional  Mobility    Impairments Affecting Function (Mobility) strength;endurance/activity tolerance;balance  -CZ           User Key  (r) = Recorded By, (t) = Taken By, (c) = Cosigned By    Initials Name Provider Type    Rodolfo Holliday, PT Physical Therapist               Mobility     Row Name 08/16/22 1345          Bed Mobility    Bed Mobility supine-sit;sit-supine  -CZ     Supine-Sit [place] (Bed Mobility) supervision  -CZ     Sit-Supine [place] (Bed Mobility) supervision  -CZ     Assistive Device (Bed Mobility) head of bed elevated;bed rails  -CZ     Row Name 08/16/22 1345          Sit-Stand Transfer    Sit-Stand [place] (Transfers) contact guard  -CZ     Assistive Device (Sit-Stand Transfers) walker, front-wheeled  -CZ     Row Name 08/16/22 1345          Gait/Stairs (Locomotion)    [place] Level (Gait) contact guard  -CZ     Assistive Device (Gait) walker, front-wheeled  -CZ     Distance in Feet (Gait) 5' x 1.  -CZ           User Key  (r) = Recorded By, (t) = Taken By, (c) = Cosigned By    Initials Name Provider Type    CZ Rodolfo Oliveira, PT Physical Therapist               Obj/Interventions     Row Name 08/16/22 1345          Range of Motion Comprehensive    General Range of Motion bilateral lower extremity ROM WFL  -CZ     Comment, General Range of Motion Except B hip flexion limited by obese abdomen.  -CZ     Row Name 08/16/22 1345          Strength Comprehensive (MMT)    General Manual Muscle Testing (MMT) Assessment other (see comments)  -     Comment, General Manual Muscle Testing (MMT) Assessment BLEs: 3+/5 grossly.  -     Row Name 08/16/22 1345          Sensory Assessment (Somatosensory)    Sensory Assessment (Somatosensory) other (see comments)  B LE neuropathy.  -CZ           User Key  (r) = Recorded By, (t) = Taken By, (c) = Cosigned By    Initials Name Provider Type    Rodolfo Holliday, PT Physical Therapist               Goals/Plan     Row Name 08/16/22 1343          Bed  Mobility Goal 1 (PT)    Activity/Assistive Device (Bed Mobility Goal 1, PT) sit to supine/supine to sit  -CZ     [place] Level/Cues Needed (Bed Mobility Goal 1, PT) independent  -CZ     Time Frame (Bed Mobility Goal 1, PT) by discharge  -CZ     Strategies/Barriers (Bed Mobility Goal 1, PT) HOB flat, no bed rails.  -CZ     Progress/Outcomes (Bed Mobility Goal 1, PT) goal not met  -CZ     Row Name 08/16/22 8453          Transfer Goal 1 (PT)    Activity/Assistive Device (Transfer Goal 1, PT) sit-to-stand/stand-to-sit;bed-to-chair/chair-to-bed;walker, rolling  -CZ     [place] Level/Cues Needed (Transfer Goal 1, PT) modified independence  -CZ     Time Frame (Transfer Goal 1, PT) by discharge  -CZ     Strategies/Barriers (Transfers Goal 1, PT) Fatigues easily.  -CZ     Progress/Outcome (Transfer Goal 1, PT) goal not met  -CZ     Row Name 08/16/22 4381          Gait Training Goal 1 (PT)    Activity/Assistive Device (Gait Training Goal 1, PT) walker, rolling  -CZ     [place] Level (Gait Training Goal 1, PT) modified independence  -CZ     Distance (Gait Training Goal 1, PT) 50'x2.  -CZ     Time Frame (Gait Training Goal 1, PT) by discharge  -CZ     Strategies/Barriers (Gait Training Goal 1, PT) Fatigues easily.  -CZ     Progress/Outcome (Gait Training Goal 1, PT) goal not met  -CZ     Row Name 08/16/22 1830          Problem Specific Goal 1 (PT)    Problem Specific Goal 1 (PT) Score 24/28 on [place]etti fall risk assessment.  -CZ     Time Frame (Problem Specific Goal 1, PT) by discharge  -CZ     Strategies/Barriers (Problem Specific Goal 1, PT) Fatigues easily.  -CZ     Progress/Outcome (Problem Specific Goal 1, PT) goal not met  -CZ     Row Name 08/16/22 1342          Therapy Assessment/Plan (PT)    Planned Therapy Interventions (PT) balance training;bed mobility training;gait training;patient/family education;transfer training;stair training;strengthening;stretching;ROM (range of motion)  -CZ           User Key   (r) = Recorded By, (t) = Taken By, (c) = Cosigned By    Initials Name Provider Type    CZ Rodolfo Oliveira, PT Physical Therapist               Clinical Impression     Row Name 08/16/22 1345          Pain    Pretreatment Pain Rating 0/10 - no pain  -CZ     Posttreatment Pain Rating 0/10 - no pain  -CZ     Row Name 08/16/22 1349          Plan of Care Review    Plan of Care Reviewed With patient  -CZ     Outcome Evaluation Initial PT evaluation complete.  Patient is asleep but arousable and cooperative, c/o fatigue.  She requires SPV with bed mobility, CGA with transfers and gait, ambulating 5'x1 with FWW, fatigues quite easily.  Patient's bed soiled with blood.  Linens changed, RN notified.  Patient has a 4WW at home.  She will likely need HHPT upon return home, alone. Goals established, continue skilled I/P PT.  -     Row Name 08/16/22 1342          Therapy Assessment/Plan (PT)    Rehab Potential (PT) good, to achieve stated therapy goals  -CZ     Criteria for Skilled Interventions Met (PT) yes;skilled treatment is necessary  -CZ     Therapy Frequency (PT) other (see comments)  5-7 days/week  -CZ     Row Name 08/16/22 1345          Vital Signs    Pre Systolic BP Rehab 141  -CZ     Pre Treatment Diastolic BP 76  -CZ     Pretreatment Heart Rate (beats/min) 93  -CZ     Pre SpO2 (%) 94  -CZ     O2 Delivery Pre Treatment nasal cannula  3 LPM, no home O2  -CZ     Pre Patient Position Sitting  -CZ     Row Name 08/16/22 1346          Positioning and Restraints    Pre-Treatment Position in bed  -CZ     Post Treatment Position bed  -CZ     In Bed side lying right;call light within reach;encouraged to call for assist;exit alarm on  -CZ           User Key  (r) = Recorded By, (t) = Taken By, (c) = Cosigned By    Initials Name Provider Type    CZ Rodolfo Oliveira, PT Physical Therapist               Outcome Measures     Row Name 08/16/22 1099          How much help from another person do you currently need...    Turning from  your back to your side while in flat bed without using bedrails? 3  -CZ     Moving from lying on back to sitting on the side of a flat bed without bedrails? 3  -CZ     Moving to and from a bed to a chair (including a wheelchair)? 3  -CZ     Standing up from a chair using your arms (e.g., wheelchair, bedside chair)? 3  -CZ     Climbing 3-5 steps with a railing? 3  -CZ     To walk in hospital room? 3  -CZ     AM-PAC 6 Clicks Score (PT) 18  -CZ     Highest level of mobility 6 --> Walked 10 steps or more  -     Row Name 08/16/22 7885          Functional Assessment    Outcome Measure Options AM-PAC 6 Clicks Basic Mobility (PT)  -           User Key  (r) = Recorded By, (t) = Taken By, (c) = Cosigned By    Initials Name Provider Type    CZ Rodolfo Oliveira, PT Physical Therapist                             Physical Therapy Education                 Title: PT OT SLP Therapies (In Progress)     Topic: Physical Therapy (In Progress)     Point: Mobility training (Done)     Learning Progress Summary           Patient Acceptance, E, VU,NR by  at 8/16/2022 1431    Comment: PT POC, goals, use of walker.                   Point: Home exercise program (Not Started)     Learner Progress:  Not documented in this visit.          Point: Body mechanics (Not Started)     Learner Progress:  Not documented in this visit.          Point: Precautions (Not Started)     Learner Progress:  Not documented in this visit.                      User Key     Initials Effective Dates Name Provider Type Discipline     06/16/21 -  Rodolfo Oliveira, PT Physical Therapist PT              PT Recommendation and Plan  Planned Therapy Interventions (PT): balance training, bed mobility training, gait training, patient/family education, transfer training, stair training, strengthening, stretching, ROM (range of motion)  Plan of Care Reviewed With: patient  Outcome Evaluation: Initial PT evaluation complete.  Patient is asleep but arousable and  cooperative, c/o fatigue.  She requires SPV with bed mobility, CGA with transfers and gait, ambulating 5'x1 with FWW, fatigues quite easily.  Patient's bed soiled with blood.  Linens changed, RN notified.  Patient has a 4WW at home.  She will likely need HHPT upon return home, alone. Goals established, continue skilled I/P PT.     Time Calculation:    PT Charges     Row Name 08/16/22 1438             Time Calculation    Start Time 1345  -CZ      Stop Time 1439  -CZ      Time Calculation (min) 54 min  -CZ      PT Received On 08/16/22  -CZ      PT Goal Re-Cert Due Date 08/29/22  -CZ              Untimed Charges    PT Eval/Re-eval Minutes 54  -CZ              Total Minutes    Untimed Charges Total Minutes 54  -CZ       Total Minutes 54  -CZ            User Key  (r) = Recorded By, (t) = Taken By, (c) = Cosigned By    Initials Name Provider Type    CZ Rodolfo Oliveira, PT Physical Therapist              Therapy Charges for Today     Code Description Service Date Service Provider Modifiers Qty    04237643502 HC PT EVAL MOD COMPLEXITY 4 8/16/2022 Rodolfo Oliveira, PT GP 1          PT G-Codes  Outcome Measure Options: AM-PAC 6 Clicks Basic Mobility (PT)  AM-PAC 6 Clicks Score (PT): 18    Rodolfo Oliveira PT  8/16/2022

## 2022-08-16 NOTE — SIGNIFICANT NOTE
08/16/22 1616   OTHER   Discipline occupational therapist   Rehab Time/Intention   Session Not Performed other (see comments)  (Transport taking patient to CT. Will f/u tomorrow.)   Recommendation   OT - Next Appointment 08/17/22

## 2022-08-16 NOTE — PAYOR COMM NOTE
"    Ruby Brooks RN [facility]  [phone]     Phone  [phone]      Fax  Cont. Stay review      Stephanie Bradshaw (62 y.o. Female)             Date of Birth   1960    Social Security Number       Address   [address] [place] [address] [address] [place] [address]    Home Phone   [phone]    MRN   [id number]       Anabaptist   None    Marital Status                               Admission Date   8/9/22    Admission Type   Emergency    Admitting Provider   Fern Burr MD    Attending Provider   Fern Burr MD    Department, Room/Bed   [facility] [place], 320/1       Discharge Date       Discharge Disposition       Discharge Destination                               Attending Provider: Fern Burr MD    Allergies: Penicillins    Isolation: None   Infection: None   Code Status: CPR   Advance Care Planning Activity    Ht: 157.5 cm (62\")   Wt: 132 kg (290 lb 6.4 oz)    Admission Cmt: None   Principal Problem: None                Active Insurance as of 8/9/2022     Primary Coverage     Payor Plan Insurance Group Employer/Plan Group    [employer] [place] [employer] [place]ICA[place]      Payor Plan Address Payor Plan Phone Number Payor Plan Fax Number Effective Dates    [address] [address] [phone]  8/9/2022 - None Entered    [facility] [address]       Subscriber Name Subscriber Birth Date Member ID       STEPHANIE BRADSHAW 1960 [id number]                 Emergency Contacts      (Rel.) Home Phone Work Phone Mobile Phone    Sonia Correia (Friend) [phone] -- [phone]    Hieu Neumann (Son) [phone] -- [phone]            Vital Signs (last day)     Date/Time Temp Temp src Pulse Resp BP Patient Position SpO2    08/16/22 0743 98 (36.7) Temporal 89 18 125/66 Sitting 95    08/16/22 0727 -- -- 88 -- -- -- --    08/16/22 0328 97.3 (36.3) Oral 88 18 125/79 Lying 96    08/15/22 2328 -- -- 89 -- -- -- --    " 08/15/22 2326 97.6 (36.4) Oral 89 18 117/69 Sitting 96    08/15/22 1921 97.7 (36.5) Oral 90 18 128/67 Lying 93    08/15/22 1518 96.4 (35.8) Oral 92 18 114/56 Lying 92    08/15/22 1514 -- -- 95 -- -- -- --    08/15/22 0737 97.2 (36.2) Oral 84 18 108/55 Lying 95    08/15/22 0726 -- -- 86 -- -- -- --    08/15/22 0301 97.1 (36.2) Infrared 83 15 121/96 Sitting 90    08/15/22 0109 -- -- 86 -- -- -- --          Oxygen Therapy (last day)     Date/Time SpO2 Device (Oxygen Therapy) Flow (L/min) Oxygen Concentration (%) ETCO2 (mmHg)    08/16/22 0743 95 nasal cannula 3 -- --    08/16/22 0328 96 nasal cannula 3 -- --    08/15/22 2326 96 nasal cannula 3 -- --    08/15/22 1948 -- nasal cannula 3 -- --    08/15/22 1921 93 nasal cannula -- -- --    08/15/22 1518 92 nasal cannula 3 -- --    08/15/22 0737 95 nasal cannula 3 -- --    08/15/22 0301 90 -- -- -- --          Current Facility-Administered Medications   Medication Dose Route Frequency Provider Last Rate Last Admin   • acetaminophen (TYLENOL) tablet 650 mg  650 mg Oral Q6H Amandeep Coleman MD       • apixaban (ELIQUIS) tablet 10 mg  10 mg Oral Q12H Alphonso Martin MD   10 mg at 08/16/22 0834    Followed by   • [START ON 8/20/2022] apixaban (ELIQUIS) tablet 5 mg  5 mg Oral Q12H Alphonso Martin MD       • [place] ointment ointment 1 application  1 application Topical BID Dexter Mclean MD   1 application at 08/16/22 0830   • cefTRIAXone (ROCEPHIN) 2 g/100 mL 0.9% NS IVPB (MBP)  2 g Intravenous Q24H Dexter Mclean MD   2 g at 08/15/22 1315   • doxycycline (MONODOX) capsule 100 mg  100 mg Oral Q12H Alphonso Martin MD   100 mg at 08/16/22 0841   • furosemide (LASIX) tablet 40 mg  40 mg Oral BID Alphonso Martin MD   40 mg at 08/16/22 0834   • HYDROmorphone (DILAUDID) injection 0.5 mg  0.5 mg Intravenous Q2H PRN Amandeep Bowen MD       • metoclopramide (REGLAN) injection 10 mg  10 mg Intravenous Q8H PRN Amandeep Bowen MD       •  nicotine (NICODERM CQ) 21 MG/24HR patch 1 patch  1 patch Transdermal Q24H Davide Marrufo MD   1 patch at 08/16/22 0834   • nystatin (MYCOSTATIN) powder   Topical Q12H Amandeep Bowen MD   Given at 08/16/22 0913   • ondansetron (ZOFRAN) injection 4 mg  4 mg Intravenous Q6H PRN Amandeep Bowen MD       • sodium chloride 0.9 % flush 10 mL  10 mL Intravenous PRN Isaiah Key MD       • sodium chloride 0.9 % flush 10 mL  10 mL Intravenous Q12H Amandeep Bowen MD   10 mL at 08/16/22 0841   • sodium chloride 0.9 % flush 10 mL  10 mL Intravenous PRN Amandeep Bowen MD       • traMADol (ULTRAM) tablet 50 mg  50 mg Oral Q6H PRN Dexter Mclean MD            Physician Progress Notes (last 24 hours)      Dexter Mclean MD at 08/15/22 1251              [facility]  INPATIENT PROGRESS NOTE    Length of Stay: 4  Date of Admission: 8/9/2022  Primary Care Physician: Danielle Vaughn APRN    Subjective   Chief Complaint: Shortness of breath/hematuria  HPI: Patient states that her breathing feels about the same.  She also notes that she is having pain in her back whenever she has to urinate and states that this is new since being on Lasix.  She also notes that she has had hematuria since being at the hospital after starting anticoagulation.  She notes some occasional chills but denies any other current concerns.    Review of Systems   Constitutional: Positive for chills. Negative for fever.   HENT: Negative for congestion.    Respiratory: Positive for shortness of breath.    Cardiovascular: Negative for chest pain and palpitations.   Gastrointestinal: Negative for abdominal pain.   Genitourinary: Positive for flank pain and hematuria.   Musculoskeletal: Positive for back pain.   Skin: Negative.    Neurological: Negative.    Psychiatric/Behavioral: Negative.    All other systems reviewed and are negative.     All pertinent negatives  and positives are as above. All other systems have been reviewed and are negative unless otherwise stated.     Objective    Temp:  [97.1 °F (36.2 °C)-97.8 °F (36.6 °C)] 97.2 °F (36.2 °C)  Heart Rate:  [83-91] 84  Resp:  [15-18] 18  BP: (108-143)/(55-96) 108/55    Physical Exam  Constitutional:       General: She is not in acute distress.     Appearance: She is not toxic-appearing.   HENT:      Head: Normocephalic and atraumatic.      Right Ear: External ear normal.      Left Ear: External ear normal.      Mouth/Throat:      Mouth: Mucous membranes are moist.      Pharynx: Oropharynx is clear.   Eyes:      Conjunctiva/sclera: Conjunctivae normal.   Cardiovascular:      Rate and Rhythm: Normal rate and regular rhythm.      Pulses: Normal pulses.      Heart sounds: Normal heart sounds.   Pulmonary:      Comments: Diminished but otherwise clear breath sounds bilaterally  Abdominal:      General: Bowel sounds are normal.      Palpations: Abdomen is soft.      Tenderness: There is no abdominal tenderness. There is left CVA tenderness.   Musculoskeletal:      Right lower leg: Edema present.      Left lower leg: Edema present.      Comments: Erythema bilateral lower extremities noted, there does not appear to be any increased warmth though.   Skin:     General: Skin is warm and dry.      Capillary Refill: Capillary refill takes less than 2 seconds.      Findings: Erythema present.   Neurological:      General: No focal deficit present.      Mental Status: She is alert and oriented to person, place, and time. Mental status is at baseline.   Psychiatric:         Behavior: Behavior normal.         Thought Content: Thought content normal.         Results Review:  I have reviewed the labs, radiology results, and diagnostic studies.    Laboratory Data:   Results from last 7 days   Lab Units 08/14/22  0501 08/13/22  0131 08/11/22  0539 08/09/22  1515   SODIUM mmol/L 138  --  136 135*   POTASSIUM mmol/L 4.1 4.4 4.1 4.2   CHLORIDE  mmol/L 92*  --  93* 91*   CO2 mmol/L 44.0*  --  38.0* 39.0*   BUN mg/dL 6*  --  6* 9   CREATININE mg/dL 0.61  --  0.61 0.70   GLUCOSE mg/dL 141*  --  124* 189*   CALCIUM mg/dL 8.2*  --  7.9* 8.4*   BILIRUBIN mg/dL  --   --   --  1.4*   ALK PHOS U/L  --   --   --  94   ALT (SGPT) U/L  --   --   --  12   AST (SGOT) U/L  --   --   --  20   ANION GAP mmol/L 2.0*  --  5.0 5.0     Estimated Creatinine Clearance: 129.6 mL/min (by C-G formula based on SCr of 0.61 mg/dL).  Results from last 7 days   Lab Units 08/09/22  1515   MAGNESIUM mg/dL 2.0         Results from last 7 days   Lab Units 08/14/22  0501 08/11/22  0539 08/09/22  1515   WBC 10*3/mm3 6.59 7.92 8.33   HEMOGLOBIN g/dL 14.1 13.3 15.2   HEMATOCRIT % 45.9 43.3 49.4*   PLATELETS 10*3/mm3 217 179 155     Results from last 7 days   Lab Units 08/09/22  2358   INR  1.20       Culture Data:   No results found for: BLOODCX  No results found for: URINECX  No results found for: RESPCX  No results found for: WOUNDCX  No results found for: STOOLCX  No components found for: BODYFLD    Radiology Data:   Imaging Results (Last 24 Hours)     Procedure Component Value Units Date/Time    XR Abdomen KUB [[id number]] Resulted: 08/15/22 1129     Updated: 08/15/22 1137          I have reviewed the patient's current medications.     Assessment/Plan     Active Problems:    Multiple subsegmental pulmonary emboli without acute cor pulmonale (HCC)    Cellulitis    Cellulitis  Bilateral lower extremity edema  Left popliteal vein DVT  Hematuria    Plan:  - Continue anticoagulation with p.o. Eliquis  - Continue supplemental oxygen wean as tolerated  - She did have some pretty jonnathan hematuria in her bedside commode during my evaluation  - Check KUB to see if there is any signs of kidney stone  - Repeat urinalysis for signs of possible infection  - Continue doxycycline for cellulitic changes of her lower extremities and start Rocephin for urine coverage  - Discussed with urology and they will also  see, appreciate their help in advance  - Continue home medications as appropriate  - DVT/PE prophylaxis/treatment with Eliquis  - CODE STATUS: Full      I confirmed that the patient's Advance Care Plan is present, code status is documented, or surrogate decision maker is listed in the patient's medical record.     Discharge Planning: In process.    Dexter Mclean MD      Electronically signed by Dexter Mclean MD at 08/15/22 1417       Medical Student Notes (last 24 hours)  Notes from 08/15/22 1126 through 08/16/22 1126   No notes of this type exist for this encounter.         Consult Notes (last 24 hours)  Notes from 08/15/22 1126 through 08/16/22 1126   No notes of this type exist for this encounter.

## 2022-08-17 LAB
ALBUMIN SERPL-MCNC: 3.3 G/DL (ref 3.5–5.2)
ALBUMIN/GLOB SERPL: 1.1 G/DL
ALP SERPL-CCNC: 74 U/L (ref 39–117)
ALT SERPL W P-5'-P-CCNC: 34 U/L (ref 1–33)
ANION GAP SERPL CALCULATED.3IONS-SCNC: 4 MMOL/L (ref 5–15)
AST SERPL-CCNC: 77 U/L (ref 1–32)
BASOPHILS # BLD AUTO: 0.06 10*3/MM3 (ref 0–0.2)
BASOPHILS NFR BLD AUTO: 1 % (ref 0–1.5)
BILIRUB SERPL-MCNC: 0.6 MG/DL (ref 0–1.2)
BUN SERPL-MCNC: 6 MG/DL (ref 8–23)
BUN/CREAT SERPL: 12 (ref 7–25)
CALCIUM SPEC-SCNC: 8.7 MG/DL (ref 8.6–10.5)
CHLORIDE SERPL-SCNC: 95 MMOL/L (ref 98–107)
CO2 SERPL-SCNC: 40 MMOL/L (ref 22–29)
CREAT SERPL-MCNC: 0.5 MG/DL (ref 0.57–1)
DEPRECATED RDW RBC AUTO: 50.4 FL (ref 37–54)
EGFRCR SERPLBLD CKD-EPI 2021: 107.5 ML/MIN/1.73
EOSINOPHIL # BLD AUTO: 0.12 10*3/MM3 (ref 0–0.4)
EOSINOPHIL NFR BLD AUTO: 2.1 % (ref 0.3–6.2)
ERYTHROCYTE [DISTWIDTH] IN BLOOD BY AUTOMATED COUNT: 15.3 % (ref 12.3–15.4)
GLOBULIN UR ELPH-MCNC: 3 GM/DL
GLUCOSE BLDC GLUCOMTR-MCNC: 190 MG/DL (ref 70–130)
GLUCOSE BLDC GLUCOMTR-MCNC: 202 MG/DL (ref 70–130)
GLUCOSE SERPL-MCNC: 190 MG/DL (ref 65–99)
HCT VFR BLD AUTO: 45.7 % (ref 34–46.6)
HGB BLD-MCNC: 13.8 G/DL (ref 12–15.9)
IMM GRANULOCYTES # BLD AUTO: 0.02 10*3/MM3 (ref 0–0.05)
IMM GRANULOCYTES NFR BLD AUTO: 0.3 % (ref 0–0.5)
LYMPHOCYTES # BLD AUTO: 1.48 10*3/MM3 (ref 0.7–3.1)
LYMPHOCYTES NFR BLD AUTO: 25.8 % (ref 19.6–45.3)
MCH RBC QN AUTO: 27 PG (ref 26.6–33)
MCHC RBC AUTO-ENTMCNC: 30.2 G/DL (ref 31.5–35.7)
MCV RBC AUTO: 89.3 FL (ref 79–97)
MONOCYTES # BLD AUTO: 0.59 10*3/MM3 (ref 0.1–0.9)
MONOCYTES NFR BLD AUTO: 10.3 % (ref 5–12)
NEUTROPHILS NFR BLD AUTO: 3.47 10*3/MM3 (ref 1.7–7)
NEUTROPHILS NFR BLD AUTO: 60.5 % (ref 42.7–76)
NRBC BLD AUTO-RTO: 0 /100 WBC (ref 0–0.2)
PLATELET # BLD AUTO: 225 10*3/MM3 (ref 140–450)
PMV BLD AUTO: 11.2 FL (ref 6–12)
POTASSIUM SERPL-SCNC: 4.6 MMOL/L (ref 3.5–5.2)
PROT SERPL-MCNC: 6.3 G/DL (ref 6–8.5)
RBC # BLD AUTO: 5.12 10*6/MM3 (ref 3.77–5.28)
SODIUM SERPL-SCNC: 139 MMOL/L (ref 136–145)
WBC NRBC COR # BLD: 5.74 10*3/MM3 (ref 3.4–10.8)

## 2022-08-17 PROCEDURE — 80053 COMPREHEN METABOLIC PANEL: CPT | Performed by: FAMILY MEDICINE

## 2022-08-17 PROCEDURE — 97110 THERAPEUTIC EXERCISES: CPT

## 2022-08-17 PROCEDURE — 82962 GLUCOSE BLOOD TEST: CPT

## 2022-08-17 PROCEDURE — 85025 COMPLETE CBC W/AUTO DIFF WBC: CPT | Performed by: FAMILY MEDICINE

## 2022-08-17 PROCEDURE — 97166 OT EVAL MOD COMPLEX 45 MIN: CPT

## 2022-08-17 PROCEDURE — 97116 GAIT TRAINING THERAPY: CPT

## 2022-08-17 PROCEDURE — 25010000002 CEFTRIAXONE PER 250 MG: Performed by: FAMILY MEDICINE

## 2022-08-17 RX ADMIN — TRAMADOL HYDROCHLORIDE 50 MG: 50 TABLET, COATED ORAL at 11:35

## 2022-08-17 RX ADMIN — TRAMADOL HYDROCHLORIDE 50 MG: 50 TABLET, COATED ORAL at 21:52

## 2022-08-17 RX ADMIN — APIXABAN 10 MG: 5 TABLET, FILM COATED ORAL at 08:43

## 2022-08-17 RX ADMIN — NYSTATIN: 100000 POWDER TOPICAL at 21:53

## 2022-08-17 RX ADMIN — DOXYCYCLINE 100 MG: 100 CAPSULE ORAL at 08:43

## 2022-08-17 RX ADMIN — APIXABAN 10 MG: 5 TABLET, FILM COATED ORAL at 21:52

## 2022-08-17 RX ADMIN — FUROSEMIDE 40 MG: 40 TABLET ORAL at 17:51

## 2022-08-17 RX ADMIN — CEFTRIAXONE SODIUM 2 G: 2 INJECTION, POWDER, FOR SOLUTION INTRAMUSCULAR; INTRAVENOUS at 11:35

## 2022-08-17 RX ADMIN — Medication 10 ML: at 21:54

## 2022-08-17 RX ADMIN — NYSTATIN: 100000 POWDER TOPICAL at 08:47

## 2022-08-17 RX ADMIN — FUROSEMIDE 40 MG: 40 TABLET ORAL at 08:43

## 2022-08-17 RX ADMIN — Medication 10 ML: at 08:47

## 2022-08-17 RX ADMIN — Medication 1 APPLICATION: at 08:44

## 2022-08-17 RX ADMIN — DOXYCYCLINE 100 MG: 100 CAPSULE ORAL at 21:52

## 2022-08-17 RX ADMIN — NICOTINE 1 PATCH: 21 PATCH, EXTENDED RELEASE TRANSDERMAL at 08:46

## 2022-08-17 RX ADMIN — Medication 1 APPLICATION: at 21:53

## 2022-08-17 RX ADMIN — TRAMADOL HYDROCHLORIDE 50 MG: 50 TABLET, COATED ORAL at 05:19

## 2022-08-17 NOTE — THERAPY EVALUATION
Acute Care - Occupational Therapy Initial Evaluation  [facility]     Patient Name: Stephanie Bradshaw  : 1960  MRN: [id number]  Today's Date: 2022  Onset of Illness/Injury or Date of Surgery: 22  Date of Referral to OT: 22  Referring Physician: Vinay SHAH MD    Admit Date: 2022       ICD-10-CM ICD-9-CM   1. Multiple subsegmental pulmonary emboli without acute cor pulmonale (HCC)  I26.94 415.19   2. Hypoxia  R09.02 799.02   3. Dyspnea, unspecified type  R06.00 786.09   4. Acute pulmonary embolism, unspecified pulmonary embolism type, unspecified whether acute cor pulmonale present (HCC)  I26.99 415.19   5. Benign hypertension  I10 401.1   6. Impaired functional mobility, balance, gait, and endurance  Z74.09 V49.89   7. Impaired mobility and ADLs  Z74.09 V49.89    Z78.9      Patient Active Problem List   Diagnosis   • Multiple subsegmental pulmonary emboli without acute cor pulmonale (HCC)   • Cellulitis   • Cellulitis     Past Medical History:   Diagnosis Date   • COPD (chronic obstructive pulmonary disease) (HCC)    • Hyperlipidemia      History reviewed. No pertinent surgical history.      OT ASSESSMENT FLOWSHEET (last 12 hours)     OT Evaluation and Treatment     Row Name 22 1053                   OT Time and Intention    Subjective Information complains of;pain  -KH        Document Type evaluation  -KH        Mode of Treatment individual therapy;occupational therapy  -KH        Patient Effort good  -KH        Symptoms Noted During/After Treatment none  -KH                  General Information    Patient Profile Reviewed yes  -KH        Onset of Illness/Injury or Date of Surgery 22  -KH        Referring Physician Vinay SHAH MD  -KH        Patient/Family/Caregiver Comments/Observations no family present  -KH        General Observations of Patient IV, O2  -KH        Prior Level of Function independent:;all household mobility;ADL's  -KH        Equipment Currently Used at  Home walker, rolling  -        Existing Precautions/Restrictions oxygen therapy device and L/min;fall  -        Risks Reviewed patient:  -        Benefits Reviewed patient:  -                  Living Environment    Current Living Arrangements home  -        Home Accessibility stairs to enter home  -        People in Home alone  -        Primary Care Provided by self  -                  Home Main Entrance    Number of Stairs, Main Entrance one  -        Stair Railings, Main Entrance none  -                  Stairs Within Home, Primary    Number of Stairs, Within Home, Primary none  -                  Home Use of Assistive/Adaptive Equipment    Equipment Currently Used at Home walker, rolling  -                  Pain Assessment    Pretreatment Pain Rating 4/10  -        Posttreatment Pain Rating 4/10  -        Pain Location - Side/Orientation Bilateral  -        Pain Location lower  -        Pain Location - extremity  -        Pain Intervention(s) Repositioned  -                  Cognition    Orientation Status (Cognition) oriented x 4  -                  Range of Motion (ROM)    Range of Motion bilateral upper extremities;ROM is [facility]                  Range of Motion Comprehensive    General Range of Motion bilateral upper extremity ROM [facility]                  Strength Comprehensive (MMT)    Comment, General Manual Muscle Testing (MMT) Assessment BUE grossly 4/5  -                  Activities of Daily Living    BADL Assessment/Intervention lower body dressing  -                  Lower Body Dressing Assessment/Training    [place] Level (Lower Body Dressing) don;doff;socks;maximum assist (25% patient effort)  -                  BADL Safety/Performance    Impairments, BADL Safety/Performance endurance/activity tolerance;pain;strength  -                  Bed Mobility    Bed Mobility supine-sit;sit-supine  -        Supine-Sit [place] (Bed Mobility) standby  assist  -        Sit-Supine [place] (Bed Mobility) standby assist  -                  Functional Mobility    Functional Mobility- Ind. Level contact guard assist  -                  Transfer Assessment/Treatment    Transfers sit-stand transfer;stand-sit transfer  -                  Transfers    Sit-Stand [place] (Transfers) contact guard  -        Stand-Sit [place] (Transfers) contact guard  -                  Plan of Care Review    Plan of Care Reviewed With patient  -        Progress improving  -                  Vital Signs    Pretreatment Heart Rate (beats/min) 88  -KH        Posttreatment Heart Rate (beats/min) 90  -KH        Pre SpO2 (%) 98  -KH        O2 Delivery Pre Treatment supplemental O2  -KH        Post SpO2 (%) 97  -KH        O2 Delivery Post Treatment supplemental O2  -KH                  Positioning and Restraints    Pre-Treatment Position in bed  -        Post Treatment Position bed  -KH        In Bed notified nsg;fowlers;call light within reach;encouraged to call for assist;exit alarm on  -                  Therapy Assessment/Plan (OT)    Date of Referral to OT 08/09/22  -        Patient/Family Therapy Goal Statement (OT) return home  -        OT Diagnosis impaired mobility and ADLs  -        Rehab Potential (OT) good, to achieve stated therapy goals  -        Criteria for Skilled Therapeutic Interventions Met (OT) yes;meets criteria;skilled treatment is necessary  -        Therapy Frequency (OT) other (see comments)  3-7x/wk  -        Predicted Duration of Therapy Intervention (OT) until OT goals met or d/c facility  -        Problem List (OT) mobility;strength;pain  -        Activity Limitations Related to Problem List (OT) unable to ambulate safely;unable to transfer safely;BADLs not performed adequately or safely;IADLs not performed adequately or safely  -        Planned Therapy Interventions (OT) activity tolerance training;adaptive  equipment training;BADL retraining;functional balance retraining;IADL retraining;patient/caregiver education/training;ROM/therapeutic exercise;strengthening exercise;transfer/mobility retraining  -                  Evaluation Complexity (OT)    Review Occupational Profile/Medical/Therapy History Complexity expanded/moderate complexity  -        Assessment, Occupational Performance/Identification of Deficit Complexity 3-5 performance deficits  -        Clinical Decision Making Complexity (OT) detailed assessment/moderate complexity  -        Overall Complexity of Evaluation (OT) moderate complexity  -                  Therapy Plan Review/Discharge Plan (OT)    Therapy Plan Review (OT) evaluation/treatment results reviewed;care plan/treatment goals reviewed;risks/benefits reviewed;current/potential barriers reviewed;participants voiced agreement with care plan;participants included;patient  -        Equipment Needs Upon Discharge (OT) shower chair  -        Anticipated Discharge Disposition (OT) home with assist;home with home health  -                  OT Goals    Transfer Goal Selection (OT) transfer, OT goal 1  -KH        Bathing Goal Selection (OT) bathing, OT goal 1  -KH        Dressing Goal Selection (OT) dressing, OT goal 1  -        Toileting Goal Selection (OT) toileting, OT goal 1  -KH        Activity Tolerance Goal Selection (OT) activity tolerance, OT goal 1  -KH                  Transfer Goal 1 (OT)    Activity/Assistive Device (Transfer Goal 1, OT) sit-to-stand/stand-to-sit;bed-to-chair/chair-to-bed;toilet  -        [place] Level/Cues Needed (Transfer Goal 1, OT) independent  -KH        Time Frame (Transfer Goal 1, OT) long term goal (LTG);by discharge  -        Progress/Outcome (Transfer Goal 1, OT) goal not met  -                  Bathing Goal 1 (OT)    Activity/Device (Bathing Goal 1, OT) lower body bathing  -        [place] Level/Cues Needed (Bathing Goal 1, OT)  modified independence  -KH        Time Frame (Bathing Goal 1, OT) long term goal (LTG);by discharge  -KH        Progress/Outcomes (Bathing Goal 1, OT) goal not met  -KH                  Dressing Goal 1 (OT)    Activity/Device (Dressing Goal 1, OT) lower body dressing  -KH        [place]/Cues Needed (Dressing Goal 1, OT) modified independence  -KH        Time Frame (Dressing Goal 1, OT) long term goal (LTG);by discharge  -KH        Progress/Outcome (Dressing Goal 1, OT) goal not met  -KH                  Toileting Goal 1 (OT)    Activity/Device (Toileting Goal 1, OT) toileting skills, all  -KH        [place] Level/Cues Needed (Toileting Goal 1, OT) independent  -KH        Time Frame (Toileting Goal 1, OT) long term goal (LTG);by discharge  -KH        Progress/Outcome (Toileting Goal 1, OT) goal not met  -KH                   Activity Tolerance Goal 1 (OT)    Activity Level (Endurance Goal 1, OT) 15 min activity;O2 sat >/ equal to 88%  -KH        Time Frame (Activity Tolerance Goal 1, OT) long term goal (LTG);by discharge  -KH        Progress/Outcome (Activity Tolerance Goal 1, OT) goal not met  -KH              User Key  (r) = Recorded By, (t) = Taken By, (c) = Cosigned By    Initials Name Effective Dates    Naa Chase, OT 06/16/21 -                  Occupational Therapy Education                 Title: PT OT SLP Therapies (In Progress)     Topic: Occupational Therapy (In Progress)     Point: ADL training (Done)     Description:   Instruct learner(s) on proper safety adaptation and remediation techniques during self care or transfers.   Instruct in proper use of assistive devices.              Learning Progress Summary           Patient Acceptance, E, VU by KELSIE at 8/17/2022 9627    Comment: Educated pt on fall prevention and safety with ADLs                   Point: Home exercise program (Not Started)     Description:   Instruct learner(s) on appropriate technique for monitoring, assisting and/or  progressing therapeutic exercises/activities.              Learner Progress:  Not documented in this visit.          Point: Precautions (Done)     Description:   Instruct learner(s) on prescribed precautions during self-care and functional transfers.              Learning Progress Summary           Patient Acceptance, E, VU by KELSIE at 8/17/2022 1253    Comment: Educated pt on fall prevention and safety with ADLs                   Point: Body mechanics (Not Started)     Description:   Instruct learner(s) on proper positioning and spine alignment during self-care, functional mobility activities and/or exercises.              Learner Progress:  Not documented in this visit.                      User Key     Initials Effective Dates Name Provider Type Discipline    KELSIE 06/16/21 -  Naa Padilla OT Occupational Therapist OT                  OT Recommendation and Plan  Planned Therapy Interventions (OT): activity tolerance training, adaptive equipment training, BADL retraining, functional balance retraining, IADL retraining, patient/caregiver education/training, ROM/therapeutic exercise, strengthening exercise, transfer/mobility retraining  Therapy Frequency (OT): other (see comments) (3-7x/wk)  Plan of Care Review  Plan of Care Reviewed With: patient  Progress: improving  Outcome Evaluation: RN okay'd OT eval this date. Pt sitting EOB upon arrival and agreeable. Ox4. Reports she lives alone in a 1 level apt with 1 step to enter. Has tub/shower. Reports IND with all ADLs and mobility prior to admission. Sit<>stand CG. Pt took steps with CG and no LOB noted. Pt requiring MaxA to drew socks. Pt in the bed end of assessment. Needs met and bed alarm on. RN notified. Pt would benefit from continued skilled OT services to improve IND/safety with ADLs and to improve functional act tolerance. Rec home with assist as needed and home therapy at d/c.  Plan of Care Reviewed With: patient  Outcome Evaluation: RN okay'd OT eval this  date. Pt sitting EOB upon arrival and agreeable. Ox4. Reports she lives alone in a 1 level apt with 1 step to enter. Has tub/shower. Reports IND with all ADLs and mobility prior to admission. Sit<>stand CG. Pt took steps with CG and no LOB noted. Pt requiring MaxA to drew socks. Pt in the bed end of assessment. Needs met and bed alarm on. RN notified. Pt would benefit from continued skilled OT services to improve IND/safety with ADLs and to improve functional act tolerance. Rec home with assist as needed and home therapy at d/c.     Outcome Measures     Row Name 08/17/22 1053             How much help from another is currently needed...    Putting on and taking off regular lower body clothing? 2  -KH      Bathing (including washing, rinsing, and drying) 2  -KH      Toileting (which includes using toilet bed pan or urinal) 3  -KH      Putting on and taking off regular upper body clothing 4  -KH      Taking care of personal grooming (such as brushing teeth) 4  -KH      Eating meals 4  -KH      AM-PAC 6 Clicks Score (OT) 19  -KH              Functional Assessment    Outcome Measure Options AM-PAC 6 Clicks Daily Activity (OT)  -            User Key  (r) = Recorded By, (t) = Taken By, (c) = Cosigned By    Initials Name Provider Type    Naa Chase OT Occupational Therapist                Time Calculation:    Time Calculation- OT     Row Name 08/17/22 1255             Time Calculation- OT    OT Start Time 1053  -      OT Stop Time 1118  -      OT Time Calculation (min) 25 min  -      OT Received On 08/17/22  -      OT Goal Re-Cert Due Date 08/30/22  -            User Key  (r) = Recorded By, (t) = Taken By, (c) = Cosigned By    Initials Name Provider Type    Naa Chase OT Occupational Therapist              Therapy Charges for Today     Code Description Service Date Service Provider Modifiers Qty    [id number]  OT EVAL MOD COMPLEXITY 2 8/17/2022 Naa Padilla OT GO 1                Naa Padilla, OT  8/17/2022

## 2022-08-17 NOTE — MEDICAL STUDENT
"    [facility] Medicine Services  INPATIENT PROGRESS NOTE    Length of Stay: 6  Date of Admission: 8/9/2022  Primary Care Physician: Danielle Vaughn APRN    Subjective   Chief Complaint: Shotness of breath/ hematuria     HPI:  KD stated she was feeling \"about the same today\". She stated that her pain in her feet have lessened and are slightly less swollen. There is still a pain in her back when she urinates and she is still experiencing hematuria. She is not having any shortness of breath or chest pain. States that her shortness of breath is even getting better without oxygen.     Review of Systems   (+) chills, curine changes   (-) night sweats, change in vision, tinnitus, chest pain, palpitations, SOB, pain on inspiration, bruising       All pertinent negatives and positives are as above. All other systems have been reviewed and are negative unless otherwise stated.     Objective    Temp:  [97.9 °F (36.6 °C)-98.2 °F (36.8 °C)] 98.2 °F (36.8 °C)  Heart Rate:  [85-94] 94  Resp:  [16-18] 16  BP: (120-166)/(66-87) 120/69    Physical Exam  Constitutional:       Appearance: Normal appearance.   HENT:      Head: Normocephalic and atraumatic.   Eyes:      Extraocular Movements: Extraocular movements intact.   Cardiovascular:      Rate and Rhythm: Normal rate and regular rhythm.      Heart sounds: Normal heart sounds.   Pulmonary:      Effort: Pulmonary effort is normal.      Breath sounds: Normal breath sounds.   Musculoskeletal:         General: Normal range of motion.   Neurological:      General: No focal deficit present.      Mental Status: She is alert and oriented to person, place, and time.   Psychiatric:         Behavior: Behavior normal.         Thought Content: Thought content normal.         Judgment: Judgment normal.     Feet: left foot/lower leg red and edematous, right foot/lower leg red and edematous, warm but not hot         Results Review:  I have reviewed the " labs, radiology results, and diagnostic studies.    Laboratory Data:   Results from last 7 days   Lab Units 08/17/22  0642 08/14/22  0501 08/13/22  0131 08/11/22  0539   SODIUM mmol/L 139 138  --  136   POTASSIUM mmol/L 4.6 4.1 4.4 4.1   CHLORIDE mmol/L 95* 92*  --  93*   CO2 mmol/L 40.0* 44.0*  --  38.0*   BUN mg/dL 6* 6*  --  6*   CREATININE mg/dL 0.50* 0.61  --  0.61   GLUCOSE mg/dL 190* 141*  --  124*   CALCIUM mg/dL 8.7 8.2*  --  7.9*   BILIRUBIN mg/dL 0.6  --   --   --    ALK PHOS U/L 74  --   --   --    ALT (SGPT) U/L 34*  --   --   --    AST (SGOT) U/L 77*  --   --   --    ANION GAP mmol/L 4.0* 2.0*  --  5.0     Estimated Creatinine Clearance: 155.1 mL/min (A) (by C-G formula based on SCr of 0.5 mg/dL (L)).          Results from last 7 days   Lab Units 08/17/22  0642 08/14/22  0501 08/11/22  0539   WBC 10*3/mm3 5.74 6.59 7.92   HEMOGLOBIN g/dL 13.8 14.1 13.3   HEMATOCRIT % 45.7 45.9 43.3   PLATELETS 10*3/mm3 225 217 179           Culture Data:   No results found for: BLOODCX  No results found for: URINECX  No results found for: RESPCX  No results found for: WOUNDCX  No results found for: STOOLCX  No components found for: BODYFLD    Radiology Data:   Imaging Results (Last 24 Hours)     Procedure Component Value Units Date/Time    CT Abdomen Pelvis With Contrast [[id number]] Collected: 08/16/22 1630     Updated: 08/16/22 1815    Narrative:      CT abdomen and pelvis with contrast    TECHNIQUE: Axial images were taken through the abdomen and pelvis  after the administration of IV contrast. All CT scans at this  facility use dose modulation, iterative reconstruction, and/or  weight based dosing when appropriate to reduce radiation dose to  as low as reasonably achievable    HISTORY: hematuria/vaginal bleeding, I26.94 Multiple subsegmental  pulmonary emboli without acute cor pulmonale R09.02 Hypoxemia  R06.00 Dyspnea, unspecified I26.99 Other pulmonary embolism  without acute cor pulmonale I10 Essential (primary)  hypertension  Z74.09 Other reduced mobility    COMPARISON:None    FINDINGS:    Lung bases:    Mild atelectatic changes in the right lung base. Small hiatal  hernia.    ABDOMEN:    There is diffuse fatty infiltration of the liver. The liver is  enlarged measuring 19 cm in length. There is no evidence for mass  or intrahepatic biliary ductal dilatation. The gallbladder  appears unremarkable. There is no evidence for gallbladder wall  thickening or pericholecystic fluid. The adrenal glands, pancreas  and spleen are normal. The kidneys appear unremarkable. There is  no evidence for hydronephrosis or stone.    Multiple diverticuli seen extending off portions of the  descending colon and sigmoid colon, there is no evidence for  diverticulitis. There is no bowel obstruction. The appendix is  normal. There is mild thickening of the mucosa of the left colon  and sigmoid colon could be decompression versus mild acute  colitis.    Pelvis:    The aorta is normal in caliber. There is no evidence for  pathologically enlarged adenopathy.    The bladder appears unremarkable. There is no free air or free  fluid.    Mild to moderate degenerative changes and degenerative disc  disease in lumbar spine. There are no acute bony abnormalities.  Mild skin thickening on the abdominal wall mostly in the  suprapubic region with mild subcutaneous fat stranding could be  related to generalized edema however superimposed infectious  process such as cellulitis is also a consideration, correlate  clinically.      Impression:      Impression:  1.  Mild skin thickening on the abdominal wall mostly in the  suprapubic region with mild subcutaneous fat stranding could be  related to generalized edema however superimposed infectious  process such as cellulitis is also a consideration, correlate  clinically.  2.  Mild thickening of the mucosa of the left colon and sigmoid  colon could be decompression versus mild acute colitis.  3.  Hepatomegaly with  diffuse fatty infiltration of the liver.  4.  Small hiatal hernia.    Electronically signed by:  Linda Owen MD, MELIDA  8/16/2022  6:13 PM CDT Workstation: [id number]    US Non-ob Transvaginal [[id number]] Collected: 08/16/22 1020     Updated: 08/16/22 1202    Narrative:        Transvaginal pelvic ultrasound non-OB complete    HISTORY: Vaginal bleeding.    Transvaginal ultrasound of the pelvis was performed.    COMPARISON: None.    FINDINGS:  Examination limited by patient's body habitus.    The uterus is normal in position, shape and size.  The uterus measures 6.76 cm in length by 3.88 cm AP by 4.50 cm  transverse.  Uterine volume 61.83 mL.  Endometrium not well delineated.  Small nabothian cysts.    Ovaries obscured by bowel gas.  No adnexal mass.  No free fluid.      Impression:      CONCLUSION:  Examination limited by patient's body habitus.  No uterine masses identified.  Endometrium not well delineated.  Ovaries obscured by bowel gas.    [id number]    Electronically signed by:  Jeremy Calvert MD  8/16/2022 11:59 AM  CDT Workstation: [id number]          I have reviewed the patient's current medications.     Assessment/Plan     Active Problems:    Multiple subsegmental pulmonary emboli without acute cor pulmonale (HCC)    Cellulitis    Cellulitis    Multiple subsegmental pulmonary emboli without acute cor pulmonale   -SOB is improving and there have been no acute changes.    - Continue apixaban for anticoagulation and clot progression   -Continue supplemental oxygen as needed    Cellulitis   -There is no warmth, WBC has not been elevated, never had a fever  -Discontinue doxycycline    -Continue bagbalm ointment and furosemide, skin on feet and legs have improved since starting this treatment     Hematuria   -No renal stone found on KUB, patient stated no change in red color of urine  -Urinalysis again showed RBCs and proteinuria   -Refer to nephro, possible intrinsic renal pathology   -Consider EVIE  -Continue Rocephin  for urine coverage    -Transvaginal ultrasound showed the endometrium was not well delineated, possible adenomyosis   -Refer to OB/GYN  Dago Grewal, Medical Student     Patient seen and examined in conjunction with medical student, please refer to my note for pertinent details.    Dexter Mclean MD

## 2022-08-17 NOTE — PLAN OF CARE
Goal Outcome Evaluation:  Plan of Care Reviewed With: patient        Progress: improving  Outcome Evaluation: Pt. was sba for bed mobility and cga for transfers and gait this tx.  Pt. showed improved mobility and amb. 20' x 2 with RW and seated rest break between gait trips.  Pt. had some soreness with gait and decreased gait speed.  Pt. performed seated and standing therex and seated EOB post tx. with all needs met.

## 2022-08-17 NOTE — PROGRESS NOTES
"   LOS: 6 days   Patient Care Team:  Danielle Vaughn APRN as PCP - General (Family Medicine)    Subjective     Subjective:  Symptoms:  Stable.  (Ulises hematuria persists).    Diet:  No vomiting.        History taken from: patient chart    Objective     Vital Signs  Temp:  [97.9 °F (36.6 °C)-98.2 °F (36.8 °C)] 98.2 °F (36.8 °C)  Heart Rate:  [60-94] 60  Resp:  [16-18] 16  BP: (120-166)/(60-87) 122/60    Objective:  General Appearance:  In no acute distress.    Vital signs: (most recent): Blood pressure 122/60, pulse 60, temperature 98.2 °F (36.8 °C), temperature source Temporal, resp. rate 16, height 157.5 cm (62\"), weight 130 kg (287 lb), SpO2 96 %.  Vital signs are normal.  No fever.    Output: Producing urine.    Lungs:  She is not in respiratory distress.    Neurological: Patient is alert.    Pupils:  Pupils are equal, round, and reactive to light.    Skin:  Warm and dry.              Results Review:    Lab Results (last 24 hours)     Procedure Component Value Units Date/Time    POC Glucose Once [[id number]]  (Abnormal) Collected: 08/17/22 1001    Specimen: Blood Updated: 08/17/22 1030     Glucose 202 mg/dL      Comment: Result Not ConfirmedOperator: [id number] HOMA Florez ID: [id number]       POC Glucose Once [[id number]]  (Abnormal) Collected: 08/17/22 0604    Specimen: Blood Updated: 08/17/22 1030     Glucose 190 mg/dL      Comment: RN NotifiedOperator: [id number] WARNER Nevarez ID: [id number]       Comprehensive Metabolic Panel [[id number]]  (Abnormal) Collected: 08/17/22 0642    Specimen: Blood Updated: 08/17/22 0714     Glucose 190 mg/dL      BUN 6 mg/dL      Creatinine 0.50 mg/dL      Sodium 139 mmol/L      Potassium 4.6 mmol/L      Chloride 95 mmol/L      CO2 40.0 mmol/L      Calcium 8.7 mg/dL      Total Protein 6.3 g/dL      Albumin 3.30 g/dL      ALT (SGPT) 34 U/L      AST (SGOT) 77 U/L      Alkaline Phosphatase 74 U/L      Total Bilirubin 0.6 mg/dL      Globulin 3.0 gm/dL      " A/G Ratio 1.1 g/dL      BUN/Creatinine Ratio 12.0     Anion Gap 4.0 mmol/L      eGFR 107.5 mL/min/1.73      Comment: National Kidney Foundation and American Society of Nephrology (ASN) Task Force recommended calculation based on the Chronic Kidney Disease Epidemiology Collaboration (CKD-EPI) equation refit without adjustment for race.       Narrative:      GFR Normal >60  Chronic Kidney Disease <60  Kidney Failure <15      CBC & Differential [[id number]]  (Abnormal) Collected: 08/17/22 0642    Specimen: Blood Updated: 08/17/22 0654    Narrative:      The following orders were created for panel order CBC & Differential.  Procedure                               Abnormality         Status                     ---------                               -----------         ------                     CBC Auto Differential[[id number]]        Abnormal            Final result                 Please view results for these tests on the individual orders.    CBC Auto Differential [[id number]]  (Abnormal) Collected: 08/17/22 0642    Specimen: Blood Updated: 08/17/22 0654     WBC 5.74 10*3/mm3      RBC 5.12 10*6/mm3      Hemoglobin 13.8 g/dL      Hematocrit 45.7 %      MCV 89.3 fL      MCH 27.0 pg      MCHC 30.2 g/dL      RDW 15.3 %      RDW-SD 50.4 fl      MPV 11.2 fL      Platelets 225 10*3/mm3      Neutrophil % 60.5 %      Lymphocyte % 25.8 %      Monocyte % 10.3 %      Eosinophil % 2.1 %      Basophil % 1.0 %      Immature Grans % 0.3 %      Neutrophils, Absolute 3.47 10*3/mm3      Lymphocytes, Absolute 1.48 10*3/mm3      Monocytes, Absolute 0.59 10*3/mm3      Eosinophils, Absolute 0.12 10*3/mm3      Basophils, Absolute 0.06 10*3/mm3      Immature Grans, Absolute 0.02 10*3/mm3      nRBC 0.0 /100 WBC     POC Glucose Once [[id number]]  (Abnormal) Collected: 08/16/22 1924    Specimen: Blood Updated: 08/16/22 2011     Glucose 247 mg/dL      Comment: RN NotifiedOperator: [id number] WARNER KINCAIDMeter ID: [id number]       POC Glucose  Once [[id number]]  (Abnormal) Collected: 08/16/22 1559    Specimen: Blood Updated: 08/16/22 1639     Glucose 229 mg/dL      Comment: RN NotifiedOperator: [id number] HOMA Florez ID: [id number]            Imaging Results (Last 24 Hours)     Procedure Component Value Units Date/Time    CT Abdomen Pelvis With Contrast [[id number]] Collected: 08/16/22 1630     Updated: 08/16/22 1815    Narrative:      CT abdomen and pelvis with contrast    TECHNIQUE: Axial images were taken through the abdomen and pelvis  after the administration of IV contrast. All CT scans at this  facility use dose modulation, iterative reconstruction, and/or  weight based dosing when appropriate to reduce radiation dose to  as low as reasonably achievable    HISTORY: hematuria/vaginal bleeding, I26.94 Multiple subsegmental  pulmonary emboli without acute cor pulmonale R09.02 Hypoxemia  R06.00 Dyspnea, unspecified I26.99 Other pulmonary embolism  without acute cor pulmonale I10 Essential (primary) hypertension  Z74.09 Other reduced mobility    COMPARISON:None    FINDINGS:    Lung bases:    Mild atelectatic changes in the right lung base. Small hiatal  hernia.    ABDOMEN:    There is diffuse fatty infiltration of the liver. The liver is  enlarged measuring 19 cm in length. There is no evidence for mass  or intrahepatic biliary ductal dilatation. The gallbladder  appears unremarkable. There is no evidence for gallbladder wall  thickening or pericholecystic fluid. The adrenal glands, pancreas  and spleen are normal. The kidneys appear unremarkable. There is  no evidence for hydronephrosis or stone.    Multiple diverticuli seen extending off portions of the  descending colon and sigmoid colon, there is no evidence for  diverticulitis. There is no bowel obstruction. The appendix is  normal. There is mild thickening of the mucosa of the left colon  and sigmoid colon could be decompression versus mild acute  colitis.    Pelvis:    The aorta is  normal in caliber. There is no evidence for  pathologically enlarged adenopathy.    The bladder appears unremarkable. There is no free air or free  fluid.    Mild to moderate degenerative changes and degenerative disc  disease in lumbar spine. There are no acute bony abnormalities.  Mild skin thickening on the abdominal wall mostly in the  suprapubic region with mild subcutaneous fat stranding could be  related to generalized edema however superimposed infectious  process such as cellulitis is also a consideration, correlate  clinically.      Impression:      Impression:  1.  Mild skin thickening on the abdominal wall mostly in the  suprapubic region with mild subcutaneous fat stranding could be  related to generalized edema however superimposed infectious  process such as cellulitis is also a consideration, correlate  clinically.  2.  Mild thickening of the mucosa of the left colon and sigmoid  colon could be decompression versus mild acute colitis.  3.  Hepatomegaly with diffuse fatty infiltration of the liver.  4.  Small hiatal hernia.    Electronically signed by:  Linda Owen MD, MELIDA  8/16/2022  6:13 PM CDT Workstation: [id number]           I reviewed the patient's new clinical results.  I reviewed the patient's new imaging results and agree with the interpretation.  I reviewed the patient's other test results and agree with the interpretation      Assessment & Plan       Multiple subsegmental pulmonary emboli without acute cor pulmonale (HCC)    Cellulitis    Cellulitis      Assessment & Plan    Consulted to Urology for hematuria, KUB negative for stone, CT abdomen/pelvis showed no acute findings in the kidneys/ureters/bladder. UA x2 show TNTC RBCs.   -Estimated Creatinine Clearance: 155.1 mL/min (A) (by C-G formula based on SCr of 0.5 mg/dL (L)).    Plan:  Cystoscopy tomorrow, NPO after midnight.     ROSANNE Koehler  08/17/22  14:48 CDT

## 2022-08-17 NOTE — THERAPY TREATMENT NOTE
Acute Care - Physical Therapy Treatment Note  [facility]     Patient Name: Stephanie Bradshaw  : 1960  MRN: [id number]  Today's Date: 2022   Onset of Illness/Injury or Date of Surgery: 22  Visit Dx:     ICD-10-CM ICD-9-CM   1. Multiple subsegmental pulmonary emboli without acute cor pulmonale (HCC)  I26.94 415.19   2. Hypoxia  R09.02 799.02   3. Dyspnea, unspecified type  R06.00 786.09   4. Acute pulmonary embolism, unspecified pulmonary embolism type, unspecified whether acute cor pulmonale present (HCC)  I26.99 415.19   5. Benign hypertension  I10 401.1   6. Impaired functional mobility, balance, gait, and endurance  Z74.09 V49.89   7. Impaired mobility and ADLs  Z74.09 V49.89    Z78.9      Patient Active Problem List   Diagnosis   • Multiple subsegmental pulmonary emboli without acute cor pulmonale (HCC)   • Cellulitis   • Cellulitis     Past Medical History:   Diagnosis Date   • COPD (chronic obstructive pulmonary disease) (HCC)    • Hyperlipidemia      History reviewed. No pertinent surgical history.  PT Assessment (last 12 hours)     PT Evaluation and Treatment     Row Name 22          Physical Therapy Time and Intention    Subjective Information no complaints  -CA     Document Type therapy note (daily note)  -CA     Mode of Treatment individual therapy;physical therapy  -CA     Row Name 22          General Information    Existing Precautions/Restrictions oxygen therapy device and L/min  -CA     Row Name 22          Pain    Pretreatment Pain Rating /10  -CA     Posttreatment Pain Rating /10  -CA     Pain Location - Side/Orientation Bilateral  -CA     Pain Location lower  -CA     Pain Location - back  -CA     Row Name 22          Cognition    Orientation Status (Cognition) oriented x 4  -CA     Row Name 22          Bed Mobility    Bed Mobility supine-sit  -CA     Supine-Sit [place] (Bed Mobility) standby assist  -CA     Row  Name 08/17/22 0919          Transfers    Transfers sit-stand transfer;stand-sit transfer  -CA     Sit-Stand [place] (Transfers) contact guard  -CA     Stand-Sit [place] (Transfers) contact guard  -CA     Row Name 08/17/22 0919          Sit-Stand Transfer    Assistive Device (Sit-Stand Transfers) walker, front-wheeled  -CA     Row Name 08/17/22 0919          Stand-Sit Transfer    Assistive Device (Stand-Sit Transfers) walker, front-wheeled  -CA     Row Name 08/17/22 0919          Gait/Stairs (Locomotion)    [place] Level (Gait) contact guard  -CA     Assistive Device (Gait) walker, front-wheeled  -CA     Distance in Feet (Gait) 20' x 2  -CA     Pattern (Gait) step-through  -CA     Deviations/Abnormal Patterns (Gait) stride length decreased;gait speed decreased  -CA     Row Name 08/17/22 0919          Motor Skills    Therapeutic Exercise hip;knee;ankle  -CA     Row Name 08/17/22 0919          Hip (Therapeutic Exercise)    Hip (Therapeutic Exercise) AROM (active range of motion)  -CA     Hip AROM (Therapeutic Exercise) bilateral;flexion;extension;standing  -CA     Row Name 08/17/22 0919          Knee (Therapeutic Exercise)    Knee (Therapeutic Exercise) AROM (active range of motion)  -CA     Knee AROM (Therapeutic Exercise) bilateral;LAQ (long arc quad)  -CA     Row Name 08/17/22 0919          Ankle (Therapeutic Exercise)    Ankle (Therapeutic Exercise) AROM (active range of motion)  -CA     Ankle AROM (Therapeutic Exercise) bilateral;dorsiflexion;plantarflexion  -CA     Row Name 08/17/22 0919          Positioning and Restraints    Pre-Treatment Position in bed  -CA     Post Treatment Position bed  -CA     In Bed sitting EOB;call light within reach;encouraged to call for assist  -CA           User Key  (r) = Recorded By, (t) = Taken By, (c) = Cosigned By    Initials Name Provider Type    CA Bruno Caldwell, PTA Physical Therapist Assistant                Physical Therapy Education                 Title: PT  OT SLP Therapies (In Progress)     Topic: Physical Therapy (In Progress)     Point: Mobility training (Done)     Learning Progress Summary           Patient Acceptance, E, VU,NR by KRISTOPHER at 8/16/2022 0431    Comment: PT POC, goals, use of walker.                   Point: Home exercise program (Not Started)     Learner Progress:  Not documented in this visit.          Point: Body mechanics (Not Started)     Learner Progress:  Not documented in this visit.          Point: Precautions (Not Started)     Learner Progress:  Not documented in this visit.                      User Key     Initials Effective Dates Name Provider Type Discipline     06/16/21 -  Rodolfo Oliveira, PT Physical Therapist PT              PT Recommendation and Plan     Plan of Care Reviewed With: patient  Progress: improving  Outcome Evaluation: Pt. was sba for bed mobility and cga for transfers and gait this tx.  Pt. showed improved mobility and amb. 20' x 2 with RW and seated rest break between gait trips.  Pt. had some soreness with gait and decreased gait speed.  Pt. performed seated and standing therex and seated EOB post tx. with all needs met.   Outcome Measures     Row Name 08/17/22 1053 08/17/22 0919          How much help from another person do you currently need...    Turning from your back to your side while in flat bed without using bedrails? -- 3  -CA     Moving from lying on back to sitting on the side of a flat bed without bedrails? -- 3  -CA     Moving to and from a bed to a chair (including a wheelchair)? -- 3  -CA     Standing up from a chair using your arms (e.g., wheelchair, bedside chair)? -- 3  -CA     Climbing 3-5 steps with a railing? -- 3  -CA     To walk in hospital room? -- 3  -CA     AM-PAC 6 Clicks Score (PT) -- 18  -CA            How much help from another is currently needed...    Putting on and taking off regular lower body clothing? 2  -KH --     Bathing (including washing, rinsing, and drying) 2  -KH --      Toileting (which includes using toilet bed pan or urinal) 3  -KH --     Putting on and taking off regular upper body clothing 4  -KH --     Taking care of personal grooming (such as brushing teeth) 4  -KH --     Eating meals 4  -KH --     AM-PAC 6 Clicks Score (OT) 19  -KH --            Functional Assessment    Outcome Measure Options AM-PAC 6 Clicks Daily Activity (OT)  -KH AM-PAC 6 Clicks Basic Mobility (PT)  -CA           User Key  (r) = Recorded By, (t) = Taken By, (c) = Cosigned By    Initials Name Provider Type    Bruno Magaña PTA Physical Therapist Assistant    Naa Chase, OT Occupational Therapist                 Time Calculation:    PT Charges     Row Name 08/17/22 [facility]             Time Calculation    Start Time 0919  -CA      Stop Time 0944  -CA      Time Calculation (min) 25 min  -CA      PT Received On 08/17/22  -CA              Time Calculation- PT    Total Timed Code Minutes- PT 25 minute(s)  -CA            User Key  (r) = Recorded By, (t) = Taken By, (c) = Cosigned By    Initials Name Provider Type    Bruno Magaña PTA Physical Therapist Assistant              Therapy Charges for Today     Code Description Service Date Service Provider Modifiers Qty    18428282424 HC GAIT TRAINING EA 15 MIN 8/17/2022 Bruno Caldwell PTA GP 1    14324560947 HC PT THER PROC EA 15 MIN 8/17/2022 Bruno Caldwell PTA GP 1          PT G-Codes  Outcome Measure Options: AM-PAC 6 Clicks Daily Activity (OT)  AM-PAC 6 Clicks Score (PT): 18  AM-PAC 6 Clicks Score (OT): 19    Bruno Caldwell PTA  8/17/2022

## 2022-08-17 NOTE — THERAPY TREATMENT NOTE
Acute Care - Physical Therapy Treatment Note  [facility]     Patient Name: Stephanie Bradshaw  : 1960  MRN: [id number]  Today's Date: 2022   Onset of Illness/Injury or Date of Surgery: 22  Visit Dx:     ICD-10-CM ICD-9-CM   1. Multiple subsegmental pulmonary emboli without acute cor pulmonale (HCC)  I26.94 415.19   2. Hypoxia  R09.02 799.02   3. Dyspnea, unspecified type  R06.00 786.09   4. Acute pulmonary embolism, unspecified pulmonary embolism type, unspecified whether acute cor pulmonale present (HCC)  I26.99 415.19   5. Benign hypertension  I10 401.1   6. Impaired functional mobility, balance, gait, and endurance  Z74.09 V49.89   7. Impaired mobility and ADLs  Z74.09 V49.89    Z78.9      Patient Active Problem List   Diagnosis   • Multiple subsegmental pulmonary emboli without acute cor pulmonale (HCC)   • Cellulitis   • Cellulitis     Past Medical History:   Diagnosis Date   • COPD (chronic obstructive pulmonary disease) (HCC)    • Hyperlipidemia      History reviewed. No pertinent surgical history.  PT Assessment (last 12 hours)     PT Evaluation and Treatment     Row Name 22          Physical Therapy Time and Intention    Subjective Information no complaints  -CA     Document Type therapy note (daily note)  -CA     Mode of Treatment individual therapy;physical therapy  -CA     Row Name 22          General Information    Existing Precautions/Restrictions oxygen therapy device and L/min  -CA     Row Name 22          Pain    Pretreatment Pain Rating 4/10  -CA     Pain Location - Side/Orientation Bilateral  -CA     Pain Location lower  -CA     Pain Location - back  -CA     Row Name 22          Cognition    Orientation Status (Cognition) oriented x 4  -CA           User Key  (r) = Recorded By, (t) = Taken By, (c) = Cosigned By    Initials Name Provider Type    Bruno Magaña PTA Physical Therapist Assistant                Physical Therapy  Education                 Title: PT OT SLP Therapies (In Progress)     Topic: Physical Therapy (In Progress)     Point: Mobility training (Done)     Learning Progress Summary           Patient Acceptance, E, VU,NR by  at 8/16/2022 1431    Comment: PT POC, goals, use of walker.                   Point: Home exercise program (Not Started)     Learner Progress:  Not documented in this visit.          Point: Body mechanics (Not Started)     Learner Progress:  Not documented in this visit.          Point: Precautions (Not Started)     Learner Progress:  Not documented in this visit.                      User Key     Initials Effective Dates Name Provider Type Discipline     06/16/21 -  Rodolfo Oliveira, PT Physical Therapist PT              PT Recommendation and Plan         Outcome Measures     Row Name 08/17/22 1053             How much help from another is currently needed...    Putting on and taking off regular lower body clothing? 2  -KH      Bathing (including washing, rinsing, and drying) 2  -KH      Toileting (which includes using toilet bed pan or urinal) 3  -KH      Putting on and taking off regular upper body clothing 4  -KH      Taking care of personal grooming (such as brushing teeth) 4  -KH      Eating meals 4  -KH      AM-PAC 6 Clicks Score (OT) 19  -KH              Functional Assessment    Outcome Measure Options AM-PAC 6 Clicks Daily Activity (OT)  -KH            User Key  (r) = Recorded By, (t) = Taken By, (c) = Cosigned By    Initials Name Provider Type    Naa Chase, OT Occupational Therapist                 Time Calculation:    PT Charges     Row Name 08/17/22 1257             Time Calculation    Start Time 0846  -CA      Stop Time 0910  -CA      Time Calculation (min) 24 min  -CA      PT Received On 08/17/22  -CA              Time Calculation- PT    Total Timed Code Minutes- PT 24 minute(s)  -CA            User Key  (r) = Recorded By, (t) = Taken By, (c) = Cosigned By    Initials Name  Provider Type    CA Bruno Caldwell PTA Physical Therapist Assistant              Therapy Charges for Today     Code Description Service Date Service Provider Modifiers Qty    16189110347 HC PT THER PROC EA 15 MIN 8/17/2022 Bruno Caldwell PTA GP 2          PT G-Codes  Outcome Measure Options: AM-PAC 6 Clicks Daily Activity (OT)  AM-PAC 6 Clicks Score (PT): 18  AM-PAC 6 Clicks Score (OT): 19    Bruno Caldwell PTA  8/17/2022

## 2022-08-17 NOTE — PROGRESS NOTES
[facility] Medicine Services  INPATIENT PROGRESS NOTE    Length of Stay: 6  Date of Admission: 8/9/2022  Primary Care Physician: Danielle Vaughn APRN    Subjective   Chief Complaint: Shortness of breath/hematuria  HPI: Breathing better, denies any new complaints and overall feeling improved.  Hematuria continues.     Review of Systems   Constitutional: Positive for chills. Negative for fever.   HENT: Negative for congestion.    Respiratory: Positive for shortness of breath.    Cardiovascular: Negative for chest pain and palpitations.   Gastrointestinal: Negative for abdominal pain.   Genitourinary: Positive for flank pain and hematuria.   Musculoskeletal: Positive for back pain.   Skin: Negative.    Neurological: Negative.    Psychiatric/Behavioral: Negative.    All other systems reviewed and are negative.     All pertinent negatives and positives are as above. All other systems have been reviewed and are negative unless otherwise stated.     Objective    Temp:  [97.9 °F (36.6 °C)-98.2 °F (36.8 °C)] 98.2 °F (36.8 °C)  Heart Rate:  [85-94] 90  Resp:  [16-18] 16  BP: (120-166)/(60-87) 122/60    Physical Exam  Constitutional:       General: She is not in acute distress.     Appearance: She is not toxic-appearing.   HENT:      Head: Normocephalic and atraumatic.      Right Ear: External ear normal.      Left Ear: External ear normal.      Mouth/Throat:      Mouth: Mucous membranes are moist.      Pharynx: Oropharynx is clear.   Eyes:      Conjunctiva/sclera: Conjunctivae normal.   Cardiovascular:      Rate and Rhythm: Normal rate and regular rhythm.      Pulses: Normal pulses.      Heart sounds: Normal heart sounds.   Pulmonary:      Comments: Diminished but otherwise clear breath sounds bilaterally  Abdominal:      General: Bowel sounds are normal.      Palpations: Abdomen is soft.      Tenderness: There is no abdominal tenderness. There is left CVA tenderness.    Musculoskeletal:      Right lower leg: Edema present.      Left lower leg: Edema present.      Comments: Erythema bilateral lower extremities noted, there does not appear to be any increased warmth though.   Skin:     General: Skin is warm and dry.      Capillary Refill: Capillary refill takes less than 2 seconds.      Findings: Erythema present.   Neurological:      General: No focal deficit present.      Mental Status: She is alert and oriented to person, place, and time. Mental status is at baseline.   Psychiatric:         Behavior: Behavior normal.         Thought Content: Thought content normal.         Results Review:  I have reviewed the labs, radiology results, and diagnostic studies.    Laboratory Data:   Results from last 7 days   Lab Units 08/17/22  0642 08/14/22  0501 08/13/22  0131 08/11/22  0539   SODIUM mmol/L 139 138  --  136   POTASSIUM mmol/L 4.6 4.1 4.4 4.1   CHLORIDE mmol/L 95* 92*  --  93*   CO2 mmol/L 40.0* 44.0*  --  38.0*   BUN mg/dL 6* 6*  --  6*   CREATININE mg/dL 0.50* 0.61  --  0.61   GLUCOSE mg/dL 190* 141*  --  124*   CALCIUM mg/dL 8.7 8.2*  --  7.9*   BILIRUBIN mg/dL 0.6  --   --   --    ALK PHOS U/L 74  --   --   --    ALT (SGPT) U/L 34*  --   --   --    AST (SGOT) U/L 77*  --   --   --    ANION GAP mmol/L 4.0* 2.0*  --  5.0     Estimated Creatinine Clearance: 155.1 mL/min (A) (by C-G formula based on SCr of 0.5 mg/dL (L)).          Results from last 7 days   Lab Units 08/17/22  0642 08/14/22  0501 08/11/22  0539   WBC 10*3/mm3 5.74 6.59 7.92   HEMOGLOBIN g/dL 13.8 14.1 13.3   HEMATOCRIT % 45.7 45.9 43.3   PLATELETS 10*3/mm3 225 217 179           Culture Data:   No results found for: BLOODCX  No results found for: URINECX  No results found for: RESPCX  No results found for: WOUNDCX  No results found for: STOOLCX  No components found for: BODYFLD    Radiology Data:   Imaging Results (Last 24 Hours)     Procedure Component Value Units Date/Time    CT Abdomen Pelvis With Contrast  [[id number]] Collected: 08/16/22 1630     Updated: 08/16/22 1815    Narrative:      CT abdomen and pelvis with contrast    TECHNIQUE: Axial images were taken through the abdomen and pelvis  after the administration of IV contrast. All CT scans at this  facility use dose modulation, iterative reconstruction, and/or  weight based dosing when appropriate to reduce radiation dose to  as low as reasonably achievable    HISTORY: hematuria/vaginal bleeding, I26.94 Multiple subsegmental  pulmonary emboli without acute cor pulmonale R09.02 Hypoxemia  R06.00 Dyspnea, unspecified I26.99 Other pulmonary embolism  without acute cor pulmonale I10 Essential (primary) hypertension  Z74.09 Other reduced mobility    COMPARISON:None    FINDINGS:    Lung bases:    Mild atelectatic changes in the right lung base. Small hiatal  hernia.    ABDOMEN:    There is diffuse fatty infiltration of the liver. The liver is  enlarged measuring 19 cm in length. There is no evidence for mass  or intrahepatic biliary ductal dilatation. The gallbladder  appears unremarkable. There is no evidence for gallbladder wall  thickening or pericholecystic fluid. The adrenal glands, pancreas  and spleen are normal. The kidneys appear unremarkable. There is  no evidence for hydronephrosis or stone.    Multiple diverticuli seen extending off portions of the  descending colon and sigmoid colon, there is no evidence for  diverticulitis. There is no bowel obstruction. The appendix is  normal. There is mild thickening of the mucosa of the left colon  and sigmoid colon could be decompression versus mild acute  colitis.    Pelvis:    The aorta is normal in caliber. There is no evidence for  pathologically enlarged adenopathy.    The bladder appears unremarkable. There is no free air or free  fluid.    Mild to moderate degenerative changes and degenerative disc  disease in lumbar spine. There are no acute bony abnormalities.  Mild skin thickening on the abdominal wall  mostly in the  suprapubic region with mild subcutaneous fat stranding could be  related to generalized edema however superimposed infectious  process such as cellulitis is also a consideration, correlate  clinically.      Impression:      Impression:  1.  Mild skin thickening on the abdominal wall mostly in the  suprapubic region with mild subcutaneous fat stranding could be  related to generalized edema however superimposed infectious  process such as cellulitis is also a consideration, correlate  clinically.  2.  Mild thickening of the mucosa of the left colon and sigmoid  colon could be decompression versus mild acute colitis.  3.  Hepatomegaly with diffuse fatty infiltration of the liver.  4.  Small hiatal hernia.    Electronically signed by:  Linda Owen MD, MELIDA  8/16/2022  6:13 PM CDT Workstation: [id number]          I have reviewed the patient's current medications.     Assessment/Plan     Active Problems:    Multiple subsegmental pulmonary emboli without acute cor pulmonale (HCC)    Cellulitis    Cellulitis  Bilateral lower extremity edema  Left popliteal vein DVT  Hematuria    Plan:  - Continue anticoagulation with p.o. Eliquis  - Continue supplemental oxygen wean as tolerated  - KUB negative for signs of stone  - Continue doxycycline for cellulitic changes of her lower extremities and continue Rocephin for urine coverage for now  - Appreciate urology assistance  - CT A/P and transvaginal US reviewed. No changes to current plan.   - Appreciate Urology assistance.   - Continue home medications as appropriate  - DVT/PE prophylaxis/treatment with Eliquis  - CODE STATUS: Full      I confirmed that the patient's Advance Care Plan is present, code status is documented, or surrogate decision maker is listed in the patient's medical record.     Discharge Planning: In process.    Dexter Mclean MD

## 2022-08-18 ENCOUNTER — ANESTHESIA EVENT (OUTPATIENT)
Dept: PERIOP | Facility: HOSPITAL | Age: 62
End: 2022-08-18

## 2022-08-18 ENCOUNTER — ANESTHESIA (OUTPATIENT)
Dept: PERIOP | Facility: HOSPITAL | Age: 62
End: 2022-08-18

## 2022-08-18 PROBLEM — R31.0 FRANK HEMATURIA: Status: ACTIVE | Noted: 2022-08-09

## 2022-08-18 LAB
GLUCOSE BLDC GLUCOMTR-MCNC: 169 MG/DL (ref 70–130)
GLUCOSE BLDC GLUCOMTR-MCNC: 198 MG/DL (ref 70–130)
GLUCOSE BLDC GLUCOMTR-MCNC: 224 MG/DL (ref 70–130)

## 2022-08-18 PROCEDURE — 82962 GLUCOSE BLOOD TEST: CPT

## 2022-08-18 PROCEDURE — 0TJB8ZZ INSPECTION OF BLADDER, VIA NATURAL OR ARTIFICIAL OPENING ENDOSCOPIC: ICD-10-PCS | Performed by: UROLOGY

## 2022-08-18 PROCEDURE — 25010000002 CEFTRIAXONE PER 250 MG: Performed by: FAMILY MEDICINE

## 2022-08-18 PROCEDURE — 25010000002 MIDAZOLAM PER 1 MG

## 2022-08-18 PROCEDURE — 94799 UNLISTED PULMONARY SVC/PX: CPT

## 2022-08-18 PROCEDURE — 25010000002 FENTANYL CITRATE (PF) 50 MCG/ML SOLUTION

## 2022-08-18 PROCEDURE — 97116 GAIT TRAINING THERAPY: CPT

## 2022-08-18 PROCEDURE — 25010000002 PROPOFOL 10 MG/ML EMULSION

## 2022-08-18 PROCEDURE — 97530 THERAPEUTIC ACTIVITIES: CPT

## 2022-08-18 RX ORDER — SODIUM CHLORIDE, SODIUM GLUCONATE, SODIUM ACETATE, POTASSIUM CHLORIDE, AND MAGNESIUM CHLORIDE 526; 502; 368; 37; 30 MG/100ML; MG/100ML; MG/100ML; MG/100ML; MG/100ML
INJECTION, SOLUTION INTRAVENOUS CONTINUOUS PRN
Status: DISCONTINUED | OUTPATIENT
Start: 2022-08-18 | End: 2022-08-18 | Stop reason: SURG

## 2022-08-18 RX ORDER — ONDANSETRON 4 MG/1
4 TABLET, FILM COATED ORAL EVERY 6 HOURS PRN
Status: DISCONTINUED | OUTPATIENT
Start: 2022-08-18 | End: 2022-08-19 | Stop reason: HOSPADM

## 2022-08-18 RX ORDER — DEXTROSE AND SODIUM CHLORIDE 5; .45 G/100ML; G/100ML
100 INJECTION, SOLUTION INTRAVENOUS CONTINUOUS
Status: DISCONTINUED | OUTPATIENT
Start: 2022-08-18 | End: 2022-08-19 | Stop reason: HOSPADM

## 2022-08-18 RX ORDER — ONDANSETRON 2 MG/ML
4 INJECTION INTRAMUSCULAR; INTRAVENOUS EVERY 6 HOURS PRN
Status: DISCONTINUED | OUTPATIENT
Start: 2022-08-18 | End: 2022-08-19 | Stop reason: HOSPADM

## 2022-08-18 RX ORDER — PROPOFOL 10 MG/ML
VIAL (ML) INTRAVENOUS AS NEEDED
Status: DISCONTINUED | OUTPATIENT
Start: 2022-08-18 | End: 2022-08-18 | Stop reason: SURG

## 2022-08-18 RX ORDER — NALOXONE HCL 0.4 MG/ML
0.4 VIAL (ML) INJECTION
Status: DISCONTINUED | OUTPATIENT
Start: 2022-08-18 | End: 2022-08-19 | Stop reason: HOSPADM

## 2022-08-18 RX ORDER — MIDAZOLAM HYDROCHLORIDE 1 MG/ML
INJECTION INTRAMUSCULAR; INTRAVENOUS AS NEEDED
Status: DISCONTINUED | OUTPATIENT
Start: 2022-08-18 | End: 2022-08-18 | Stop reason: SURG

## 2022-08-18 RX ORDER — LIDOCAINE HYDROCHLORIDE 20 MG/ML
JELLY TOPICAL AS NEEDED
Status: DISCONTINUED | OUTPATIENT
Start: 2022-08-18 | End: 2022-08-18 | Stop reason: HOSPADM

## 2022-08-18 RX ORDER — FENTANYL CITRATE 50 UG/ML
INJECTION, SOLUTION INTRAMUSCULAR; INTRAVENOUS AS NEEDED
Status: DISCONTINUED | OUTPATIENT
Start: 2022-08-18 | End: 2022-08-18 | Stop reason: SURG

## 2022-08-18 RX ADMIN — Medication 10 ML: at 20:30

## 2022-08-18 RX ADMIN — DOXYCYCLINE 100 MG: 100 CAPSULE ORAL at 20:30

## 2022-08-18 RX ADMIN — Medication 1 APPLICATION: at 20:30

## 2022-08-18 RX ADMIN — FUROSEMIDE 40 MG: 40 TABLET ORAL at 08:38

## 2022-08-18 RX ADMIN — PROPOFOL 50 MG: 10 INJECTION, EMULSION INTRAVENOUS at 14:25

## 2022-08-18 RX ADMIN — FENTANYL CITRATE 50 MCG: 50 INJECTION INTRAMUSCULAR; INTRAVENOUS at 14:21

## 2022-08-18 RX ADMIN — FUROSEMIDE 40 MG: 40 TABLET ORAL at 16:43

## 2022-08-18 RX ADMIN — SODIUM CHLORIDE, SODIUM GLUCONATE, SODIUM ACETATE, POTASSIUM CHLORIDE, AND MAGNESIUM CHLORIDE: 526; 502; 368; 37; 30 INJECTION, SOLUTION INTRAVENOUS at 14:18

## 2022-08-18 RX ADMIN — APIXABAN 10 MG: 5 TABLET, FILM COATED ORAL at 20:30

## 2022-08-18 RX ADMIN — Medication 10 ML: at 08:38

## 2022-08-18 RX ADMIN — DEXTROSE AND SODIUM CHLORIDE 100 ML/HR: 5; 450 INJECTION, SOLUTION INTRAVENOUS at 15:20

## 2022-08-18 RX ADMIN — NYSTATIN: 100000 POWDER TOPICAL at 20:30

## 2022-08-18 RX ADMIN — MIDAZOLAM HYDROCHLORIDE 1 MG: 1 INJECTION, SOLUTION INTRAMUSCULAR; INTRAVENOUS at 14:17

## 2022-08-18 RX ADMIN — TRAMADOL HYDROCHLORIDE 50 MG: 50 TABLET, COATED ORAL at 08:46

## 2022-08-18 RX ADMIN — PROPOFOL 70 MG: 10 INJECTION, EMULSION INTRAVENOUS at 14:26

## 2022-08-18 RX ADMIN — NICOTINE 1 PATCH: 21 PATCH, EXTENDED RELEASE TRANSDERMAL at 08:38

## 2022-08-18 RX ADMIN — Medication 1 APPLICATION: at 08:39

## 2022-08-18 RX ADMIN — CEFTRIAXONE SODIUM 2 G: 2 INJECTION, POWDER, FOR SOLUTION INTRAMUSCULAR; INTRAVENOUS at 11:04

## 2022-08-18 RX ADMIN — APIXABAN 10 MG: 5 TABLET, FILM COATED ORAL at 08:38

## 2022-08-18 RX ADMIN — NYSTATIN: 100000 POWDER TOPICAL at 08:41

## 2022-08-18 RX ADMIN — DOXYCYCLINE 100 MG: 100 CAPSULE ORAL at 08:38

## 2022-08-18 NOTE — PLAN OF CARE
Goal Outcome Evaluation:  Plan of Care Reviewed With: patient        Progress: improving  Outcome Evaluation: pt. sitting up EOB upon entry and agreeable to PT this am.  Pt. was sba for transfers and gait this tx.  Pt. amb. 22' x 2 with RW with a seated rest break between gait trips and c/o of just soreness in feet.  Pt. performed standing and seated LE therex and sitting up EOB with breakfast post tx.  No new goals met this tx.

## 2022-08-18 NOTE — ANESTHESIA POSTPROCEDURE EVALUATION
Patient: Stephanie Bradshaw    Procedure Summary     Date: 08/18/22 Room / Location: [facility] OR 02 / [facility] OR    Anesthesia Start: 1418 Anesthesia Stop: 1445    Procedure: CYSTOSCOPY (N/A Urethra) Diagnosis:       Ulises hematuria      (Ulises hematuria [R31.0])    Surgeons: Davide Moore MD Provider: Sergio Bell MD    Anesthesia Type: MAC, general ASA Status: 4          Anesthesia Type: MAC, general    Vitals  No vitals data found for the desired time range.          Post Anesthesia Care and Evaluation    Patient location during evaluation: PHASE II  Patient participation: complete - patient cannot participate  Level of consciousness: sleepy but conscious  Pain score: 0  Pain management: adequate    Airway patency: patent  Anesthetic complications: No anesthetic complications  PONV Status: none  Cardiovascular status: acceptable  Respiratory status: acceptable and room air  Hydration status: acceptable    Comments: Vital signs:    HR: 89  BP: 120/67  SpO2: 91  RR: 12  Temp: 97.7  No anesthesia care post op

## 2022-08-18 NOTE — THERAPY TREATMENT NOTE
Acute Care - Physical Therapy Treatment Note  [facility]     Patient Name: Stephanie Bradshaw  : 1960  MRN: [id number]  Today's Date: 2022   Onset of Illness/Injury or Date of Surgery: 22  Visit Dx:     ICD-10-CM ICD-9-CM   1. Multiple subsegmental pulmonary emboli without acute cor pulmonale (HCC)  I26.94 415.19   2. Hypoxia  R09.02 799.02   3. Dyspnea, unspecified type  R06.00 786.09   4. Acute pulmonary embolism, unspecified pulmonary embolism type, unspecified whether acute cor pulmonale present (HCC)  I26.99 415.19   5. Benign hypertension  I10 401.1   6. Impaired functional mobility, balance, gait, and endurance  Z74.09 V49.89   7. Impaired mobility and ADLs  Z74.09 V49.89    Z78.9    8. Ulises hematuria  R31.0 599.71     Patient Active Problem List   Diagnosis   • Multiple subsegmental pulmonary emboli without acute cor pulmonale (HCC)   • Cellulitis   • Cellulitis   • Ulises hematuria     Past Medical History:   Diagnosis Date   • COPD (chronic obstructive pulmonary disease) ([facility])    • Hyperlipidemia      History reviewed. No pertinent surgical history.  PT Assessment (last 12 hours)     PT Evaluation and Treatment     Row Name 22          Physical Therapy Time and Intention    Subjective Information no complaints;fatigue  -CA     Document Type therapy note (daily note)  -CA     Mode of Treatment individual therapy;physical therapy  -CA     Row Name 22          Pain    Pretreatment Pain Rating 0/10 - no pain  -CA     Posttreatment Pain Rating 0/10 - no pain  -CA     Pre/Posttreatment Pain Comment pt. stated she was just sore  -CA     Row Name 22          Cognition    Orientation Status (Cognition) oriented x 4  -CA     Row Name 22          Bed Mobility    Comment, (Bed Mobility) pt. sitting up eob upon entry  -CA     Row Name 22          Transfers    Transfers sit-stand transfer;stand-sit transfer  -CA     Sit-Stand [place]  (Transfers) standby assist  -CA     Stand-Sit [place] (Transfers) standby assist  -CA     Row Name 08/18/22 0802          Sit-Stand Transfer    Assistive Device (Sit-Stand Transfers) walker, front-wheeled  -CA     Row Name 08/18/22 0802          Stand-Sit Transfer    Assistive Device (Stand-Sit Transfers) walker, front-wheeled  -CA     Row Name 08/18/22 0802          Gait/Stairs (Locomotion)    [place] Level (Gait) standby assist  -CA     Assistive Device (Gait) walker, front-wheeled  -CA     Distance in Feet (Gait) 22' x 2  -CA     Pattern (Gait) step-through  -CA     Deviations/Abnormal Patterns (Gait) stride length decreased;gait speed decreased  -CA     Row Name 08/18/22 0802          Motor Skills    Therapeutic Exercise hip;ankle  -CA     Row Name 08/18/22 0802          Hip (Therapeutic Exercise)    Hip (Therapeutic Exercise) AROM (active range of motion)  -CA     Hip AROM (Therapeutic Exercise) bilateral;flexion;extension;standing  -CA     Row Name 08/18/22 0802          Ankle (Therapeutic Exercise)    Ankle (Therapeutic Exercise) AROM (active range of motion)  -CA     Ankle AROM (Therapeutic Exercise) bilateral;dorsiflexion;plantarflexion  -CA     Row Name 08/18/22 0802          Vital Signs    Pre Patient Position Sitting  -CA     Intra Patient Position Standing  -CA     Post Patient Position Sitting  -CA     Row Name 08/18/22 0802          Positioning and Restraints    Pre-Treatment Position in bed  -CA     Post Treatment Position bed  -CA     In Bed sitting;call light within reach;encouraged to call for assist  -CA           User Key  (r) = Recorded By, (t) = Taken By, (c) = Cosigned By    Initials Name Provider Type    Bruno Magaña PTA Physical Therapist Assistant                Physical Therapy Education                 Title: PT OT SLP Therapies (In Progress)     Topic: Physical Therapy (In Progress)     Point: Mobility training (Done)     Learning Progress Summary           Patient  Acceptance, E, VU,NR by  at 8/16/2022 1431    Comment: PT POC, goals, use of walker.                   Point: Home exercise program (Not Started)     Learner Progress:  Not documented in this visit.          Point: Body mechanics (Not Started)     Learner Progress:  Not documented in this visit.          Point: Precautions (Not Started)     Learner Progress:  Not documented in this visit.                      User Key     Initials Effective Dates Name Provider Type Discipline     06/16/21 -  Rodolfo Oliveira, PT Physical Therapist PT              PT Recommendation and Plan     Plan of Care Reviewed With: patient  Progress: improving  Outcome Evaluation: pt. sitting up EOB upon entry and agreeable to PT this am.  Pt. was sba for transfers and gait this tx.  Pt. amb. 22' x 2 with RW with a seated rest break between gait trips and c/o of just soreness in feet.  Pt. performed standing and seated LE therex and sitting up EOB with breakfast post tx.  No new goals met this tx.   Outcome Measures     Row Name 08/18/22 0802 08/17/22 1053 08/17/22 0919       How much help from another person do you currently need...    Turning from your back to your side while in flat bed without using bedrails? 3  -CA -- 3  -CA    Moving from lying on back to sitting on the side of a flat bed without bedrails? 3  -CA -- 3  -CA    Moving to and from a bed to a chair (including a wheelchair)? 3  -CA -- 3  -CA    Standing up from a chair using your arms (e.g., wheelchair, bedside chair)? 3  -CA -- 3  -CA    Climbing 3-5 steps with a railing? 3  -CA -- 3  -CA    To walk in hospital room? 3  -CA -- 3  -CA    AM-PAC 6 Clicks Score (PT) 18  -CA -- 18  -CA       How much help from another is currently needed...    Putting on and taking off regular lower body clothing? -- 2  -KH --    Bathing (including washing, rinsing, and drying) -- 2  -KH --    Toileting (which includes using toilet bed pan or urinal) -- 3  -KH --    Putting on and taking off  regular upper body clothing -- 4  -KH --    Taking care of personal grooming (such as brushing teeth) -- 4  -KH --    Eating meals -- 4  -KH --    AM-PAC 6 Clicks Score (OT) -- 19  -KH --       Functional Assessment    Outcome Measure Options AM-PAC 6 Clicks Basic Mobility (PT)  -CA AM-PAC 6 Clicks Daily Activity (OT)  -KH AM-PAC 6 Clicks Basic Mobility (PT)  -CA          User Key  (r) = Recorded By, (t) = Taken By, (c) = Cosigned By    Initials Name Provider Type    Bruno Magaña PTA Physical Therapist Assistant    Naa Chase, OT Occupational Therapist                 Time Calculation:    PT Charges     Row Name 08/18/22 1506             Time Calculation    Start Time 0802  -CA      Stop Time 0826  -CA      Time Calculation (min) 24 min  -CA      PT Received On 08/18/22  -CA              Time Calculation- PT    Total Timed Code Minutes- PT 24 minute(s)  -CA            User Key  (r) = Recorded By, (t) = Taken By, (c) = Cosigned By    Initials Name Provider Type    Bruno Magaña PTA Physical Therapist Assistant              Therapy Charges for Today     Code Description Service Date Service Provider Modifiers Qty    72656339488 HC GAIT TRAINING EA 15 MIN 8/17/2022 Bruno Caldwell, TIMMY GP 1    09951293018 HC PT THER PROC EA 15 MIN 8/17/2022 Bruno Caldwell, TIMMY GP 1    51609393783 HC GAIT TRAINING EA 15 MIN 8/18/2022 Bruno Caldwell, TIMMY GP 1    01683643289 HC PT THERAPEUTIC ACT EA 15 MIN 8/18/2022 Bruno Caldwell, TIMMY GP 1          PT G-Codes  Outcome Measure Options: AM-PAC 6 Clicks Basic Mobility (PT)  AM-PAC 6 Clicks Score (PT): 18  AM-PAC 6 Clicks Score (OT): 19    Bruno Caldwell PTA  8/18/2022

## 2022-08-18 NOTE — OP NOTE
CYSTOSCOPY  Procedure Note    Stephanie Bradshaw  8/18/2022    Pre-op Diagnosis:   Ulises hematuria [R31.0]    Post-op Diagnosis:     Post-Op Diagnosis Codes:     * Ulises hematuria [R31.0]    Procedure(s):  CYSTOSCOPY    Surgeon(s):  Davide Moore MD    Anesthesia: Choice    Staff:   Circulator: Janice Pierce RN  Scrub Person: Sheba Vargas  Assistant: Eve Luciano CSA    Assistant: Eve Luciano CSA was responsible for performing the following activities: Irrigation and their skilled assistance was necessary for the success of this case.     Estimated Blood Loss: none    Specimens:                None      Drains: * No LDAs found *    Findings: No tumors of the bladder or stones    Complications: None    Indications: Same    Description of Procedure: Patient brought the operating suite placed in dorsolithotomy position sterile prep and drape genitalia in routine fashion I passed a 22 [place] scope into the bladder no signs of any tumors or calculi or infection bladder was drained scope was removed patient taken recovery    Davide Moore MD     Date: 8/18/2022  Time: 14:38 CDT

## 2022-08-18 NOTE — PROGRESS NOTES
LOS: 7 days     Patient Care Team:  Danielle Vaughn APRN as PCP - General (Family Medicine)      Subjective   Urine clear  Hematuria    Objective       Vital Signs  Temp:  [96.7 °F (35.9 °C)-98.3 °F (36.8 °C)] 96.9 °F (36.1 °C)  Heart Rate:  [81-97] 83  Resp:  [16-18] 18  BP: (121-143)/(64-83) 121/64    Physical Exam:        General Appearance:   No acute distress     Respiratory:    UNLABORED RESPIRATIONS.     Abdomen:     SOFT.       Genitourinary:  Urine clear     Rectal:     DEFERRED       Results Review:       Imaging Results (Last 24 Hours)     ** No results found for the last 24 hours. **        Lab Results (last 24 hours)     Procedure Component Value Units Date/Time    POC Glucose Once [[id number]]  (Abnormal) Collected: 08/17/22 2000    Specimen: Blood Updated: 08/18/22 0622     Glucose 224 mg/dL      Comment: RN NotifiedOperator: [id number] WARNER KINCAIDCentral New York Psychiatric Centercarli ID: [id number]               I reviewed the patient's new clinical results.  I reviewed the patient's new imaging results and agree with the interpretation.  I reviewed the patient's other test results and agree with the interpretation        Assessment:    Hematuria    Plan:     Cystoscopy risk and benefits of been discussed      Davide Moore MD  08/18/22  13:59 CDT

## 2022-08-18 NOTE — PROGRESS NOTES
"   LOS: 7 days   Patient Care Team:  Danielle Vaughn APRN as PCP - General (Family Medicine)    Subjective     Subjective:  Symptoms:  Stable.  (Hematuria but no clots).    Diet:  No vomiting.        History taken from: patient chart    Objective     Vital Signs  Temp:  [96.7 °F (35.9 °C)-98.3 °F (36.8 °C)] 97.8 °F (36.6 °C)  Heart Rate:  [60-97] 86  Resp:  [16] 16  BP: (123-143)/(68-83) 143/82    Objective:  General Appearance:  In no acute distress.    Vital signs: (most recent): Blood pressure 143/82, pulse 86, temperature 97.8 °F (36.6 °C), temperature source Temporal, resp. rate 16, height 157.5 cm (62\"), weight 131 kg (288 lb 12.8 oz), SpO2 94 %.  Vital signs are normal.  No fever.    Output: Producing urine.    Lungs:  She is not in respiratory distress.    Neurological: Patient is alert.    Pupils:  Pupils are equal, round, and reactive to light.    Skin:  Warm and dry.              Results Review:    Lab Results (last 24 hours)     Procedure Component Value Units Date/Time    POC Glucose Once [[id number]]  (Abnormal) Collected: 08/17/22 2000    Specimen: Blood Updated: 08/18/22 0622     Glucose 224 mg/dL      Comment: RN NotifiedOperator: [id number] WARNER KINCAIDNewYork-Presbyterian Lower Manhattan Hospitaler ID: [id number]            Imaging Results (Last 24 Hours)     ** No results found for the last 24 hours. **           I reviewed the patient's new clinical results.  I reviewed the patient's new imaging results and agree with the interpretation.  I reviewed the patient's other test results and agree with the interpretation      Assessment & Plan       Ulises hematuria    Multiple subsegmental pulmonary emboli without acute cor pulmonale (HCC)    Cellulitis    Cellulitis      Assessment & Plan    Consulted to Urology for hematuria, KUB negative for stone, CT abdomen/pelvis showed no acute findings in the kidneys/ureters/bladder. UA x2 show TNTC RBCs.   -Estimated Creatinine Clearance: 155.8 mL/min (A) (by C-G formula based on SCr of 0.5 " mg/dL (L)).    Plan:  NPO for possible cystoscopy this afternoon with Dr. Teresa Preston, ROSANNE  08/18/22  11:59 CDT

## 2022-08-18 NOTE — PROGRESS NOTES
[facility] Medicine Services  INPATIENT PROGRESS NOTE    Length of Stay: 7  Date of Admission: 8/9/2022  Primary Care Physician: Danielle Vaughn APRN    Subjective   Chief Complaint: Shortness of breath/hematuria  HPI: Doing well with no current concerns.  Denies difficulties with breathing.     Review of Systems   Constitutional: Negative for chills and fever.   HENT: Negative for congestion.    Respiratory: Negative for shortness of breath.    Cardiovascular: Negative for chest pain and palpitations.   Gastrointestinal: Negative for abdominal pain.   Genitourinary: Positive for hematuria. Negative for flank pain.   Musculoskeletal: Negative for back pain.   Skin: Negative.    Neurological: Negative.    Psychiatric/Behavioral: Negative.    All other systems reviewed and are negative.     All pertinent negatives and positives are as above. All other systems have been reviewed and are negative unless otherwise stated.     Objective    Temp:  [96.7 °F (35.9 °C)-98.3 °F (36.8 °C)] 97.8 °F (36.6 °C)  Heart Rate:  [60-97] 85  Resp:  [16] 16  BP: (122-136)/(60-83) 131/81    Physical Exam  Constitutional:       General: She is not in acute distress.     Appearance: She is not toxic-appearing.   HENT:      Head: Normocephalic and atraumatic.      Right Ear: External ear normal.      Left Ear: External ear normal.      Mouth/Throat:      Mouth: Mucous membranes are moist.      Pharynx: Oropharynx is clear.   Eyes:      Conjunctiva/sclera: Conjunctivae normal.   Cardiovascular:      Rate and Rhythm: Normal rate and regular rhythm.      Pulses: Normal pulses.      Heart sounds: Normal heart sounds.   Pulmonary:      Comments: Diminished but otherwise clear breath sounds bilaterally  Abdominal:      General: Bowel sounds are normal.      Palpations: Abdomen is soft.      Tenderness: There is no abdominal tenderness. There is left CVA tenderness.   Musculoskeletal:      Right lower  leg: Edema present.      Left lower leg: Edema present.      Comments: Erythema bilateral lower extremities noted but overall improved, there does not appear to be any increased warmth though.   Skin:     General: Skin is warm and dry.      Capillary Refill: Capillary refill takes less than 2 seconds.      Findings: Erythema present.   Neurological:      General: No focal deficit present.      Mental Status: She is alert and oriented to person, place, and time. Mental status is at baseline.   Psychiatric:         Behavior: Behavior normal.         Thought Content: Thought content normal.         Results Review:  I have reviewed the labs, radiology results, and diagnostic studies.    Laboratory Data:   Results from last 7 days   Lab Units 08/17/22  0642 08/14/22  0501 08/13/22  0131   SODIUM mmol/L 139 138  --    POTASSIUM mmol/L 4.6 4.1 4.4   CHLORIDE mmol/L 95* 92*  --    CO2 mmol/L 40.0* 44.0*  --    BUN mg/dL 6* 6*  --    CREATININE mg/dL 0.50* 0.61  --    GLUCOSE mg/dL 190* 141*  --    CALCIUM mg/dL 8.7 8.2*  --    BILIRUBIN mg/dL 0.6  --   --    ALK PHOS U/L 74  --   --    ALT (SGPT) U/L 34*  --   --    AST (SGOT) U/L 77*  --   --    ANION GAP mmol/L 4.0* 2.0*  --      Estimated Creatinine Clearance: 155.8 mL/min (A) (by C-G formula based on SCr of 0.5 mg/dL (L)).          Results from last 7 days   Lab Units 08/17/22  0642 08/14/22  0501   WBC 10*3/mm3 5.74 6.59   HEMOGLOBIN g/dL 13.8 14.1   HEMATOCRIT % 45.7 45.9   PLATELETS 10*3/mm3 225 217           Culture Data:   No results found for: BLOODCX  No results found for: URINECX  No results found for: RESPCX  No results found for: WOUNDCX  No results found for: STOOLCX  No components found for: BODYFLD    Radiology Data:   Imaging Results (Last 24 Hours)     ** No results found for the last 24 hours. **          I have reviewed the patient's current medications.     Assessment/Plan     Principal Problem:    Ulises hematuria  Active Problems:    Multiple  subsegmental pulmonary emboli without acute cor pulmonale (HCC)    Cellulitis    Cellulitis  Bilateral lower extremity edema  Left popliteal vein DVT  Hematuria    Plan:  - Continue anticoagulation with p.o. Eliquis  - Continue supplemental oxygen and wean as tolerated  - KUB negative for signs of stone  - Continue doxycycline for cellulitic changes of her lower extremities and continue Rocephin for urine coverage for now  - Appreciate urology assistance  - CT A/P and transvaginal US reviewed. No changes to current plan.   - Cystoscopy planned for later in the day.    - Continue home medications as appropriate  - Possibly home tomorrow vs Saturday.   - DVT/PE prophylaxis/treatment with Eliquis  - CODE STATUS: Full      I confirmed that the patient's Advance Care Plan is present, code status is documented, or surrogate decision maker is listed in the patient's medical record.     Discharge Planning: In process.    Dexter Mclean MD

## 2022-08-18 NOTE — ANESTHESIA PREPROCEDURE EVALUATION
Anesthesia Evaluation     Patient summary reviewed and Nursing notes reviewed   no history of anesthetic complications:  NPO Solid Status: > 8 hours  NPO Liquid Status: > 4 hours           Airway   Mallampati: II  TM distance: >3 FB  Neck ROM: full  possible difficult intubation and Large neck circumference  Dental    (+) edentulous    Pulmonary     breath sounds clear to auscultation  (+) pulmonary embolism, a smoker Former, COPD moderate, decreased breath sounds,   (-) shortness of breath    ROS comment: - lines/tubes: None    - cardiac: Size within normal limits.    - mediastinum: Contour within normal limits.     - lungs: No evidence of a focal air space process. Mild  interstitial prominence and indistinctness.     - pleura: No evidence of  fluid.      - osseous: Unremarkable for age.      IMPRESSION:  Interstitial prominence and indistinctness, could represent an  infectious process or mild edema.        Electronically signed by:  Talia Garduno MD  8/9/2022 3:49 PM CDT  Cardiovascular     ECG reviewed  PT is on anticoagulation therapy  Rhythm: regular  Rate: normal    (+) CABRAL, DVT resolved, hyperlipidemia,   (-) angina, murmur    ROS comment: Sinus rhythm with Premature supraventricular complexes  Low voltage QRS  Incomplete right bundle branch block  NS STT changes  Abnormal ECG  No previous ECGs available     Referred By:            Confirmed By: SAIM TIRADO MD· The right atrial cavity is mild to moderately dilated.  · The right ventricular cavity is mildly dilated.  · Left ventricular wall thickness is consistent with borderline concentric hypertrophy.  · Left ventricular ejection fraction appears to be 56 - 60%.         Neuro/Psych- negative ROS  GI/Hepatic/Renal/Endo    (+) morbid obesity,      Musculoskeletal (-) negative ROS    Abdominal   (+) obese,    Substance History - negative use     OB/GYN negative ob/gyn ROS         Other - negative ROS           Phys Exam Other: Right upper arm PIC line               Anesthesia Plan    ASA 4     MAC and general   total IV anesthesia  intravenous induction     Anesthetic plan, risks, benefits, and alternatives have been provided, discussed and informed consent has been obtained with: patient.        CODE STATUS:    Level Of Support Discussed With: Patient  Code Status (Patient has no pulse and is not breathing): CPR (Attempt to Resuscitate)  Medical Interventions (Patient has pulse or is breathing): Full Support  Comments: But the patient will continue to discuss with family and may change decision  Release to patient: Routine Release

## 2022-08-19 ENCOUNTER — HOME HEALTH ADMISSION (OUTPATIENT)
Dept: HOME HEALTH SERVICES | Facility: HOME HEALTHCARE | Age: 62
End: 2022-08-19

## 2022-08-19 ENCOUNTER — READMISSION MANAGEMENT (OUTPATIENT)
Dept: CALL CENTER | Facility: HOSPITAL | Age: 62
End: 2022-08-19

## 2022-08-19 VITALS
TEMPERATURE: 97.8 F | RESPIRATION RATE: 18 BRPM | BODY MASS INDEX: 53.18 KG/M2 | WEIGHT: 289 LBS | OXYGEN SATURATION: 94 % | HEIGHT: 62 IN | DIASTOLIC BLOOD PRESSURE: 73 MMHG | SYSTOLIC BLOOD PRESSURE: 143 MMHG | HEART RATE: 86 BPM

## 2022-08-19 LAB
ANION GAP SERPL CALCULATED.3IONS-SCNC: 5 MMOL/L (ref 5–15)
BACTERIA UR QL AUTO: ABNORMAL /HPF
BASOPHILS # BLD AUTO: 0.05 10*3/MM3 (ref 0–0.2)
BASOPHILS NFR BLD AUTO: 0.9 % (ref 0–1.5)
BILIRUB UR QL STRIP: NEGATIVE
BUN SERPL-MCNC: 7 MG/DL (ref 8–23)
BUN/CREAT SERPL: 14.9 (ref 7–25)
CALCIUM SPEC-SCNC: 8.6 MG/DL (ref 8.6–10.5)
CHLORIDE SERPL-SCNC: 93 MMOL/L (ref 98–107)
CLARITY UR: CLEAR
CO2 SERPL-SCNC: 37 MMOL/L (ref 22–29)
COLOR UR: YELLOW
CREAT SERPL-MCNC: 0.47 MG/DL (ref 0.57–1)
DEPRECATED RDW RBC AUTO: 50.6 FL (ref 37–54)
EGFRCR SERPLBLD CKD-EPI 2021: 109.1 ML/MIN/1.73
EOSINOPHIL # BLD AUTO: 0.17 10*3/MM3 (ref 0–0.4)
EOSINOPHIL NFR BLD AUTO: 3 % (ref 0.3–6.2)
ERYTHROCYTE [DISTWIDTH] IN BLOOD BY AUTOMATED COUNT: 15.4 % (ref 12.3–15.4)
GLUCOSE BLDC GLUCOMTR-MCNC: 223 MG/DL (ref 70–130)
GLUCOSE SERPL-MCNC: 195 MG/DL (ref 65–99)
GLUCOSE UR STRIP-MCNC: NEGATIVE MG/DL
HCT VFR BLD AUTO: 45.7 % (ref 34–46.6)
HGB BLD-MCNC: 13.8 G/DL (ref 12–15.9)
HGB UR QL STRIP.AUTO: ABNORMAL
HYALINE CASTS UR QL AUTO: ABNORMAL /LPF
IMM GRANULOCYTES # BLD AUTO: 0.01 10*3/MM3 (ref 0–0.05)
IMM GRANULOCYTES NFR BLD AUTO: 0.2 % (ref 0–0.5)
KETONES UR QL STRIP: NEGATIVE
LEUKOCYTE ESTERASE UR QL STRIP.AUTO: NEGATIVE
LYMPHOCYTES # BLD AUTO: 1.61 10*3/MM3 (ref 0.7–3.1)
LYMPHOCYTES NFR BLD AUTO: 28 % (ref 19.6–45.3)
MCH RBC QN AUTO: 27.2 PG (ref 26.6–33)
MCHC RBC AUTO-ENTMCNC: 30.2 G/DL (ref 31.5–35.7)
MCV RBC AUTO: 90.1 FL (ref 79–97)
MONOCYTES # BLD AUTO: 0.59 10*3/MM3 (ref 0.1–0.9)
MONOCYTES NFR BLD AUTO: 10.3 % (ref 5–12)
NEUTROPHILS NFR BLD AUTO: 3.31 10*3/MM3 (ref 1.7–7)
NEUTROPHILS NFR BLD AUTO: 57.6 % (ref 42.7–76)
NITRITE UR QL STRIP: NEGATIVE
NRBC BLD AUTO-RTO: 0 /100 WBC (ref 0–0.2)
PH UR STRIP.AUTO: 7 [PH] (ref 5–9)
PLATELET # BLD AUTO: 163 10*3/MM3 (ref 140–450)
PMV BLD AUTO: 11 FL (ref 6–12)
POTASSIUM SERPL-SCNC: 4 MMOL/L (ref 3.5–5.2)
PROT UR QL STRIP: NEGATIVE
RBC # BLD AUTO: 5.07 10*6/MM3 (ref 3.77–5.28)
RBC # UR STRIP: ABNORMAL /HPF
REF LAB TEST METHOD: ABNORMAL
SODIUM SERPL-SCNC: 135 MMOL/L (ref 136–145)
SP GR UR STRIP: 1.01 (ref 1–1.03)
SQUAMOUS #/AREA URNS HPF: ABNORMAL /HPF
UROBILINOGEN UR QL STRIP: ABNORMAL
WBC # UR STRIP: ABNORMAL /HPF
WBC NRBC COR # BLD: 5.74 10*3/MM3 (ref 3.4–10.8)

## 2022-08-19 PROCEDURE — 85025 COMPLETE CBC W/AUTO DIFF WBC: CPT | Performed by: UROLOGY

## 2022-08-19 PROCEDURE — 97535 SELF CARE MNGMENT TRAINING: CPT

## 2022-08-19 PROCEDURE — 25010000002 CEFTRIAXONE PER 250 MG: Performed by: FAMILY MEDICINE

## 2022-08-19 PROCEDURE — 81001 URINALYSIS AUTO W/SCOPE: CPT | Performed by: NURSE PRACTITIONER

## 2022-08-19 PROCEDURE — 80048 BASIC METABOLIC PNL TOTAL CA: CPT | Performed by: UROLOGY

## 2022-08-19 RX ORDER — DOXYCYCLINE 100 MG/1
100 CAPSULE ORAL EVERY 12 HOURS SCHEDULED
Qty: 3 CAPSULE | Refills: 0 | Status: SHIPPED | OUTPATIENT
Start: 2022-08-19 | End: 2022-08-21

## 2022-08-19 RX ORDER — FUROSEMIDE 40 MG/1
40 TABLET ORAL 2 TIMES DAILY
Qty: 60 TABLET | Refills: 0 | Status: SHIPPED | OUTPATIENT
Start: 2022-08-19 | End: 2022-09-18

## 2022-08-19 RX ADMIN — CEFTRIAXONE SODIUM 2 G: 2 INJECTION, POWDER, FOR SOLUTION INTRAMUSCULAR; INTRAVENOUS at 11:21

## 2022-08-19 RX ADMIN — DOXYCYCLINE 100 MG: 100 CAPSULE ORAL at 08:15

## 2022-08-19 RX ADMIN — TRAMADOL HYDROCHLORIDE 50 MG: 50 TABLET, COATED ORAL at 08:21

## 2022-08-19 RX ADMIN — NYSTATIN: 100000 POWDER TOPICAL at 08:16

## 2022-08-19 RX ADMIN — Medication 1 APPLICATION: at 08:16

## 2022-08-19 RX ADMIN — FUROSEMIDE 40 MG: 40 TABLET ORAL at 08:15

## 2022-08-19 RX ADMIN — TRAMADOL HYDROCHLORIDE 50 MG: 50 TABLET, COATED ORAL at 02:07

## 2022-08-19 RX ADMIN — Medication 10 ML: at 08:15

## 2022-08-19 RX ADMIN — DEXTROSE AND SODIUM CHLORIDE 100 ML/HR: 5; 450 INJECTION, SOLUTION INTRAVENOUS at 02:00

## 2022-08-19 RX ADMIN — NICOTINE 1 PATCH: 21 PATCH, EXTENDED RELEASE TRANSDERMAL at 08:14

## 2022-08-19 RX ADMIN — APIXABAN 10 MG: 5 TABLET, FILM COATED ORAL at 08:15

## 2022-08-19 NOTE — THERAPY TREATMENT NOTE
Acute Care - Physical Therapy Treatment Note  [facility]     Patient Name: Stephanie Bradshaw  : 1960  MRN: [id number]  Today's Date: 2022   Onset of Illness/Injury or Date of Surgery: 22  Visit Dx:     ICD-10-CM ICD-9-CM   1. Multiple subsegmental pulmonary emboli without acute cor pulmonale (HCC)  I26.94 415.19   2. Hypoxia  R09.02 799.02   3. Dyspnea, unspecified type  R06.00 786.09   4. Acute pulmonary embolism, unspecified pulmonary embolism type, unspecified whether acute cor pulmonale present (HCC)  I26.99 415.19   5. Benign hypertension  I10 401.1   6. Impaired functional mobility, balance, gait, and endurance  Z74.09 V49.89   7. Impaired mobility and ADLs  Z74.09 V49.89    Z78.9    8. Ulises hematuria  R31.0 599.71   9. Vaginal bleeding  N93.9 623.8     Patient Active Problem List   Diagnosis   • Multiple subsegmental pulmonary emboli without acute cor pulmonale (HCC)   • Cellulitis   • Cellulitis   • Ulises hematuria     Past Medical History:   Diagnosis Date   • COPD (chronic obstructive pulmonary disease) (HCC)    • Hyperlipidemia      History reviewed. No pertinent surgical history.  PT Assessment (last 12 hours)     PT Evaluation and Treatment     Row Name 22 1308          Physical Therapy Time and Intention    Subjective Information no complaints  -     Document Type therapy note (daily note)  -     Mode of Treatment individual therapy;physical therapy  -     Comment pt sitting EOB, to d/c home, agreeable to HEP  -     Row Name 22 1308          General Information    Patient Profile Reviewed yes  -     Row Name 22 1308          Pain    Pretreatment Pain Rating 0/10 - no pain  -JW     Posttreatment Pain Rating 0/10 - no pain  -     Row Name 22 1308          Cognition    Orientation Status (Cognition) oriented x 4  -     Row Name 22 1308          Plan of Care Review    Plan of Care Reviewed With patient  -JW     Outcome Evaluation pt  sitting EOB, to d/c home, agreeable to HEP instruction and review of home safety, HEP reviewed and issued of the following: AP, LAQ, hip flexion (sitting or standing), hip abd/add (sitting or standing), hip add iso, pt demonstrated each ther ex and voiced understanding  -     Row Name 08/19/22 1308          Vital Signs    Pre Patient Position Sitting  -JW     Post Patient Position Sitting  -     Row Name 08/19/22 1308          Positioning and Restraints    Pre-Treatment Position in bed  -JW     Post Treatment Position bed  -JW     In Bed sitting EOB;call light within reach;encouraged to call for assist  -     Row Name 08/19/22 1308          Bed Mobility Goal 1 (PT)    Activity/Assistive Device (Bed Mobility Goal 1, PT) sit to supine/supine to sit  -JW     Gonzales Level/Cues Needed (Bed Mobility Goal 1, PT) independent  -JW     Time Frame (Bed Mobility Goal 1, PT) by discharge  -JW     Strategies/Barriers (Bed Mobility Goal 1, PT) HOB flat, no bed rails.  -JW     Progress/Outcomes (Bed Mobility Goal 1, PT) goal not met  -     Row Name 08/19/22 1308          Transfer Goal 1 (PT)    Activity/Assistive Device (Transfer Goal 1, PT) sit-to-stand/stand-to-sit;bed-to-chair/chair-to-bed;walker, rolling  -JW     Gonzales Level/Cues Needed (Transfer Goal 1, PT) modified independence  -JW     Time Frame (Transfer Goal 1, PT) by discharge  -JW     Strategies/Barriers (Transfers Goal 1, PT) Fatigues easily.  -JW     Progress/Outcome (Transfer Goal 1, PT) goal not met  -     Row Name 08/19/22 1308          Gait Training Goal 1 (PT)    Activity/Assistive Device (Gait Training Goal 1, PT) walker, rolling  -JW     Gonzales Level (Gait Training Goal 1, PT) modified independence  -JW     Distance (Gait Training Goal 1, PT) 50'x2.  -JW     Time Frame (Gait Training Goal 1, PT) by discharge  -JW     Strategies/Barriers (Gait Training Goal 1, PT) Fatigues easily.  -JW     Progress/Outcome (Gait Training Goal 1, PT)  goal not met  -JW     Row Name 08/19/22 1308          Problem Specific Goal 1 (PT)    Problem Specific Goal 1 (PT) Score 24/28 on Tinetti fall risk assessment.  -JW     Time Frame (Problem Specific Goal 1, PT) by discharge  -     Strategies/Barriers (Problem Specific Goal 1, PT) Fatigues easily.  -     Progress/Outcome (Problem Specific Goal 1, PT) goal not met  -           User Key  (r) = Recorded By, (t) = Taken By, (c) = Cosigned By    Initials Name Provider Type    JW Kat Quevedo, PTA Physical Therapist Assistant                Physical Therapy Education                 Title: PT OT SLP Therapies (In Progress)     Topic: Physical Therapy (In Progress)     Point: Mobility training (Done)     Learning Progress Summary           Patient Acceptance, E, VU,NR by  at 8/16/2022 1431    Comment: PT POC, goals, use of walker.                   Point: Home exercise program (Not Started)     Learner Progress:  Not documented in this visit.          Point: Body mechanics (Not Started)     Learner Progress:  Not documented in this visit.          Point: Precautions (Not Started)     Learner Progress:  Not documented in this visit.                      User Key     Initials Effective Dates Name Provider Type Discipline     06/16/21 -  Rodolfo Oliveira, PT Physical Therapist PT              PT Recommendation and Plan     Plan of Care Reviewed With: patient  Outcome Evaluation: pt sitting EOB, to d/c home, agreeable to HEP instruction and review of home safety, HEP reviewed and issued of the following: AP, LAQ, hip flexion (sitting or standing), hip abd/add (sitting or standing), hip add iso, pt demonstrated each ther ex and voiced understanding   Outcome Measures     Row Name 08/19/22 1300 08/18/22 0802 08/17/22 1053       How much help from another person do you currently need...    Turning from your back to your side while in flat bed without using bedrails? 3  -JW 3  -CA --    Moving from lying on back  to sitting on the side of a flat bed without bedrails? 3  - 3  -CA --    Moving to and from a bed to a chair (including a wheelchair)? 3  - 3  -CA --    Standing up from a chair using your arms (e.g., wheelchair, bedside chair)? 3  - 3  -CA --    Climbing 3-5 steps with a railing? 3  - 3  -CA --    To walk in hospital room? 3  - 3  -CA --    AM-PAC 6 Clicks Score (PT) 18  - 18  -CA --       How much help from another is currently needed...    Putting on and taking off regular lower body clothing? -- -- 2  -KH    Bathing (including washing, rinsing, and drying) -- -- 2  -KH    Toileting (which includes using toilet bed pan or urinal) -- -- 3  -KH    Putting on and taking off regular upper body clothing -- -- 4  -KH    Taking care of personal grooming (such as brushing teeth) -- -- 4  -KH    Eating meals -- -- 4  -KH    AM-PAC 6 Clicks Score (OT) -- -- 19  -KH       Functional Assessment    Outcome Measure Options AM-PAC 6 Clicks Basic Mobility (PT)  - AM-PAC 6 Clicks Basic Mobility (PT)  -CA AM-PAC 6 Clicks Daily Activity (OT)  -    Row Name 08/17/22 0919             How much help from another person do you currently need...    Turning from your back to your side while in flat bed without using bedrails? 3  -CA      Moving from lying on back to sitting on the side of a flat bed without bedrails? 3  -CA      Moving to and from a bed to a chair (including a wheelchair)? 3  -CA      Standing up from a chair using your arms (e.g., wheelchair, bedside chair)? 3  -CA      Climbing 3-5 steps with a railing? 3  -CA      To walk in hospital room? 3  -CA      AM-PAC 6 Clicks Score (PT) 18  -CA              Functional Assessment    Outcome Measure Options AM-PAC 6 Clicks Basic Mobility (PT)  -CA            User Key  (r) = Recorded By, (t) = Taken By, (c) = Cosigned By    Initials Name Provider Type    Kat Boyd PTA Physical Therapist Assistant    CA [place], Bruno T, PTA Physical Therapist Assistant     Naa Chase, OT Occupational Therapist                 Time Calculation:    PT Charges     Row Name 08/19/22 1308             Time Calculation    Start Time 1308  -      Stop Time 1322  -      Time Calculation (min) 14 min  -      PT Received On 08/19/22  -              Time Calculation- PT    Total Timed Code Minutes- PT 14 minute(s)  -            User Key  (r) = Recorded By, (t) = Taken By, (c) = Cosigned By    Initials Name Provider Type    Kat Boyd, PTA Physical Therapist Assistant              Therapy Charges for Today     Code Description Service Date Service Provider Modifiers Qty    14598497682 HC PT THER SUPP EA 15 MIN 8/19/2022 Kat Quevedo, PTA GP 1    72542507547 HC PT SELF CARE/MGMT/TRAIN EA 15 MIN 8/19/2022 Kat Quevedo, PTA GP 1          PT G-Codes  Outcome Measure Options: AM-PAC 6 Clicks Basic Mobility (PT)  AM-PAC 6 Clicks Score (PT): 18  AM-PAC 6 Clicks Score (OT): 21    Kat Quevedo PTA  8/19/2022

## 2022-08-19 NOTE — DISCHARGE PLACEMENT REQUEST
"Stephanie Bradshaw (62 y.o. Female)             Date of Birth   1960    Social Security Number       Address   [address] [place] [address]  [address] [address]    Home Phone   [phone]    MRN   [id number]       Congregation   None    Marital Status                               Admission Date   8/9/22    Admission Type   Emergency    Admitting Provider   Fern Burr MD    Attending Provider   Fern Burr MD    Department, Room/Bed   [address], 320/1       Discharge Date       Discharge Disposition       Discharge Destination                               Attending Provider: Fern Burr MD    Allergies: Penicillins    Isolation: None   Infection: None   Code Status: CPR   Advance Care Planning Activity    Ht: 157.5 cm (62\")   Wt: 131 kg (289 lb)    Admission Cmt: None   Principal Problem: Ulises hematuria [R31.0] More...                 Active Insurance as of 8/9/2022     Primary Coverage     Payor Plan Insurance Group Employer/Plan Group    [employer] [place] [employer] [place]      Payor Plan Address Payor Plan Phone Number Payor Plan Fax Number Effective Dates    [address] [address] [phone]  8/9/2022 - None Entered    [facility] [id number]       Subscriber Name Subscriber Birth Date Member ID       STEPHANIE BRADSHAW 1960 [id number]                 Emergency Contacts      (Rel.) Home Phone Work Phone Mobile Phone    Sonia Correia (Friend) [phone] -- [phone]    Hieu Neumann (Son) [phone] -- [phone]              "

## 2022-08-19 NOTE — SIGNIFICANT NOTE
08/19/22 1129   OTHER   Discipline physical therapy assistant   Rehab Time/Intention   Session Not Performed patient/family declined treatment  (pt sleeping soundly, easily awakens, pt on RA 87%, worked on PLB and up to 91%, pt states she just walked a little w/ nsg to check O2, check back in the PM)

## 2022-08-19 NOTE — PLAN OF CARE
Goal Outcome Evaluation:  Plan of Care Reviewed With: patient           Outcome Evaluation: pt sitting EOB, to d/c home, agreeable to HEP instruction and review of home safety, HEP reviewed and issued of the following: AP, LAQ, hip flexion (sitting or standing), hip abd/add (sitting or standing), hip add iso, pt demonstrated each ther ex and voiced understanding

## 2022-08-19 NOTE — PROGRESS NOTES
"   LOS: 8 days   Patient Care Team:  Danielle Vaughn APRN as PCP - General (Family Medicine)    Subjective     Subjective:  Symptoms:  Stable.  (Says she thinks blood is coming from vagina not bladder but she can't be sure).        History taken from: patient chart    Objective     Vital Signs  Temp:  [96.9 °F (36.1 °C)-98 °F (36.7 °C)] 97.8 °F (36.6 °C)  Heart Rate:  [82-97] 86  Resp:  [18] 18  BP: (114-143)/(50-81) 143/73    Objective:  General Appearance:  In no acute distress.    Vital signs: (most recent): Blood pressure 143/73, pulse 86, temperature 97.8 °F (36.6 °C), temperature source Temporal, resp. rate 18, height 157.5 cm (62\"), weight 131 kg (289 lb), SpO2 94 %.  Vital signs are normal.  No fever.    Output: Producing urine and producing stool.    Lungs:  She is not in respiratory distress.    Neurological: Patient is alert.    Pupils:  Pupils are equal, round, and reactive to light.    Skin:  Warm and dry.              Results Review:    Lab Results (last 24 hours)     Procedure Component Value Units Date/Time    Urinalysis With Microscopic - Urine, Clean Catch [[id number]]  (Abnormal) Collected: 08/19/22 1022    Specimen: Urine, Clean Catch Updated: 08/19/22 1032    Narrative:      The following orders were created for panel order Urinalysis With Microscopic - Urine, Clean Catch.  Procedure                               Abnormality         Status                     ---------                               -----------         ------                     Urinalysis without micro...[[id number]]  Abnormal            Final result               Urinalysis, Microscopic ...[[id number]]  Abnormal            Final result                 Please view results for these tests on the individual orders.    Urinalysis, Microscopic Only - Urine, Clean Catch [[id number]]  (Abnormal) Collected: 08/19/22 1022    Specimen: Urine, Clean Catch Updated: 08/19/22 1032     RBC, UA Too Numerous to Count /HPF      WBC, UA 0-2 /HPF  "     Bacteria, UA None Seen /HPF      Squamous Epithelial Cells, UA 0-2 /HPF      Hyaline Casts, UA 0-2 /LPF      Methodology Automated Microscopy    Urinalysis without microscopic (no culture) - Urine, Clean Catch [[id number]]  (Abnormal) Collected: 08/19/22 1022    Specimen: Urine, Clean Catch Updated: 08/19/22 1031     Color, UA Yellow     Appearance, UA Clear     pH, UA 7.0     Specific Gravity, UA 1.009     Glucose, UA Negative     Ketones, UA Negative     Bilirubin, UA Negative     Blood, UA Large (3+)     Protein, UA Negative     Leuk Esterase, UA Negative     Nitrite, UA Negative     Urobilinogen, UA 0.2 E.U./dL    Basic Metabolic Panel [[id number]]  (Abnormal) Collected: 08/19/22 0608    Specimen: Blood Updated: 08/19/22 0644     Glucose 195 mg/dL      BUN 7 mg/dL      Creatinine 0.47 mg/dL      Sodium 135 mmol/L      Potassium 4.0 mmol/L      Chloride 93 mmol/L      CO2 37.0 mmol/L      Calcium 8.6 mg/dL      BUN/Creatinine Ratio 14.9     Anion Gap 5.0 mmol/L      eGFR 109.1 mL/min/1.73      Comment: National Kidney Foundation and American Society of Nephrology (ASN) Task Force recommended calculation based on the Chronic Kidney Disease Epidemiology Collaboration (CKD-EPI) equation refit without adjustment for race.       Narrative:      GFR Normal >60  Chronic Kidney Disease <60  Kidney Failure <15      CBC & Differential [[id number]]  (Abnormal) Collected: 08/19/22 0608    Specimen: Blood Updated: 08/19/22 0638    Narrative:      The following orders were created for panel order CBC & Differential.  Procedure                               Abnormality         Status                     ---------                               -----------         ------                     CBC Auto Differential[[id number]]        Abnormal            Final result                 Please view results for these tests on the individual orders.    CBC Auto Differential [[id number]]  (Abnormal) Collected: 08/19/22 0608    Specimen:  Blood Updated: 08/19/22 0638     WBC 5.74 10*3/mm3      RBC 5.07 10*6/mm3      Hemoglobin 13.8 g/dL      Hematocrit 45.7 %      MCV 90.1 fL      MCH 27.2 pg      MCHC 30.2 g/dL      RDW 15.4 %      RDW-SD 50.6 fl      MPV 11.0 fL      Platelets 163 10*3/mm3      Neutrophil % 57.6 %      Lymphocyte % 28.0 %      Monocyte % 10.3 %      Eosinophil % 3.0 %      Basophil % 0.9 %      Immature Grans % 0.2 %      Neutrophils, Absolute 3.31 10*3/mm3      Lymphocytes, Absolute 1.61 10*3/mm3      Monocytes, Absolute 0.59 10*3/mm3      Eosinophils, Absolute 0.17 10*3/mm3      Basophils, Absolute 0.05 10*3/mm3      Immature Grans, Absolute 0.01 10*3/mm3      nRBC 0.0 /100 WBC     POC Glucose Once [[id number]]  (Abnormal) Collected: 08/18/22 0607    Specimen: Blood Updated: 08/18/22 2030     Glucose 169 mg/dL      Comment: RN NotifiedOperator: [id number] WARNER Nevarez ID: [id number]       POC Glucose Once [[id number]]  (Abnormal) Collected: 08/17/22 1611    Specimen: Blood Updated: 08/18/22 2029     Glucose 198 mg/dL      Comment: Result Not ConfirmedOperator: [id number] HOMA Florez ID: [id number]            Imaging Results (Last 24 Hours)     ** No results found for the last 24 hours. **           I reviewed the patient's new clinical results.  I reviewed the patient's new imaging results and agree with the interpretation.  I reviewed the patient's other test results and agree with the interpretation      Assessment & Plan       Ulises hematuria    Multiple subsegmental pulmonary emboli without acute cor pulmonale (HCC)    Cellulitis    Cellulitis      Assessment & Plan    POD 1 cystoscopy, negative findings.   Consulted to Urology for hematuria, KUB negative for stone, CT abdomen/pelvis showed no acute findings in the kidneys/ureters/bladder. UA x2 show TNTC RBCs.   -Estimated Creatinine Clearance: 165.8 mL/min (A) (by C-G formula based on SCr of 0.47 mg/dL (L)).    Plan:  Straight cath for micro  UA  Follow up Urology outpatient    She Preston, APRN  08/19/22  13:04 CDT

## 2022-08-19 NOTE — PROGRESS NOTES
Nutrition Services    Patient Name:  Stephanie Bradshaw  YOB: 1960  MRN: [id number]  Admit Date:  8/9/2022    Spoke with  Nursing re pt's renal diet restrictions. Pt said she is going home today and at the time of this documentation pt has been dismissed. Pt said she does have diabetes and does limit her added salt. Will send info home with pt on the low sodium diet. We discussed eating three meals per day and avoiding beverages with added sugar previously. Pt's BUN/creat were below normal limits. Pt said she makes meals ahead and freezes them for easy access and she limits foods that contain carbohydrate. Encouraged pt to request a referral for our outpatient dietitian when she is feeling better to learn about carb counting. Pt said she would. She has been eating well here and compliments the meals. Pt is d/c to home today.    Electronically signed by:  Camilla Garcia RD  08/19/22 15:56 CDT

## 2022-08-19 NOTE — PLAN OF CARE
Problem: Adult Inpatient Plan of Care  Goal: Plan of Care Review  Outcome: Ongoing, Progressing  Flowsheets  Taken 8/19/2022 1014  Progress: improving  Plan of Care Reviewed With: patient  Taken 8/19/2022 0840  Progress: improving  Outcome Evaluation: Pt sitting EOB upon arrival into room. Agreed to OT. UB and LB bathing and dressing SBA. Grooming Independent. Sit to stand, stand to sit SBA. Pt sitting EOB upon exit from room. Continue OT POC   Goal Outcome Evaluation:  Plan of Care Reviewed With: patient        Progress: improving  Outcome Evaluation: Pt sitting EOB upon arrival into room. Agreed to OT. UB and LB bathing and dressing SBA. Grooming Independent. Sit to stand, stand to sit SBA. Pt sitting EOB upon exit from room. Continue OT POC

## 2022-08-19 NOTE — THERAPY TREATMENT NOTE
Patient Name: Stephanie Bradshaw  : 1960    MRN: [id number]                              Today's Date: 2022       Admit Date: 2022    Visit Dx:     ICD-10-CM ICD-9-CM   1. Multiple subsegmental pulmonary emboli without acute cor pulmonale (HCC)  I26.94 415.19   2. Hypoxia  R09.02 799.02   3. Dyspnea, unspecified type  R06.00 786.09   4. Acute pulmonary embolism, unspecified pulmonary embolism type, unspecified whether acute cor pulmonale present (HCC)  I26.99 415.19   5. Benign hypertension  I10 401.1   6. Impaired functional mobility, balance, gait, and endurance  Z74.09 V49.89   7. Impaired mobility and ADLs  Z74.09 V49.89    Z78.9    8. Ulises hematuria  R31.0 599.71     Patient Active Problem List   Diagnosis   • Multiple subsegmental pulmonary emboli without acute cor pulmonale (HCC)   • Cellulitis   • Cellulitis   • Ulises hematuria     Past Medical History:   Diagnosis Date   • COPD (chronic obstructive pulmonary disease) (HCC)    • Hyperlipidemia      History reviewed. No pertinent surgical history.   General Information     Row Name 22 0840          OT Time and Intention    Document Type therapy note (daily note)  -LW     Mode of Treatment individual therapy;occupational therapy  -LW     Row Name 22 0840          General Information    Existing Precautions/Restrictions oxygen therapy device and L/min  -LW     Row Name 22 0840          Cognition    Orientation Status (Cognition) oriented x 4  -LW     Row Name 22 0840          Safety Issues, Functional Mobility    Impairments Affecting Function (Mobility) balance;endurance/activity tolerance;strength  -LW           User Key  (r) = Recorded By, (t) = Taken By, (c) = Cosigned By    Initials Name Provider Type    LW Sultana Nguyen COTA Occupational Therapist Assistant                 Mobility/ADL's     Row Name 22 0840          Transfers    Transfers sit-stand transfer;stand-sit transfer  -LW     Sit-Stand  [place] (Transfers) standby assist  -LW     Stand-Sit [place] (Transfers) standby assist  -LW     Row Name 08/19/22 0840          Sit-Stand Transfer    Assistive Device (Sit-Stand Transfers) other (see comments)  No AD  -LW     Row Name 08/19/22 0840          Stand-Sit Transfer    Assistive Device (Stand-Sit Transfers) other (see comments)  No AD  -LW     Row Name 08/19/22 0840          Activities of Daily Living    BADL Assessment/Intervention bathing;upper body dressing;lower body dressing;grooming  -LW     Row Name 08/19/22 0840          Bathing Assessment/Intervention    [place] Level (Bathing) bathing skills;lower body;upper body;standby assist  -LW     Position (Bathing) edge of bed sitting  -LW     Row Name 08/19/22 0840          Upper Body Dressing Assessment/Training    [place] Level (Upper Body Dressing) doff;don;standby assist  Hospital gown  -LW     Position (Upper Body Dressing) edge of bed sitting  -LW     Row Name 08/19/22 0840          Lower Body Dressing Assessment/Training    [place] Level (Lower Body Dressing) doff;don;socks;standby assist  -LW     Position (Lower Body Dressing) edge of bed sitting  -LW     Row Name 08/19/22 0840          Grooming Assessment/Training    [place] Level (Grooming) hair care, combing/brushing;oral care regimen;wash face, hands;independent  -LW     Position (Grooming) edge of bed sitting  -LW           User Key  (r) = Recorded By, (t) = Taken By, (c) = Cosigned By    Initials Name Provider Type    Sultana Tejeda COTA Occupational Therapist Assistant               Obj/Interventions    No documentation.                Goals/Plan     Row Name 08/19/22 0840          Transfer Goal 1 (OT)    Activity/Assistive Device (Transfer Goal 1, OT) sit-to-stand/stand-to-sit;bed-to-chair/chair-to-bed;toilet  -LW     [place] Level/Cues Needed (Transfer Goal 1, OT) independent  -LW     Time Frame (Transfer Goal 1, OT) long term goal (LTG);by  discharge  -LW     Progress/Outcome (Transfer Goal 1, OT) goal not met  -LW     Row Name 08/19/22 0840          Bathing Goal 1 (OT)    Activity/Device (Bathing Goal 1, OT) lower body bathing  -LW     [place] Level/Cues Needed (Bathing Goal 1, OT) modified independence  -LW     Time Frame (Bathing Goal 1, OT) long term goal (LTG);by discharge  -LW     Progress/Outcomes (Bathing Goal 1, OT) goal not met  -LW     Row Name 08/19/22 0840          Dressing Goal 1 (OT)    Activity/Device (Dressing Goal 1, OT) lower body dressing  -LW     [place]/Cues Needed (Dressing Goal 1, OT) modified independence  -LW     Time Frame (Dressing Goal 1, OT) long term goal (LTG);by discharge  -LW     Progress/Outcome (Dressing Goal 1, OT) goal not met  -LW     Row Name 08/19/22 0840          Toileting Goal 1 (OT)    Activity/Device (Toileting Goal 1, OT) toileting skills, all  -LW     [place] Level/Cues Needed (Toileting Goal 1, OT) independent  -LW     Time Frame (Toileting Goal 1, OT) long term goal (LTG);by discharge  -LW     Progress/Outcome (Toileting Goal 1, OT) goal not met  -     Row Name 08/19/22 0840           Activity Tolerance Goal 1 (OT)    Activity Level (Endurance Goal 1, OT) 15 min activity;O2 sat >/ equal to 88%  -LW     Progress/Outcome (Activity Tolerance Goal 1, OT) goal not met  -LW           User Key  (r) = Recorded By, (t) = Taken By, (c) = Cosigned By    Initials Name Provider Type    LW Sultana Nguyen COTA Occupational Therapist Assistant               Clinical Impression     Row Name 08/19/22 0840          Pain Assessment    Pretreatment Pain Rating 0/10 - no pain  -LW     Posttreatment Pain Rating 0/10 - no pain  -LW     Pain Location - Side/Orientation Bilateral  -LW     Pain Location lower  -LW     Pain Location - extremity  -LW     Pain Intervention(s) Repositioned  -LW     Row Name 08/19/22 0840          Plan of Care Review    Progress improving  -LW     Outcome Evaluation Pt sitting EOB  upon arrival into room. Agreed to OT. UB and LB bathing and dressing SBA. Grooming Independent. Sit to stand, stand to sit SBA. Pt sitting EOB upon exit from room. Continue OT POC  -LW     Row Name 08/19/22 0840          Positioning and Restraints    Pre-Treatment Position in bed  -LW     Post Treatment Position bed  -LW     In Bed sitting EOB;call light within reach;encouraged to call for assist;exit alarm on  -LW           User Key  (r) = Recorded By, (t) = Taken By, (c) = Cosigned By    Initials Name Provider Type    Sultana Tejeda COTA Occupational Therapist Assistant               Outcome Measures     Row Name 08/19/22 0830          How much help from another is currently needed...    Putting on and taking off regular lower body clothing? 3  -LW     Bathing (including washing, rinsing, and drying) 3  -LW     Toileting (which includes using toilet bed pan or urinal) 3  -LW     Putting on and taking off regular upper body clothing 4  -LW     Taking care of personal grooming (such as brushing teeth) 4  -LW     Eating meals 4  -LW     AM-PAC 6 Clicks Score (OT) 21  -LW           User Key  (r) = Recorded By, (t) = Taken By, (c) = Cosigned By    Initials Name Provider Type    Sultana Tejeda COTA Occupational Therapist Assistant                Occupational Therapy Education                 Title: PT OT SLP Therapies (In Progress)     Topic: Occupational Therapy (Done)     Point: ADL training (Done)     Description:   Instruct learner(s) on proper safety adaptation and remediation techniques during self care or transfers.   Instruct in proper use of assistive devices.              Learning Progress Summary           Patient Acceptance, E,TB, VU by BRIELLE at 8/19/2022 1014    Acceptance, E, VU by KELSIE at 8/17/2022 1253    Comment: Educated pt on fall prevention and safety with ADLs                   Point: Home exercise program (Done)     Description:   Instruct learner(s) on appropriate technique for monitoring,  assisting and/or progressing therapeutic exercises/activities.              Learning Progress Summary           Patient Acceptance, E,TB, VU by  at 8/19/2022 1014                   Point: Precautions (Done)     Description:   Instruct learner(s) on prescribed precautions during self-care and functional transfers.              Learning Progress Summary           Patient Acceptance, E,TB, VU by  at 8/19/2022 1014    Acceptance, E, VU by  at 8/17/2022 1253    Comment: Educated pt on fall prevention and safety with ADLs                   Point: Body mechanics (Done)     Description:   Instruct learner(s) on proper positioning and spine alignment during self-care, functional mobility activities and/or exercises.              Learning Progress Summary           Patient Acceptance, E,TB, VU by  at 8/19/2022 1014                               User Key     Initials Effective Dates Name Provider Type Discipline     06/16/21 -  Sultana Nguyen COTA Occupational Therapist Assistant OT     06/16/21 -  Naa Padilla OT Occupational Therapist OT              OT Recommendation and Plan     Plan of Care Review  Plan of Care Reviewed With: patient  Progress: improving  Outcome Evaluation: Pt sitting EOB upon arrival into room. Agreed to OT. UB and LB bathing and dressing SBA. Grooming Independent. Sit to stand, stand to sit SBA. Pt sitting EOB upon exit from room. Continue OT POC     Time Calculation:    Time Calculation- OT     Row Name 08/19/22 0840             Time Calculation- OT    OT Start Time 0840  -LW      OT Stop Time 0940  -LW      OT Time Calculation (min) 60 min  -LW      Total Timed Code Minutes- OT 60 minute(s)  -LW      OT Received On 08/19/22  -LW              Timed Charges    02380 - OT Self Care/Mgmt Minutes 60  -LW              Total Minutes    Timed Charges Total Minutes 60  -LW       Total Minutes 60  -LW            User Key  (r) = Recorded By, (t) = Taken By, (c) = Cosigned By    Initials Name  Provider Type    LW Sultana Nguyen COTA Occupational Therapist Assistant              Therapy Charges for Today     Code Description Service Date Service Provider Modifiers Qty    [id number] HC OT SELF CARE/MGMT/TRAIN EA 15 MIN 8/19/2022 Sultana Nguyen COTA GO 4               VEINTA Rendon  8/19/2022

## 2022-08-19 NOTE — DISCHARGE SUMMARY
[facility] Medicine Services  DISCHARGE SUMMARY       Date of Admission: 8/9/2022  Date of Discharge:  8/19/2022  Primary Care Physician: Danielle Vaughn APRN    Presenting Problem/History of Present Illness:  Hypoxia [R09.02]  Multiple subsegmental pulmonary emboli without acute cor pulmonale (HCC) [I26.94]    Final Discharge Diagnoses:  Active Hospital Problems    Diagnosis    • **Ulises hematuria      Added automatically from request for surgery [id number]     • Multiple subsegmental pulmonary emboli without acute cor pulmonale (HCC)    • Cellulitis    • Cellulitis        Consults:   Consults     Date and Time Order Name Status Description    8/15/2022 11:06 AM Inpatient Urology Consult Completed     8/10/2022  4:24 PM Inpatient Wound Care MD Consult            Procedures Performed: Procedure(s):  CYSTOSCOPY                Pertinent Test Results:   Lab Results (most recent)     Procedure Component Value Units Date/Time    Urinalysis With Microscopic - Urine, Clean Catch [[id number]]  (Abnormal) Collected: 08/19/22 1022    Specimen: Urine, Clean Catch Updated: 08/19/22 1032    Narrative:      The following orders were created for panel order Urinalysis With Microscopic - Urine, Clean Catch.  Procedure                               Abnormality         Status                     ---------                               -----------         ------                     Urinalysis without micro...[[id number]]  Abnormal            Final result               Urinalysis, Microscopic ...[[id number]]  Abnormal            Final result                 Please view results for these tests on the individual orders.    Urinalysis, Microscopic Only - Urine, Clean Catch [[id number]]  (Abnormal) Collected: 08/19/22 1022    Specimen: Urine, Clean Catch Updated: 08/19/22 1032     RBC, UA Too Numerous to Count /HPF      WBC, UA 0-2 /HPF      Bacteria, UA None Seen /HPF      Squamous Epithelial Cells,  UA 0-2 /HPF      Hyaline Casts, UA 0-2 /LPF      Methodology Automated Microscopy    Urinalysis without microscopic (no culture) - Urine, Clean Catch [[id number]]  (Abnormal) Collected: 08/19/22 1022    Specimen: Urine, Clean Catch Updated: 08/19/22 1031     Color, UA Yellow     Appearance, UA Clear     pH, UA 7.0     Specific Gravity, UA 1.009     Glucose, UA Negative     Ketones, UA Negative     Bilirubin, UA Negative     Blood, UA Large (3+)     Protein, UA Negative     Leuk Esterase, UA Negative     Nitrite, UA Negative     Urobilinogen, UA 0.2 E.U./dL    Basic Metabolic Panel [[id number]]  (Abnormal) Collected: 08/19/22 0608    Specimen: Blood Updated: 08/19/22 0644     Glucose 195 mg/dL      BUN 7 mg/dL      Creatinine 0.47 mg/dL      Sodium 135 mmol/L      Potassium 4.0 mmol/L      Chloride 93 mmol/L      CO2 37.0 mmol/L      Calcium 8.6 mg/dL      BUN/Creatinine Ratio 14.9     Anion Gap 5.0 mmol/L      eGFR 109.1 mL/min/1.73      Comment: National Kidney Foundation and American Society of Nephrology (ASN) Task Force recommended calculation based on the Chronic Kidney Disease Epidemiology Collaboration (CKD-EPI) equation refit without adjustment for race.       Narrative:      GFR Normal >60  Chronic Kidney Disease <60  Kidney Failure <15      CBC & Differential [[id number]]  (Abnormal) Collected: 08/19/22 0608    Specimen: Blood Updated: 08/19/22 0638    Narrative:      The following orders were created for panel order CBC & Differential.  Procedure                               Abnormality         Status                     ---------                               -----------         ------                     CBC Auto Differential[[id number]]        Abnormal            Final result                 Please view results for these tests on the individual orders.    CBC Auto Differential [[id number]]  (Abnormal) Collected: 08/19/22 0608    Specimen: Blood Updated: 08/19/22 0638     WBC 5.74 10*3/mm3      RBC 5.07  10*6/mm3      Hemoglobin 13.8 g/dL      Hematocrit 45.7 %      MCV 90.1 fL      MCH 27.2 pg      MCHC 30.2 g/dL      RDW 15.4 %      RDW-SD 50.6 fl      MPV 11.0 fL      Platelets 163 10*3/mm3      Neutrophil % 57.6 %      Lymphocyte % 28.0 %      Monocyte % 10.3 %      Eosinophil % 3.0 %      Basophil % 0.9 %      Immature Grans % 0.2 %      Neutrophils, Absolute 3.31 10*3/mm3      Lymphocytes, Absolute 1.61 10*3/mm3      Monocytes, Absolute 0.59 10*3/mm3      Eosinophils, Absolute 0.17 10*3/mm3      Basophils, Absolute 0.05 10*3/mm3      Immature Grans, Absolute 0.01 10*3/mm3      nRBC 0.0 /100 WBC     POC Glucose Once [[id number]]  (Abnormal) Collected: 08/18/22 0607    Specimen: Blood Updated: 08/18/22 2030     Glucose 169 mg/dL      Comment: RN NotifiedOperator: [id number] WARNER Roque ID: [id number]       POC Glucose Once [[id number]]  (Abnormal) Collected: 08/17/22 1611    Specimen: Blood Updated: 08/18/22 2029     Glucose 198 mg/dL      Comment: Result Not ConfirmedOperator: [id number] HOMA STOVALLVishla ID: [id number]       Comprehensive Metabolic Panel [[id number]]  (Abnormal) Collected: 08/17/22 0642    Specimen: Blood Updated: 08/17/22 0714     Glucose 190 mg/dL      BUN 6 mg/dL      Creatinine 0.50 mg/dL      Sodium 139 mmol/L      Potassium 4.6 mmol/L      Chloride 95 mmol/L      CO2 40.0 mmol/L      Calcium 8.7 mg/dL      Total Protein 6.3 g/dL      Albumin 3.30 g/dL      ALT (SGPT) 34 U/L      AST (SGOT) 77 U/L      Alkaline Phosphatase 74 U/L      Total Bilirubin 0.6 mg/dL      Globulin 3.0 gm/dL      A/G Ratio 1.1 g/dL      BUN/Creatinine Ratio 12.0     Anion Gap 4.0 mmol/L      eGFR 107.5 mL/min/1.73      Comment: National Kidney Foundation and American Society of Nephrology (ASN) Task Force recommended calculation based on the Chronic Kidney Disease Epidemiology Collaboration (CKD-EPI) equation refit without adjustment for race.       Narrative:      GFR Normal >60  Chronic Kidney  Disease <60  Kidney Failure <15      CBC & Differential [[id number]]  (Abnormal) Collected: 08/17/22 0642    Specimen: Blood Updated: 08/17/22 0654    Narrative:      The following orders were created for panel order CBC & Differential.  Procedure                               Abnormality         Status                     ---------                               -----------         ------                     CBC Auto Differential[[id number]]        Abnormal            Final result                 Please view results for these tests on the individual orders.    CBC Auto Differential [[id number]]  (Abnormal) Collected: 08/17/22 0642    Specimen: Blood Updated: 08/17/22 0654     WBC 5.74 10*3/mm3      RBC 5.12 10*6/mm3      Hemoglobin 13.8 g/dL      Hematocrit 45.7 %      MCV 89.3 fL      MCH 27.0 pg      MCHC 30.2 g/dL      RDW 15.3 %      RDW-SD 50.4 fl      MPV 11.2 fL      Platelets 225 10*3/mm3      Neutrophil % 60.5 %      Lymphocyte % 25.8 %      Monocyte % 10.3 %      Eosinophil % 2.1 %      Basophil % 1.0 %      Immature Grans % 0.3 %      Neutrophils, Absolute 3.47 10*3/mm3      Lymphocytes, Absolute 1.48 10*3/mm3      Monocytes, Absolute 0.59 10*3/mm3      Eosinophils, Absolute 0.12 10*3/mm3      Basophils, Absolute 0.06 10*3/mm3      Immature Grans, Absolute 0.02 10*3/mm3      nRBC 0.0 /100 WBC     Urinalysis, Microscopic Only - Urine, Clean Catch [[id number]]  (Abnormal) Collected: 08/15/22 1129    Specimen: Urine, Clean Catch Updated: 08/15/22 1148     RBC, UA Too Numerous to Count /HPF      WBC, UA 3-5 /HPF      Comment: Urine culture not indicated.        Bacteria, UA None Seen /HPF      Squamous Epithelial Cells, UA 0-2 /HPF      Hyaline Casts, UA None Seen /LPF      Methodology Automated Microscopy    Urinalysis With Culture If Indicated - Urine, Clean Catch [[id number]]  (Abnormal) Collected: 08/15/22 1129    Specimen: Urine, Clean Catch Updated: 08/15/22 1148     Color, UA Romeo     Appearance, UA  Cloudy     pH, UA 8.5     Specific Gravity, UA 1.015     Glucose, UA Negative     Ketones, UA Negative     Bilirubin, UA Negative     Blood, UA Large (3+)     Protein, UA 30 mg/dL (1+)     Leuk Esterase, UA Small (1+)     Nitrite, UA Negative     Urobilinogen, UA 1.0 E.U./dL    Narrative:      In absence of clinical symptoms, the presence of pyuria, bacteria, and/or nitrites on the urinalysis result does not correlate with infection.    Blood Culture - Blood, Hand, Right [[id number]]  (Normal) Collected: 08/09/22 2358    Specimen: Blood from Hand, Right Updated: 08/15/22 0017     Blood Culture No growth at 5 days    Blood Culture - Blood, Arm, Left [[id number]]  (Normal) Collected: 08/09/22 1944    Specimen: Blood from Arm, Left Updated: 08/14/22 2002     Blood Culture No growth at 5 days    Basic Metabolic Panel [[id number]]  (Abnormal) Collected: 08/14/22 0501    Specimen: Blood Updated: 08/14/22 0527     Glucose 141 mg/dL      BUN 6 mg/dL      Creatinine 0.61 mg/dL      Sodium 138 mmol/L      Potassium 4.1 mmol/L      Chloride 92 mmol/L      CO2 44.0 mmol/L      Calcium 8.2 mg/dL      BUN/Creatinine Ratio 9.8     Anion Gap 2.0 mmol/L      eGFR 102.5 mL/min/1.73      Comment: National Kidney Foundation and American Society of Nephrology (ASN) Task Force recommended calculation based on the Chronic Kidney Disease Epidemiology Collaboration (CKD-EPI) equation refit without adjustment for race.       Narrative:      GFR Normal >60  Chronic Kidney Disease <60  Kidney Failure <15      Vancomycin, Trough [[id number]]  (Normal) Collected: 08/14/22 0501    Specimen: Blood Updated: 08/14/22 0526     Vancomycin Trough 18.80 mcg/mL     Potassium [[id number]]  (Normal) Collected: 08/13/22 0131    Specimen: Blood Updated: 08/13/22 0215     Potassium 4.4 mmol/L      Comment: Slight hemolysis detected by analyzer. Results may be affected.       Urinalysis With Microscopic - Urine, Clean Catch [[id number]]  (Abnormal) Collected:  08/13/22 0035    Specimen: Urine, Clean Catch Updated: 08/13/22 0049    Narrative:      The following orders were created for panel order Urinalysis With Microscopic - Urine, Clean Catch.  Procedure                               Abnormality         Status                     ---------                               -----------         ------                     Urinalysis without micro...[[id number]]  Abnormal            Final result               Urinalysis, Microscopic ...[[id number]]  Abnormal            Final result                 Please view results for these tests on the individual orders.    Urinalysis without microscopic (no culture) - Urine, Clean Catch [[id number]]  (Abnormal) Collected: 08/13/22 0035    Specimen: Urine, Clean Catch Updated: 08/13/22 0043     Color, UA Red     Appearance, UA Turbid     pH, UA 7.5     Specific Gravity, UA 1.017     Glucose, UA Negative     Ketones, UA Negative     Bilirubin, UA Negative     Blood, UA Large (3+)     Protein, UA 30 mg/dL (1+)     Leuk Esterase, UA Small (1+)     Nitrite, UA Negative     Urobilinogen, UA 1.0 E.U./dL    Protime-INR [[id number]]  (Normal) Collected: 08/09/22 2358    Specimen: Blood from Arm, Left Updated: 08/10/22 0044     Protime 15.0 Seconds      INR 1.20    Narrative:      Therapeutic range for most indications is 2.0-3.0 INR,  or 2.5-3.5 for mechanical heart valves.    aPTT [[id number]]  (Normal) Collected: 08/09/22 2358    Specimen: Blood from Arm, Left Updated: 08/10/22 0044     PTT 26.9 seconds     Narrative:      The recommended Heparin therapeutic range is 68-97 seconds.    Extra Tubes [[id number]] Collected: 08/09/22 1522    Specimen: Blood Updated: 08/09/22 1932    Narrative:      The following orders were created for panel order Extra Tubes.  Procedure                               Abnormality         Status                     ---------                               -----------         ------                     Gold Top - [facility][[id number]]                                    Final result               Gray Top[[id number]]                                         Final result               Light Blue Top[[id number]]                                   Final result                 Please view results for these tests on the individual orders.    Gray Top [[id number]] Collected: 08/09/22 1522    Specimen: Blood Updated: 08/09/22 1932     Extra Tube Hold for add-ons.     Comment: Auto resulted.       STAT Lactic Acid, Reflex [[id number]]  (Normal) Collected: 08/09/22 1831    Specimen: Blood Updated: 08/09/22 1847     Lactate 1.8 mmol/L     D-dimer, Quantitative [[id number]]  (Abnormal) Collected: 08/09/22 1522    Specimen: Blood Updated: 08/09/22 1716     D-Dimer, Quantitative 2,487 ng/mL (FEU)     Narrative:      Dimer values <500 ng/ml FEU are FDA approved as aid in diagnosis of deep venous thrombosis and pulmonary embolism.  This test should not be used in an exclusion strategy with pretest probability alone.    A recent guideline regarding diagnosis for pulmonary thromboembolism recommends an adjusted exclusion criterion of age x 10 ng/ml FEU for patients >50 years of age ([employer] Intern Med 2015; 163: 701-711).      COVID-19 and FLU A/B PCR - Swab, Nasopharynx [[id number]]  (Normal) Collected: 08/09/22 1621    Specimen: Swab from Nasopharynx Updated: 08/09/22 1649     COVID19 Not Detected     Influenza A PCR Not Detected     Influenza B PCR Not Detected    Narrative:      Fact sheet for providers: https://www.fda.gov/media/259568/download    Fact sheet for patients: https://www.fda.gov/media/575939/download    Test performed by PCR.    Gold Top - SST [[id number]] Collected: 08/09/22 1522    Specimen: Blood Updated: 08/09/22 1633     Extra Tube Hold for add-ons.     Comment: Auto resulted.       Light Blue Top [[id number]] Collected: 08/09/22 1522    Specimen: Blood Updated: 08/09/22 1633     Extra Tube Hold for add-ons.     Comment: Auto resulted       CK [[id number]]   (Normal) Collected: 08/09/22 1515    Specimen: Blood Updated: 08/09/22 1618     Creatine Kinase 39 U/L     Magnesium [[id number]]  (Normal) Collected: 08/09/22 1515    Specimen: Blood Updated: 08/09/22 1618     Magnesium 2.0 mg/dL     Lactic Acid, Plasma [[id number]]  (Abnormal) Collected: 08/09/22 1522    Specimen: Blood Updated: 08/09/22 1614     Lactate 2.1 mmol/L     Comprehensive Metabolic Panel [[id number]]  (Abnormal) Collected: 08/09/22 1515    Specimen: Blood Updated: 08/09/22 1546     Glucose 189 mg/dL      BUN 9 mg/dL      Creatinine 0.70 mg/dL      Sodium 135 mmol/L      Potassium 4.2 mmol/L      Chloride 91 mmol/L      CO2 39.0 mmol/L      Calcium 8.4 mg/dL      Total Protein 5.9 g/dL      Albumin 2.90 g/dL      ALT (SGPT) 12 U/L      AST (SGOT) 20 U/L      Alkaline Phosphatase 94 U/L      Total Bilirubin 1.4 mg/dL      Globulin 3.0 gm/dL      A/G Ratio 1.0 g/dL      BUN/Creatinine Ratio 12.9     Anion Gap 5.0 mmol/L      eGFR 99.2 mL/min/1.73      Comment: National Kidney Foundation and American Society of Nephrology (ASN) Task Force recommended calculation based on the Chronic Kidney Disease Epidemiology Collaboration (CKD-EPI) equation refit without adjustment for race.       Narrative:      GFR Normal >60  Chronic Kidney Disease <60  Kidney Failure <15      Troponin [[id number]]  (Normal) Collected: 08/09/22 1515    Specimen: Blood Updated: 08/09/22 1546     Troponin T <0.010 ng/mL     Narrative:      Troponin T Reference Range:  <= 0.03 ng/mL-   Negative for AMI  >0.03 ng/mL-     Abnormal for myocardial necrosis.  Clinicians would have to utilize clinical acumen, EKG, Troponin and serial changes to determine if it is an Acute Myocardial Infarction or myocardial injury due to an underlying chronic condition.       Results may be falsely decreased if patient taking Biotin.      BNP [[id number]]  (Abnormal) Collected: 08/09/22 1515    Specimen: Blood Updated: 08/09/22 1544     proBNP 1,544.0 pg/mL      Narrative:      Among patients with dyspnea, NT-proBNP is highly sensitive for the detection of acute congestive heart failure. In addition NT-proBNP of <300 pg/ml effectively rules out acute congestive heart failure with 99% negative predictive value.    Results may be falsely decreased if patient taking Biotin.          Imaging Results (Most Recent)     Procedure Component Value Units Date/Time    CT Abdomen Pelvis With Contrast [[id number]] Collected: 08/16/22 1630     Updated: 08/16/22 1815    Narrative:      CT abdomen and pelvis with contrast    TECHNIQUE: Axial images were taken through the abdomen and pelvis  after the administration of IV contrast. All CT scans at this  facility use dose modulation, iterative reconstruction, and/or  weight based dosing when appropriate to reduce radiation dose to  as low as reasonably achievable    HISTORY: hematuria/vaginal bleeding, I26.94 Multiple subsegmental  pulmonary emboli without acute cor pulmonale R09.02 Hypoxemia  R06.00 Dyspnea, unspecified I26.99 Other pulmonary embolism  without acute cor pulmonale I10 Essential (primary) hypertension  Z74.09 Other reduced mobility    COMPARISON:None    FINDINGS:    Lung bases:    Mild atelectatic changes in the right lung base. Small hiatal  hernia.    ABDOMEN:    There is diffuse fatty infiltration of the liver. The liver is  enlarged measuring 19 cm in length. There is no evidence for mass  or intrahepatic biliary ductal dilatation. The gallbladder  appears unremarkable. There is no evidence for gallbladder wall  thickening or pericholecystic fluid. The adrenal glands, pancreas  and spleen are normal. The kidneys appear unremarkable. There is  no evidence for hydronephrosis or stone.    Multiple diverticuli seen extending off portions of the  descending colon and sigmoid colon, there is no evidence for  diverticulitis. There is no bowel obstruction. The appendix is  normal. There is mild thickening of the mucosa of the left  colon  and sigmoid colon could be decompression versus mild acute  colitis.    Pelvis:    The aorta is normal in caliber. There is no evidence for  pathologically enlarged adenopathy.    The bladder appears unremarkable. There is no free air or free  fluid.    Mild to moderate degenerative changes and degenerative disc  disease in lumbar spine. There are no acute bony abnormalities.  Mild skin thickening on the abdominal wall mostly in the  suprapubic region with mild subcutaneous fat stranding could be  related to generalized edema however superimposed infectious  process such as cellulitis is also a consideration, correlate  clinically.      Impression:      Impression:  1.  Mild skin thickening on the abdominal wall mostly in the  suprapubic region with mild subcutaneous fat stranding could be  related to generalized edema however superimposed infectious  process such as cellulitis is also a consideration, correlate  clinically.  2.  Mild thickening of the mucosa of the left colon and sigmoid  colon could be decompression versus mild acute colitis.  3.  Hepatomegaly with diffuse fatty infiltration of the liver.  4.  Small hiatal hernia.    Electronically signed by:  Linda Owen MD, MBA  8/16/2022  6:13 PM CDT Workstation: [id number]    US Non-ob Transvaginal [[id number]] Collected: 08/16/22 1020     Updated: 08/16/22 1202    Narrative:        Transvaginal pelvic ultrasound non-OB complete    HISTORY: Vaginal bleeding.    Transvaginal ultrasound of the pelvis was performed.    COMPARISON: None.    FINDINGS:  Examination limited by patient's body habitus.    The uterus is normal in position, shape and size.  The uterus measures 6.76 cm in length by 3.88 cm AP by 4.50 cm  transverse.  Uterine volume 61.83 mL.  Endometrium not well delineated.  Small nabothian cysts.    Ovaries obscured by bowel gas.  No adnexal mass.  No free fluid.      Impression:      CONCLUSION:  Examination limited by patient's body  habitus.  No uterine masses identified.  Endometrium not well delineated.  Ovaries obscured by bowel gas.    [id number]    Electronically signed by:  Jeremy Calvert MD  8/16/2022 11:59 AM  CDT Workstation: [id number]    XR Abdomen KUB [[id number]] Collected: 08/15/22 1129     Updated: 08/15/22 1800    Narrative:      Exam:  KUB    History:  Hematuria. Left flank pain.    Supine film of the abdomen was obtained.    Comparison:  None    Small to moderate amount retained feces in the colon.  No mechanical bowel obstruction.  No organomegaly.  Phleboliths right hemipelvis.  2.3 cm and smaller left gluteal calcifications.  No acute osseous abnormality.  Degenerative disc disease lumbar spine.  Degenerative changes pubic symphysis.      Impression:      Conclusion:  No definite renal or ureteral calculi identified.  Small to moderate amount retained feces in the colon.    [id number]    Electronically signed by:  Jeremy Calvert MD  8/15/2022 5:58 PM CDT  Workstation: [id number]    US Guided Vascular Access [[id number]] Collected: 08/11/22 1051     Updated: 08/11/22 1608    Narrative:      ULTRASOUND-GUIDED VASCULAR ACCESS    INDICATION: access, I26.94 Multiple subsegmental pulmonary emboli  without acute cor pulmonale R09.02 Hypoxemia    TECHNIQUE: Real-time ultrasound imaging was utilized to visualize  needle entry.     FINDINGS:  Realtime ultrasound imaging was utilized to visualize needle  entry into a patent right basilic vein during midline catheter   placement and a permanent image was stored for permanent  recording and reporting.       Impression:      Imaging obtained during vascular access device placement under  ultrasound guidance.    Electronically signed by:  Fouzia Tan MD  8/11/2022 4:06 PM CDT  Workstation: [id number]    IR Insert Midline Without Port Pump 5 Plus [[id number]] Resulted: 08/11/22 1119     Updated: 08/11/22 1119    Narrative:      This procedure was auto-finalized with no dictation required.    US Venous Doppler  Lower Extremity Bilateral (duplex) [[id number]] Collected: 08/10/22 1316     Updated: 08/10/22 1518    Narrative:      ULTRASOUND VENOUS DOPPLER BILATERAL LOWER EXTREMITIES    INDICATION: dvt eval, I26.94 Multiple subsegmental pulmonary  emboli without acute cor pulmonale R09.02 Hypoxemia    COMPARISON: None    TECHNIQUE: Grayscale and color Doppler sonographic images of  bilateral lower extremity veins were obtained.     FINDINGS:  RIGHT:  CFV: Patent, compressible, and augments.  GSV: Patent, compressible, and augments.  SFV: Patent, compressible, and augments.  Popliteal: Patent, compressible, and augments.  Subcutaneous edema is noted.    LEFT:  CFV: Patent, compressible, and augments.  GSV: Patent, compressible, and augments.  SFV: Patent, compressible, and augments.  Popliteal: Noncompressible suggesting thrombus. Thrombus of  heterogeneous echogenicity is visualized in the left popliteal  vein.  There is subcutaneous edema.    Additional Findings: Bilateral lower extremity subcutaneous  edema.      Impression:      1. DVT of left popliteal vein.  2. Bilateral lower extremity edema.    Numerous attempts were made to contact Dr. Burr. The patient is  already fully anticoagulated for PE and report of findings was  given to the patient's nurse Kartik Jesus RN at 3:12 PM on  8/10/2022.    Electronically signed by:  Fouzia Tan MD  8/10/2022 3:12 PM CDT  Workstation: [id number]    CT Angiogram Chest [[id number]] Collected: 08/09/22 1913     Updated: 08/10/22 0817    Addenda:         ADDENDUM   ADDENDUM #1       Findings discussed with referring physician Dr. Barnes at 2030    Electronically signed by:  Gene Stephens MD  8/10/2022 8:15 AM CDT  Workstation: [id number]    Signed: 08/10/22 0815 by Gene Stephens MD    Narrative:      EXAM:  CT CHEST ANGIOGRAPHY WITH IV CONTRAST    ORDERING PROVIDER:  CHANTELL BARAHONA    CLINICAL HISTORY:  Shortness of breath    COMPARISON:      TECHNIQUE:   Chest CT was performed  using a high resolution pulmonary  angiogram protocol with 80ml of Isovue 370 contrast and  reformatted in the sagittal and coronal planes.     3-dimensional images also acquired with special processing of the  CT scan data with a specialized workstation for evaluation.    This examination was performed according to our departmental dose  optimization program which includes automated exposure control,  adjustment of the MA and kV according to patient size, and/or use  of iterative reconstruction technique.     FINDINGS:     LUNGS AND PLEURA: Scattered ground glass opacities in the  bilateral lower lobes which may be due to nonspecific  pneumonitis. No focal consolidation or masses.No pleural effusion  or plaques.  Unremarkable interstitium.    HEART: Prominent size and unremarkable configuration. No  pericardial effusion.     MEDIASTINUM AND ADDISON: No significant adenopathy.     AORTA AND GREAT VESSELS: No aneurysm or dissection.     PULMONARY ARTERIES: Bilateral filling defects in branches  supplying the upper lobes, lower lobes and right middle lobe.  There is no evidence of saddle embolism. Main pulmonary artery  measuring 4.4 cm concerning for pulmonary arterial hypertension.    UPPER ABDOMEN: Unremarkable.    MUSCULOSKELETAL:  No aggressive osseous lesion. Unremarkable  vertebral body height and alignment. No lytic or sclerotic  lesion.     EXTRATHORACIC SOFT TISSUES: No axillary adenopathy. No mass.  Unremarkable supraclavicular soft tissues.       Impression:      Bilateral filling defects in branches supplying the upper lobes,  lower lobes and right middle lobe consistent with pulmonary  embolism. There is no evidence of saddle embolism.   Main pulmonary artery measuring 4.4 cm concerning for pulmonary  arterial hypertension.  Scattered ground glass opacities in the bilateral lower lobes  which may be due to nonspecific pneumonitis.     Electronically signed by:  Gene Stephens MD  8/9/2022 8:10 PM  CDT  Workstation: [id number]    XR Chest 1 View [[id number]] Collected: 08/09/22 1453     Updated: 08/09/22 1551    Narrative:      PROCEDURE: XR CHEST 1 VW    VIEWS:Single    INDICATION: Shortness of breath    COMPARISON: CXR: None    FINDINGS:       - lines/tubes: None    - cardiac: Size within normal limits.    - mediastinum: Contour within normal limits.     - lungs: No evidence of a focal air space process. Mild  interstitial prominence and indistinctness.     - pleura: No evidence of  fluid.      - osseous: Unremarkable for age.        Impression:      Interstitial prominence and indistinctness, could represent an  infectious process or mild edema.      Electronically signed by:  Talia Garduno MD  8/9/2022 3:49 PM CDT  Workstation: [id number]          Chief Complaint on Day of Discharge: No new complaints    Hospital Course:  The patient is a 62 y.o. female a medical history notable for COPD and hyperlipidemia who presented to [facility] with shortness of breath.  Patient was found to have pulmonary embolism and was started on anticoagulation.  She was transitioned to p.o. Eliquis.  Patient was noted to develop hematuria versus vaginal bleeding during her stay.  Urology was consulted and evaluation did not reveal any obvious signs of cause for hematuria.  Her hemoglobin remained stable during her stay.  Patient also was treated for cellulitis of her lower extremities and had improvement in this.  She did still have some erythema to the lower extremities but no increased warmth but has felt to be related to some chronic lymphedema changes.  Hematuria evaluation findings were discussed with the patient and it was commended that she follow-up with OB/GYN as an outpatient for further evaluation for possible vaginal bleeding.  Patient was agreeable to this and referral was placed.  Patient did initially require supplemental oxygen but was able to be weaned to room air prior to discharge.  Patient was  "otherwise noted to be in stable condition and will be discharged to follow-up with PCP, urology, and OB/GYN as an outpatient.  Patient voiced understanding agreement to the plan.      Condition on Discharge: Stable    Physical Exam on Discharge:  /73 (BP Location: Left arm, Patient Position: Sitting)   Pulse 86   Temp 97.8 °F (36.6 °C) (Temporal)   Resp 18   Ht 157.5 cm (62\")   Wt 131 kg (289 lb)   LMP  (LMP Unknown)   SpO2 94%   BMI 52.86 kg/m²   Physical Exam  Constitutional:       General: She is not in acute distress.     Appearance: She is not toxic-appearing.   HENT:      Head: Normocephalic and atraumatic.      Right Ear: External ear normal.      Left Ear: External ear normal.      Nose: Nose normal.      Mouth/Throat:      Mouth: Mucous membranes are moist.      Pharynx: Oropharynx is clear.   Eyes:      Conjunctiva/sclera: Conjunctivae normal.   Cardiovascular:      Rate and Rhythm: Normal rate and regular rhythm.      Pulses: Normal pulses.      Heart sounds: Normal heart sounds.   Pulmonary:      Effort: Pulmonary effort is normal. No respiratory distress.      Breath sounds: Normal breath sounds.   Abdominal:      General: Bowel sounds are normal.      Palpations: Abdomen is soft.      Tenderness: There is no abdominal tenderness.   Musculoskeletal:      Right lower leg: Edema present.      Left lower leg: Edema present.   Skin:     General: Skin is warm and dry.      Capillary Refill: Capillary refill takes less than 2 seconds.      Findings: Erythema present.   Neurological:      General: No focal deficit present.      Mental Status: She is alert and oriented to person, place, and time. Mental status is at baseline.   Psychiatric:         Behavior: Behavior normal.         Thought Content: Thought content normal.         Discharge Disposition:  Home-Health Care [facility]    Discharge Medications:     Discharge Medications      New Medications      Instructions Start Date   apixaban 5 MG tablet " tablet  Commonly known as: ELIQUIS   10 mg, Oral, Every 12 Hours Scheduled      apixaban 5 MG tablet tablet  Commonly known as: ELIQUIS   5 mg, Oral, Every 12 Hours Scheduled   Start Date: August 20, 2022     doxycycline 100 MG capsule  Commonly known as: MONODOX   100 mg, Oral, Every 12 Hours Scheduled      furosemide 40 MG tablet  Commonly known as: LASIX   40 mg, Oral, 2 Times Daily             Discharge Diet:   Diet Instructions     Diet: Regular, Consistent Carbohydrate, Cardiac, Renal      Discharge Diet:  Regular  Consistent Carbohydrate  Cardiac  Renal             Activity at Discharge:   Activity Instructions     Gradually Increase Activity Until at Pre-Hospitalization Level            Discharge Care Plan/Instructions: Take medications as prescribed, follow-up with PCP, follow-up with urology, follow-up with OB/GYN, and return for worsening symptoms.    Follow-up Appointments:   Future Appointments   Date Time Provider Department Center   8/20/2022 To Be Determined Maribell Suggs RN [facility]   8/22/2022 To Be Determined Jean Romano, COLE [facility]   8/23/2022 To Be Determined Ysabel Aguilar OT [facility] [place]       Test Results Pending at Discharge: None    Dexter Mclean MD    Time: 32 minutes

## 2022-08-20 ENCOUNTER — HOME CARE VISIT (OUTPATIENT)
Dept: HOME HEALTH SERVICES | Facility: CLINIC | Age: 62
End: 2022-08-20

## 2022-08-20 LAB
GLUCOSE BLDC GLUCOMTR-MCNC: 221 MG/DL (ref 70–130)
GLUCOSE BLDC GLUCOMTR-MCNC: 236 MG/DL (ref 70–130)

## 2022-08-20 PROCEDURE — G0299 HHS/HOSPICE OF RN EA 15 MIN: HCPCS

## 2022-08-20 NOTE — OUTREACH NOTE
Prep Survey    Flowsheet Row Responses   [facility] facility patient discharged from? [place]   Is LACE score < 7 ? No   Emergency Room discharge w/ pulse ox? No   Eligibility Readm Mgmt   Discharge diagnosis **Ulises hematuria  Multiple subsegmental pulmonary emboli without acute cor pulmonale    Does the patient have one of the following disease processes/diagnoses(primary or secondary)? Other   Does the patient have Home health ordered? Yes   What is the Home health agency?  [facility]    Is there a DME ordered? No   Prep survey completed? Yes          LIA MAYFIELD - Registered Nurse

## 2022-08-22 ENCOUNTER — HOME CARE VISIT (OUTPATIENT)
Dept: HOME HEALTH SERVICES | Facility: CLINIC | Age: 62
End: 2022-08-22

## 2022-08-22 VITALS
TEMPERATURE: 97.4 F | OXYGEN SATURATION: 92 % | RESPIRATION RATE: 19 BRPM | SYSTOLIC BLOOD PRESSURE: 138 MMHG | HEART RATE: 91 BPM | DIASTOLIC BLOOD PRESSURE: 72 MMHG

## 2022-08-22 VITALS
RESPIRATION RATE: 24 BRPM | HEART RATE: 74 BPM | OXYGEN SATURATION: 97 % | TEMPERATURE: 98.8 F | DIASTOLIC BLOOD PRESSURE: 84 MMHG | SYSTOLIC BLOOD PRESSURE: 128 MMHG

## 2022-08-22 PROCEDURE — G0151 HHCP-SERV OF PT,EA 15 MIN: HCPCS

## 2022-08-22 NOTE — HOME HEALTH
"REASON FOR REFERRAL:  Patient admitted to  with hypoxia due to multiple subsegmental PE and cellulitis.  She reports she lost 19 pounds.  Patient reports she still feels weak and \"wobbly\" but the shortness of breath is easier.  DIAGNOSIS: Weakness, abnormal gait  SURGICAL PROCEDURE:  Cystoscopy  PERTINENT MEDICAL HISTORY: COPD, HLD, hx pneumonia, obesity, venous stasis, cardiomegaly  PRIOR LEVEL OF FUNCTION: Patient ambulate without AD in home and short distances in the community.  PATIENT GOAL FOR THIS EPISODE OF CARE:  'Be able to walk around the building without the walker\".  HOME SOCIAL ENVIRONMENT: Lives in first floor apartment without steps to enter"

## 2022-08-22 NOTE — PAYOR COMM NOTE
"Julieta Denson  [facility]  Case Management Extender  [phone] phone  [phone] fax      Auth# [id number]    Stephanie Bradshaw (62 y.o. Female)             Date of Birth   1960    Social Security Number       Address   [address] [place] [address] [address] [place] [address]    Home Phone   [phone]    MRN   [id number]       Rastafari   None    Marital Status                               Admission Date   8/9/22    Admission Type   Emergency    Admitting Provider   Fern Burr MD    Attending Provider       Department, Room/Bed   [facility] [address] [place], 320/1       Discharge Date   8/19/2022    Discharge Disposition   [place] [facility] [place] Destination                               Attending Provider: (none)   Allergies: Penicillins, Penicillins    Isolation: None   Infection: None   Code Status: CPR   Advance Care Planning Activity    Ht: 157.5 cm (62\")   Wt: 131 kg (289 lb)    Admission Cmt: None   Principal Problem: Ulises hematuria [R31.0] More...                 Active Insurance as of 8/9/2022     Primary Coverage     Payor Plan Insurance Group Employer/Plan Group    [employer] [place] [employer] [place]ICA[place]      Payor Plan Address Payor Plan Phone Number Payor Plan Fax Number Effective Dates    [address] [address] [phone]  8/9/2022 - None Entered    [facility] [id number]       Subscriber Name Subscriber Birth Date Member ID       STEPHANIE BRADSHAW 1960 [id number]                 Emergency Contacts      (Rel.) Home Phone Work Phone Mobile Phone    Sonia Correia (Friend) [phone] -- [phone]    Hieu Nuemann (Son) [phone] -- [phone]    Hieu Crump (Son) [phone] -- --               Discharge Summary      Dexter Mclean MD at 08/19/22 [facility]              [facility]  DISCHARGE SUMMARY       Date of Admission: 8/9/2022  Date of Discharge:  " 8/19/2022  Primary Care Physician: Danielle Vaughn APRN    Presenting Problem/History of Present Illness:  Hypoxia [R09.02]  Multiple subsegmental pulmonary emboli without acute cor pulmonale (HCC) [I26.94]    Final Discharge Diagnoses:  Active Hospital Problems    Diagnosis    • **Ulises hematuria      Added automatically from request for surgery [id number]     • Multiple subsegmental pulmonary emboli without acute cor pulmonale (HCC)    • Cellulitis    • Cellulitis        Consults:   Consults     Date and Time Order Name Status Description    8/15/2022 11:06 AM Inpatient Urology Consult Completed     8/10/2022  4:24 PM Inpatient Wound Care MD Consult            Procedures Performed: Procedure(s):  CYSTOSCOPY                Pertinent Test Results:   Lab Results (most recent)     Procedure Component Value Units Date/Time    Urinalysis With Microscopic - Urine, Clean Catch [[id number]]  (Abnormal) Collected: 08/19/22 1022    Specimen: Urine, Clean Catch Updated: 08/19/22 1032    Narrative:      The following orders were created for panel order Urinalysis With Microscopic - Urine, Clean Catch.  Procedure                               Abnormality         Status                     ---------                               -----------         ------                     Urinalysis without micro...[[id number]]  Abnormal            Final result               Urinalysis, Microscopic ...[[id number]]  Abnormal            Final result                 Please view results for these tests on the individual orders.    Urinalysis, Microscopic Only - Urine, Clean Catch [[id number]]  (Abnormal) Collected: 08/19/22 1022    Specimen: Urine, Clean Catch Updated: 08/19/22 1032     RBC, UA Too Numerous to Count /HPF      WBC, UA 0-2 /HPF      Bacteria, UA None Seen /HPF      Squamous Epithelial Cells, UA 0-2 /HPF      Hyaline Casts, UA 0-2 /LPF      Methodology Automated Microscopy    Urinalysis without microscopic (no culture) - Urine, Clean  Catch [[id number]]  (Abnormal) Collected: 08/19/22 1022    Specimen: Urine, Clean Catch Updated: 08/19/22 1031     Color, UA Yellow     Appearance, UA Clear     pH, UA 7.0     Specific Gravity, UA 1.009     Glucose, UA Negative     Ketones, UA Negative     Bilirubin, UA Negative     Blood, UA Large (3+)     Protein, UA Negative     Leuk Esterase, UA Negative     Nitrite, UA Negative     Urobilinogen, UA 0.2 E.U./dL    Basic Metabolic Panel [[id number]]  (Abnormal) Collected: 08/19/22 0608    Specimen: Blood Updated: 08/19/22 0644     Glucose 195 mg/dL      BUN 7 mg/dL      Creatinine 0.47 mg/dL      Sodium 135 mmol/L      Potassium 4.0 mmol/L      Chloride 93 mmol/L      CO2 37.0 mmol/L      Calcium 8.6 mg/dL      BUN/Creatinine Ratio 14.9     Anion Gap 5.0 mmol/L      eGFR 109.1 mL/min/1.73      Comment: National Kidney Foundation and American Society of Nephrology (ASN) Task Force recommended calculation based on the Chronic Kidney Disease Epidemiology Collaboration (CKD-EPI) equation refit without adjustment for race.       Narrative:      GFR Normal >60  Chronic Kidney Disease <60  Kidney Failure <15      CBC & Differential [[id number]]  (Abnormal) Collected: 08/19/22 0608    Specimen: Blood Updated: 08/19/22 0638    Narrative:      The following orders were created for panel order CBC & Differential.  Procedure                               Abnormality         Status                     ---------                               -----------         ------                     CBC Auto Differential[[id number]]        Abnormal            Final result                 Please view results for these tests on the individual orders.    CBC Auto Differential [[id number]]  (Abnormal) Collected: 08/19/22 0608    Specimen: Blood Updated: 08/19/22 0638     WBC 5.74 10*3/mm3      RBC 5.07 10*6/mm3      Hemoglobin 13.8 g/dL      Hematocrit 45.7 %      MCV 90.1 fL      MCH 27.2 pg      MCHC 30.2 g/dL      RDW 15.4 %      RDW-SD 50.6 fl       MPV 11.0 fL      Platelets 163 10*3/mm3      Neutrophil % 57.6 %      Lymphocyte % 28.0 %      Monocyte % 10.3 %      Eosinophil % 3.0 %      Basophil % 0.9 %      Immature Grans % 0.2 %      Neutrophils, Absolute 3.31 10*3/mm3      Lymphocytes, Absolute 1.61 10*3/mm3      Monocytes, Absolute 0.59 10*3/mm3      Eosinophils, Absolute 0.17 10*3/mm3      Basophils, Absolute 0.05 10*3/mm3      Immature Grans, Absolute 0.01 10*3/mm3      nRBC 0.0 /100 WBC     POC Glucose Once [[id number]]  (Abnormal) Collected: 08/18/22 0607    Specimen: Blood Updated: 08/18/22 2030     Glucose 169 mg/dL      Comment: RN NotifiedOperator: [id number] WARNER Nevarez ID: [id number]       POC Glucose Once [[id number]]  (Abnormal) Collected: 08/17/22 1611    Specimen: Blood Updated: 08/18/22 2029     Glucose 198 mg/dL      Comment: Result Not ConfirmedOperator: [id number] HOMA Florez ID: [id number]       Comprehensive Metabolic Panel [[id number]]  (Abnormal) Collected: 08/17/22 0642    Specimen: Blood Updated: 08/17/22 0714     Glucose 190 mg/dL      BUN 6 mg/dL      Creatinine 0.50 mg/dL      Sodium 139 mmol/L      Potassium 4.6 mmol/L      Chloride 95 mmol/L      CO2 40.0 mmol/L      Calcium 8.7 mg/dL      Total Protein 6.3 g/dL      Albumin 3.30 g/dL      ALT (SGPT) 34 U/L      AST (SGOT) 77 U/L      Alkaline Phosphatase 74 U/L      Total Bilirubin 0.6 mg/dL      Globulin 3.0 gm/dL      A/G Ratio 1.1 g/dL      BUN/Creatinine Ratio 12.0     Anion Gap 4.0 mmol/L      eGFR 107.5 mL/min/1.73      Comment: National Kidney Foundation and American Society of Nephrology (ASN) Task Force recommended calculation based on the Chronic Kidney Disease Epidemiology Collaboration (CKD-EPI) equation refit without adjustment for race.       Narrative:      GFR Normal >60  Chronic Kidney Disease <60  Kidney Failure <15      CBC & Differential [[id number]]  (Abnormal) Collected: 08/17/22 0642    Specimen: Blood Updated: 08/17/22 0654     Narrative:      The following orders were created for panel order CBC & Differential.  Procedure                               Abnormality         Status                     ---------                               -----------         ------                     CBC Auto Differential[[id number]]        Abnormal            Final result                 Please view results for these tests on the individual orders.    CBC Auto Differential [[id number]]  (Abnormal) Collected: 08/17/22 0642    Specimen: Blood Updated: 08/17/22 0654     WBC 5.74 10*3/mm3      RBC 5.12 10*6/mm3      Hemoglobin 13.8 g/dL      Hematocrit 45.7 %      MCV 89.3 fL      MCH 27.0 pg      MCHC 30.2 g/dL      RDW 15.3 %      RDW-SD 50.4 fl      MPV 11.2 fL      Platelets 225 10*3/mm3      Neutrophil % 60.5 %      Lymphocyte % 25.8 %      Monocyte % 10.3 %      Eosinophil % 2.1 %      Basophil % 1.0 %      Immature Grans % 0.3 %      Neutrophils, Absolute 3.47 10*3/mm3      Lymphocytes, Absolute 1.48 10*3/mm3      Monocytes, Absolute 0.59 10*3/mm3      Eosinophils, Absolute 0.12 10*3/mm3      Basophils, Absolute 0.06 10*3/mm3      Immature Grans, Absolute 0.02 10*3/mm3      nRBC 0.0 /100 WBC     Urinalysis, Microscopic Only - Urine, Clean Catch [[id number]]  (Abnormal) Collected: 08/15/22 1129    Specimen: Urine, Clean Catch Updated: 08/15/22 1148     RBC, UA Too Numerous to Count /HPF      WBC, UA 3-5 /HPF      Comment: Urine culture not indicated.        Bacteria, UA None Seen /HPF      Squamous Epithelial Cells, UA 0-2 /HPF      Hyaline Casts, UA None Seen /LPF      Methodology Automated Microscopy    Urinalysis With Culture If Indicated - Urine, Clean Catch [[id number]]  (Abnormal) Collected: 08/15/22 1129    Specimen: Urine, Clean Catch Updated: 08/15/22 1148     Color, UA [place]     Appearance, UA Cloudy     pH, UA 8.5     Specific Gravity, UA 1.015     Glucose, UA Negative     Ketones, UA Negative     Bilirubin, UA Negative     Blood, UA Large  (3+)     Protein, UA 30 mg/dL (1+)     Leuk Esterase, UA Small (1+)     Nitrite, UA Negative     Urobilinogen, UA 1.0 E.U./dL    Narrative:      In absence of clinical symptoms, the presence of pyuria, bacteria, and/or nitrites on the urinalysis result does not correlate with infection.    Blood Culture - Blood, Hand, Right [[id number]]  (Normal) Collected: 08/09/22 2358    Specimen: Blood from Hand, Right Updated: 08/15/22 0017     Blood Culture No growth at 5 days    Blood Culture - Blood, Arm, Left [[id number]]  (Normal) Collected: 08/09/22 1944    Specimen: Blood from Arm, Left Updated: 08/14/22 2002     Blood Culture No growth at 5 days    Basic Metabolic Panel [[id number]]  (Abnormal) Collected: 08/14/22 0501    Specimen: Blood Updated: 08/14/22 0527     Glucose 141 mg/dL      BUN 6 mg/dL      Creatinine 0.61 mg/dL      Sodium 138 mmol/L      Potassium 4.1 mmol/L      Chloride 92 mmol/L      CO2 44.0 mmol/L      Calcium 8.2 mg/dL      BUN/Creatinine Ratio 9.8     Anion Gap 2.0 mmol/L      eGFR 102.5 mL/min/1.73      Comment: National Kidney Foundation and American Society of Nephrology (ASN) Task Force recommended calculation based on the Chronic Kidney Disease Epidemiology Collaboration (CKD-EPI) equation refit without adjustment for race.       Narrative:      GFR Normal >60  Chronic Kidney Disease <60  Kidney Failure <15      Vancomycin, Trough [[id number]]  (Normal) Collected: 08/14/22 0501    Specimen: Blood Updated: 08/14/22 0526     Vancomycin Trough 18.80 mcg/mL     Potassium [[id number]]  (Normal) Collected: 08/13/22 0131    Specimen: Blood Updated: 08/13/22 0215     Potassium 4.4 mmol/L      Comment: Slight hemolysis detected by analyzer. Results may be affected.       Urinalysis With Microscopic - Urine, Clean Catch [[id number]]  (Abnormal) Collected: 08/13/22 0035    Specimen: Urine, Clean Catch Updated: 08/13/22 0049    Narrative:      The following orders were created for panel order Urinalysis With  Microscopic - Urine, Clean Catch.  Procedure                               Abnormality         Status                     ---------                               -----------         ------                     Urinalysis without micro...[[id number]]  Abnormal            Final result               Urinalysis, Microscopic ...[[id number]]  Abnormal            Final result                 Please view results for these tests on the individual orders.    Urinalysis without microscopic (no culture) - Urine, Clean Catch [[id number]]  (Abnormal) Collected: 08/13/22 0035    Specimen: Urine, Clean Catch Updated: 08/13/22 0043     Color, UA Red     Appearance, UA Turbid     pH, UA 7.5     Specific Gravity, UA 1.017     Glucose, UA Negative     Ketones, UA Negative     Bilirubin, UA Negative     Blood, UA Large (3+)     Protein, UA 30 mg/dL (1+)     Leuk Esterase, UA Small (1+)     Nitrite, UA Negative     Urobilinogen, UA 1.0 E.U./dL    Protime-INR [[id number]]  (Normal) Collected: 08/09/22 2358    Specimen: Blood from Arm, Left Updated: 08/10/22 0044     Protime 15.0 Seconds      INR 1.20    Narrative:      Therapeutic range for most indications is 2.0-3.0 INR,  or 2.5-3.5 for mechanical heart valves.    aPTT [[id number]]  (Normal) Collected: 08/09/22 2358    Specimen: Blood from Arm, Left Updated: 08/10/22 0044     PTT 26.9 seconds     Narrative:      The recommended Heparin therapeutic range is 68-97 seconds.    Extra Tubes [[id number]] Collected: 08/09/22 1522    Specimen: Blood Updated: 08/09/22 1932    Narrative:      The following orders were created for panel order Extra Tubes.  Procedure                               Abnormality         Status                     ---------                               -----------         ------                     Gold Top - SST[[id number]]                                   Final result               Gray Top[[id number]]                                         Final result               Light  Blue Top[[id number]]                                   Final result                 Please view results for these tests on the individual orders.    Gray Top [[id number]] Collected: 08/09/22 1522    Specimen: Blood Updated: 08/09/22 1932     Extra Tube Hold for add-ons.     Comment: Auto resulted.       STAT Lactic Acid, Reflex [[id number]]  (Normal) Collected: 08/09/22 1831    Specimen: Blood Updated: 08/09/22 1847     Lactate 1.8 mmol/L     D-dimer, Quantitative [[id number]]  (Abnormal) Collected: 08/09/22 1522    Specimen: Blood Updated: 08/09/22 1716     D-Dimer, Quantitative 2,487 ng/mL (FEU)     Narrative:      Dimer values <500 ng/ml FEU are FDA approved as aid in diagnosis of deep venous thrombosis and pulmonary embolism.  This test should not be used in an exclusion strategy with pretest probability alone.    A recent guideline regarding diagnosis for pulmonary thromboembolism recommends an adjusted exclusion criterion of age x 10 ng/ml FEU for patients >50 years of age ([employer] Intern Med 2015; 163: 701-711).      COVID-19 and FLU A/B PCR - Swab, Nasopharynx [[id number]]  (Normal) Collected: 08/09/22 1621    Specimen: Swab from Nasopharynx Updated: 08/09/22 1649     COVID19 Not Detected     Influenza A PCR Not Detected     Influenza B PCR Not Detected    Narrative:      Fact sheet for providers: https://www.fda.gov/media/985003/download    Fact sheet for patients: https://www.fda.gov/media/852897/download    Test performed by PCR.    Gold Top - SST [[id number]] Collected: 08/09/22 1522    Specimen: Blood Updated: 08/09/22 1633     Extra Tube Hold for add-ons.     Comment: Auto resulted.       Light Blue Top [[id number]] Collected: 08/09/22 1522    Specimen: Blood Updated: 08/09/22 1633     Extra Tube Hold for add-ons.     Comment: Auto resulted       CK [[id number]]  (Normal) Collected: 08/09/22 1515    Specimen: Blood Updated: 08/09/22 1618     Creatine Kinase 39 U/L     Magnesium [[id number]]  (Normal) Collected:  08/09/22 1515    Specimen: Blood Updated: 08/09/22 1618     Magnesium 2.0 mg/dL     Lactic Acid, Plasma [[id number]]  (Abnormal) Collected: 08/09/22 1522    Specimen: Blood Updated: 08/09/22 1614     Lactate 2.1 mmol/L     Comprehensive Metabolic Panel [[id number]]  (Abnormal) Collected: 08/09/22 1515    Specimen: Blood Updated: 08/09/22 1546     Glucose 189 mg/dL      BUN 9 mg/dL      Creatinine 0.70 mg/dL      Sodium 135 mmol/L      Potassium 4.2 mmol/L      Chloride 91 mmol/L      CO2 39.0 mmol/L      Calcium 8.4 mg/dL      Total Protein 5.9 g/dL      Albumin 2.90 g/dL      ALT (SGPT) 12 U/L      AST (SGOT) 20 U/L      Alkaline Phosphatase 94 U/L      Total Bilirubin 1.4 mg/dL      Globulin 3.0 gm/dL      A/G Ratio 1.0 g/dL      BUN/Creatinine Ratio 12.9     Anion Gap 5.0 mmol/L      eGFR 99.2 mL/min/1.73      Comment: National Kidney Foundation and American Society of Nephrology (ASN) Task Force recommended calculation based on the Chronic Kidney Disease Epidemiology Collaboration (CKD-EPI) equation refit without adjustment for race.       Narrative:      GFR Normal >60  Chronic Kidney Disease <60  Kidney Failure <15      Troponin [[id number]]  (Normal) Collected: 08/09/22 1515    Specimen: Blood Updated: 08/09/22 1546     Troponin T <0.010 ng/mL     Narrative:      Troponin T Reference Range:  <= 0.03 ng/mL-   Negative for AMI  >0.03 ng/mL-     Abnormal for myocardial necrosis.  Clinicians would have to utilize clinical acumen, EKG, Troponin and serial changes to determine if it is an Acute Myocardial Infarction or myocardial injury due to an underlying chronic condition.       Results may be falsely decreased if patient taking Biotin.      BNP [[id number]]  (Abnormal) Collected: 08/09/22 1515    Specimen: Blood Updated: 08/09/22 1544     proBNP 1,544.0 pg/mL     Narrative:      Among patients with dyspnea, NT-proBNP is highly sensitive for the detection of acute congestive heart failure. In addition NT-proBNP of  <300 pg/ml effectively rules out acute congestive heart failure with 99% negative predictive value.    Results may be falsely decreased if patient taking Biotin.          Imaging Results (Most Recent)     Procedure Component Value Units Date/Time    CT Abdomen Pelvis With Contrast [[id number]] Collected: 08/16/22 1630     Updated: 08/16/22 1815    Narrative:      CT abdomen and pelvis with contrast    TECHNIQUE: Axial images were taken through the abdomen and pelvis  after the administration of IV contrast. All CT scans at this  facility use dose modulation, iterative reconstruction, and/or  weight based dosing when appropriate to reduce radiation dose to  as low as reasonably achievable    HISTORY: hematuria/vaginal bleeding, I26.94 Multiple subsegmental  pulmonary emboli without acute cor pulmonale R09.02 Hypoxemia  R06.00 Dyspnea, unspecified I26.99 Other pulmonary embolism  without acute cor pulmonale I10 Essential (primary) hypertension  Z74.09 Other reduced mobility    COMPARISON:None    FINDINGS:    Lung bases:    Mild atelectatic changes in the right lung base. Small hiatal  hernia.    ABDOMEN:    There is diffuse fatty infiltration of the liver. The liver is  enlarged measuring 19 cm in length. There is no evidence for mass  or intrahepatic biliary ductal dilatation. The gallbladder  appears unremarkable. There is no evidence for gallbladder wall  thickening or pericholecystic fluid. The adrenal glands, pancreas  and spleen are normal. The kidneys appear unremarkable. There is  no evidence for hydronephrosis or stone.    Multiple diverticuli seen extending off portions of the  descending colon and sigmoid colon, there is no evidence for  diverticulitis. There is no bowel obstruction. The appendix is  normal. There is mild thickening of the mucosa of the left colon  and sigmoid colon could be decompression versus mild acute  colitis.    Pelvis:    The aorta is normal in caliber. There is no evidence  for  pathologically enlarged adenopathy.    The bladder appears unremarkable. There is no free air or free  fluid.    Mild to moderate degenerative changes and degenerative disc  disease in lumbar spine. There are no acute bony abnormalities.  Mild skin thickening on the abdominal wall mostly in the  suprapubic region with mild subcutaneous fat stranding could be  related to generalized edema however superimposed infectious  process such as cellulitis is also a consideration, correlate  clinically.      Impression:      Impression:  1.  Mild skin thickening on the abdominal wall mostly in the  suprapubic region with mild subcutaneous fat stranding could be  related to generalized edema however superimposed infectious  process such as cellulitis is also a consideration, correlate  clinically.  2.  Mild thickening of the mucosa of the left colon and sigmoid  colon could be decompression versus mild acute colitis.  3.  Hepatomegaly with diffuse fatty infiltration of the liver.  4.  Small hiatal hernia.    Electronically signed by:  Linda Owen MD, MBA  8/16/2022  6:13 PM CDT Workstation: [id number]    US Non-ob Transvaginal [[id number]] Collected: 08/16/22 1020     Updated: 08/16/22 1202    Narrative:        Transvaginal pelvic ultrasound non-OB complete    HISTORY: Vaginal bleeding.    Transvaginal ultrasound of the pelvis was performed.    COMPARISON: None.    FINDINGS:  Examination limited by patient's body habitus.    The uterus is normal in position, shape and size.  The uterus measures 6.76 cm in length by 3.88 cm AP by 4.50 cm  transverse.  Uterine volume 61.83 mL.  Endometrium not well delineated.  Small nabothian cysts.    Ovaries obscured by bowel gas.  No adnexal mass.  No free fluid.      Impression:      CONCLUSION:  Examination limited by patient's body habitus.  No uterine masses identified.  Endometrium not well delineated.  Ovaries obscured by bowel gas.    [id number]    Electronically signed by:   Jeremy Calvert MD  8/16/2022 11:59 AM  CDT Workstation: [id number]    XR Abdomen KUB [[id number]] Collected: 08/15/22 1129     Updated: 08/15/22 1800    Narrative:      Exam:  KUB    History:  Hematuria. Left flank pain.    Supine film of the abdomen was obtained.    Comparison:  None    Small to moderate amount retained feces in the colon.  No mechanical bowel obstruction.  No organomegaly.  Phleboliths right hemipelvis.  2.3 cm and smaller left gluteal calcifications.  No acute osseous abnormality.  Degenerative disc disease lumbar spine.  Degenerative changes pubic symphysis.      Impression:      Conclusion:  No definite renal or ureteral calculi identified.  Small to moderate amount retained feces in the colon.    [id number]    Electronically signed by:  Jeremy Calvert MD  8/15/2022 5:58 PM CDT  Workstation: [id number]    US Guided Vascular Access [[id number]] Collected: 08/11/22 1051     Updated: 08/11/22 1608    Narrative:      ULTRASOUND-GUIDED VASCULAR ACCESS    INDICATION: access, I26.94 Multiple subsegmental pulmonary emboli  without acute cor pulmonale R09.02 Hypoxemia    TECHNIQUE: Real-time ultrasound imaging was utilized to visualize  needle entry.     FINDINGS:  Realtime ultrasound imaging was utilized to visualize needle  entry into a patent right basilic vein during midline catheter   placement and a permanent image was stored for permanent  recording and reporting.       Impression:      Imaging obtained during vascular access device placement under  ultrasound guidance.    Electronically signed by:  Fouzia Tan MD  8/11/2022 4:06 PM CDT  Workstation: [id number]    IR Insert Midline Without Port Pump 5 Plus [[id number]] Resulted: 08/11/22 1119     Updated: 08/11/22 1119    Narrative:      This procedure was auto-finalized with no dictation required.    US Venous Doppler Lower Extremity Bilateral (duplex) [[id number]] Collected: 08/10/22 1316     Updated: 08/10/22 1518    Narrative:      ULTRASOUND VENOUS DOPPLER  BILATERAL LOWER EXTREMITIES    INDICATION: dvt eval, I26.94 Multiple subsegmental pulmonary  emboli without acute cor pulmonale R09.02 Hypoxemia    COMPARISON: None    TECHNIQUE: Grayscale and color Doppler sonographic images of  bilateral lower extremity veins were obtained.     FINDINGS:  RIGHT:  CFV: Patent, compressible, and augments.  GSV: Patent, compressible, and augments.  SFV: Patent, compressible, and augments.  Popliteal: Patent, compressible, and augments.  Subcutaneous edema is noted.    LEFT:  CFV: Patent, compressible, and augments.  GSV: Patent, compressible, and augments.  SFV: Patent, compressible, and augments.  Popliteal: Noncompressible suggesting thrombus. Thrombus of  heterogeneous echogenicity is visualized in the left popliteal  vein.  There is subcutaneous edema.    Additional Findings: Bilateral lower extremity subcutaneous  edema.      Impression:      1. DVT of left popliteal vein.  2. Bilateral lower extremity edema.    Numerous attempts were made to contact Dr. Burr. The patient is  already fully anticoagulated for PE and report of findings was  given to the patient's nurse Kartik Jesus RN at 3:12 PM on  8/10/2022.    Electronically signed by:  Fouzia Tan MD  8/10/2022 3:12 PM CDT  Workstation: [id number]    CT Angiogram Chest [[id number]] Collected: 08/09/22 1913     Updated: 08/10/22 0817    Addenda:         ADDENDUM   ADDENDUM #1       Findings discussed with referring physician Dr. Barnes at 2030    Electronically signed by:  Gene Stephens MD  8/10/2022 8:15 AM CDT  Workstation: [id number]    Signed: 08/10/22 0815 by Gene Stephens MD    Narrative:      EXAM:  CT CHEST ANGIOGRAPHY WITH IV CONTRAST    ORDERING PROVIDER:  CHANTELL BARAHONA    CLINICAL HISTORY:  Shortness of breath    COMPARISON:      TECHNIQUE:   Chest CT was performed using a high resolution pulmonary  angiogram protocol with 80ml of Isovue 370 contrast and  reformatted in the sagittal and coronal planes.      3-dimensional images also acquired with special processing of the  CT scan data with a specialized workstation for evaluation.    This examination was performed according to our departmental dose  optimization program which includes automated exposure control,  adjustment of the MA and kV according to patient size, and/or use  of iterative reconstruction technique.     FINDINGS:     LUNGS AND PLEURA: Scattered ground glass opacities in the  bilateral lower lobes which may be due to nonspecific  pneumonitis. No focal consolidation or masses.No pleural effusion  or plaques.  Unremarkable interstitium.    HEART: Prominent size and unremarkable configuration. No  pericardial effusion.     MEDIASTINUM AND ADDISON: No significant adenopathy.     AORTA AND GREAT VESSELS: No aneurysm or dissection.     PULMONARY ARTERIES: Bilateral filling defects in branches  supplying the upper lobes, lower lobes and right middle lobe.  There is no evidence of saddle embolism. Main pulmonary artery  measuring 4.4 cm concerning for pulmonary arterial hypertension.    UPPER ABDOMEN: Unremarkable.    MUSCULOSKELETAL:  No aggressive osseous lesion. Unremarkable  vertebral body height and alignment. No lytic or sclerotic  lesion.     EXTRATHORACIC SOFT TISSUES: No axillary adenopathy. No mass.  Unremarkable supraclavicular soft tissues.       Impression:      Bilateral filling defects in branches supplying the upper lobes,  lower lobes and right middle lobe consistent with pulmonary  embolism. There is no evidence of saddle embolism.   Main pulmonary artery measuring 4.4 cm concerning for pulmonary  arterial hypertension.  Scattered ground glass opacities in the bilateral lower lobes  which may be due to nonspecific pneumonitis.     Electronically signed by:  Gene Stephens MD  8/9/2022 8:10 PM CDT  Workstation: [id number]    XR Chest 1 View [[id number]] Collected: 08/09/22 1453     Updated: 08/09/22 1551    Narrative:      PROCEDURE: XR CHEST 1  VW    VIEWS:Single    INDICATION: Shortness of breath    COMPARISON: CXR: None    FINDINGS:       - lines/tubes: None    - cardiac: Size within normal limits.    - mediastinum: Contour within normal limits.     - lungs: No evidence of a focal air space process. Mild  interstitial prominence and indistinctness.     - pleura: No evidence of  fluid.      - osseous: Unremarkable for age.        Impression:      Interstitial prominence and indistinctness, could represent an  infectious process or mild edema.      Electronically signed by:  Talia Garduno MD  8/9/2022 3:49 PM CDT  Workstation: [id number]          Chief Complaint on Day of Discharge: No new complaints    Hospital Course:  The patient is a 62 y.o. female a medical history notable for COPD and hyperlipidemia who presented to [facility] with shortness of breath.  Patient was found to have pulmonary embolism and was started on anticoagulation.  She was transitioned to p.o. Eliquis.  Patient was noted to develop hematuria versus vaginal bleeding during her stay.  Urology was consulted and evaluation did not reveal any obvious signs of cause for hematuria.  Her hemoglobin remained stable during her stay.  Patient also was treated for cellulitis of her lower extremities and had improvement in this.  She did still have some erythema to the lower extremities but no increased warmth but has felt to be related to some chronic lymphedema changes.  Hematuria evaluation findings were discussed with the patient and it was commended that she follow-up with OB/GYN as an outpatient for further evaluation for possible vaginal bleeding.  Patient was agreeable to this and referral was placed.  Patient did initially require supplemental oxygen but was able to be weaned to room air prior to discharge.  Patient was otherwise noted to be in stable condition and will be discharged to follow-up with PCP, urology, and OB/GYN as an outpatient.  Patient voiced  "understanding agreement to the plan.      Condition on Discharge: Stable    Physical Exam on Discharge:  /73 (BP Location: Left arm, Patient Position: Sitting)   Pulse 86   Temp 97.8 °F (36.6 °C) (Temporal)   Resp 18   Ht 157.5 cm (62\")   Wt 131 kg (289 lb)   LMP  (LMP Unknown)   SpO2 94%   BMI 52.86 kg/m²   Physical Exam  Constitutional:       General: She is not in acute distress.     Appearance: She is not toxic-appearing.   HENT:      Head: Normocephalic and atraumatic.      Right Ear: External ear normal.      Left Ear: External ear normal.      Nose: Nose normal.      Mouth/Throat:      Mouth: Mucous membranes are moist.      Pharynx: Oropharynx is clear.   Eyes:      Conjunctiva/sclera: Conjunctivae normal.   Cardiovascular:      Rate and Rhythm: Normal rate and regular rhythm.      Pulses: Normal pulses.      Heart sounds: Normal heart sounds.   Pulmonary:      Effort: Pulmonary effort is normal. No respiratory distress.      Breath sounds: Normal breath sounds.   Abdominal:      General: Bowel sounds are normal.      Palpations: Abdomen is soft.      Tenderness: There is no abdominal tenderness.   Musculoskeletal:      Right lower leg: Edema present.      Left lower leg: Edema present.   Skin:     General: Skin is warm and dry.      Capillary Refill: Capillary refill takes less than 2 seconds.      Findings: Erythema present.   Neurological:      General: No focal deficit present.      Mental Status: She is alert and oriented to person, place, and time. Mental status is at baseline.   Psychiatric:         Behavior: Behavior normal.         Thought Content: Thought content normal.         Discharge Disposition:  Home-Health Care [facility] [place]    Discharge Medications:     Discharge Medications      New Medications      Instructions Start Date   apixaban 5 MG tablet tablet  Commonly known as: ELIQUIS   10 mg, Oral, Every 12 Hours Scheduled      apixaban 5 MG tablet tablet  Commonly known as: ELIQUIS   5 " mg, Oral, Every 12 Hours Scheduled   Start Date: August 20, 2022     doxycycline 100 MG capsule  Commonly known as: MONODOX   100 mg, Oral, Every 12 Hours Scheduled      furosemide 40 MG tablet  Commonly known as: LASIX   40 mg, Oral, 2 Times Daily             Discharge Diet:   Diet Instructions     Diet: Regular, Consistent Carbohydrate, Cardiac, Renal      Discharge Diet:  Regular  Consistent Carbohydrate  Cardiac  Renal             Activity at Discharge:   Activity Instructions     Gradually Increase Activity Until at Pre-Hospitalization Level            Discharge Care Plan/Instructions: Take medications as prescribed, follow-up with PCP, follow-up with urology, follow-up with OB/GYN, and return for worsening symptoms.    Follow-up Appointments:   Future Appointments   Date Time Provider Department Center   8/20/2022 To Be Determined Maribell Suggs RN [facility]   8/22/2022 To Be Determined Jean Romano, COLE [facility]   8/23/2022 To Be Determined Ysabel Aguilar OT [facility]       Test Results Pending at Discharge: None    Dexter Mclean MD    Time: 32 minutes      Electronically signed by Dexter Mclean MD at 08/19/22 6671

## 2022-08-23 ENCOUNTER — HOME CARE VISIT (OUTPATIENT)
Dept: HOME HEALTH SERVICES | Facility: CLINIC | Age: 62
End: 2022-08-23

## 2022-08-23 VITALS
HEART RATE: 98 BPM | SYSTOLIC BLOOD PRESSURE: 123 MMHG | OXYGEN SATURATION: 94 % | TEMPERATURE: 97.7 F | DIASTOLIC BLOOD PRESSURE: 74 MMHG | RESPIRATION RATE: 20 BRPM

## 2022-08-23 DIAGNOSIS — J44.9 COPD, MODERATE: ICD-10-CM

## 2022-08-23 PROCEDURE — G0494 LPN CARE EA 15MIN HH/HOSPICE: HCPCS

## 2022-08-24 ENCOUNTER — READMISSION MANAGEMENT (OUTPATIENT)
Dept: CALL CENTER | Facility: HOSPITAL | Age: 62
End: 2022-08-24

## 2022-08-24 NOTE — OUTREACH NOTE
Medical Week 1 Survey    Flowsheet Row Responses   [facility] patient discharged from? [place]   Does the patient have one of the following disease processes/diagnoses(primary or secondary)? Other   Week 1 attempt successful? Yes   Call start time 1702   Call end time 1705   Discharge diagnosis **Ulises hematuria  Multiple subsegmental pulmonary emboli without acute cor pulmonale    Meds reviewed with patient/caregiver? Yes   Is the patient having any side effects they believe may be caused by any medication additions or changes? No   Does the patient have all medications ordered at discharge? Yes   Is the patient taking all medications as directed (includes completed medication regime)? Yes   Does the patient have a primary care provider?  Yes   Does the patient have an appointment with their PCP within 7 days of discharge? Yes   Has the patient kept scheduled appointments due by today? N/A   What is the Home health agency?  [facility]    Has home health visited the patient within 72 hours of discharge? Yes   Home health comments  PT   Psychosocial issues? No   Did the patient receive a copy of their discharge instructions? Yes   Nursing interventions Reviewed instructions with patient   What is the patient's perception of their health status since discharge? Improving   Is the patient/caregiver able to teach back signs and symptoms related to disease process for when to call PCP? Yes   Is the patient/caregiver able to teach back signs and symptoms related to disease process for when to call 911? Yes   Is the patient/caregiver able to teach back the hierarchy of who to call/visit for symptoms/problems? PCP, Specialist, Home health nurse, Urgent Care, ED, 911 Yes   If the patient is a current smoker, are they able to teach back resources for cessation? --  [has cut down to 3 cigs/day, would like to try patch, which was effective in hospital]   Additional teach back comments n   Week 1 call  completed? Yes          LUIS HOGAN - Registered Nurse

## 2022-08-25 ENCOUNTER — HOME CARE VISIT (OUTPATIENT)
Dept: HOME HEALTH SERVICES | Facility: CLINIC | Age: 62
End: 2022-08-25

## 2022-08-25 NOTE — CASE COMMUNICATION
Patient missed a LPN visit from [facility] on 8/25/22.     Reason: Patient has 2 MD appointments today.       For your records only.   As per home health protocol, MD must be notified of missed/cancelled visits; therefore the prescribed frequency was not met.

## 2022-08-26 ENCOUNTER — TELEPHONE (OUTPATIENT)
Dept: CARDIOLOGY | Facility: CLINIC | Age: 62
End: 2022-08-26

## 2022-08-26 ENCOUNTER — OFFICE VISIT (OUTPATIENT)
Dept: OBSTETRICS AND GYNECOLOGY | Facility: CLINIC | Age: 62
End: 2022-08-26

## 2022-08-26 VITALS — BODY MASS INDEX: 49.9 KG/M2 | WEIGHT: 272.8 LBS | DIASTOLIC BLOOD PRESSURE: 76 MMHG | SYSTOLIC BLOOD PRESSURE: 132 MMHG

## 2022-08-26 DIAGNOSIS — Z12.31 ENCOUNTER FOR SCREENING MAMMOGRAM FOR BREAST CANCER: ICD-10-CM

## 2022-08-26 DIAGNOSIS — Z12.4 ENCOUNTER FOR PAPANICOLAOU SMEAR FOR CERVICAL CANCER SCREENING: ICD-10-CM

## 2022-08-26 DIAGNOSIS — N95.0 POSTMENOPAUSAL BLEEDING: Primary | ICD-10-CM

## 2022-08-26 PROCEDURE — 58100 BIOPSY OF UTERUS LINING: CPT | Performed by: OBSTETRICS & GYNECOLOGY

## 2022-08-26 PROCEDURE — 99204 OFFICE O/P NEW MOD 45 MIN: CPT | Performed by: OBSTETRICS & GYNECOLOGY

## 2022-08-26 RX ORDER — TRAMADOL HYDROCHLORIDE 50 MG/1
TABLET ORAL
COMMUNITY
Start: 2022-08-25

## 2022-08-26 RX ORDER — ROSUVASTATIN CALCIUM 10 MG/1
10 TABLET, COATED ORAL DAILY
COMMUNITY
Start: 2022-08-25

## 2022-08-26 NOTE — PROGRESS NOTES
[facility]  Gynecology  Procedure Note: Endometrial Biopsy    Date of procedure: 8/26/2022    LMP: No LMP recorded (lmp unknown). Patient is postmenopausal.    Procedure documentation:    The cervix was grasped anterior at the 12 o'clock position.  The cavity sounded to 7 centimeters.  An endometrial biopsy specimen was obtained; moderate tissue was obtained.  The tissue was sent for permanent histopathologic evaluation.  Tenaculum was removed from the cervix with scant bleeding.  She tolerated the procedure well.    Post procedure instructions: If there is any significant fever or excessive bleeding or pain she is to call immediately.    Jenny Rajput MD  8/26/2022  12:23 CDT

## 2022-08-26 NOTE — PROGRESS NOTES
[facility]  Gynecology Consult  Referring provider: Dexter Mclean MD  Date of Service: 2022    CC: Postmenopausal bleeding    HPI  Stephanie Bradshaw is a 62 y.o.  postmenopausal female who presents as a referral from Dr. Mclean secondary to vaginal bleeding.    She was admitted to the ER on  with shortness of breath and found to have a PE.  She was started on Eliquis.  She had some hematuria versus vaginal bleeding and had a cystoscopy with Dr. Moore; that showed no stones or tumors.    She did have a pelvic ultrasound.  TVUS-  The uterus is normal in position, shape and size.  The uterus measures 6.76 cm in length by 3.88 cm AP by 4.50 cm transverse.  Uterine volume 61.83 mL.  Endometrium not well delineated.  Small nabothian cysts.  Ovaries obscured by bowel gas.  No adnexal mass.  No free fluid.    She presents today for evaluation; she is using a walker.  She states that she would have off and on spotting prior to the hospital admission but rare.  She states that while in the hospital, she did have bleeding but they couldn't determine if it was from her rectum, bladder, or vagina.  She states that she does have hemorrhoids so thought it was from that.  She states that she would notice blood in her urine but she states that it was often on her pads.  She states that it was just spotting and nothing heavy.  She does report some cramping as well.      ROS  Review of Systems   Constitutional: Negative.    HENT: Negative.    Eyes: Negative.    Respiratory: Negative.    Cardiovascular: Negative.    Gastrointestinal: Negative.    Endocrine: Negative.    Genitourinary: Negative.    Musculoskeletal: Negative.    Skin: Negative.    Allergic/Immunologic: Negative.    Neurological: Negative.    Hematological: Negative.    Psychiatric/Behavioral: Negative.      GYN HISTORY  Menarche: age unknown  Menopause: age unknown  History of STIs: None  Last pap smear: Unknown  Abnormal pap  "smear history: None per patient     OB HISTORY  OB History    Para Term  AB Living   4 2 2   2 2   SAB IAB Ectopic Molar Multiple Live Births     2       2      # Outcome Date GA Lbr Gerson/2nd Weight Sex Delivery Anes PTL Lv   4 IAB      TAB      3 IAB      TAB      2 Term      Vag-Spont   DONNELL   1 Term      Vag-Spont   DONNELL     PAST MEDICAL HISTORY  Past Medical History:   Diagnosis Date   • Controlled type 2 diabetes mellitus without complication, without long-term current use of insulin (HCC)    • COPD (chronic obstructive pulmonary disease) ([facility])    • DVT (deep venous thrombosis) ([facility]) 2022    left popliteal   • Hyperlipemia, mixed    • Pulmonary embolism ([facility]) 2022    right     PAST SURGICAL HISTORY  Past Surgical History:   Procedure Laterality Date   • TUBAL ABDOMINAL LIGATION     Cystoscopy on     FAMILY HISTORY  Family History   Problem Relation Age of Onset   • Prostate cancer Father    • Uterine cancer Mother    • Lung cancer Mother    • Throat cancer Paternal Grandfather    • Liver cancer Maternal Grandmother    • Alcohol abuse Maternal Grandmother    • Lung cancer Maternal Aunt    • Lung cancer Maternal Aunt    • COPD Other    • Heart disease Other    • Hypertension Other    • Diabetes Other    • Breast cancer Neg Hx    • Ovarian cancer Neg Hx    • Colon cancer Neg Hx      SOCIAL HISTORY  Social History     Socioeconomic History   • Marital status:    Tobacco Use   • Smoking status: Former Smoker     Packs/day: 2.00     Years: 30.00     Pack years: 60.00     Types: Cigarettes   • Smokeless tobacco: Never Used   Substance and Sexual Activity   • Alcohol use: Yes     Comment: occasionally   • Drug use: Never   • Sexual activity: Defer     ALLERGIES  Allergies   Allergen Reactions   • Penicillins Rash     Patient states she is allergic to all \"cillins\".   • Penicillins Rash     HOME MEDICATIONS  Prior to Admission medications    Medication Sig Start Date End Date Taking? " Authorizing Provider   apixaban (ELIQUIS) 5 MG tablet tablet Take 1 tablet by mouth Every 12 (Twelve) Hours. Indications: DVT/PE (active thrombosis) 8/20/22   Dexter Mclean MD   atorvastatin (LIPITOR) 20 MG tablet Take 1 tablet by mouth Every Night. 7/27/21   Mavis Avelar APRN   cetirizine (zyrTEC) 10 MG tablet  1/16/17   Janiya Montenegro MD   cyclobenzaprine (FLEXERIL) 10 MG tablet TK 1 T PO TID 12/20/16   Janiya Montenegro MD   fluticasone (FLONASE) 50 MCG/ACT nasal spray INSTILL 2 PUFFS INTO THE NOSTRILS D UTD 10/25/16   Janiya Montenegro MD   Furosemide (LASIX PO) Take  by mouth Daily.    Janiya Montenegro MD   furosemide (LASIX) 40 MG tablet Take 1 tablet by mouth 2 (Two) Times a Day for 30 days. 8/19/22 9/18/22  Dexter Mclean MD   gabapentin (NEURONTIN) 400 MG capsule Take 400 mg by mouth 3 (Three) Times a Day.    Janiya Montenegro MD   pentoxifylline (TRENtal) 400 MG CR tablet Take 1 tablet by mouth 2 (Two) Times a Day. 11/8/21   Rafael Cisse APRN   tiotropium bromide-olodaterol (STIOLTO RESPIMAT) 2.5-2.5 MCG/ACT aerosol solution inhaler Inhale 2 puffs Daily. 8/23/22   Mimi Kern APRN   VENTOLIN  (90 BASE) MCG/ACT inhaler  1/16/17   Janiya Montenegro MD     PE  /76 (BP Location: Left arm, Patient Position: Sitting, Cuff Size: Adult)   Wt 124 kg (272 lb 12.8 oz)   LMP  (LMP Unknown)   BMI 49.90 kg/m²        General: Alert, healthy, no distress, well nourished and well developed.  Neurologic: Alert, oriented to person, place, and time.  Gait normal.  Cranial nerves II-XII grossly intact.  HEENT: Normocephalic, atraumatic.  Extraocular muscles intact, pupils equal and reactive times two.    Neck: Supple, no adenopathy, thyroid normal size, non-tender, without nodularity, trachea midline.  Lungs: Normal respiratory effort.  Clear to auscultation bilaterally.  No wheezes, rhonci, or rales.  Heart: Regular rate and rhythm.  No murmer, rub or  gallop.  Abdomen: Soft, non-tender, non-distended,no masses, no hepatosplenomegaly, no hernia.  Skin: No rash, no lesions.  Extremities: No cyanosis, clubbing or edema.  PELVIC EXAM:  External Genitalia/Vulva: Anatomy is normal, no significant redness of labia, no discharge on vulvar tissues, Wab[place]'s and Bartholin's glands are normal, no ulcers, no condylomatous lesions.  Urethral meatus: Normal, no lesions, no prolapse.  Urethra: Normal, no masses, no tenderness with palpation.  Bladder: Normal, no fullness, no masses, no tenderness with palpation.  Vagina: Vaginal tissues are atrophic, no significant discharge present.  Pelvic support adequate.  Cervix: Normal, no lesions, no purulent discharge, no cervical motion tenderness.  Pap smear obtained.  Uterus: Normal size, shape, and consistency.  Good mobility noted.  Minimal descent noted with good support.  Adnexa: Normal size and shape bilaterally, no palpable mass bilaterally and non-tender bilaterally.  Rectal: Normal, no masses or polyps, confirms bimanual exam, perianal normal, no lesions; DIANA deferred.    IMPRESSION  Stephanie Bradshaw is a 62 y.o.  presenting with postmenopausal bleeding following initiation of Eliquis; EMS unable to be delineated.  Discussed possibilities including atrophy, endometrial polyp, hyperplasia, or malignancy.    PLAN    1. Postmenopausal bleeding  Discussed recommendation for EMB to evaluate for hyperplasia/malignancy.    - TISSUE EXAM, [facility] ([facility]); Future  - TISSUE EXAM, [facility] ([place])    2. Encounter for Papanicolaou smear for cervical cancer screening  - LIQUID-BASED PAP SMEAR, [facility] ([place]); Future  - LIQUID-BASED PAP SMEAR, [facility] ([place])         This document has been electronically signed by Jenny Rajput MD on 2022 12:35 CDT.

## 2022-08-26 NOTE — TELEPHONE ENCOUNTER
Contacted per Mimi Jalloh about refills.   PT was unavailable and left vm.      ----- Message from ROSANNE Turcios sent at 8/23/2022 11:23 AM CDT -----  Regarding: RE: Mavis Patient  Also, I did not see where Mavis has refilled any other inhaler for her. So I just sent in the Stilolto    She will have to call her primary to refill any other inhaler that she has  ----- Message -----  From: Yvonne Corley MA  Sent: 8/22/2022   4:00 PM CDT  To: ROSANNE Turcios  Subject: FW: Mavis Patient                             Hey, this is a mavis pt, what would you advise?    ----- Message -----  From: Maribell Guzmán RegSched Rep  Sent: 8/22/2022   9:51 AM CDT  To: Yvonne Corley MA  Subject: Mavis Patient                                 Stephanie Dameron called stating she has run out of her inhaler (the small one that goes with the Stiolto inhaler she is using). Can be called into [facility]. Return number is [phone]. Thanks.

## 2022-08-29 ENCOUNTER — OFFICE VISIT (OUTPATIENT)
Dept: CARDIAC SURGERY | Facility: CLINIC | Age: 62
End: 2022-08-29

## 2022-08-29 VITALS
OXYGEN SATURATION: 90 % | BODY MASS INDEX: 51.12 KG/M2 | WEIGHT: 277.8 LBS | DIASTOLIC BLOOD PRESSURE: 88 MMHG | SYSTOLIC BLOOD PRESSURE: 130 MMHG | HEART RATE: 103 BPM | HEIGHT: 62 IN

## 2022-08-29 DIAGNOSIS — E78.2 MIXED HYPERLIPIDEMIA: ICD-10-CM

## 2022-08-29 DIAGNOSIS — Z71.89 ENCOUNTER FOR ANTICOAGULATION DISCUSSION AND COUNSELING: ICD-10-CM

## 2022-08-29 DIAGNOSIS — I82.432 ACUTE DEEP VEIN THROMBOSIS (DVT) OF POPLITEAL VEIN OF LEFT LOWER EXTREMITY: ICD-10-CM

## 2022-08-29 DIAGNOSIS — I26.94 MULTIPLE SUBSEGMENTAL PULMONARY EMBOLI WITHOUT ACUTE COR PULMONALE: ICD-10-CM

## 2022-08-29 DIAGNOSIS — R60.0 LEG EDEMA: Primary | ICD-10-CM

## 2022-08-29 LAB — REF LAB TEST METHOD: NORMAL

## 2022-08-29 PROCEDURE — 99214 OFFICE O/P EST MOD 30 MIN: CPT | Performed by: NURSE PRACTITIONER

## 2022-08-29 RX ORDER — PENTOXIFYLLINE 400 MG/1
400 TABLET, EXTENDED RELEASE ORAL 2 TIMES DAILY
Qty: 60 TABLET | Refills: 5 | Status: SHIPPED | OUTPATIENT
Start: 2022-08-29

## 2022-08-29 RX ORDER — NYSTATIN 100000 [USP'U]/G
POWDER TOPICAL 4 TIMES DAILY
COMMUNITY
Start: 2022-08-25 | End: 2023-08-26

## 2022-08-29 RX ORDER — NICOTINE 21 MG/24HR
1 PATCH, TRANSDERMAL 24 HOURS TRANSDERMAL
COMMUNITY
Start: 2022-08-25 | End: 2022-09-25

## 2022-08-29 NOTE — PROGRESS NOTES
CVTS Office Progress Note     8/29/2022    Stephanie Bradshaw  1960    Chief Complaint:    Chief Complaint   Patient presents with   • Leg Swelling       HPI:      PCP:  Danielle Vaughn APRN  Cardiology:  Mavis LAY    62 y.o. female with Hyperlipidemia(stable, increased risk cardiovascular events), Obesity(uncontrolled, increased risk cardiovascular events), Smoker(uncontrolled, increased risk cardiovascular events) and COPD(stable, increased risk pulmonary complications) borderline diabetes.  Prior surgical history includes tubal ligation  smokes 1 PPD.  Establishes with vascular surgery for evaluation of peripheral edema, symptoms began approx last year, some weight gain during winter, decrease in activity, edema has not improved with conservative measures at home.  Recent hospitalization with provoked DVT/PE secondary to pneumonia.  Placed on eliquis, improving dyspnea.  DVT is 1st event provoked, no immediate FH of VTE.    7/2021 Bilateral venous: Negative for DVT or GSV reflux.  She has deep system reflux bilaterally at the saphenofemoral junction  8/2022 Bilateral venous: Acute L pop DVT  8/2022 CTA Chest: Bilateral PE, distal Right main, subsegmental Left  8/2022 TTE: EF 60%, grade I DD, RVSP 14    The following portions of the patient's history were reviewed and updated as appropriate: allergies, current medications, past family history, past medical history, past social history, past surgical history and problem list.  Recent images independently reviewed.  Available laboratory values reviewed.    PMH:  Past Medical History:   Diagnosis Date   • Controlled type 2 diabetes mellitus without complication, without long-term current use of insulin ([facility])    • COPD (chronic obstructive pulmonary disease) ([facility])    • DVT (deep venous thrombosis) ([facility]) 08/2022    left popliteal   • Hyperlipemia, mixed    • Pulmonary embolism ([facility]) 08/2022    right     Past Surgical History:   Procedure  "Laterality Date   • TUBAL ABDOMINAL LIGATION       Family History   Problem Relation Age of Onset   • Prostate cancer Father    • Uterine cancer Mother    • Lung cancer Mother    • Throat cancer Paternal Grandfather    • Liver cancer Maternal Grandmother    • Alcohol abuse Maternal Grandmother    • Lung cancer Maternal Aunt    • Lung cancer Maternal Aunt    • COPD Other    • Heart disease Other    • Hypertension Other    • Diabetes Other    • Breast cancer Neg Hx    • Ovarian cancer Neg Hx    • Colon cancer Neg Hx      Social History     Tobacco Use   • Smoking status: Current Every Day Smoker     Years: 30.00     Types: Cigarettes   • Smokeless tobacco: Never Used   • Tobacco comment: 2 cigarettes daily   Substance Use Topics   • Alcohol use: Yes     Comment: occasionally   • Drug use: Never       ALLERGIES:  Allergies   Allergen Reactions   • Penicillins Rash     Patient states she is allergic to all \"cillins\".   • Penicillins Rash         MEDICATIONS:    Current Outpatient Medications:   •  apixaban (ELIQUIS) 5 MG tablet tablet, Take 1 tablet by mouth Every 12 (Twelve) Hours. Indications: DVT/PE (active thrombosis), Disp: 60 tablet, Rfl: 5  •  cyclobenzaprine (FLEXERIL) 10 MG tablet, TK 1 T PO TID, Disp: , Rfl: 1  •  furosemide (LASIX) 40 MG tablet, Take 1 tablet by mouth 2 (Two) Times a Day for 30 days., Disp: 60 tablet, Rfl: 0  •  gabapentin (NEURONTIN) 400 MG capsule, Take 400 mg by mouth 3 (Three) Times a Day., Disp: , Rfl:   •  nicotine (NICODERM CQ) 21 MG/24HR patch, Place 1 patch on the skin as directed by provider., Disp: , Rfl:   •  nystatin (MYCOSTATIN) 473559 UNIT/GM powder, Apply  topically to the appropriate area as directed 4 (Four) Times a Day., Disp: , Rfl:   •  pentoxifylline (TRENtal) 400 MG CR tablet, Take 1 tablet by mouth 2 (Two) Times a Day., Disp: 60 tablet, Rfl: 5  •  rosuvastatin (CRESTOR) 10 MG tablet, Take 10 mg by mouth Daily., Disp: , Rfl:   •  traMADol (ULTRAM) 50 MG tablet, , Disp: " ", Rfl:   •  tiotropium bromide-olodaterol (STIOLTO RESPIMAT) 2.5-2.5 MCG/ACT aerosol solution inhaler, Inhale 2 puffs Daily., Disp: 4 g, Rfl: 3  •  VENTOLIN  (90 BASE) MCG/ACT inhaler, , Disp: , Rfl:       Review of Systems   Constitutional: Negative for chills, decreased appetite, fever and weight loss.   HENT: Negative for congestion, nosebleeds and sore throat.    Eyes: Negative for blurred vision, visual disturbance and visual halos.   Cardiovascular: Positive for dyspnea on exertion and leg swelling. Negative for chest pain.   Respiratory: Negative for cough, shortness of breath, sputum production and wheezing.    Endocrine: Negative for cold intolerance and polyuria (improved).   Hematologic/Lymphatic: Negative for bleeding problem. Bruises/bleeds easily.   Skin: Negative for flushing, nail changes and unusual hair distribution.   Musculoskeletal: Positive for arthritis, back pain and joint pain.   Gastrointestinal: Negative for bloating, abdominal pain, hematemesis, melena, nausea and vomiting.   Genitourinary: Negative for flank pain and hematuria.   Neurological: Negative for brief paralysis, difficulty with concentration, focal weakness, light-headedness, loss of balance, numbness, paresthesias and weakness.   Psychiatric/Behavioral: Negative for altered mental status, depression, substance abuse and suicidal ideas.   Allergic/Immunologic: Negative for hives and persistent infections.         Vitals:    08/29/22 1414   BP: 130/88   BP Location: Left arm   Patient Position: Sitting   Cuff Size: Adult   Pulse: 103   SpO2: 90%   Weight: 126 kg (277 lb 12.8 oz)   Height: 157.5 cm (62\")     Physical Exam  Constitutional:       Appearance: She is obese.   HENT:      Head: Normocephalic.      Nose: Nose normal.      Mouth/Throat:      Mouth: Mucous membranes are moist.   Eyes:      Pupils: Pupils are equal, round, and reactive to light.   Cardiovascular:      Rate and Rhythm: Normal rate.      Pulses: " Normal pulses.   Pulmonary:      Effort: Pulmonary effort is normal.   Abdominal:      General: Abdomen is flat.      Palpations: Abdomen is soft.   Musculoskeletal:         General: Normal range of motion.      Cervical back: Normal range of motion.      Right lower leg: Edema (+3) present.      Left lower leg: Edema (+3) present.   Skin:     General: Skin is warm and dry.      Capillary Refill: Capillary refill takes 2 to 3 seconds.      Findings: Erythema present.      Comments: Venous stasis rash R shin, ucler  Evidence of thickening, hyperpigmentation   Neurological:      General: No focal deficit present.      Mental Status: She is alert and oriented to person, place, and time.   Psychiatric:         Mood and Affect: Mood normal.       Lymphedema is caused by:   Lymphedema Tarda (late onset after 36 y/o - dysfunction of the lymphatic system) CVI, Obesity     SYMPTOMS DESPITE CONSERVATIVE THERAPY:  Hyperkeratosis (fibrosis), Skin breakdown with lymphorrhea (skin weeping), Progressive edema, Truncal/abdominal swelling,  Pain     CONSERVATIVE TREATMENT:   Patient has tried conservative treatments      Instructed on appropriate skin/nail care practices, Compression garments of at least 20-30mmHg: >4 WEEKS*, Bandaging: >4 WEEKS, Elevation: >4 WEEKS*, Exercise: >4 WEEKS*        PNEUMATIC COMPRESSION DEVICE EVALUATION:  Patient has tried an  basic pneumatic compression device?  No     Current Measurements    R Ankle 29 L Ankle 31   R Calf 45 L Calf  47     R Thigh 51 L Thigh 55      Assessment & Plan     Independent Review of Studies    1. Leg edema  Worsening, right shin ulceration with severe venous stasis dermatitis.  Trental BID.     Failed justin boot therapy and diamond compression, diet/exercise.     Will require pneumatic therapy for daily treatment.      Referral submitted to [facility]    Attempted one time trial with basic pump, unable to tolerate due to pain secondary to harsh static pressures.   Recommend flexi touch system which simulates MLD.    2. Mixed hyperlipidemia  Lipid-lowering therapy has been proven beneficial in patients with cardio-vascular disease. Current guidelines recommend statin treatment for all patients with PAD,CAD and carotid stenosis. Statins are beneficial in preventing cardiovascular events, increasing functional capacity and lower the risk of adverse limb loss in PAD. Statins decrease the progression of plaque formation and may improve peripheral vessel lining, and aid in reversing atherosclerosis.    3. Multiple subsegmental pulmonary emboli without acute cor pulmonale (HCC)  1st event provoked in the setting of recent pneumonia and inactivity.    She was placed on eliquis in hospital.   Patient understands that efficacy of eliquis in patients with a BMI as elevated as hers has not been well studied, however she continues to improve so we will recheck imaging in 3 months with left venous.  CTA chest in 6 months    Preliminary hypercoagulable evaluation with factor V, factor II, homocysteine.  Completion of lupus anticoagulant, antiphospholipid syndrome, antithrombin III, and protein C/S studies to be performed after discontinuation of anticoagulation.     Extensive risks and benefits discussion regarding selection of anticoagulant.  Pro's/con's of DOAC vs. Vitamin K antagonist discussed. Patient understands and wishes to proceed with current plan.  Patient instructed to monitor for signs of adverse bleeding event not limited to but including hematemesis, hematuria, hematochezia.  In the event of trauma or fall patient is advised to undergo evaluation at nearest emergency department.      - Homocysteine, serum; Future  - Factor II, DNA Analysis; Future  - Factor 5 [place]; Future    4. Encounter for anticoagulation discussion and counseling  Adverse bleeding events that require evaluation includes blood in the urine, stool, gums, and nose.  If you are to experience these symptoms  you should present to the heart and vascular center for evaluation.  Avoid NSAID's such as ibuprofen and naproxen for pain relief as these medications increase your risk of gastrointestinal bleeding.  Over the counter supplements such as garlic, gingko biloba, and other herbs may increase bleeding risks. Do not stop your medication unless directed by a provider, take care in making sure you have adequate supply so that you are not without medication. If this medicine becomes unaffordable please contact heart and vascular center for evaluation before discontinuing.    5. Acute deep vein thrombosis (DVT) of popliteal vein of left lower extremity (HCC)  1st event provoked  eliquis  Repeat ultrasound in 3 months    - Duplex Venous Lower Extremity - Left CAR; Future          Detailed discussion regarding risks, benefits, and treatment plan. Images independently reviewed. Patient understands, agrees, and wishes to proceed with plan.       This document has been electronically signed by JACK BarrettCNP-BC @  On August 29, 2022 14:54 CDT      EMR Dragon/Transcription disclaimer:   Part of this note may be an electronic transcription/translation of spoken language to printed text using the Dragon Dictation System.

## 2022-08-29 NOTE — PATIENT INSTRUCTIONS
Pulmonary embolism 1st event, treatment period for 6 months.     Submit for leg pump therapy.     Return in 3 months for left venous ultrasound    Adverse bleeding events that require evaluation includes blood in the urine, stool, gums, and nose.  If you are to experience these symptoms you should present to the heart and vascular center for evaluation.  Avoid NSAID's such as ibuprofen and naproxen for pain relief as these medications increase your risk of gastrointestinal bleeding.  Over the counter supplements such as garlic, gingko biloba, and other herbs may increase bleeding risks.

## 2022-08-30 ENCOUNTER — TELEPHONE (OUTPATIENT)
Dept: OBSTETRICS AND GYNECOLOGY | Facility: CLINIC | Age: 62
End: 2022-08-30

## 2022-08-30 LAB — REF LAB TEST METHOD: NORMAL

## 2022-08-30 NOTE — TELEPHONE ENCOUNTER
----- Message from Jenny Rajput MD sent at 8/29/2022  2:56 PM CDT -----  Please let her know that biopsy showed atrophy and NO cancer was seen.  If she has more issues with bleeding, she should let us know.

## 2022-08-30 NOTE — TELEPHONE ENCOUNTER
Called and spoke with patient. Discussed results and advised patient to contact us if she has anymore bleeding issues.  Patient verbalized understanding and had no questions or concerns at this time.

## 2022-08-31 ENCOUNTER — HOME CARE VISIT (OUTPATIENT)
Dept: HOME HEALTH SERVICES | Facility: CLINIC | Age: 62
End: 2022-08-31

## 2022-08-31 PROCEDURE — G0299 HHS/HOSPICE OF RN EA 15 MIN: HCPCS

## 2022-09-01 ENCOUNTER — HOME CARE VISIT (OUTPATIENT)
Dept: HOME HEALTH SERVICES | Facility: CLINIC | Age: 62
End: 2022-09-01

## 2022-09-01 VITALS
OXYGEN SATURATION: 95 % | TEMPERATURE: 98.8 F | RESPIRATION RATE: 22 BRPM | HEART RATE: 86 BPM | SYSTOLIC BLOOD PRESSURE: 130 MMHG | DIASTOLIC BLOOD PRESSURE: 88 MMHG

## 2022-09-07 ENCOUNTER — HOME CARE VISIT (OUTPATIENT)
Dept: HOME HEALTH SERVICES | Facility: CLINIC | Age: 62
End: 2022-09-07

## 2022-10-06 ENCOUNTER — OFFICE VISIT (OUTPATIENT)
Dept: CARDIOLOGY | Facility: CLINIC | Age: 62
End: 2022-10-06

## 2022-10-06 VITALS
BODY MASS INDEX: 48.86 KG/M2 | HEIGHT: 62 IN | HEART RATE: 95 BPM | DIASTOLIC BLOOD PRESSURE: 92 MMHG | SYSTOLIC BLOOD PRESSURE: 154 MMHG | OXYGEN SATURATION: 95 % | WEIGHT: 265.5 LBS

## 2022-10-06 DIAGNOSIS — I77.810 ASCENDING AORTA DILATION: ICD-10-CM

## 2022-10-06 DIAGNOSIS — Q20.8 RIGHT VENTRICULAR CARDIAC ABNORMALITY: Primary | ICD-10-CM

## 2022-10-06 DIAGNOSIS — J41.1 MUCOPURULENT CHRONIC BRONCHITIS: ICD-10-CM

## 2022-10-06 DIAGNOSIS — R03.0 ELEVATED BLOOD PRESSURE READING WITHOUT DIAGNOSIS OF HYPERTENSION: ICD-10-CM

## 2022-10-06 PROCEDURE — 99214 OFFICE O/P EST MOD 30 MIN: CPT | Performed by: NURSE PRACTITIONER

## 2022-10-06 NOTE — PROGRESS NOTES
Cardiomegaly      History of Present Illness    Stephanie Bradshaw is a 62-year-old  female with past medical history of hyperlipidemia, type 2 diabetes, obesity, current tobacco use, COPD, peripheral edema/venous stasis.  Patient is following with CT vascular surgery ROSANNE Barrett for leg swelling.  She was for draws for cardiomegaly on a chest x-ray. Normal proBNP.  Echocardiogram showing normal biventricular function, grade 1 DD, right ventricular cavity is mildly dilated, trace MR, trace TR, there is mild dilation of the proximal ascending aorta at 3.9cm. Patient reports hx of asthma and COPD. She is on ventolin inhaler. Likely has SNEHA which is undiagnosed. She does have hypersomnia and snoring.     Since her last visit patient was hospitalized for provoked DVT/PE secondary to pneumonia.  She was placed on Eliquis. She report dyspnea is improving. She continues to have BLE edema.  She has failed unna boot therapy and referral submitted to [facility]ile for flexi touch per [facility]. She denies chest pain, palpitations, PND, orthopnea, dizziness or syncope.         Past Medical History:   Diagnosis Date   • Controlled type 2 diabetes mellitus without complication, without long-term current use of insulin (HCC)    • COPD (chronic obstructive pulmonary disease) ([facility])    • DVT (deep venous thrombosis) ([facility]) 08/2022    left popliteal   • Hyperlipemia, mixed    • Pulmonary embolism ([facility]) 08/2022    right     Past Surgical History:   Procedure Laterality Date   • CYSTOSCOPY N/A 8/18/2022    Procedure: CYSTOSCOPY;  Surgeon: Teresa, Davide ESPINOZA MD;  Location: [facility];  Service: Urology;  Laterality: N/A;   • TUBAL ABDOMINAL LIGATION       Social History     Socioeconomic History   • Marital status:    Tobacco Use   • Smoking status: Every Day     Years: 30.00     Types: Cigarettes   • Smokeless tobacco: Never   • Tobacco comments:     2 cigarettes daily   Substance and Sexual Activity   • Alcohol use: Yes      "Comment: occasionally   • Drug use: Never   • Sexual activity: Defer     Family History   Problem Relation Age of Onset   • Prostate cancer Father    • Uterine cancer Mother    • Lung cancer Mother    • Throat cancer Paternal Grandfather    • Liver cancer Maternal Grandmother    • Alcohol abuse Maternal Grandmother    • Lung cancer Maternal Aunt    • Lung cancer Maternal Aunt    • COPD Other    • Heart disease Other    • Hypertension Other    • Diabetes Other    • Breast cancer Neg Hx    • Ovarian cancer Neg Hx    • Colon cancer Neg Hx        ALLERGIES:  Allergies   Allergen Reactions   • Penicillins Rash     Patient states she is allergic to all \"cillins\".   • Penicillins Rash         Review of Systems   Constitutional: Negative for chills, fever, malaise/fatigue and weight gain.   HENT: Negative for nosebleeds and tinnitus.    Eyes: Negative for blurred vision and double vision.   Cardiovascular: Positive for dyspnea on exertion and leg swelling. Negative for chest pain, irregular heartbeat, palpitations and syncope.   Respiratory: Positive for shortness of breath. Negative for cough, sleep disturbances due to breathing and snoring.    Endocrine: Negative for polydipsia, polyphagia and polyuria.   Hematologic/Lymphatic: Negative for bleeding problem. Does not bruise/bleed easily.   Skin: Negative for color change and suspicious lesions.   Musculoskeletal: Negative for falls and myalgias.   Gastrointestinal: Negative for bloating, heartburn and hematochezia.   Genitourinary: Negative for dysuria and hematuria.   Neurological: Negative for dizziness, headaches, seizures, vertigo and weakness.   Psychiatric/Behavioral: Negative for altered mental status and depression. The patient does not have insomnia and is not nervous/anxious.    Allergic/Immunologic: Negative for environmental allergies and persistent infections.       Current Outpatient Medications   Medication Sig Dispense Refill   • apixaban (ELIQUIS) 5 MG " tablet tablet Take 1 tablet by mouth Every 12 (Twelve) Hours. Indications: DVT/PE (active thrombosis) 60 tablet 5   • cyclobenzaprine (FLEXERIL) 10 MG tablet TK 1 T PO TID  1   • gabapentin (NEURONTIN) 400 MG capsule Take 400 mg by mouth 3 (Three) Times a Day.     • nystatin (MYCOSTATIN) 090568 UNIT/GM powder Apply  topically to the appropriate area as directed 4 (Four) Times a Day.     • pentoxifylline (TRENtal) 400 MG CR tablet Take 1 tablet by mouth 2 (Two) Times a Day. 60 tablet 5   • rosuvastatin (CRESTOR) 10 MG tablet Take 10 mg by mouth Daily.     • tiotropium bromide-olodaterol (STIOLTO RESPIMAT) 2.5-2.5 MCG/ACT aerosol solution inhaler Inhale 2 puffs Daily. 4 g 3   • traMADol (ULTRAM) 50 MG tablet      • VENTOLIN  (90 BASE) MCG/ACT inhaler      • furosemide (LASIX) 40 MG tablet Take 1 tablet by mouth 2 (Two) Times a Day for 30 days. 60 tablet 0     No current facility-administered medications for this visit.       OBJECTIVE:    Physical Exam:   Vitals reviewed.   Constitutional:       General: Not in acute distress.     Appearance: Normal appearance. Well-developed. Not toxic-appearing or diaphoretic.   Eyes:      General: Lids are normal.      Conjunctiva/sclera: Conjunctivae normal.   HENT:      Head: Normocephalic and atraumatic.      Right Ear: External ear normal.      Left Ear: External ear normal.   Neck:      Vascular: No JVD.   Pulmonary:      Effort: Pulmonary effort is normal. No respiratory distress.      Breath sounds: Decreased breath sounds present. No wheezing. No rales.   Chest:      Chest wall: Not tender to palpatation.   Cardiovascular:      5th ICS   Normal rate. Regular rhythm. Normal S1 with normal intensity. s1 greater than s2   Normal S2 with normal intensity.      Murmurs: There is no murmur.      No gallop. No S3 and S4 gallop. No click. No rub.   Edema:     Peripheral edema present.     Pretibial: bilateral 2+ edema of the pretibial area.     Ankle: bilateral 2+ edema of  "the ankle.     Feet: bilateral 2+ edema of the feet.  Abdominal:      General: Bowel sounds are normal. There is no distension.      Palpations: Abdomen is soft.      Tenderness: There is no abdominal tenderness.   Musculoskeletal:      Cervical back: Normal range of motion and neck supple. Skin:     General: Skin is warm and dry.      Coloration: Skin is not pale.      Findings: No erythema or rash.        Neurological:      Mental Status: Alert and oriented to person, place, and time.      Gait: Gait normal.   Psychiatric:         Behavior: Behavior normal.         Thought Content: Thought content normal.         Judgment: Judgment normal.       Vitals:    10/06/22 1259   BP: 154/92   BP Location: Left arm   Patient Position: Sitting   Cuff Size: Adult   Pulse: 95   SpO2: 95%   Weight: 120 kg (265 lb 8 oz)   Height: 157.5 cm (62\")       DATA REVIEWED:   Results for orders placed during the hospital encounter of 08/09/22    Adult Transthoracic Echo Complete W/ Cont if Necessary Per Protocol    Interpretation Summary  · The right atrial cavity is mild to moderately dilated.  · The right ventricular cavity is mildly dilated.  · Left ventricular wall thickness is consistent with borderline concentric hypertrophy.  · Left ventricular ejection fraction appears to be 56 - 60%.      No radiology results for the last 30 days.  CXR:     CT:     Labs: BMP, CBC, LIPID, TSH  Lab Results   Component Value Date    GLUCOSE 195 (H) 08/19/2022    CALCIUM 8.6 08/19/2022     (L) 08/19/2022    K 4.0 08/19/2022    CO2 37.0 (H) 08/19/2022    CL 93 (L) 08/19/2022    BUN 7 (L) 08/19/2022    CREATININE 0.47 (L) 08/19/2022    EGFRIFNONA 106 07/26/2021    BCR 14.9 08/19/2022    ANIONGAP 5.0 08/19/2022     Lab Results   Component Value Date    WBC 5.74 08/19/2022    HGB 13.8 08/19/2022    HCT 45.7 08/19/2022    MCV 90.1 08/19/2022     08/19/2022     Lab Results   Component Value Date    CHOL 243 (H) 07/26/2021     Lab Results "   Component Value Date    TRIG 175 (H) 07/26/2021     Lab Results   Component Value Date    HDL 34 (L) 07/26/2021     No components found for: LDLCALC  Lab Results   Component Value Date     (H) 07/26/2021     No results found for: HDLLDLRATIO  No components found for: CHOLHDL  Lab Results   Component Value Date    TSH 1.290 07/26/2021     Lab Results   Component Value Date    PROBNP 1,544.0 (H) 08/09/2022     EKG:           Venous Duplex BLE:  Appears negative for DVT of the lower extremities  · Appears negative for reflux of the GSV bilaterally  · Duplicated LT GSV  · Limited views of calf veins due to obesity and body habitus  · RT SFJ REFLUX 1.97sec  · LT SFJ REFLUX 1.57 sec           The following portions of the patient's history were reviewed and updated as appropriate: allergies, current medications, past family history, past medical history, past social history, past surgical history and problem list.  Old records reviewed and pertinent information is included in the above objective data.     ASSESSMENT/PLAN:       Diagnosis Plan   1. Right ventricular cardiac abnormality        2. Ascending aorta dilation (HCC)        3. Elevated blood pressure reading without diagnosis of hypertension  Blood Pressure Device          Cardiomegaly noted on CXR with associated symptoms of dyspnea on exertion, shortness of breath and leg edema. EKG today in office showing RBBB .  Dyspnea likely multifactorial in nature. Echocardiogram showing normal biventricular function, grade 1 DD, right ventricular cavity is mildly dilated, trace MR, trace TR, there is mild dilation of the proximal acensending aorta at 3.9cm.  -proBNP normal. There is no definitive evidence of CHF on labs, echo or exam.     #1. Right ventricular abnormality: mild RVC dilation. RVEF is normal. No evidence of PAH. Have recommended patient for SNEHA evaluation. PFT showed moderate restriction and she was started on inhalers.     #2. Ascending aorta  dilation: mild dilation of the proximal ascending aorta at 3.9cm. She will continue lipitor. Recent echo during hospitalization unchanged. She will continue with clinical surveillance.     #3. Elevated blood pressure without diagnosis of HTN: 154/92 in office. Advised patient to blood pressure at home and call with readings to consider anti-hypertensive therapy.     #4. COPD: refilled Stiolto and Albuterol inhalers.       Follow up: 3 months    I spent 30 minutes caring for Stephanie on this date of service. This time includes time spent by me in the following activities: preparing for the visit, reviewing tests, obtaining and/or reviewing a separately obtained history, performing a medically appropriate examination and/or evaluation, referring and communicating with other health care professionals and documenting information in the medical record-2    Stephanie Bradshaw  reports that she has been smoking cigarettes. She has never used smokeless tobacco. I have educated her on the risk of diseases from using tobacco products such as cancer, COPD and heart disease.     I advised her to quit and she is not willing to quit.    I spent 3  minutes counseling the patient.                     This document has been electronically signed by ROSANNE Swanson on October 11, 2022 15:48 CDT

## 2022-10-11 PROBLEM — J41.1 MUCOPURULENT CHRONIC BRONCHITIS: Status: ACTIVE | Noted: 2022-10-11

## 2023-01-09 NOTE — PLAN OF CARE
Problem: Adult Inpatient Plan of Care  Goal: Plan of Care Review  Flowsheets  Taken 8/17/2022 1489  Plan of Care Reviewed With: patient  Outcome Evaluation: RN okay'rashel OT terri this date. Pt sitting EOB upon arrival and agreeable. Ox4. Reports she lives alone in a 1 level apt with 1 step to enter. Has tub/shower. Reports IND with all ADLs and mobility prior to admission. Sit<>stand CG. Pt took steps with CG and no LOB noted. Pt requiring MaxA to drew socks. Pt in the bed end of assessment. Needs met and bed alarm on. RN notified. Pt would benefit from continued skilled OT services to improve IND/safety with ADLs and to improve functional act tolerance. Rec home with assist as needed and home therapy at d/c.     normal...

## 2023-05-25 ENCOUNTER — APPOINTMENT (OUTPATIENT)
Dept: GENERAL RADIOLOGY | Facility: HOSPITAL | Age: 63
DRG: 291 | End: 2023-05-25
Payer: COMMERCIAL

## 2023-05-25 ENCOUNTER — HOSPITAL ENCOUNTER (INPATIENT)
Facility: HOSPITAL | Age: 63
LOS: 8 days | Discharge: HOME HEALTH | DRG: 291 | End: 2023-06-02
Attending: EMERGENCY MEDICINE | Admitting: FAMILY MEDICINE
Payer: COMMERCIAL

## 2023-05-25 ENCOUNTER — APPOINTMENT (OUTPATIENT)
Dept: CT IMAGING | Facility: HOSPITAL | Age: 63
DRG: 291 | End: 2023-05-25
Payer: COMMERCIAL

## 2023-05-25 DIAGNOSIS — R26.89 DECREASED FUNCTIONAL MOBILITY: ICD-10-CM

## 2023-05-25 DIAGNOSIS — I50.33 ACUTE ON CHRONIC DIASTOLIC HEART FAILURE: ICD-10-CM

## 2023-05-25 DIAGNOSIS — J96.21 ACUTE ON CHRONIC RESPIRATORY FAILURE WITH HYPOXIA: ICD-10-CM

## 2023-05-25 DIAGNOSIS — Z74.09 IMPAIRED MOBILITY AND ADLS: ICD-10-CM

## 2023-05-25 DIAGNOSIS — R94.31 ABNORMAL EKG: ICD-10-CM

## 2023-05-25 DIAGNOSIS — Z78.9 IMPAIRED MOBILITY AND ADLS: ICD-10-CM

## 2023-05-25 DIAGNOSIS — J96.01 ACUTE RESPIRATORY FAILURE WITH HYPOXIA: ICD-10-CM

## 2023-05-25 DIAGNOSIS — R06.00 DYSPNEA, UNSPECIFIED TYPE: Primary | ICD-10-CM

## 2023-05-25 PROBLEM — R60.1 ANASARCA: Status: ACTIVE | Noted: 2023-05-25

## 2023-05-25 PROBLEM — F32.A DEPRESSION: Status: ACTIVE | Noted: 2023-05-25

## 2023-05-25 PROBLEM — I27.20 PULMONARY HYPERTENSION: Status: ACTIVE | Noted: 2023-05-25

## 2023-05-25 PROBLEM — Z86.711 HISTORY OF PULMONARY EMBOLUS (PE): Status: ACTIVE | Noted: 2023-05-25

## 2023-05-25 LAB
ALBUMIN SERPL-MCNC: 3.2 G/DL (ref 3.5–5.2)
ALBUMIN/GLOB SERPL: 1 G/DL
ALP SERPL-CCNC: 104 U/L (ref 39–117)
ALT SERPL W P-5'-P-CCNC: 7 U/L (ref 1–33)
ANION GAP SERPL CALCULATED.3IONS-SCNC: 5 MMOL/L (ref 5–15)
AST SERPL-CCNC: 12 U/L (ref 1–32)
BASOPHILS # BLD AUTO: 0.06 10*3/MM3 (ref 0–0.2)
BASOPHILS NFR BLD AUTO: 0.6 % (ref 0–1.5)
BILIRUB SERPL-MCNC: 0.7 MG/DL (ref 0–1.2)
BUN SERPL-MCNC: 8 MG/DL (ref 8–23)
BUN/CREAT SERPL: 12.9 (ref 7–25)
CALCIUM SPEC-SCNC: 8.5 MG/DL (ref 8.6–10.5)
CHLORIDE SERPL-SCNC: 96 MMOL/L (ref 98–107)
CO2 SERPL-SCNC: 39 MMOL/L (ref 22–29)
CREAT SERPL-MCNC: 0.62 MG/DL (ref 0.57–1)
D-DIMER, QUANTITATIVE (MAD,POW, STR): 1000 NG/ML (FEU) (ref 0–610)
DEPRECATED RDW RBC AUTO: 52.6 FL (ref 37–54)
EGFRCR SERPLBLD CKD-EPI 2021: 101.5 ML/MIN/1.73
EOSINOPHIL # BLD AUTO: 0.07 10*3/MM3 (ref 0–0.4)
EOSINOPHIL NFR BLD AUTO: 0.7 % (ref 0.3–6.2)
ERYTHROCYTE [DISTWIDTH] IN BLOOD BY AUTOMATED COUNT: 15 % (ref 12.3–15.4)
FLUAV SUBTYP SPEC NAA+PROBE: NOT DETECTED
FLUBV RNA ISLT QL NAA+PROBE: NOT DETECTED
GLOBULIN UR ELPH-MCNC: 3.3 GM/DL
GLUCOSE SERPL-MCNC: 155 MG/DL (ref 65–99)
HBA1C MFR BLD: 8.1 % (ref 4.8–5.6)
HCT VFR BLD AUTO: 49.6 % (ref 34–46.6)
HGB BLD-MCNC: 14.1 G/DL (ref 12–15.9)
HOLD SPECIMEN: NORMAL
IMM GRANULOCYTES # BLD AUTO: 0.06 10*3/MM3 (ref 0–0.05)
IMM GRANULOCYTES NFR BLD AUTO: 0.6 % (ref 0–0.5)
LYMPHOCYTES # BLD AUTO: 1.8 10*3/MM3 (ref 0.7–3.1)
LYMPHOCYTES NFR BLD AUTO: 18.7 % (ref 19.6–45.3)
MCH RBC QN AUTO: 26.7 PG (ref 26.6–33)
MCHC RBC AUTO-ENTMCNC: 28.4 G/DL (ref 31.5–35.7)
MCV RBC AUTO: 93.8 FL (ref 79–97)
MONOCYTES # BLD AUTO: 0.7 10*3/MM3 (ref 0.1–0.9)
MONOCYTES NFR BLD AUTO: 7.3 % (ref 5–12)
NEUTROPHILS NFR BLD AUTO: 6.96 10*3/MM3 (ref 1.7–7)
NEUTROPHILS NFR BLD AUTO: 72.1 % (ref 42.7–76)
NRBC BLD AUTO-RTO: 0.2 /100 WBC (ref 0–0.2)
NT-PROBNP SERPL-MCNC: 1054 PG/ML (ref 0–900)
PLATELET # BLD AUTO: 205 10*3/MM3 (ref 140–450)
PMV BLD AUTO: 11.6 FL (ref 6–12)
POTASSIUM SERPL-SCNC: 4.7 MMOL/L (ref 3.5–5.2)
PROT SERPL-MCNC: 6.5 G/DL (ref 6–8.5)
QT INTERVAL: 404 MS
QTC INTERVAL: 477 MS
RBC # BLD AUTO: 5.29 10*6/MM3 (ref 3.77–5.28)
SARS-COV-2 RNA RESP QL NAA+PROBE: NOT DETECTED
SODIUM SERPL-SCNC: 140 MMOL/L (ref 136–145)
TROPONIN T SERPL HS-MCNC: 14 NG/L
TROPONIN T SERPL HS-MCNC: 14 NG/L
WBC NRBC COR # BLD: 9.65 10*3/MM3 (ref 3.4–10.8)
WHOLE BLOOD HOLD COAG: NORMAL
WHOLE BLOOD HOLD SPECIMEN: NORMAL

## 2023-05-25 PROCEDURE — G0378 HOSPITAL OBSERVATION PER HR: HCPCS

## 2023-05-25 PROCEDURE — 25010000002 FUROSEMIDE PER 20 MG: Performed by: EMERGENCY MEDICINE

## 2023-05-25 PROCEDURE — 84484 ASSAY OF TROPONIN QUANT: CPT | Performed by: EMERGENCY MEDICINE

## 2023-05-25 PROCEDURE — 25010000002 FUROSEMIDE PER 20 MG: Performed by: FAMILY MEDICINE

## 2023-05-25 PROCEDURE — 36415 COLL VENOUS BLD VENIPUNCTURE: CPT | Performed by: EMERGENCY MEDICINE

## 2023-05-25 PROCEDURE — 71046 X-RAY EXAM CHEST 2 VIEWS: CPT

## 2023-05-25 PROCEDURE — 93005 ELECTROCARDIOGRAM TRACING: CPT | Performed by: EMERGENCY MEDICINE

## 2023-05-25 PROCEDURE — 87636 SARSCOV2 & INF A&B AMP PRB: CPT | Performed by: EMERGENCY MEDICINE

## 2023-05-25 PROCEDURE — 83036 HEMOGLOBIN GLYCOSYLATED A1C: CPT | Performed by: FAMILY MEDICINE

## 2023-05-25 PROCEDURE — 93005 ELECTROCARDIOGRAM TRACING: CPT

## 2023-05-25 PROCEDURE — 85379 FIBRIN DEGRADATION QUANT: CPT | Performed by: EMERGENCY MEDICINE

## 2023-05-25 PROCEDURE — 25510000001 IOPAMIDOL PER 1 ML: Performed by: EMERGENCY MEDICINE

## 2023-05-25 PROCEDURE — 99285 EMERGENCY DEPT VISIT HI MDM: CPT

## 2023-05-25 PROCEDURE — 83880 ASSAY OF NATRIURETIC PEPTIDE: CPT | Performed by: EMERGENCY MEDICINE

## 2023-05-25 PROCEDURE — 93010 ELECTROCARDIOGRAM REPORT: CPT | Performed by: INTERNAL MEDICINE

## 2023-05-25 PROCEDURE — 71275 CT ANGIOGRAPHY CHEST: CPT

## 2023-05-25 PROCEDURE — 80050 GENERAL HEALTH PANEL: CPT

## 2023-05-25 RX ORDER — ACETAMINOPHEN 325 MG/1
650 TABLET ORAL EVERY 4 HOURS PRN
Status: DISCONTINUED | OUTPATIENT
Start: 2023-05-25 | End: 2023-06-02 | Stop reason: HOSPADM

## 2023-05-25 RX ORDER — FUROSEMIDE 10 MG/ML
80 INJECTION INTRAMUSCULAR; INTRAVENOUS ONCE
Status: COMPLETED | OUTPATIENT
Start: 2023-05-25 | End: 2023-05-25

## 2023-05-25 RX ORDER — SODIUM CHLORIDE 0.9 % (FLUSH) 0.9 %
10 SYRINGE (ML) INJECTION AS NEEDED
Status: DISCONTINUED | OUTPATIENT
Start: 2023-05-25 | End: 2023-06-02 | Stop reason: HOSPADM

## 2023-05-25 RX ORDER — ONDANSETRON 4 MG/1
4 TABLET, FILM COATED ORAL EVERY 6 HOURS PRN
Status: DISCONTINUED | OUTPATIENT
Start: 2023-05-25 | End: 2023-06-02 | Stop reason: HOSPADM

## 2023-05-25 RX ORDER — CHOLECALCIFEROL (VITAMIN D3) 125 MCG
5 CAPSULE ORAL NIGHTLY PRN
Status: DISCONTINUED | OUTPATIENT
Start: 2023-05-25 | End: 2023-06-02 | Stop reason: HOSPADM

## 2023-05-25 RX ORDER — ACETAMINOPHEN 160 MG/5ML
650 SOLUTION ORAL EVERY 4 HOURS PRN
Status: DISCONTINUED | OUTPATIENT
Start: 2023-05-25 | End: 2023-06-02 | Stop reason: HOSPADM

## 2023-05-25 RX ORDER — ALBUTEROL SULFATE 2.5 MG/3ML
2.5 SOLUTION RESPIRATORY (INHALATION) EVERY 6 HOURS PRN
Status: DISCONTINUED | OUTPATIENT
Start: 2023-05-25 | End: 2023-06-02 | Stop reason: HOSPADM

## 2023-05-25 RX ORDER — NICOTINE 21 MG/24HR
1 PATCH, TRANSDERMAL 24 HOURS TRANSDERMAL EVERY 24 HOURS
Status: DISCONTINUED | OUTPATIENT
Start: 2023-05-25 | End: 2023-06-02 | Stop reason: HOSPADM

## 2023-05-25 RX ORDER — ROSUVASTATIN CALCIUM 10 MG/1
10 TABLET, COATED ORAL DAILY
Status: DISCONTINUED | OUTPATIENT
Start: 2023-05-25 | End: 2023-06-02 | Stop reason: HOSPADM

## 2023-05-25 RX ORDER — PENTOXIFYLLINE 400 MG/1
400 TABLET, EXTENDED RELEASE ORAL 2 TIMES DAILY
Status: DISCONTINUED | OUTPATIENT
Start: 2023-05-25 | End: 2023-06-02 | Stop reason: HOSPADM

## 2023-05-25 RX ORDER — BISACODYL 5 MG/1
5 TABLET, DELAYED RELEASE ORAL DAILY PRN
Status: DISCONTINUED | OUTPATIENT
Start: 2023-05-25 | End: 2023-06-02 | Stop reason: HOSPADM

## 2023-05-25 RX ORDER — ONDANSETRON 2 MG/ML
4 INJECTION INTRAMUSCULAR; INTRAVENOUS EVERY 6 HOURS PRN
Status: DISCONTINUED | OUTPATIENT
Start: 2023-05-25 | End: 2023-06-02 | Stop reason: HOSPADM

## 2023-05-25 RX ORDER — MORPHINE SULFATE 2 MG/ML
1 INJECTION, SOLUTION INTRAMUSCULAR; INTRAVENOUS EVERY 4 HOURS PRN
Status: DISCONTINUED | OUTPATIENT
Start: 2023-05-25 | End: 2023-05-31

## 2023-05-25 RX ORDER — ACETAMINOPHEN 650 MG/1
650 SUPPOSITORY RECTAL EVERY 4 HOURS PRN
Status: DISCONTINUED | OUTPATIENT
Start: 2023-05-25 | End: 2023-06-02 | Stop reason: HOSPADM

## 2023-05-25 RX ORDER — CALCIUM CARBONATE 500 MG/1
2 TABLET, CHEWABLE ORAL 2 TIMES DAILY PRN
Status: DISCONTINUED | OUTPATIENT
Start: 2023-05-25 | End: 2023-06-02 | Stop reason: HOSPADM

## 2023-05-25 RX ORDER — HYDROCODONE BITARTRATE AND ACETAMINOPHEN 5; 325 MG/1; MG/1
1 TABLET ORAL EVERY 4 HOURS PRN
Status: ACTIVE | OUTPATIENT
Start: 2023-05-25 | End: 2023-06-01

## 2023-05-25 RX ORDER — BISACODYL 10 MG
10 SUPPOSITORY, RECTAL RECTAL DAILY PRN
Status: DISCONTINUED | OUTPATIENT
Start: 2023-05-25 | End: 2023-06-02 | Stop reason: HOSPADM

## 2023-05-25 RX ORDER — SODIUM CHLORIDE 0.9 % (FLUSH) 0.9 %
10 SYRINGE (ML) INJECTION EVERY 12 HOURS SCHEDULED
Status: DISCONTINUED | OUTPATIENT
Start: 2023-05-25 | End: 2023-06-02 | Stop reason: HOSPADM

## 2023-05-25 RX ORDER — POLYETHYLENE GLYCOL 3350 17 G/17G
17 POWDER, FOR SOLUTION ORAL DAILY PRN
Status: DISCONTINUED | OUTPATIENT
Start: 2023-05-25 | End: 2023-06-02 | Stop reason: HOSPADM

## 2023-05-25 RX ORDER — AMOXICILLIN 250 MG
2 CAPSULE ORAL 2 TIMES DAILY PRN
Status: DISCONTINUED | OUTPATIENT
Start: 2023-05-25 | End: 2023-06-02 | Stop reason: HOSPADM

## 2023-05-25 RX ORDER — SODIUM CHLORIDE 9 MG/ML
40 INJECTION, SOLUTION INTRAVENOUS AS NEEDED
Status: DISCONTINUED | OUTPATIENT
Start: 2023-05-25 | End: 2023-06-02 | Stop reason: HOSPADM

## 2023-05-25 RX ORDER — NALOXONE HCL 0.4 MG/ML
0.4 VIAL (ML) INJECTION
Status: DISCONTINUED | OUTPATIENT
Start: 2023-05-25 | End: 2023-06-02 | Stop reason: HOSPADM

## 2023-05-25 RX ADMIN — FUROSEMIDE 80 MG: 10 INJECTION, SOLUTION INTRAMUSCULAR; INTRAVENOUS at 13:11

## 2023-05-25 RX ADMIN — ROSUVASTATIN CALCIUM 10 MG: 10 TABLET, FILM COATED ORAL at 18:16

## 2023-05-25 RX ADMIN — APIXABAN 5 MG: 5 TABLET, FILM COATED ORAL at 21:50

## 2023-05-25 RX ADMIN — FUROSEMIDE 10 MG/HR: 10 INJECTION, SOLUTION INTRAVENOUS at 21:50

## 2023-05-25 RX ADMIN — Medication 10 ML: at 21:50

## 2023-05-25 RX ADMIN — Medication 1 PATCH: at 18:16

## 2023-05-25 RX ADMIN — IOPAMIDOL 74 ML: 755 INJECTION, SOLUTION INTRAVENOUS at 15:25

## 2023-05-25 RX ADMIN — PENTOXIFYLLINE 400 MG: 400 TABLET, EXTENDED RELEASE ORAL at 21:50

## 2023-05-25 NOTE — H&P
[facility] Medicine Admission      Date of Admission: 5/25/2023      Primary Care Physician: Danielle Vaughn APRN      Chief Complaint: Shortness of breath    HPI: Patient is a 63-year-old female with past medical history notable for type 2 diabetes mellitus, history of DVT and PE, COPD, hyperlipidemia, and chronic diastolic heart failure who presented to the hospital due to 1 to 2-week history of worsening dyspnea with exertion and at rest.  Patient also reports orthopnea and a 60 pound weight gain over the last several weeks.  Patient states that approximately 3 weeks ago she became tired of taking her medication so she stopped taking all of them including her Eliquis.  She states that she did this had a moment of depression but denies any suicidal or homicidal ideations.  Patient denies any chest pain or other concerning symptoms.  She does note that she has had some intermittent hematuria but otherwise has no other concerns.  Patient did have an elevated proBNP at 1054 and elevated D-dimer 2001.  CTA of her chest was negative for PE.    Concurrent Medical History:  has a past medical history of Controlled type 2 diabetes mellitus without complication, without long-term current use of insulin, COPD (chronic obstructive pulmonary disease), DVT (deep venous thrombosis) (08/2022), Hyperlipemia, mixed, and Pulmonary embolism (08/2022).    Past Surgical History:  has a past surgical history that includes Tubal ligation and Cystoscopy (N/A, 8/18/2022).    Family History: family history includes Alcohol abuse in her maternal grandmother; COPD in an other family member; Diabetes in an other family member; Heart disease in an other family member; Hypertension in an other family member; Liver cancer in her maternal grandmother; Lung cancer in her maternal aunt, maternal aunt, and mother; Prostate cancer in her father; Throat cancer in her paternal grandfather;  "Uterine cancer in her mother. No changes    Social History:  reports that she has been smoking cigarettes. She has a 60.00 pack-year smoking history. She has never used smokeless tobacco. She reports current alcohol use. She reports that she does not use drugs.    Allergies:   Allergies   Allergen Reactions   • Penicillins Rash     Patient states she is allergic to all \"cillins\".   • Penicillins Rash       Medications:   Prior to Admission medications    Medication Sig Start Date End Date Taking? Authorizing Provider   cyclobenzaprine (FLEXERIL) 10 MG tablet TK 1 T PO TID 12/20/16  Yes Janiya Montenegro MD   tiotropium bromide-olodaterol (STIOLTO RESPIMAT) 2.5-2.5 MCG/ACT aerosol solution inhaler Inhale 2 puffs Daily. 8/23/22  Yes Mimi Kern APRN   traMADol (ULTRAM) 50 MG tablet  8/25/22  Yes Janiya Montenegro MD   VENTOLIN  (90 BASE) MCG/ACT inhaler  1/16/17  Yes Janiya Montenegro MD   apixaban (ELIQUIS) 5 MG tablet tablet Take 1 tablet by mouth Every 12 (Twelve) Hours. Indications: DVT/PE (active thrombosis) 9/22/22   Rafael Cisse APRN   furosemide (LASIX) 40 MG tablet Take 1 tablet by mouth 2 (Two) Times a Day for 30 days. 8/19/22 9/18/22  Dexter Mclean MD   gabapentin (NEURONTIN) 400 MG capsule Take 1 capsule by mouth 3 (Three) Times a Day.    Janiya Montenegro MD   nystatin (MYCOSTATIN) 445308 UNIT/GM powder Apply  topically to the appropriate area as directed 4 (Four) Times a Day. 8/25/22 8/26/23  Janiya Montenegro MD   pentoxifylline (TRENtal) 400 MG CR tablet Take 1 tablet by mouth 2 (Two) Times a Day. 8/29/22   Rafael Cisse APRN   rosuvastatin (CRESTOR) 10 MG tablet Take 1 tablet by mouth Daily. 8/25/22   Janiya Montneegro MD       Review of Systems:  Review of Systems   Otherwise complete ROS is negative except as mentioned above.    Physical Exam:   Temp:  [98.7 °F (37.1 °C)] 98.7 °F (37.1 °C)  Heart Rate:  [77-90] 89  Resp:  [18-26] 22  BP: " (128-160)/() 160/81  Physical Exam  Constitutional:       Appearance: She is ill-appearing.   HENT:      Head: Normocephalic and atraumatic.      Right Ear: External ear normal.      Left Ear: External ear normal.      Nose: Nose normal.      Mouth/Throat:      Pharynx: Oropharynx is clear.   Eyes:      Conjunctiva/sclera: Conjunctivae normal.   Cardiovascular:      Rate and Rhythm: Normal rate and regular rhythm.      Heart sounds: Normal heart sounds.   Pulmonary:      Effort: Pulmonary effort is normal. No respiratory distress.      Breath sounds: Normal breath sounds.   Abdominal:      General: Bowel sounds are normal.      Palpations: Abdomen is soft.      Tenderness: There is no abdominal tenderness.      Comments: Edema of pannus noted   Musculoskeletal:      Right lower leg: Edema present.      Left lower leg: Edema present.      Comments: 4+ pitting edema bilaterally   Skin:     General: Skin is warm and dry.      Capillary Refill: Capillary refill takes less than 2 seconds.   Neurological:      General: No focal deficit present.      Mental Status: She is alert. Mental status is at baseline.   Psychiatric:         Behavior: Behavior normal.         Thought Content: Thought content normal.         Judgment: Judgment normal.           Results Reviewed:  I have personally reviewed current lab, radiology, and data and agree with results.  Lab Results (last 24 hours)     Procedure Component Value Units Date/Time    Single High Sensitivity Troponin T [[id number]]  (Abnormal) Collected: 05/25/23 1414    Specimen: Blood Updated: 05/25/23 1438     HS Troponin T 14 ng/L     Narrative:      High Sensitive Troponin T Reference Range:  <10.0 ng/L- Negative Female for AMI  <15.0 ng/L- Negative Male for AMI  >=10 - Abnormal Female indicating possible myocardial injury.  >=15 - Abnormal Male indicating possible myocardial injury.   Clinicians would have to utilize clinical acumen, EKG, Troponin, and serial changes to  determine if it is an Acute Myocardial Infarction or myocardial injury due to an underlying chronic condition.         COVID-19 and FLU A/B PCR - Swab, Nasopharynx [[id number]]  (Normal) Collected: 05/25/23 1313    Specimen: Swab from Nasopharynx Updated: 05/25/23 1414     COVID19 Not Detected     Influenza A PCR Not Detected     Influenza B PCR Not Detected    Narrative:      Fact sheet for providers: https://www.fda.gov/media/478319/download    Fact sheet for patients: https://www.fda.gov/media/093990/download    Test performed by PCR.    [facility] [[id number]] Collected: 05/25/23 1212    Specimen: Blood Updated: 05/25/23 1315    Narrative:      The following orders were created for panel order [facility].  Procedure                               Abnormality         Status                     ---------                               -----------         ------                     Green Top (Gel)[[id number]]                                  Final result               Lavender Top[[id number]]                                     Final result               Gold Top - SST[[id number]]                                   Final result               Light Blue Top[[id number]]                                   Final result                 Please view results for these tests on the individual orders.    Light Blue Top [[id number]] Collected: 05/25/23 1212    Specimen: Blood Updated: 05/25/23 1315     Extra Tube Hold for add-ons.     Comment: Auto resulted       Green Top (Gel) [[id number]] Collected: 05/25/23 1212    Specimen: Blood Updated: 05/25/23 1315     Extra Tube Hold for add-ons.     Comment: Auto resulted.       Gold Top - SST [[id number]] Collected: 05/25/23 1212    Specimen: Blood Updated: 05/25/23 1315     Extra Tube Hold for add-ons.     Comment: Auto resulted.       Lavender Top [[id number]] Collected: 05/25/23 1212    Specimen: Blood Updated: 05/25/23 1315     Extra Tube hold for add-on     Comment: Auto resulted        "Extra Tubes [[id number]] Collected: 05/25/23 1307    Specimen: Blood, Venous Line Updated: 05/25/23 1307    Narrative:      The following orders were created for panel order Extra Tubes.  Procedure                               Abnormality         Status                     ---------                               -----------         ------                     Aguilar Top[[id number]]                                         In process                   Please view results for these tests on the individual orders.    Gray Top [[id number]] Collected: 05/25/23 1307    Specimen: Blood Updated: 05/25/23 1307    D-dimer, Quantitative [[id number]]  (Abnormal) Collected: 05/25/23 1212    Specimen: Blood Updated: 05/25/23 1253     D-Dimer, Quantitative 1,000 ng/mL (FEU)     Narrative:      According to the assay 's published package insert, a normal (<500 ng/mL (FEU)) D-dimer result in conjunction with a non-high clinical probability assessment, excludes deep vein thrombosis (DVT) and pulmonary embolism (PE) with high sensitivity.    D-dimer values increase with age and this can make VTE exclusion of an older population difficult. To address this, the [employer], based on best available evidence and recent guidelines, recommends that clinicians use age-adjusted D-dimer thresholds in patients greater than 50 years of age with: a) a low probability of PE who do not meet all Pulmonary Embolism Rule Out Criteria, or b) in those with intermediate probability of PE.   The formula for an age-adjusted D-dimer cut-off is \"age*10\".  For example, a 60 year old patient would have an age-adjusted cut-off of 600 ng/mL (FEU) and an 80 year old 800 ng/mL (FEU).      Comprehensive Metabolic Panel [[id number]]  (Abnormal) Collected: 05/25/23 1212    Specimen: Blood Updated: 05/25/23 1242     Glucose 155 mg/dL      BUN 8 mg/dL      Creatinine 0.62 mg/dL      Sodium 140 mmol/L      Potassium 4.7 mmol/L      Chloride 96 " mmol/L      CO2 39.0 mmol/L      Calcium 8.5 mg/dL      Total Protein 6.5 g/dL      Albumin 3.2 g/dL      ALT (SGPT) 7 U/L      AST (SGOT) 12 U/L      Alkaline Phosphatase 104 U/L      Total Bilirubin 0.7 mg/dL      Globulin 3.3 gm/dL      A/G Ratio 1.0 g/dL      BUN/Creatinine Ratio 12.9     Anion Gap 5.0 mmol/L      eGFR 101.5 mL/min/1.73     Narrative:      GFR Normal >60  Chronic Kidney Disease <60  Kidney Failure <15      Single High Sensitivity Troponin T [[id number]]  (Abnormal) Collected: 05/25/23 1212    Specimen: Blood Updated: 05/25/23 1241     HS Troponin T 14 ng/L     Narrative:      High Sensitive Troponin T Reference Range:  <10.0 ng/L- Negative Female for AMI  <15.0 ng/L- Negative Male for AMI  >=10 - Abnormal Female indicating possible myocardial injury.  >=15 - Abnormal Male indicating possible myocardial injury.   Clinicians would have to utilize clinical acumen, EKG, Troponin, and serial changes to determine if it is an Acute Myocardial Infarction or myocardial injury due to an underlying chronic condition.         BNP [[id number]]  (Abnormal) Collected: 05/25/23 1212    Specimen: Blood Updated: 05/25/23 1239     proBNP 1,054.0 pg/mL     Narrative:      Among patients with dyspnea, NT-proBNP is highly sensitive for the detection of acute congestive heart failure. In addition NT-proBNP of <300 pg/ml effectively rules out acute congestive heart failure with 99% negative predictive value.      CBC & Differential [[id number]]  (Abnormal) Collected: 05/25/23 1212    Specimen: Blood Updated: 05/25/23 1223    Narrative:      The following orders were created for panel order CBC & Differential.  Procedure                               Abnormality         Status                     ---------                               -----------         ------                     CBC Auto Differential[[id number]]        Abnormal            Final result                 Please view results for these tests on the individual  orders.    CBC Auto Differential [[id number]]  (Abnormal) Collected: 05/25/23 1212    Specimen: Blood Updated: 05/25/23 1223     WBC 9.65 10*3/mm3      RBC 5.29 10*6/mm3      Hemoglobin 14.1 g/dL      Hematocrit 49.6 %      MCV 93.8 fL      MCH 26.7 pg      MCHC 28.4 g/dL      RDW 15.0 %      RDW-SD 52.6 fl      MPV 11.6 fL      Platelets 205 10*3/mm3      Neutrophil % 72.1 %      Lymphocyte % 18.7 %      Monocyte % 7.3 %      Eosinophil % 0.7 %      Basophil % 0.6 %      Immature Grans % 0.6 %      Neutrophils, Absolute 6.96 10*3/mm3      Lymphocytes, Absolute 1.80 10*3/mm3      Monocytes, Absolute 0.70 10*3/mm3      Eosinophils, Absolute 0.07 10*3/mm3      Basophils, Absolute 0.06 10*3/mm3      Immature Grans, Absolute 0.06 10*3/mm3      nRBC 0.2 /100 WBC         Imaging Results (Last 24 Hours)     Procedure Component Value Units Date/Time    CT Angiogram Chest [[id number]] Collected: 05/25/23 1616     Updated: 05/25/23 1625    Narrative:      EXAM:  CTA chest with contrast, PE protocol.    COMPARISON:  CTA chest with contrast, 8/9/2022.    HISTORY:  History of PE, elevated D-dimer and hypoxia with noncompliance of  anticoagulation.    TECHNIQUE:  Intravenous contrast was administered and axial images from the thoracic inlet  through the diaphragm were performed followed by 2D multiplanar reformats.  MIPS  were reconstructed and reviewed.    FINDINGS:  The lungs are clear and well-expanded with the exception of minimal right  basilar atelectasis.  No consolidation.    There is probably mild enlargement of the right heart.  No pleural or  pericardial effusion.  Esophagus is grossly normal in course and caliber.  No  pathologically enlarged lymphadenopathy identified in the chest by size  criteria.    No thoracic aortic dissection or dilatation appreciated.  Main pulmonary artery  is enlarged, measuring 4.4 cm.  No pulmonary embolism identified.    No acute process in the visualized upper abdomen or involving the  osseous  structures.      Impression:      1.  No acute findings in the chest.  No pulmonary embolism.    2.  Enlargement of the main pulmonary artery measuring up to 4.4 cm.  This  suggests pulmonary hypertension.  There is probably also mild enlargement of the  right heart.    XR Chest 2 View [[id number]] Collected: 05/25/23 1248     Updated: 05/25/23 1307    Narrative:      TWO VIEW CHEST:    INDICATION:  Triage protocol.    FINDINGS:  Cardiomegaly.  No focal consolidation.  Osseous structures are age-appropriate.      Impression:      impression:  No acute disease.            Assessment:    Active Hospital Problems    Diagnosis    • **Dyspnea, unspecified type    • Acute on chronic diastolic heart failure    • Pulmonary hypertension    • Anasarca    • History of pulmonary embolus (PE)    • Depression              Plan:  - Patient symptomology appears most consistent with acute on chronic diastolic heart failure  - She did receive 80 mg of IV Lasix in the ER given her significant weight gain over the last several weeks we will start her on a Lasix infusion at 10 mg/h  - Electrolyte replacement protocol will be in place  - Monitor I's and O's as well as daily weights  - CT scan did show signs of pulmonary hypertension which is likely contributing to her dyspnea as well  - Patient does admit to being depressed and she was offered psychiatry consultation as well as antidepressant medication.  She denied any suicidal or homicidal ideations during my exam and also denied wanting to have a consultation or start any medications at this time.  - We will resume her other home medications as appropriate  - She will eventually need PT and OT when she is able to tolerate being more mobile and has diuresed  - DVT prophylaxis with Eliquis  - CODE STATUS: DNR/DNI, patient wishes for her son Hieu to be her healthcare surrogate        Medical Decision Making  Number and Complexity of problems: 5 high complexity  problems    Conditions and Status:        Condition is unchanged.     MDM Data  My EKG interpretation: Normal sinus rhythm with what appears to be an incomplete right bundle branch block  My plain film interpretation: Agree with radiology  Tests considered but not ordered: None     Decision rules/scores evaluated (example [id number], Wells, etc): None     Discussed with: Patient     Treatment Plan  As above    Care Planning  Shared decision making: Patient  Code status and discussions: DNR/DNI    Disposition  Social Determinants of Health that impact treatment or disposition: None  I expect the patient to be discharged to home in 3-4 days.     I have utilized all available immediate resources to obtain, update, or review the patient's current medications (including all prescriptions, over-the-counter products, herbals, cannabis/cannabidiol products, and vitamin/mineral/dietary (nutritional) supplements).   I confirmed that the patient's Advance Care Plan is present, code status is documented, or surrogate decision maker is listed in the patient's medical record.    I discussed the patient's findings and my recommendations with: Patient    Dexter Mclean MD

## 2023-05-25 NOTE — Clinical Note
Level of Care: Telemetry [5]   Diagnosis: Dyspnea, unspecified type [[id number]]   Admitting Physician: SURJIT VENTURA [[id number]]   Attending Physician: SURJIT VENTURA [[id number]]   Certification: I Certify That Inpatient Hospital Services Are Medically Necessary For Greater Than 2 Midnights

## 2023-05-25 NOTE — ED PROVIDER NOTES
"Subjective   History of Present Illness  This is a 63-year-old female with history of heart failure and subsegmental pulmonary embolisms who has been noncompliant with her medication for \"a couple weeks\" and has had increasing fatigue and shortness of breath progressively worsening over that duration as well.  She describes herself as dragging her feet through her house and having difficulty at this point doing things like bathing or taking care of herself otherwise.  She went to her family doctor who forwarded her on to the emergency department.  She denies fevers or chills or feeling \"sick.\"  She denies cough.  She has had increasing lower extremity edema.  She denies nausea vomiting or diarrhea.  She denies chest pain.  The patient denies being suicidal.        Review of Systems   All other systems reviewed and are negative.      Past Medical History:   Diagnosis Date   • Controlled type 2 diabetes mellitus without complication, without long-term current use of insulin    • COPD (chronic obstructive pulmonary disease)    • DVT (deep venous thrombosis) 08/2022    left popliteal   • Hyperlipemia, mixed    • Pulmonary embolism 08/2022    right       Allergies   Allergen Reactions   • Penicillins Rash     Patient states she is allergic to all \"cillins\".   • Penicillins Rash       Past Surgical History:   Procedure Laterality Date   • CYSTOSCOPY N/A 8/18/2022    Procedure: CYSTOSCOPY;  Surgeon: Davide Moore MD;  Location: [facility];  Service: Urology;  Laterality: N/A;   • TUBAL ABDOMINAL LIGATION         Family History   Problem Relation Age of Onset   • Prostate cancer Father    • Uterine cancer Mother    • Lung cancer Mother    • Throat cancer Paternal Grandfather    • Liver cancer Maternal Grandmother    • Alcohol abuse Maternal Grandmother    • Lung cancer Maternal Aunt    • Lung cancer Maternal Aunt    • COPD Other    • Heart disease Other    • Hypertension Other    • Diabetes Other    • Breast cancer Neg " Hx    • Ovarian cancer Neg Hx    • Colon cancer Neg Hx        Social History     Socioeconomic History   • Marital status:    Tobacco Use   • Smoking status: Every Day     Packs/day: 2.00     Years: 30.00     Pack years: 60.00     Types: Cigarettes   • Smokeless tobacco: Never   • Tobacco comments:     2 cigarettes daily   Vaping Use   • Vaping Use: Never used   Substance and Sexual Activity   • Alcohol use: Yes     Comment: occasionally   • Drug use: Never   • Sexual activity: Defer           Objective   Physical Exam  Vitals and nursing note reviewed.   Constitutional:       General: She is not in acute distress.     Appearance: She is obese. She is ill-appearing.      Comments: Poor hygiene   HENT:      Head: Normocephalic and atraumatic.   Neck:      Vascular: No JVD.   Cardiovascular:      Rate and Rhythm: Normal rate and regular rhythm.   Pulmonary:      Effort: Pulmonary effort is normal.      Breath sounds: Examination of the right-lower field reveals decreased breath sounds. Examination of the left-lower field reveals decreased breath sounds. Decreased breath sounds present.   Chest:      Chest wall: No tenderness.   Abdominal:      Palpations: Abdomen is soft.      Tenderness: There is no abdominal tenderness.   Musculoskeletal:      Right lower leg: No tenderness. Edema present.      Left lower leg: No tenderness. Edema present.   Skin:     General: Skin is warm and dry.      Comments: Venous stasis dermatitis of the bilateral lower extremities   Neurological:      Mental Status: She is alert and oriented to person, place, and time.   Psychiatric:      Comments: Depressed          Procedures           ED Course  ED Course as of 05/25/23 2110   Thu May 25, 2023   1237 CBC & Differential(!)  No significant anemia [JV]   1303 Total Bilirubin: 0.7 [JV]      ED Course User Index  [JV] Edward Law, DO                                           Medical Decision Making  The patient's recent past medical  "charts for this facility as well as outside facilities via the \"care everywhere\" application of epic reviewed.  The patient was last admitted in August 2022 for multiple pulmonary embolisms.  She has significant cardiopulmonary past medical history including heart failure and bronchitis.  The patient saw her PCP earlier today and reported about 2 weeks of medication noncompliance with a room air saturation of 68% and she was transported by EMS to this facility.    The differential diagnosis includes acute coronary syndrome, pneumonia, medication noncompliance, and CHF, among others.    On final reevaluation the patient the patient is in stable condition and agreeable to being admitted.      Abnormal EKG: acute illness or injury  Acute on chronic respiratory failure with hypoxia: acute illness or injury  Dyspnea, unspecified type: acute illness or injury  Amount and/or Complexity of Data Reviewed  Independent Historian: EMS     Details: EMS provided additional historical details.  Labs: ordered. Decision-making details documented in ED Course.  Radiology: ordered.  ECG/medicine tests: ordered and independent interpretation performed.     Details: EKG interpretation: Interpreted by myself.  Normal sinus rhythm.  Rate 84.  Normal P wave and FL interval.  Incomplete right bundle branch block and normal axis.  Normal QTc interval.  ST segments nonspecific changes as well as T wave inversions in the lateral precordial leads.  These changes are new compared to old EKG.  Discussion of management or test interpretation with external provider(s): Case discussed with admitting hospitalist physician.    Risk  OTC drugs.  Prescription drug management.  Decision regarding hospitalization.          Final diagnoses:   Dyspnea, unspecified type   Abnormal EKG   Acute on chronic respiratory failure with hypoxia       ED Disposition  ED Disposition     ED Disposition   Decision to Admit    Condition   --    Comment   Level of Care: " Telemetry [5]   Diagnosis: Dyspnea, unspecified type [[id number]]   Admitting Physician: SURJIT VENTURA [[id number]]   Attending Physician: SURJIT VENTURA [[id number]]               No follow-up provider specified.       Medication List      No changes were made to your prescriptions during this visit.          Edward Law DO  05/25/23 0350

## 2023-05-26 LAB
ALBUMIN SERPL-MCNC: 3 G/DL (ref 3.5–5.2)
ALBUMIN/GLOB SERPL: 0.9 G/DL
ALP SERPL-CCNC: 106 U/L (ref 39–117)
ALT SERPL W P-5'-P-CCNC: 7 U/L (ref 1–33)
ANION GAP SERPL CALCULATED.3IONS-SCNC: 3 MMOL/L (ref 5–15)
AST SERPL-CCNC: 15 U/L (ref 1–32)
BACTERIA UR QL AUTO: ABNORMAL /HPF
BASOPHILS # BLD AUTO: 0.09 10*3/MM3 (ref 0–0.2)
BASOPHILS NFR BLD AUTO: 0.8 % (ref 0–1.5)
BILIRUB SERPL-MCNC: 0.9 MG/DL (ref 0–1.2)
BILIRUB UR QL STRIP: NEGATIVE
BUN SERPL-MCNC: 10 MG/DL (ref 8–23)
BUN/CREAT SERPL: 16.1 (ref 7–25)
CALCIUM SPEC-SCNC: 8.1 MG/DL (ref 8.6–10.5)
CHLORIDE SERPL-SCNC: 92 MMOL/L (ref 98–107)
CLARITY UR: CLEAR
CO2 SERPL-SCNC: 44 MMOL/L (ref 22–29)
COLOR UR: YELLOW
CREAT SERPL-MCNC: 0.62 MG/DL (ref 0.57–1)
DEPRECATED RDW RBC AUTO: 52.3 FL (ref 37–54)
EGFRCR SERPLBLD CKD-EPI 2021: 101.5 ML/MIN/1.73
EOSINOPHIL # BLD AUTO: 0.09 10*3/MM3 (ref 0–0.4)
EOSINOPHIL NFR BLD AUTO: 0.8 % (ref 0.3–6.2)
ERYTHROCYTE [DISTWIDTH] IN BLOOD BY AUTOMATED COUNT: 15.2 % (ref 12.3–15.4)
GLOBULIN UR ELPH-MCNC: 3.3 GM/DL
GLUCOSE BLDC GLUCOMTR-MCNC: 150 MG/DL (ref 70–130)
GLUCOSE BLDC GLUCOMTR-MCNC: 183 MG/DL (ref 70–130)
GLUCOSE BLDC GLUCOMTR-MCNC: 187 MG/DL (ref 70–130)
GLUCOSE SERPL-MCNC: 190 MG/DL (ref 65–99)
GLUCOSE UR STRIP-MCNC: NEGATIVE MG/DL
HCT VFR BLD AUTO: 46.9 % (ref 34–46.6)
HGB BLD-MCNC: 13.5 G/DL (ref 12–15.9)
HGB UR QL STRIP.AUTO: ABNORMAL
HYALINE CASTS UR QL AUTO: ABNORMAL /LPF
IMM GRANULOCYTES # BLD AUTO: 0.08 10*3/MM3 (ref 0–0.05)
IMM GRANULOCYTES NFR BLD AUTO: 0.7 % (ref 0–0.5)
KETONES UR QL STRIP: NEGATIVE
LEUKOCYTE ESTERASE UR QL STRIP.AUTO: NEGATIVE
LYMPHOCYTES # BLD AUTO: 1.33 10*3/MM3 (ref 0.7–3.1)
LYMPHOCYTES NFR BLD AUTO: 12.4 % (ref 19.6–45.3)
MAGNESIUM SERPL-MCNC: 1.8 MG/DL (ref 1.6–2.4)
MCH RBC QN AUTO: 26.8 PG (ref 26.6–33)
MCHC RBC AUTO-ENTMCNC: 28.8 G/DL (ref 31.5–35.7)
MCV RBC AUTO: 93.2 FL (ref 79–97)
MONOCYTES # BLD AUTO: 0.8 10*3/MM3 (ref 0.1–0.9)
MONOCYTES NFR BLD AUTO: 7.5 % (ref 5–12)
NEUTROPHILS NFR BLD AUTO: 77.8 % (ref 42.7–76)
NEUTROPHILS NFR BLD AUTO: 8.3 10*3/MM3 (ref 1.7–7)
NITRITE UR QL STRIP: NEGATIVE
NRBC BLD AUTO-RTO: 0 /100 WBC (ref 0–0.2)
PH UR STRIP.AUTO: 5.5 [PH] (ref 5–9)
PLATELET # BLD AUTO: 232 10*3/MM3 (ref 140–450)
PMV BLD AUTO: 11 FL (ref 6–12)
POTASSIUM SERPL-SCNC: 4.1 MMOL/L (ref 3.5–5.2)
PROT SERPL-MCNC: 6.3 G/DL (ref 6–8.5)
PROT UR QL STRIP: NEGATIVE
RBC # BLD AUTO: 5.03 10*6/MM3 (ref 3.77–5.28)
RBC # UR STRIP: ABNORMAL /HPF
REF LAB TEST METHOD: ABNORMAL
SODIUM SERPL-SCNC: 139 MMOL/L (ref 136–145)
SP GR UR STRIP: 1.01 (ref 1–1.03)
SQUAMOUS #/AREA URNS HPF: ABNORMAL /HPF
TSH SERPL DL<=0.05 MIU/L-ACNC: 1.96 UIU/ML (ref 0.27–4.2)
UROBILINOGEN UR QL STRIP: ABNORMAL
WBC # UR STRIP: ABNORMAL /HPF
WBC NRBC COR # BLD: 10.69 10*3/MM3 (ref 3.4–10.8)

## 2023-05-26 PROCEDURE — 25010000002 FUROSEMIDE PER 20 MG: Performed by: FAMILY MEDICINE

## 2023-05-26 PROCEDURE — 94664 DEMO&/EVAL PT USE INHALER: CPT

## 2023-05-26 PROCEDURE — 63710000001 ONDANSETRON PER 8 MG: Performed by: FAMILY MEDICINE

## 2023-05-26 PROCEDURE — 94760 N-INVAS EAR/PLS OXIMETRY 1: CPT

## 2023-05-26 PROCEDURE — 83735 ASSAY OF MAGNESIUM: CPT | Performed by: FAMILY MEDICINE

## 2023-05-26 PROCEDURE — 97166 OT EVAL MOD COMPLEX 45 MIN: CPT

## 2023-05-26 PROCEDURE — 82948 REAGENT STRIP/BLOOD GLUCOSE: CPT

## 2023-05-26 PROCEDURE — 94799 UNLISTED PULMONARY SVC/PX: CPT

## 2023-05-26 PROCEDURE — 81001 URINALYSIS AUTO W/SCOPE: CPT | Performed by: FAMILY MEDICINE

## 2023-05-26 PROCEDURE — 85025 COMPLETE CBC W/AUTO DIFF WBC: CPT | Performed by: FAMILY MEDICINE

## 2023-05-26 PROCEDURE — 97162 PT EVAL MOD COMPLEX 30 MIN: CPT

## 2023-05-26 PROCEDURE — 80053 COMPREHEN METABOLIC PANEL: CPT | Performed by: FAMILY MEDICINE

## 2023-05-26 PROCEDURE — 63710000001 INSULIN ASPART PER 5 UNITS: Performed by: FAMILY MEDICINE

## 2023-05-26 RX ORDER — NYSTATIN 100000 [USP'U]/G
POWDER TOPICAL EVERY 12 HOURS SCHEDULED
Status: DISCONTINUED | OUTPATIENT
Start: 2023-05-26 | End: 2023-06-02 | Stop reason: HOSPADM

## 2023-05-26 RX ORDER — INSULIN ASPART 100 [IU]/ML
0-7 INJECTION, SOLUTION INTRAVENOUS; SUBCUTANEOUS
Status: DISCONTINUED | OUTPATIENT
Start: 2023-05-26 | End: 2023-06-01

## 2023-05-26 RX ORDER — NICOTINE POLACRILEX 4 MG
15 LOZENGE BUCCAL
Status: DISCONTINUED | OUTPATIENT
Start: 2023-05-26 | End: 2023-06-02 | Stop reason: HOSPADM

## 2023-05-26 RX ORDER — IPRATROPIUM BROMIDE AND ALBUTEROL SULFATE 2.5; .5 MG/3ML; MG/3ML
3 SOLUTION RESPIRATORY (INHALATION)
Status: DISCONTINUED | OUTPATIENT
Start: 2023-05-26 | End: 2023-06-02 | Stop reason: HOSPADM

## 2023-05-26 RX ORDER — DEXTROSE MONOHYDRATE 25 G/50ML
25 INJECTION, SOLUTION INTRAVENOUS
Status: DISCONTINUED | OUTPATIENT
Start: 2023-05-26 | End: 2023-06-02 | Stop reason: HOSPADM

## 2023-05-26 RX ADMIN — PENTOXIFYLLINE 400 MG: 400 TABLET, EXTENDED RELEASE ORAL at 20:42

## 2023-05-26 RX ADMIN — Medication 1 PATCH: at 17:25

## 2023-05-26 RX ADMIN — ROSUVASTATIN CALCIUM 10 MG: 10 TABLET, FILM COATED ORAL at 08:58

## 2023-05-26 RX ADMIN — INSULIN ASPART 2 UNITS: 100 INJECTION, SOLUTION INTRAVENOUS; SUBCUTANEOUS at 08:58

## 2023-05-26 RX ADMIN — NYSTATIN: 100000 POWDER TOPICAL at 20:41

## 2023-05-26 RX ADMIN — PENTOXIFYLLINE 400 MG: 400 TABLET, EXTENDED RELEASE ORAL at 08:58

## 2023-05-26 RX ADMIN — INSULIN ASPART 2 UNITS: 100 INJECTION, SOLUTION INTRAVENOUS; SUBCUTANEOUS at 11:54

## 2023-05-26 RX ADMIN — APIXABAN 5 MG: 5 TABLET, FILM COATED ORAL at 20:42

## 2023-05-26 RX ADMIN — APIXABAN 5 MG: 5 TABLET, FILM COATED ORAL at 08:58

## 2023-05-26 RX ADMIN — INSULIN ASPART 2 UNITS: 100 INJECTION, SOLUTION INTRAVENOUS; SUBCUTANEOUS at 17:25

## 2023-05-26 RX ADMIN — IPRATROPIUM BROMIDE AND ALBUTEROL SULFATE 3 ML: .5; 3 SOLUTION RESPIRATORY (INHALATION) at 14:30

## 2023-05-26 RX ADMIN — ONDANSETRON HYDROCHLORIDE 4 MG: 4 TABLET, FILM COATED ORAL at 05:54

## 2023-05-26 RX ADMIN — FUROSEMIDE 10 MG/HR: 10 INJECTION, SOLUTION INTRAVENOUS at 20:41

## 2023-05-26 RX ADMIN — Medication 10 ML: at 08:59

## 2023-05-26 RX ADMIN — Medication 1 APPLICATION: at 20:41

## 2023-05-26 NOTE — THERAPY EVALUATION
Patient Name: Stephanie Bradshaw  : 1960    MRN: [id number]                              Today's Date: 2023       Admit Date: 2023    Visit Dx:     ICD-10-CM ICD-9-CM   1. Dyspnea, unspecified type  R06.00 786.09   2. Abnormal EKG  R94.31 794.31   3. Acute on chronic respiratory failure with hypoxia  J96.21 518.84     799.02   4. Impaired mobility and ADLs  Z74.09 V49.89    Z78.9    5. Decreased functional mobility  R26.89 781.99     Patient Active Problem List   Diagnosis   • Multiple subsegmental pulmonary emboli without acute cor pulmonale   • Cellulitis   • Cellulitis   • Ulises hematuria   • Mixed hyperlipidemia   • Morbid obesity with BMI of 45.0-49.9, adult   • Leg edema   • Venous stasis dermatitis of both lower extremities   • Lymphedema   • Borderline diabetic   • Cardiomegaly   • Dyspnea on exertion   • Elevated blood pressure reading without diagnosis of hypertension   • Right ventricular cardiac abnormality   • Hypersomnia with sleep apnea   • Ascending aorta dilation   • Venous stasis dermatitis of both lower extremities   • Mucopurulent chronic bronchitis   • Dyspnea, unspecified type   • Acute on chronic diastolic heart failure   • Pulmonary hypertension   • Anasarca   • History of pulmonary embolus (PE)   • Depression     Past Medical History:   Diagnosis Date   • Controlled type 2 diabetes mellitus without complication, without long-term current use of insulin    • COPD (chronic obstructive pulmonary disease)    • DVT (deep venous thrombosis) 2022    left popliteal   • Hyperlipemia, mixed    • Pulmonary embolism 2022    right     Past Surgical History:   Procedure Laterality Date   • CYSTOSCOPY N/A 2022    Procedure: CYSTOSCOPY;  Surgeon: Davide Moore MD;  Location: [facility];  Service: Urology;  Laterality: N/A;   • TUBAL ABDOMINAL LIGATION        General Information     Row Name 23 0810          Physical Therapy Time and Intention    Document Type  evaluation  -LR     Mode of Treatment physical therapy;occupational therapy  -LR     Row Name 05/26/23 0810          General Information    Patient Profile Reviewed yes  -LR     Prior Level of Function independent:;all household mobility;community mobility;gait;transfer;bed mobility  -LR     Existing Precautions/Restrictions oxygen therapy device and L/min;fall  -LR     Row Name 05/26/23 0810          Living Environment    People in Home alone  -LR     Row Name 05/26/23 0810          Home Main Entrance    Number of Stairs, Main Entrance none  -LR     Row Name 05/26/23 0810          Stairs Within Home, Primary    Stairs, Within Home, Primary sleeps on couch, regular toliet, tub/shower combo,  Rollator out in the community, no AD use in the house  -LR     Row Name 05/26/23 0810          Cognition    Orientation Status (Cognition) oriented x 4  -LR     Row Name 05/26/23 0810          Safety Issues, Functional Mobility    Safety Issues Affecting Function (Mobility) ability to follow commands;at risk behavior observed;awareness of need for assistance;safety precaution awareness;safety precautions follow-through/compliance;insight into deficits/self-awareness  -LR     Impairments Affecting Function (Mobility) endurance/activity tolerance;strength;shortness of breath;visual/perceptual  -LR           User Key  (r) = Recorded By, (t) = Taken By, (c) = Cosigned By    Initials Name Provider Type    LR Jake Turner Physical Therapist               Mobility     Row Name 05/26/23 0810          Bed Mobility    Bed Mobility sit-supine  -LR     Sit-Supine [place] (Bed Mobility) contact guard  -LR     Assistive Device (Bed Mobility) head of bed elevated;bed rails  -LR     Comment, (Bed Mobility) got into quadraped to get into bed.  -LR     Row Name 05/26/23 0810          Bed-Chair Transfer    Bed-Chair [place] (Transfers) contact guard  -LR     Row Name 05/26/23 0810          Sit-Stand Transfer    Sit-Stand [place]  (Transfers) contact guard  -LR     Row Name 05/26/23 0810          Gait/Stairs (Locomotion)    [place] Level (Gait) contact guard  -LR     Assistive Device (Gait) walker, front-wheeled  -LR     Distance in Feet (Gait) 10'x2 one trial with RW and one without  -LR     Deviations/Abnormal Patterns (Gait) gait speed decreased;base of support, wide;kiel decreased;festinating/shuffling  -LR           User Key  (r) = Recorded By, (t) = Taken By, (c) = Cosigned By    Initials Name Provider Type    LR Jake Turner Physical Therapist               Obj/Interventions     Row Name 05/26/23 0810          Range of Motion Comprehensive    General Range of Motion no range of motion deficits identified  -LR     Comment, General Range of Motion BLE grossly WFL  -LR     Row Name 05/26/23 0810          Strength Comprehensive (MMT)    Comment, General Manual Muscle Testing (MMT) Assessment BLE grossly 4/5  -LR     Row Name 05/26/23 0810          Sensory Assessment (Somatosensory)    Sensory Assessment (Somatosensory) sensation intact;LE sensation intact  -LR           User Key  (r) = Recorded By, (t) = Taken By, (c) = Cosigned By    Initials Name Provider Type    LR Jake Turner Physical Therapist               Goals/Plan     Row Name 05/26/23 0810          Bed Mobility Goal 1 (PT)    Activity/Assistive Device (Bed Mobility Goal 1, PT) supine to sit;sit to supine  -LR     [place] Level/Cues Needed (Bed Mobility Goal 1, PT) modified independence  -LR     Time Frame (Bed Mobility Goal 1, PT) by discharge  -LR     Progress/Outcomes (Bed Mobility Goal 1, PT) goal not met  -LR     Row Name 05/26/23 0810          Transfer Goal 1 (PT)    Activity/Assistive Device (Transfer Goal 1, PT) sit-to-stand/stand-to-sit;bed-to-chair/chair-to-bed  -LR     [place] Level/Cues Needed (Transfer Goal 1, PT) modified independence  -LR     Time Frame (Transfer Goal 1, PT) by discharge  -LR     Progress/Outcome (Transfer Goal 1, PT) goal  not met  -LR     Row Name 05/26/23 0810          Gait Training Goal 1 (PT)    Activity/Assistive Device (Gait Training Goal 1, PT) gait (walking locomotion);assistive device use  -LR     [place] Level (Gait Training Goal 1, PT) modified independence  -LR     Distance (Gait Training Goal 1, PT) 50'x3  -LR     Time Frame (Gait Training Goal 1, PT) by discharge  -LR     Progress/Outcome (Gait Training Goal 1, PT) goal not met  -LR           User Key  (r) = Recorded By, (t) = Taken By, (c) = Cosigned By    Initials Name Provider Type    LR Jake Turner Physical Therapist               Clinical Impression     Row Name 05/26/23 0810          Pain    Pretreatment Pain Rating 0/10 - no pain  -LR     Posttreatment Pain Rating 0/10 - no pain  -LR     Row Name 05/26/23 0810          Plan of Care Review    Plan of Care Reviewed With patient  -LR     Outcome Evaluation PT eval completed. Patient EOB upon entry. Patient CGA for sit<>stand transfer with no AD. mildly unsteady with standing. CGA for ambulation 10'x2 once with RW and once without. SPO2% dropped to 88% post each ambulation trial with <60 second recovery back up to 90% sitting EOB. Anticipate therapy before return to home. Patient reports she does has people that can come assist her at home if need be.  -LR     Row Name 05/26/23 0810          Therapy Assessment/Plan (PT)    Patient/Family Therapy Goals Statement (PT) Patient wants to return home.  -LR     Rehab Potential (PT) good, to achieve stated therapy goals  -LR     Criteria for Skilled Interventions Met (PT) yes;meets criteria;skilled treatment is necessary  -LR     Therapy Frequency (PT) other (see comments)  5-7d/week  -LR     Predicted Duration of Therapy Intervention (PT) Until d/c or until all goals met  -LR     Row Name 05/26/23 0810          Vital Signs    Pre Systolic BP Rehab 117  -LR     Pre Treatment Diastolic BP 76  -LR     Pretreatment Heart Rate (beats/min) 82  -LR     Intratreatment Heart  Rate (beats/min) 89  -LR     Posttreatment Heart Rate (beats/min) 84  -LR     Pre SpO2 (%) 92  -LR     O2 Delivery Pre Treatment supplemental O2  5L  -LR     Intra SpO2 (%) 88  -LR     O2 Delivery Intra Treatment supplemental O2  -LR     Post SpO2 (%) 92  -LR     O2 Delivery Post Treatment supplemental O2  -LR     Pre Patient Position Supine  -LR     Intra Patient Position Sitting  -LR     Post Patient Position Supine  -LR     Row Name 05/26/23 0810          Positioning and Restraints    Pre-Treatment Position in bed  -LR     Post Treatment Position bed  -LR     In Bed notified nsg;call light within reach;encouraged to call for assist;exit alarm on  -LR           User Key  (r) = Recorded By, (t) = Taken By, (c) = Cosigned By    Initials Name Provider Type    Jake Silva Physical Therapist               Outcome Measures     Row Name 05/26/23 0858 05/26/23 0810       How much help from another person do you currently need...    Turning from your back to your side while in flat bed without using bedrails? 3  -HE 3  -LR    Moving from lying on back to sitting on the side of a flat bed without bedrails? 3  -HE 3  -LR    Moving to and from a bed to a chair (including a wheelchair)? 3  -HE 3  -LR    Standing up from a chair using your arms (e.g., wheelchair, bedside chair)? 3  -HE 3  -LR    Climbing 3-5 steps with a railing? 2  -HE 3  -LR    To walk in hospital room? 3  -HE 3  -LR    AM-PAC 6 Clicks Score (PT) 17  -HE 18  -LR    Highest level of mobility 5 --> Static standing  -HE 6 --> Walked 10 steps or more  -LR    Row Name 05/26/23 0815 05/26/23 0810       Functional Assessment    Outcome Measure Options AM-PAC 6 Clicks Daily Activity (OT)  -SA AM-PAC 6 Clicks Basic Mobility (PT)  -LR          User Key  (r) = Recorded By, (t) = Taken By, (c) = Cosigned By    Initials Name Provider Type    Diana Cadena, RN Registered Nurse    Jake Silva Physical Therapist    Sarah Dykes OT Occupational  Therapist                             Physical Therapy Education     Title: PT OT SLP Therapies (In Progress)     Topic: Physical Therapy (Done)     Point: Mobility training (Done)     Learning Progress Summary           Patient Acceptance, E,TB, VU,NR by LR at 5/26/2023 1306    Comment: Educated on PT POC and goals.                   Point: Home exercise program (Done)     Learning Progress Summary           Patient Acceptance, E,TB, VU,NR by LR at 5/26/2023 1306    Comment: Educated on PT POC and goals.                   Point: Body mechanics (Done)     Learning Progress Summary           Patient Acceptance, E,TB, VU,NR by LR at 5/26/2023 1306    Comment: Educated on PT POC and goals.                   Point: Precautions (Done)     Learning Progress Summary           Patient Acceptance, E,TB, VU,NR by LR at 5/26/2023 1306    Comment: Educated on PT POC and goals.                               User Key     Initials Effective Dates Name Provider Type Discipline    LR 06/16/21 -  Jake Turner Physical Therapist PT              PT Recommendation and Plan     Plan of Care Reviewed With: patient  Outcome Evaluation: PT eval completed. Patient EOB upon entry. Patient CGA for sit<>stand transfer with no AD. mildly unsteady with standing. CGA for ambulation 10'x2 once with RW and once without. SPO2% dropped to 88% post each ambulation trial with <60 second recovery back up to 90% sitting EOB. Anticipate therapy before return to home. Patient reports she does has people that can come assist her at home if need be.     Time Calculation:    PT Charges     Row Name 05/26/23 1306             Time Calculation    Start Time 0810  -LR      Stop Time 0850  -LR      Time Calculation (min) 40 min  -LR      PT Received On 05/26/23  -LR      PT Goal Re-Cert Due Date 06/08/23  -LR         Untimed Charges    PT Eval/Re-eval Minutes 40  -LR         Total Minutes    Untimed Charges Total Minutes 40  -LR       Total Minutes 40  -LR             User Key  (r) = Recorded By, (t) = Taken By, (c) = Cosigned By    Initials Name Provider Type    LR Jake Turner Physical Therapist              Therapy Charges for Today     Code Description Service Date Service Provider Modifiers Qty    72473901075 HC PT EVAL MOD COMPLEXITY 3 5/26/2023 Jake Turner GP 1          PT G-Codes  Outcome Measure Options: AM-PAC 6 Clicks Daily Activity (OT)  AM-PAC 6 Clicks Score (PT): 17  AM-PAC 6 Clicks Score (OT): 16  PT Discharge Summary  Anticipated Discharge Disposition (PT): home with assist, home with home health    Jake Turner  5/26/2023

## 2023-05-26 NOTE — SIGNIFICANT NOTE
Pt resting comfortably at this time. Woke patient to instruct IS and complete RT protocol. Pt woke up briefly, RT explained reasoning for visit, pt stared at RT then went back to sleep. Pt in no distress at this time.

## 2023-05-26 NOTE — PROGRESS NOTES
[facility] Medicine Services  INPATIENT PROGRESS NOTE      Length of Stay: 1  Date of Admission: 5/25/2023  Primary Care Physician: Danielle Vaughn APRN    Subjective   Chief Complaint: Shortness of breath  HPI: Patient reports feeling some better today.  She is still somewhat sleepy on my evaluation but denies any complaints.  She is tolerating the Lasix infusion without difficulty.    Review of Systems   All pertinent negatives and positives are as above. All other systems have been reviewed and are negative unless otherwise stated.     Objective    As of today 05/26/23  Temp:  [97.8 °F (36.6 °C)-98.7 °F (37.1 °C)] 98.2 °F (36.8 °C)  Heart Rate:  [79-89] 79  Resp:  [12-20] 16  BP: (103-143)/(56-85) 121/71    Physical Exam  Constitutional:       General: She is not in acute distress.     Appearance: She is not toxic-appearing.   HENT:      Head: Normocephalic and atraumatic.      Right Ear: External ear normal.      Left Ear: External ear normal.      Nose: Nose normal.      Mouth/Throat:      Pharynx: Oropharynx is clear.   Eyes:      Conjunctiva/sclera: Conjunctivae normal.   Cardiovascular:      Rate and Rhythm: Normal rate and regular rhythm.      Heart sounds: Normal heart sounds.   Pulmonary:      Comments: Diminished but otherwise clear breath sounds  Abdominal:      General: Bowel sounds are normal.      Palpations: Abdomen is soft.      Tenderness: There is no abdominal tenderness.   Musculoskeletal:         General: No deformity.      Right lower leg: Edema present.      Left lower leg: Edema present.   Skin:     General: Skin is warm and dry.      Capillary Refill: Capillary refill takes less than 2 seconds.   Neurological:      General: No focal deficit present.      Mental Status: She is alert and oriented to person, place, and time. Mental status is at baseline.   Psychiatric:         Behavior: Behavior normal.           Results Review:  I have  reviewed the labs, radiology results, and diagnostic studies.    Laboratory Data:   Results from last 7 days   Lab Units 05/26/23  0611 05/25/23  1212   SODIUM mmol/L 139 140   POTASSIUM mmol/L 4.1 4.7   CHLORIDE mmol/L 92* 96*   CO2 mmol/L 44.0* 39.0*   BUN mg/dL 10 8   CREATININE mg/dL 0.62 0.62   GLUCOSE mg/dL 190* 155*   CALCIUM mg/dL 8.1* 8.5*   BILIRUBIN mg/dL 0.9 0.7   ALK PHOS U/L 106 104   ALT (SGPT) U/L 7 7   AST (SGOT) U/L 15 12   ANION GAP mmol/L 3.0* 5.0     Estimated Creatinine Clearance: 125.9 mL/min (by C-G formula based on SCr of 0.62 mg/dL).  Results from last 7 days   Lab Units 05/26/23  0611   MAGNESIUM mg/dL 1.8         Results from last 7 days   Lab Units 05/26/23  0611 05/25/23  1212   WBC 10*3/mm3 10.69 9.65   HEMOGLOBIN g/dL 13.5 14.1   HEMATOCRIT % 46.9* 49.6*   PLATELETS 10*3/mm3 232 205           Culture Data:   No results found for: BLOODCX  No results found for: URINECX  No results found for: RESPCX  No results found for: WOUNDCX  No results found for: STOOLCX  No components found for: BODYFLD    Radiology Data:   Imaging Results (Last 24 Hours)     ** No results found for the last 24 hours. **          I have reviewed the patient's current medications.     Assessment/Plan     Principal Problem:    Dyspnea, unspecified type  Active Problems:    Acute on chronic diastolic heart failure    Pulmonary hypertension    Anasarca    History of pulmonary embolus (PE)    Depression    Plan:  - Continue Lasix infusion  - Monitor I's and O's and daily weights  - Continue other home medications as appropriate  - Continue working with PT and OT  - DVT prophylaxis with Eliquis  - CODE STATUS: DNR/DNI    Medical Decision Making  Number and Complexity of problems: 5 high complexity problems    Conditions and Status:        Condition is improving.     MDM Data    Tests considered but not ordered: None     Decision rules/scores evaluated (example [id number], [place], etc): None     Discussed with:  patient     Treatment Plan  As above    Care Planning  Shared decision making: patient  Code status and discussions: full    Disposition  Social Determinants of Health that impact treatment or disposition: none  Disposition to be determined based on clinical course in the hospital      I have utilized all available immediate resources to obtain, update, or review the patient's current medications (including all prescriptions, over-the-counter products, herbals, cannabis/cannabidiol products, and vitamin/mineral/dietary (nutritional) supplements).   I confirmed that the patient's Advance Care Plan is present, code status is documented, or surrogate decision maker is listed in the patient's medical record.      Dexter Mclean MD

## 2023-05-26 NOTE — PAYOR COMM NOTE
"[facility]  Jean PEREZ RN, CM  Case Management  (P) [phone]  (F) [phone]    Patient admitted to OBV on 5/25/2023 and changed to INPT 5/26/2023.       Stephanie Bradshaw (63 y.o. Female)     Date of Birth   1960    Social Security Number       Address   [address] [place] [address]    Home Phone   [phone]    MRN   [id number]       Mormonism   None    Marital Status                               Admission Date   5/25/23    Admission Type   Emergency    Admitting Provider   Dexter Mclean MD    Attending Provider   Dexter Mclean MD    Department, Room/Bed   [address], [facility]/1       Discharge Date       Discharge Disposition       Discharge Destination                               Attending Provider: Dexter Mclean MD    Allergies: Penicillins, Penicillins    Isolation: None   Infection: None   Code Status: No CPR    Ht: 157.5 cm (62\")   Wt: 134 kg (296 lb)    Admission Cmt: None   Principal Problem: Dyspnea, unspecified type [R06.00]                 Active Insurance as of 5/25/2023     Primary Coverage     Payor Plan Insurance Group Employer/Plan Group    [employer] [place] [employer] [place]      Payor Plan Address Payor Plan Phone Number Payor Plan Fax Number Effective Dates    [address] [address] [phone]  8/9/2022 - None Entered    [facility] [id number]       Subscriber Name Subscriber Birth Date Member ID       STEPHANIE BRADSHAW 1960 [id number]                 Emergency Contacts      (Rel.) Home Phone Work Phone Mobile Phone    Sonia Correia (Friend) [phone] -- [phone]    Hieu Neumann (Son) [phone] -- [phone]    Hieu Crump (Son) [phone] -- --               History & Physical      Dexter Mclean MD at 05/25/23 1643                  [facility] Medicine Admission      Date of Admission: 5/25/2023      Primary " Care Physician: Danielle Vaughn APRN      Chief Complaint: Shortness of breath    HPI: Patient is a 63-year-old female with past medical history notable for type 2 diabetes mellitus, history of DVT and PE, COPD, hyperlipidemia, and chronic diastolic heart failure who presented to the hospital due to 1 to 2-week history of worsening dyspnea with exertion and at rest.  Patient also reports orthopnea and a 60 pound weight gain over the last several weeks.  Patient states that approximately 3 weeks ago she became tired of taking her medication so she stopped taking all of them including her Eliquis.  She states that she did this had a moment of depression but denies any suicidal or homicidal ideations.  Patient denies any chest pain or other concerning symptoms.  She does note that she has had some intermittent hematuria but otherwise has no other concerns.  Patient did have an elevated proBNP at 1054 and elevated D-dimer 2001.  CTA of her chest was negative for PE.    Concurrent Medical History:  has a past medical history of Controlled type 2 diabetes mellitus without complication, without long-term current use of insulin, COPD (chronic obstructive pulmonary disease), DVT (deep venous thrombosis) (08/2022), Hyperlipemia, mixed, and Pulmonary embolism (08/2022).    Past Surgical History:  has a past surgical history that includes Tubal ligation and Cystoscopy (N/A, 8/18/2022).    Family History: family history includes Alcohol abuse in her maternal grandmother; COPD in an other family member; Diabetes in an other family member; Heart disease in an other family member; Hypertension in an other family member; Liver cancer in her maternal grandmother; Lung cancer in her maternal aunt, maternal aunt, and mother; Prostate cancer in her father; Throat cancer in her paternal grandfather; Uterine cancer in her mother. No changes    Social History:  reports that she has been smoking cigarettes. She has a 60.00 pack-year  "smoking history. She has never used smokeless tobacco. She reports current alcohol use. She reports that she does not use drugs.    Allergies:   Allergies   Allergen Reactions   • Penicillins Rash     Patient states she is allergic to all \"cillins\".   • Penicillins Rash       Medications:   Prior to Admission medications    Medication Sig Start Date End Date Taking? Authorizing Provider   cyclobenzaprine (FLEXERIL) 10 MG tablet TK 1 T PO TID 12/20/16  Yes Janiya Montenegro MD   tiotropium bromide-olodaterol (STIOLTO RESPIMAT) 2.5-2.5 MCG/ACT aerosol solution inhaler Inhale 2 puffs Daily. 8/23/22  Yes Mimi Kern APRN   traMADol (ULTRAM) 50 MG tablet  8/25/22  Yes Janiya Montenegro MD   VENTOLIN  (90 BASE) MCG/ACT inhaler  1/16/17  Yes Janiya Montenegro MD   apixaban (ELIQUIS) 5 MG tablet tablet Take 1 tablet by mouth Every 12 (Twelve) Hours. Indications: DVT/PE (active thrombosis) 9/22/22   Rafael Cisse APRN   furosemide (LASIX) 40 MG tablet Take 1 tablet by mouth 2 (Two) Times a Day for 30 days. 8/19/22 9/18/22  Dexter Mclean MD   gabapentin (NEURONTIN) 400 MG capsule Take 1 capsule by mouth 3 (Three) Times a Day.    Janiya Montenegro MD   nystatin (MYCOSTATIN) 420947 UNIT/GM powder Apply  topically to the appropriate area as directed 4 (Four) Times a Day. 8/25/22 8/26/23  Janiya Montenegro MD   pentoxifylline (TRENtal) 400 MG CR tablet Take 1 tablet by mouth 2 (Two) Times a Day. 8/29/22   Rafael Cisse APRN   rosuvastatin (CRESTOR) 10 MG tablet Take 1 tablet by mouth Daily. 8/25/22   Janiya Montenegro MD       Review of Systems:  Review of Systems   Otherwise complete ROS is negative except as mentioned above.    Physical Exam:   Temp:  [98.7 °F (37.1 °C)] 98.7 °F (37.1 °C)  Heart Rate:  [77-90] 89  Resp:  [18-26] 22  BP: (128-160)/() 160/81  Physical Exam  Constitutional:       Appearance: She is ill-appearing.   HENT:      Head: Normocephalic and atraumatic. "      Right Ear: External ear normal.      Left Ear: External ear normal.      Nose: Nose normal.      Mouth/Throat:      Pharynx: Oropharynx is clear.   Eyes:      Conjunctiva/sclera: Conjunctivae normal.   Cardiovascular:      Rate and Rhythm: Normal rate and regular rhythm.      Heart sounds: Normal heart sounds.   Pulmonary:      Effort: Pulmonary effort is normal. No respiratory distress.      Breath sounds: Normal breath sounds.   Abdominal:      General: Bowel sounds are normal.      Palpations: Abdomen is soft.      Tenderness: There is no abdominal tenderness.      Comments: Edema of pannus noted   Musculoskeletal:      Right lower leg: Edema present.      Left lower leg: Edema present.      Comments: 4+ pitting edema bilaterally   Skin:     General: Skin is warm and dry.      Capillary Refill: Capillary refill takes less than 2 seconds.   Neurological:      General: No focal deficit present.      Mental Status: She is alert. Mental status is at baseline.   Psychiatric:         Behavior: Behavior normal.         Thought Content: Thought content normal.         Judgment: Judgment normal.           Results Reviewed:  I have personally reviewed current lab, radiology, and data and agree with results.  Lab Results (last 24 hours)     Procedure Component Value Units Date/Time    Single High Sensitivity Troponin T [[id number]]  (Abnormal) Collected: 05/25/23 1414    Specimen: Blood Updated: 05/25/23 1438     HS Troponin T 14 ng/L     Narrative:      High Sensitive Troponin T Reference Range:  <10.0 ng/L- Negative Female for AMI  <15.0 ng/L- Negative Male for AMI  >=10 - Abnormal Female indicating possible myocardial injury.  >=15 - Abnormal Male indicating possible myocardial injury.   Clinicians would have to utilize clinical acumen, EKG, Troponin, and serial changes to determine if it is an Acute Myocardial Infarction or myocardial injury due to an underlying chronic condition.         COVID-19 and FLU A/B PCR -  Swab, Nasopharynx [[id number]]  (Normal) Collected: 05/25/23 1313    Specimen: Swab from Nasopharynx Updated: 05/25/23 1414     COVID19 Not Detected     Influenza A PCR Not Detected     Influenza B PCR Not Detected    Narrative:      Fact sheet for providers: https://www.fda.gov/media/120681/download    Fact sheet for patients: https://www.fda.gov/media/120645/download    Test performed by PCR.    [place] Draw [[id number]] Collected: 05/25/23 1212    Specimen: Blood Updated: 05/25/23 1315    Narrative:      The following orders were created for panel order [place] Draw.  Procedure                               Abnormality         Status                     ---------                               -----------         ------                     Green Top (Gel)[[id number]]                                  Final result               Lavender Top[[id number]]                                     Final result               Gold Top - SST[[id number]]                                   Final result               Light Blue Top[[id number]]                                   Final result                 Please view results for these tests on the individual orders.    Light Blue Top [[id number]] Collected: 05/25/23 1212    Specimen: Blood Updated: 05/25/23 1315     Extra Tube Hold for add-ons.     Comment: Auto resulted       Green Top (Gel) [[id number]] Collected: 05/25/23 1212    Specimen: Blood Updated: 05/25/23 1315     Extra Tube Hold for add-ons.     Comment: Auto resulted.       Gold Top - SST [[id number]] Collected: 05/25/23 1212    Specimen: Blood Updated: 05/25/23 1315     Extra Tube Hold for add-ons.     Comment: Auto resulted.       Lavender Top [[id number]] Collected: 05/25/23 1212    Specimen: Blood Updated: 05/25/23 1315     Extra Tube hold for add-on     Comment: Auto resulted       Extra Tubes [[id number]] Collected: 05/25/23 1307    Specimen: Blood, Venous Line Updated: 05/25/23 1307    Narrative:      The following orders  "were created for panel order Extra Tubes.  Procedure                               Abnormality         Status                     ---------                               -----------         ------                     Aguilar Top[[id number]]                                         In process                   Please view results for these tests on the individual orders.    Gray Top [[id number]] Collected: 05/25/23 1307    Specimen: Blood Updated: 05/25/23 1307    D-dimer, Quantitative [[id number]]  (Abnormal) Collected: 05/25/23 1212    Specimen: Blood Updated: 05/25/23 1253     D-Dimer, Quantitative 1,000 ng/mL (FEU)     Narrative:      According to the assay 's published package insert, a normal (<500 ng/mL (FEU)) D-dimer result in conjunction with a non-high clinical probability assessment, excludes deep vein thrombosis (DVT) and pulmonary embolism (PE) with high sensitivity.    D-dimer values increase with age and this can make VTE exclusion of an older population difficult. To address this, the American College of Physicians, based on best available evidence and recent guidelines, recommends that clinicians use age-adjusted D-dimer thresholds in patients greater than 50 years of age with: a) a low probability of PE who do not meet all Pulmonary Embolism Rule Out Criteria, or b) in those with intermediate probability of PE.   The formula for an age-adjusted D-dimer cut-off is \"age*10\".  For example, a 60 year old patient would have an age-adjusted cut-off of 600 ng/mL (FEU) and an 80 year old 800 ng/mL (FEU).      Comprehensive Metabolic Panel [[id number]]  (Abnormal) Collected: 05/25/23 1212    Specimen: Blood Updated: 05/25/23 1242     Glucose 155 mg/dL      BUN 8 mg/dL      Creatinine 0.62 mg/dL      Sodium 140 mmol/L      Potassium 4.7 mmol/L      Chloride 96 mmol/L      CO2 39.0 mmol/L      Calcium 8.5 mg/dL      Total Protein 6.5 g/dL      Albumin 3.2 g/dL      ALT (SGPT) 7 U/L      AST (SGOT) 12 U/L  "     Alkaline Phosphatase 104 U/L      Total Bilirubin 0.7 mg/dL      Globulin 3.3 gm/dL      A/G Ratio 1.0 g/dL      BUN/Creatinine Ratio 12.9     Anion Gap 5.0 mmol/L      eGFR 101.5 mL/min/1.73     Narrative:      GFR Normal >60  Chronic Kidney Disease <60  Kidney Failure <15      Single High Sensitivity Troponin T [[id number]]  (Abnormal) Collected: 05/25/23 1212    Specimen: Blood Updated: 05/25/23 1241     HS Troponin T 14 ng/L     Narrative:      High Sensitive Troponin T Reference Range:  <10.0 ng/L- Negative Female for AMI  <15.0 ng/L- Negative Male for AMI  >=10 - Abnormal Female indicating possible myocardial injury.  >=15 - Abnormal Male indicating possible myocardial injury.   Clinicians would have to utilize clinical acumen, EKG, Troponin, and serial changes to determine if it is an Acute Myocardial Infarction or myocardial injury due to an underlying chronic condition.         BNP [[id number]]  (Abnormal) Collected: 05/25/23 1212    Specimen: Blood Updated: 05/25/23 1239     proBNP 1,054.0 pg/mL     Narrative:      Among patients with dyspnea, NT-proBNP is highly sensitive for the detection of acute congestive heart failure. In addition NT-proBNP of <300 pg/ml effectively rules out acute congestive heart failure with 99% negative predictive value.      CBC & Differential [[id number]]  (Abnormal) Collected: 05/25/23 1212    Specimen: Blood Updated: 05/25/23 1223    Narrative:      The following orders were created for panel order CBC & Differential.  Procedure                               Abnormality         Status                     ---------                               -----------         ------                     CBC Auto Differential[[id number]]        Abnormal            Final result                 Please view results for these tests on the individual orders.    CBC Auto Differential [[id number]]  (Abnormal) Collected: 05/25/23 1212    Specimen: Blood Updated: 05/25/23 1223     WBC 9.65 10*3/mm3       RBC 5.29 10*6/mm3      Hemoglobin 14.1 g/dL      Hematocrit 49.6 %      MCV 93.8 fL      MCH 26.7 pg      MCHC 28.4 g/dL      RDW 15.0 %      RDW-SD 52.6 fl      MPV 11.6 fL      Platelets 205 10*3/mm3      Neutrophil % 72.1 %      Lymphocyte % 18.7 %      Monocyte % 7.3 %      Eosinophil % 0.7 %      Basophil % 0.6 %      Immature Grans % 0.6 %      Neutrophils, Absolute 6.96 10*3/mm3      Lymphocytes, Absolute 1.80 10*3/mm3      Monocytes, Absolute 0.70 10*3/mm3      Eosinophils, Absolute 0.07 10*3/mm3      Basophils, Absolute 0.06 10*3/mm3      Immature Grans, Absolute 0.06 10*3/mm3      nRBC 0.2 /100 WBC         Imaging Results (Last 24 Hours)     Procedure Component Value Units Date/Time    CT Angiogram Chest [[id number]] Collected: 05/25/23 1616     Updated: 05/25/23 1625    Narrative:      EXAM:  CTA chest with contrast, PE protocol.    COMPARISON:  CTA chest with contrast, 8/9/2022.    HISTORY:  History of PE, elevated D-dimer and hypoxia with noncompliance of  anticoagulation.    TECHNIQUE:  Intravenous contrast was administered and axial images from the thoracic inlet  through the diaphragm were performed followed by 2D multiplanar reformats.  MIPS  were reconstructed and reviewed.    FINDINGS:  The lungs are clear and well-expanded with the exception of minimal right  basilar atelectasis.  No consolidation.    There is probably mild enlargement of the right heart.  No pleural or  pericardial effusion.  Esophagus is grossly normal in course and caliber.  No  pathologically enlarged lymphadenopathy identified in the chest by size  criteria.    No thoracic aortic dissection or dilatation appreciated.  Main pulmonary artery  is enlarged, measuring 4.4 cm.  No pulmonary embolism identified.    No acute process in the visualized upper abdomen or involving the osseous  structures.      Impression:      1.  No acute findings in the chest.  No pulmonary embolism.    2.  Enlargement of the main pulmonary artery  measuring up to 4.4 cm.  This  suggests pulmonary hypertension.  There is probably also mild enlargement of the  right heart.    XR Chest 2 View [[id number]] Collected: 05/25/23 1248     Updated: 05/25/23 1307    Narrative:      TWO VIEW CHEST:    INDICATION:  Triage protocol.    FINDINGS:  Cardiomegaly.  No focal consolidation.  Osseous structures are age-appropriate.      Impression:      impression:  No acute disease.            Assessment:    Active Hospital Problems    Diagnosis    • **Dyspnea, unspecified type    • Acute on chronic diastolic heart failure    • Pulmonary hypertension    • Anasarca    • History of pulmonary embolus (PE)    • Depression              Plan:  - Patient symptomology appears most consistent with acute on chronic diastolic heart failure  - She did receive 80 mg of IV Lasix in the ER given her significant weight gain over the last several weeks we will start her on a Lasix infusion at 10 mg/h  - Electrolyte replacement protocol will be in place  - Monitor I's and O's as well as daily weights  - CT scan did show signs of pulmonary hypertension which is likely contributing to her dyspnea as well  - Patient does admit to being depressed and she was offered psychiatry consultation as well as antidepressant medication.  She denied any suicidal or homicidal ideations during my exam and also denied wanting to have a consultation or start any medications at this time.  - We will resume her other home medications as appropriate  - She will eventually need PT and OT when she is able to tolerate being more mobile and has diuresed  - DVT prophylaxis with Eliquis  - CODE STATUS: DNR/DNI, patient wishes for her son Hieu to be her healthcare surrogate        Medical Decision Making  Number and Complexity of problems: 5 high complexity problems    Conditions and Status:        Condition is unchanged.     [facility] Data  My EKG interpretation: Normal sinus rhythm with what appears to be an incomplete right  bundle branch block  My plain film interpretation: Agree with radiology  Tests considered but not ordered: None     Decision rules/scores evaluated (example [id number], Wells, etc): None     Discussed with: Patient     Treatment Plan  As above    Care Planning  Shared decision making: Patient  Code status and discussions: DNR/DNI    Disposition  Social Determinants of Health that impact treatment or disposition: None  I expect the patient to be discharged to home in 3-4 days.     I have utilized all available immediate resources to obtain, update, or review the patient's current medications (including all prescriptions, over-the-counter products, herbals, cannabis/cannabidiol products, and vitamin/mineral/dietary (nutritional) supplements).   I confirmed that the patient's Advance Care Plan is present, code status is documented, or surrogate decision maker is listed in the patient's medical record.    I discussed the patient's findings and my recommendations with: Patient    Dexter Mclean MD            Electronically signed by Dexter Mclean MD at 05/25/23 7601         Lab Results (last 24 hours)     Procedure Component Value Units Date/Time    POC Glucose Once [[id number]]  (Abnormal) Collected: 05/26/23 1125    Specimen: Blood Updated: 05/26/23 1158     Glucose 183 mg/dL      Comment: RN NotifiedOperator: [id number] SWAIN ALISEMeter ID: [id number]       Comprehensive Metabolic Panel [[id number]]  (Abnormal) Collected: 05/26/23 0611    Specimen: Blood Updated: 05/26/23 0714     Glucose 190 mg/dL      BUN 10 mg/dL      Creatinine 0.62 mg/dL      Sodium 139 mmol/L      Potassium 4.1 mmol/L      Chloride 92 mmol/L      CO2 44.0 mmol/L      Calcium 8.1 mg/dL      Total Protein 6.3 g/dL      Albumin 3.0 g/dL      ALT (SGPT) 7 U/L      AST (SGOT) 15 U/L      Alkaline Phosphatase 106 U/L      Total Bilirubin 0.9 mg/dL      Globulin 3.3 gm/dL      A/G Ratio 0.9 g/dL      BUN/Creatinine Ratio 16.1     Anion Gap 3.0  mmol/L      eGFR 101.5 mL/min/1.73     Narrative:      GFR Normal >60  Chronic Kidney Disease <60  Kidney Failure <15      Magnesium [[id number]]  (Normal) Collected: 05/26/23 0611    Specimen: Blood Updated: 05/26/23 0713     Magnesium 1.8 mg/dL     CBC Auto Differential [[id number]]  (Abnormal) Collected: 05/26/23 0611    Specimen: Blood Updated: 05/26/23 0620     WBC 10.69 10*3/mm3      RBC 5.03 10*6/mm3      Hemoglobin 13.5 g/dL      Hematocrit 46.9 %      MCV 93.2 fL      MCH 26.8 pg      MCHC 28.8 g/dL      RDW 15.2 %      RDW-SD 52.3 fl      MPV 11.0 fL      Platelets 232 10*3/mm3      Neutrophil % 77.8 %      Lymphocyte % 12.4 %      Monocyte % 7.5 %      Eosinophil % 0.8 %      Basophil % 0.8 %      Immature Grans % 0.7 %      Neutrophils, Absolute 8.30 10*3/mm3      Lymphocytes, Absolute 1.33 10*3/mm3      Monocytes, Absolute 0.80 10*3/mm3      Eosinophils, Absolute 0.09 10*3/mm3      Basophils, Absolute 0.09 10*3/mm3      Immature Grans, Absolute 0.08 10*3/mm3      nRBC 0.0 /100 WBC     TSH Rfx On Abnormal To Free T4 [[id number]]  (Normal) Collected: 05/25/23 1831    Specimen: Blood Updated: 05/26/23 0142     TSH 1.960 uIU/mL     Hemoglobin A1c [[id number]]  (Abnormal) Collected: 05/25/23 1212    Specimen: Blood Updated: 05/25/23 1814     Hemoglobin A1C 8.10 %     Narrative:      Hemoglobin A1C Ranges:    Increased Risk for Diabetes  5.7% to 6.4%  Diabetes                     >= 6.5%  Diabetic Goal                < 7.0%    Extra Tubes [[id number]] Collected: 05/25/23 1307    Specimen: Blood, Venous Line Updated: 05/25/23 4385    Narrative:      The following orders were created for panel order Extra Tubes.  Procedure                               Abnormality         Status                     ---------                               -----------         ------                     Aguilar Top[[id number]]                                         Final result                 Please view results for these tests on the  individual orders.    Gray Top [[id number]] Collected: 05/25/23 1307    Specimen: Blood Updated: 05/25/23 1715     Extra Tube Hold for add-ons.     Comment: Auto resulted.       Single High Sensitivity Troponin T [[id number]]  (Abnormal) Collected: 05/25/23 1414    Specimen: Blood Updated: 05/25/23 1438     HS Troponin T 14 ng/L     Narrative:      High Sensitive Troponin T Reference Range:  <10.0 ng/L- Negative Female for AMI  <15.0 ng/L- Negative Male for AMI  >=10 - Abnormal Female indicating possible myocardial injury.  >=15 - Abnormal Male indicating possible myocardial injury.   Clinicians would have to utilize clinical acumen, EKG, Troponin, and serial changes to determine if it is an Acute Myocardial Infarction or myocardial injury due to an underlying chronic condition.             Imaging Results (Last 24 Hours)     Procedure Component Value Units Date/Time    CT Angiogram Chest [[id number]] Collected: 05/25/23 1616     Updated: 05/25/23 1726    Narrative:      EXAM:  CTA chest with contrast, PE protocol.    COMPARISON:  CTA chest with contrast, 8/9/2022.    HISTORY:  History of PE, elevated D-dimer and hypoxia with noncompliance of  anticoagulation.    TECHNIQUE:  Intravenous contrast was administered and axial images from the thoracic inlet  through the diaphragm were performed followed by 2D multiplanar reformats.  MIPS  were reconstructed and reviewed.    FINDINGS:  The lungs are clear and well-expanded with the exception of minimal right  basilar atelectasis.  No consolidation.    There is probably mild enlargement of the right heart.  No pleural or  pericardial effusion.  Esophagus is grossly normal in course and caliber.  No  pathologically enlarged lymphadenopathy identified in the chest by size  criteria.    No thoracic aortic dissection or dilatation appreciated.  Main pulmonary artery  is enlarged, measuring 4.4 cm.  No pulmonary embolism identified.    No acute process in the visualized upper  abdomen or involving the osseous  structures.      Impression:      1.  No acute findings in the chest.  No pulmonary embolism.    2.  Enlargement of the main pulmonary artery measuring up to 4.4 cm.  This  suggests pulmonary hypertension.  There is probably also mild enlargement of the  right heart.        ECG/EMG Results (last 24 hours)     Procedure Component Value Units Date/Time    ECG 12 Lead Dyspnea [[id number]] Collected: 05/25/23 1112     Updated: 05/25/23 1514     QT Interval 404 ms      QTC Interval 477 ms     Narrative:      Test Reason : Dyspnea  Blood Pressure :   */*   mmHG  Vent. Rate :  84 BPM     Atrial Rate :  84 BPM     P-R Int : 128 ms          QRS Dur : 102 ms      QT Int : 404 ms       P-R-T Axes :  61  78  -9 degrees     QTc Int : 477 ms    Normal sinus rhythm  Low voltage QRS  Incomplete right bundle branch block  T wave abnormality, consider anterior ischemia  Prolonged QT  Abnormal ECG  When compared with ECG of 09-AUG-2022 14:46,  Premature supraventricular complexes are no longer Present  Minimal criteria for Anteroseptal infarct are no longer Present    Referred By:            Confirmed By: LARISA TOLEDO MD    SCANNED EKG [[id number]] Resulted: 05/25/23     Updated: 05/25/23 2013

## 2023-05-26 NOTE — THERAPY EVALUATION
Patient Name: Stephanie Bradshaw  : 1960    MRN: [id number]                              Today's Date: 2023       Admit Date: 2023    Visit Dx:     ICD-10-CM ICD-9-CM   1. Dyspnea, unspecified type  R06.00 786.09   2. Abnormal EKG  R94.31 794.31   3. Acute on chronic respiratory failure with hypoxia  J96.21 518.84     799.02   4. Impaired mobility and ADLs  Z74.09 V49.89    Z78.9      Patient Active Problem List   Diagnosis   • Multiple subsegmental pulmonary emboli without acute cor pulmonale   • Cellulitis   • Cellulitis   • Ulises hematuria   • Mixed hyperlipidemia   • Morbid obesity with BMI of 45.0-49.9, adult   • Leg edema   • Venous stasis dermatitis of both lower extremities   • Lymphedema   • Borderline diabetic   • Cardiomegaly   • Dyspnea on exertion   • Elevated blood pressure reading without diagnosis of hypertension   • Right ventricular cardiac abnormality   • Hypersomnia with sleep apnea   • Ascending aorta dilation   • Venous stasis dermatitis of both lower extremities   • Mucopurulent chronic bronchitis   • Dyspnea, unspecified type   • Acute on chronic diastolic heart failure   • Pulmonary hypertension   • Anasarca   • History of pulmonary embolus (PE)   • Depression     Past Medical History:   Diagnosis Date   • Controlled type 2 diabetes mellitus without complication, without long-term current use of insulin    • COPD (chronic obstructive pulmonary disease)    • DVT (deep venous thrombosis) 2022    left popliteal   • Hyperlipemia, mixed    • Pulmonary embolism 2022    right     Past Surgical History:   Procedure Laterality Date   • CYSTOSCOPY N/A 2022    Procedure: CYSTOSCOPY;  Surgeon: Davide Moore MD;  Location: [facility];  Service: Urology;  Laterality: N/A;   • TUBAL ABDOMINAL LIGATION        General Information     Row Name 23 0815          OT Time and Intention    Document Type evaluation  -SA     Mode of Treatment physical therapy;occupational  therapy  -     Row Name 05/26/23 0815          General Information    Patient Profile Reviewed yes  -SA     Prior Level of Function independent:;all household mobility;community mobility;gait;transfer;bed mobility;ADL's  -     Existing Precautions/Restrictions oxygen therapy device and L/min;fall  -     Barriers to Rehab medically complex  -     Row Name 05/26/23 0815          Living Environment    People in Home alone  -     Row Name 05/26/23 0815          Stairs Within Home, Primary    Stairs, Within Home, Primary sleeps on couch, regular toliet, tub/shower combo, Rollator out in the community, no AD use in the house  -     Row Name 05/26/23 0815          Cognition    Orientation Status (Cognition) oriented x 4  -[facility] Name 05/26/23 0815          Safety Issues, Functional Mobility    Safety Issues Affecting Function (Mobility) ability to follow commands;at risk behavior observed;awareness of need for assistance;positioning of assistive device;problem-solving;safety precaution awareness;safety precautions follow-through/compliance  -     Impairments Affecting Function (Mobility) endurance/activity tolerance;strength;shortness of breath;visual/perceptual  -           User Key  (r) = Recorded By, (t) = Taken By, (c) = Cosigned By    Initials Name Provider Type    SA Sarah Zimmerman OT Occupational Therapist                 Mobility/ADL's     Row Name 05/26/23 0815          Bed Mobility    Bed Mobility supine-sit  -     Supine-Sit [place] (Bed Mobility) contact guard  -     Assistive Device (Bed Mobility) head of bed elevated;bed rails  -     Comment, (Bed Mobility) Pt got into quadruped to get into bed  -     Row Name 05/26/23 0815          Functional Mobility    Functional Mobility- Ind. Level contact guard assist  -     Functional Mobility- Device walker, front-wheeled  -     Functional Mobility-Distance (Feet) 20  -     Row Name 05/26/23 0815          Activities of Daily  Living    BADL Assessment/Intervention lower body dressing  -     Row Name 05/26/23 0815          Lower Body Dressing Assessment/Training    [place] Level (Lower Body Dressing) socks;doff;don;dependent (less than 25% patient effort)  -     Position (Lower Body Dressing) edge of bed sitting  -           User Key  (r) = Recorded By, (t) = Taken By, (c) = Cosigned By    Initials Name Provider Type     Sarah Zimmerman OT Occupational Therapist               Obj/Interventions     Row Name 05/26/23 0815          Sensory Assessment (Somatosensory)    Sensory Assessment (Somatosensory) UE sensation intact  -[facility] Name 05/26/23 0815          Range of Motion Comprehensive    General Range of Motion upper extremity range of motion deficits identified;other (see comments)  -     Comment, General Range of Motion BUE shoulder flexion/abduction ~90 degrees. All other joints WFL.  -     Row Name 05/26/23 0815          Strength Comprehensive (MMT)    General Manual Muscle Testing (MMT) Assessment other (see comments)  -     Comment, General Manual Muscle Testing (MMT) Assessment BUE 4/5 grossly  -           User Key  (r) = Recorded By, (t) = Taken By, (c) = Cosigned By    Initials Name Provider Type     Sarah Zimmerman OT Occupational Therapist               Goals/Plan     Row Name 05/26/23 0815          Transfer Goal 1 (OT)    Activity/Assistive Device (Transfer Goal 1, OT) toilet  -     [place] Level/Cues Needed (Transfer Goal 1, OT) modified independence  -SA     Time Frame (Transfer Goal 1, OT) long term goal (LTG);by discharge  -     Progress/Outcome (Transfer Goal 1, OT) goal not met  -[facility] Name 05/26/23 0815          Bathing Goal 1 (OT)    Activity/Device (Bathing Goal 1, OT) lower body bathing  -     [place] Level/Cues Needed (Bathing Goal 1, OT) modified independence  -SA     Time Frame (Bathing Goal 1, OT) long term goal (LTG);by discharge  -     Progress/Outcomes  (Bathing Goal 1, OT) goal not met  -[facility] Name 05/26/23 0815          Dressing Goal 1 (OT)    Activity/Device (Dressing Goal 1, OT) lower body dressing  -SA     [place]/Cues Needed (Dressing Goal 1, OT) modified independence  -SA     Time Frame (Dressing Goal 1, OT) long term goal (LTG);by discharge  -SA     Progress/Outcome (Dressing Goal 1, OT) goal not met  -SA     Row Name 05/26/23 0815          Toileting Goal 1 (OT)    Activity/Device (Toileting Goal 1, OT) toileting skills, all  -SA     [place] Level/Cues Needed (Toileting Goal 1, OT) independent  -SA     Time Frame (Toileting Goal 1, OT) long term goal (LTG);by discharge  -SA     Progress/Outcome (Toileting Goal 1, OT) goal not met  -SA     Row Name 05/26/23 0815          Therapy Assessment/Plan (OT)    Planned Therapy Interventions (OT) activity tolerance training;manual therapy/joint mobilization;patient/caregiver education/training;adaptive equipment training;neuromuscular control/coordination retraining;BADL retraining;cognitive/visual perception retraining;occupation/activity based interventions;ROM/therapeutic exercise;strengthening exercise;edema control/reduction;functional balance retraining;IADL retraining;passive ROM/stretching;transfer/mobility retraining  -           User Key  (r) = Recorded By, (t) = Taken By, (c) = Cosigned By    Initials Name Provider Type    SA Sarah Zimmerman OT Occupational Therapist               Clinical Impression     Row Name 05/26/23 0815          Pain Assessment    Pretreatment Pain Rating 0/10 - no pain  -SA     Posttreatment Pain Rating 0/10 - no pain  -SA     Row Name 05/26/23 0815          Plan of Care Review    Plan of Care Reviewed With patient  -SA     Outcome Evaluation OT eval completed as co-eval with PT. Patient sitting EOB and agreeable to therapy. Pt dependent to don<>doff socks. CGA sit<>stand x4 times.  CGA to complete func mob one side of bed to the other without AD. CGA again to  complete func mob back to the other side of bed using FWW. Pt stood and placed herself in quadruped on bed with CGA for safety to lay down. Pt SpO2 dropped from 93% at rest with NC to 89% with func mob. Pt able to recover quickly with seated rest.  -     Row Name 05/26/23 0815          Therapy Assessment/Plan (OT)    Patient/Family Therapy Goal Statement (OT) return home  -     Rehab Potential (OT) good, to achieve stated therapy goals  -     Criteria for Skilled Therapeutic Interventions Met (OT) yes;meets criteria;skilled treatment is necessary  -     Therapy Frequency (OT) other (see comments)  5-7 d/wk  -     Predicted Duration of Therapy Intervention (OT) until all goals met or d/c from hospital  -     Row Name 05/26/23 0815          Therapy Plan Review/Discharge Plan (OT)    Anticipated Discharge Disposition (OT) home with assist  -     Row Name 05/26/23 0815          Vital Signs    Pre Systolic BP Rehab 117  -SA     Pre Treatment Diastolic BP 76  -SA     Pretreatment Heart Rate (beats/min) 82  -SA     Pre SpO2 (%) 92  -SA     O2 Delivery Pre Treatment supplemental O2  5 L  -SA     Pre Patient Position Sitting  -     Row Name 05/26/23 0815          Positioning and Restraints    Pre-Treatment Position in bed  -SA     Post Treatment Position bed  -SA     In Bed supine;call light within reach;encouraged to call for assist;exit alarm on  -SA           User Key  (r) = Recorded By, (t) = Taken By, (c) = Cosigned By    Initials Name Provider Type    Sarah Dykes OT Occupational Therapist               Outcome Measures     Row Name 05/26/23 0815          How much help from another is currently needed...    Putting on and taking off regular lower body clothing? 1  -SA     Bathing (including washing, rinsing, and drying) 2  -SA     Toileting (which includes using toilet bed pan or urinal) 2  -SA     Putting on and taking off regular upper body clothing 3  -SA     Taking care of personal grooming  (such as brushing teeth) 4  -     Eating meals 4  -SA     AM-PAC 6 Clicks Score (OT) 16  -     Row Name 05/26/23 0815 05/26/23 0810       Functional Assessment    Outcome Measure Options AM-PAC 6 Clicks Daily Activity (OT)  - AM-PAC 6 Clicks Basic Mobility (PT)  -          User Key  (r) = Recorded By, (t) = Taken By, (c) = Cosigned By    Initials Name Provider Type    Jake Silva Physical Therapist    Sarah Dykes OT Occupational Therapist                Occupational Therapy Education     Title: PT OT SLP Therapies (In Progress)     Topic: Occupational Therapy (In Progress)     Point: ADL training (Done)     Description:   Instruct learner(s) on proper safety adaptation and remediation techniques during self care or transfers.   Instruct in proper use of assistive devices.              Learning Progress Summary           Patient Acceptance, E,TB, VU by  at 5/26/2023 0910    Comment: OT POC, role of OT, transfer training                   Point: Home exercise program (Not Started)     Description:   Instruct learner(s) on appropriate technique for monitoring, assisting and/or progressing therapeutic exercises/activities.              Learner Progress:  Not documented in this visit.          Point: Precautions (Done)     Description:   Instruct learner(s) on prescribed precautions during self-care and functional transfers.              Learning Progress Summary           Patient Acceptance, E,TB, VU by  at 5/26/2023 0910    Comment: OT POC, role of OT, transfer training                   Point: Body mechanics (Done)     Description:   Instruct learner(s) on proper positioning and spine alignment during self-care, functional mobility activities and/or exercises.              Learning Progress Summary           Patient Acceptance, E,TB, VU by  at 5/26/2023 0910    Comment: OT POC, role of OT, transfer training                               User Key     Initials Effective Dates Name Provider  Type Discipline     08/11/22 -  Sarah Zimmerman OT Occupational Therapist OT              OT Recommendation and Plan  Planned Therapy Interventions (OT): activity tolerance training, manual therapy/joint mobilization, patient/caregiver education/training, adaptive equipment training, neuromuscular control/coordination retraining, BADL retraining, cognitive/visual perception retraining, occupation/activity based interventions, ROM/therapeutic exercise, strengthening exercise, edema control/reduction, functional balance retraining, IADL retraining, passive ROM/stretching, transfer/mobility retraining  Therapy Frequency (OT): other (see comments) (5-7 d/wk)  Plan of Care Review  Plan of Care Reviewed With: patient  Outcome Evaluation: OT eval completed as co-eval with PT. Patient sitting EOB and agreeable to therapy. Pt dependent to don<>doff socks. CGA sit<>stand x4 times.  CGA to complete func mob one side of bed to the other without AD. CGA again to complete func mob back to the other side of bed using FWW. Pt stood and placed herself in quadruped on bed with CGA for safety to lay down. Pt SpO2 dropped from 93% at rest with NC to 89% with func mob. Pt able to recover quickly with seated rest.     Time Calculation:    Time Calculation- OT     Row Name 05/26/23 0815             Time Calculation- OT    OT Start Time 0815  -SA      OT Stop Time 0900  -SA      OT Time Calculation (min) 45 min  -SA      OT Received On 05/26/23  -SA      OT Goal Re-Cert Due Date 06/08/23  -SA         Untimed Charges    OT Eval/Re-eval Minutes 45  -SA         Total Minutes    Untimed Charges Total Minutes 45  -SA       Total Minutes 45  -SA            User Key  (r) = Recorded By, (t) = Taken By, (c) = Cosigned By    Initials Name Provider Type     Sarah Zimmerman OT Occupational Therapist              Therapy Charges for Today     Code Description Service Date Service Provider Modifiers Qty    [id number]  OT EVAL MOD COMPLEXITY 3  5/26/2023 Sarah Zimmerman, OT GO 1               Sarah Zimmerman, SARA  5/26/2023

## 2023-05-26 NOTE — PLAN OF CARE
Goal Outcome Evaluation:  Plan of Care Reviewed With: patient           Outcome Evaluation: PT eval completed. Patient EOB upon entry. Patient CGA for sit<>stand transfer with no AD. mildly unsteady with standing. CGA for ambulation 10'x2 once with RW and once without. SPO2% dropped to 88% post each ambulation trial with <60 second recovery back up to 90% sitting EOB. Anticipate therapy before return to home. Patient reports she does has people that can come assist her at home if need be.      PT Evaluation Complexity  History, PT Evaluation Complexity: 3 or more personal factors and/or comorbidities  Examination of Body Systems (PT Eval Complexity): total of 4 or more elements  Clinical Presentation (PT Evaluation Complexity): evolving  Clinical Decision Making (PT Evaluation Complexity): moderate complexity  Overall Complexity (PT Evaluation Complexity): moderate complexity

## 2023-05-26 NOTE — PLAN OF CARE
Goal Outcome Evaluation:  Plan of Care Reviewed With: patient           Outcome Evaluation: OT eval completed as co-eval with PT. Patient sitting EOB and agreeable to therapy. Pt dependent to don<>doff socks. CGA sit<>stand x4 times.  CGA to complete func mob one side of bed to the other without AD. CGA again to complete func mob back to the other side of bed using FWW. Pt stood and placed herself in quadruped on bed with CGA for safety to lay down. Pt SpO2 dropped from 93% at rest with NC to 89% with func mob. Pt able to recover quickly with seated rest.

## 2023-05-26 NOTE — PROGRESS NOTES
Item 0 Points 1 Point 2 Points 3 Points 4 Points Subtotal   Mental Status Alert, oriented, cooperative Lethargic, follows commands Confused, not following commands Obtunded or Somnolent Comatose 0   Respiratory Pattern Regular RR 8-16 breaths/minute Increased RR 18-25 breaths/minute Dyspnea on exertion, irregular RR 26-30 breaths/minute Shortness of breath,  RR 31-35 breaths/minute Accessory muscle use, severe SOB  RR > 35 breaths/minute 1   Breath Sounds Clear Decreased unilaterally Decreased bilaterally Basilar crackles Wheezing and/or rhonchi 1   Cough Strong, spontaneous, non-productive Strong productive Weak, non-productive Weak, productive or weak with rhonchi Absent or may require suctioning 0   Pulmonary Status Nonsmoker, no previous history, >1 year quit < 1 PPD  <1 year quit > or = 1 PPD Diagnosed pulmonary disease (severe or chronic) Severe or chronic pulmonary disease with exacerbation 3   Surgical Status None General surgery (non-abdominal or non-thoracic) Lower abdominal Thoracic or upper abdominal Thoracic with pulmonary disease 0   Chest X-ray Clear Chronic changes Infiltrates, atelectasis or pleural effusion Infiltrates in > 1 lobe Diffuse infiltrates and atelectasis and/or effusions 0   Activity   Level Ambulatory Ambulatory with assistance Non-ambulatory Paraplegic Quadriplegic 0        Total Score   5   Score    Drug Therapy Frequency 20 or >    Q4 Duoneb with Q2 Albuterol PRN 15-19    Q6 Duoneb with Q4 Albuterol PRN 10-14    QID Duoneb with Q4 Albuterol PRN 5-9    TID Duoneb with Q6 Albuterol PRN 0-4    Q4 PRN Duoneb                  Lung Expansion Therapy (PEP) Bronchopulmonary Hygiene (CPT)   Q4 & PRN - Severe atelectasis, poor oxygenation Q4 - Copious secretions, dyspnea, unable to sleep   QID - High risk for persistent atelectasis, existence of atelectasis QID & Q4 PRN - Moderate secretion production   TID - At risk for developing atelectasis TID - Small amounts of secretions with poor cough    BID - Prevention of atelectasis BID - Unable to breathe deeply and cough spontaneously     RT Comments / Recommendations:      RT evaluated the pt. The pt scores to have TID DUoneb with Q6 Albuterol PRN. Re-eval in 48 hours.

## 2023-05-26 NOTE — PLAN OF CARE
Goal Outcome Evaluation:  Plan of Care Reviewed With: patient        Progress: no change  Outcome Evaluation: VSS; no new complaints; soap and water bath given; nystatin powder ordered for excoriation; patient confused and drowsy.

## 2023-05-27 PROBLEM — J96.22 ACUTE ON CHRONIC RESPIRATORY FAILURE WITH HYPOXIA AND HYPERCAPNIA: Status: ACTIVE | Noted: 2023-05-27

## 2023-05-27 PROBLEM — J96.01 ACUTE RESPIRATORY FAILURE WITH HYPOXIA: Status: ACTIVE | Noted: 2023-05-27

## 2023-05-27 PROBLEM — J96.21 ACUTE ON CHRONIC RESPIRATORY FAILURE WITH HYPOXIA AND HYPERCAPNIA: Status: ACTIVE | Noted: 2023-05-27

## 2023-05-27 LAB
ALBUMIN SERPL-MCNC: 2.9 G/DL (ref 3.5–5.2)
ALBUMIN/GLOB SERPL: 0.9 G/DL
ALP SERPL-CCNC: 104 U/L (ref 39–117)
ALT SERPL W P-5'-P-CCNC: 7 U/L (ref 1–33)
ANION GAP SERPL CALCULATED.3IONS-SCNC: 5 MMOL/L (ref 5–15)
ARTERIAL PATENCY WRIST A: ABNORMAL
ARTERIAL PATENCY WRIST A: ABNORMAL
AST SERPL-CCNC: 13 U/L (ref 1–32)
ATMOSPHERIC PRESS: 752 MMHG
ATMOSPHERIC PRESS: 753 MMHG
BASE EXCESS BLDA CALC-SCNC: 25.5 MMOL/L (ref 0–2)
BASE EXCESS BLDA CALC-SCNC: 25.6 MMOL/L (ref 0–2)
BASOPHILS # BLD AUTO: 0.07 10*3/MM3 (ref 0–0.2)
BASOPHILS NFR BLD AUTO: 0.7 % (ref 0–1.5)
BDY SITE: ABNORMAL
BDY SITE: ABNORMAL
BILIRUB SERPL-MCNC: 0.9 MG/DL (ref 0–1.2)
BUN SERPL-MCNC: 8 MG/DL (ref 8–23)
BUN/CREAT SERPL: 10.7 (ref 7–25)
CALCIUM SPEC-SCNC: 8.1 MG/DL (ref 8.6–10.5)
CHLORIDE SERPL-SCNC: 86 MMOL/L (ref 98–107)
CLUMPED PLATELETS: PRESENT
CO2 SERPL-SCNC: 50 MMOL/L (ref 22–29)
CREAT SERPL-MCNC: 0.75 MG/DL (ref 0.57–1)
DEPRECATED RDW RBC AUTO: 50.7 FL (ref 37–54)
EGFRCR SERPLBLD CKD-EPI 2021: 90.7 ML/MIN/1.73
EOSINOPHIL # BLD AUTO: 0.06 10*3/MM3 (ref 0–0.4)
EOSINOPHIL NFR BLD AUTO: 0.6 % (ref 0.3–6.2)
EPAP: 8
ERYTHROCYTE [DISTWIDTH] IN BLOOD BY AUTOMATED COUNT: 14.9 % (ref 12.3–15.4)
GAS FLOW AIRWAY: 6 LPM
GLOBULIN UR ELPH-MCNC: 3.1 GM/DL
GLUCOSE BLDC GLUCOMTR-MCNC: 132 MG/DL (ref 70–130)
GLUCOSE BLDC GLUCOMTR-MCNC: 156 MG/DL (ref 70–130)
GLUCOSE BLDC GLUCOMTR-MCNC: 186 MG/DL (ref 70–130)
GLUCOSE BLDC GLUCOMTR-MCNC: 189 MG/DL (ref 70–130)
GLUCOSE SERPL-MCNC: 144 MG/DL (ref 65–99)
HCO3 BLDA-SCNC: 54.5 MMOL/L (ref 20–26)
HCO3 BLDA-SCNC: 56.3 MMOL/L (ref 20–26)
HCT VFR BLD AUTO: 46.7 % (ref 34–46.6)
HGB BLD-MCNC: 13.5 G/DL (ref 12–15.9)
IMM GRANULOCYTES # BLD AUTO: 0.06 10*3/MM3 (ref 0–0.05)
IMM GRANULOCYTES NFR BLD AUTO: 0.6 % (ref 0–0.5)
INHALED O2 CONCENTRATION: 50 %
LYMPHOCYTES # BLD AUTO: 1.54 10*3/MM3 (ref 0.7–3.1)
LYMPHOCYTES NFR BLD AUTO: 16.3 % (ref 19.6–45.3)
Lab: ABNORMAL
Lab: ABNORMAL
MCH RBC QN AUTO: 26.8 PG (ref 26.6–33)
MCHC RBC AUTO-ENTMCNC: 28.9 G/DL (ref 31.5–35.7)
MCV RBC AUTO: 92.7 FL (ref 79–97)
MODALITY: ABNORMAL
MODALITY: ABNORMAL
MONOCYTES # BLD AUTO: 0.65 10*3/MM3 (ref 0.1–0.9)
MONOCYTES NFR BLD AUTO: 6.9 % (ref 5–12)
NEUTROPHILS NFR BLD AUTO: 7.04 10*3/MM3 (ref 1.7–7)
NEUTROPHILS NFR BLD AUTO: 74.9 % (ref 42.7–76)
NRBC BLD AUTO-RTO: 0 /100 WBC (ref 0–0.2)
PCO2 BLDA: 73.2 MM HG (ref 35–45)
PCO2 BLDA: 86.9 MM HG (ref 35–45)
PH BLDA: 7.42 PH UNITS (ref 7.35–7.45)
PH BLDA: 7.48 PH UNITS (ref 7.35–7.45)
PLATELET # BLD AUTO: 132 10*3/MM3 (ref 140–450)
PMV BLD AUTO: 10.3 FL (ref 6–12)
PO2 BLDA: 54.3 MM HG (ref 83–108)
PO2 BLDA: 71.3 MM HG (ref 83–108)
POTASSIUM SERPL-SCNC: 3.8 MMOL/L (ref 3.5–5.2)
PROT SERPL-MCNC: 6 G/DL (ref 6–8.5)
RBC # BLD AUTO: 5.04 10*6/MM3 (ref 3.77–5.28)
RBC MORPH BLD: NORMAL
SAO2 % BLDCOA: 72.2 % (ref 94–99)
SAO2 % BLDCOA: 96 % (ref 94–99)
SET MECH RESP RATE: 20
SMALL PLATELETS BLD QL SMEAR: NORMAL
SODIUM SERPL-SCNC: 141 MMOL/L (ref 136–145)
VENTILATOR MODE: ABNORMAL
VENTILATOR MODE: ABNORMAL
VT ON VENT VENT: 425 ML
WBC MORPH BLD: NORMAL
WBC NRBC COR # BLD: 9.42 10*3/MM3 (ref 3.4–10.8)

## 2023-05-27 PROCEDURE — 94760 N-INVAS EAR/PLS OXIMETRY 1: CPT

## 2023-05-27 PROCEDURE — 82948 REAGENT STRIP/BLOOD GLUCOSE: CPT

## 2023-05-27 PROCEDURE — 94799 UNLISTED PULMONARY SVC/PX: CPT

## 2023-05-27 PROCEDURE — 85025 COMPLETE CBC W/AUTO DIFF WBC: CPT | Performed by: FAMILY MEDICINE

## 2023-05-27 PROCEDURE — 94664 DEMO&/EVAL PT USE INHALER: CPT

## 2023-05-27 PROCEDURE — 82803 BLOOD GASES ANY COMBINATION: CPT

## 2023-05-27 PROCEDURE — 36600 WITHDRAWAL OF ARTERIAL BLOOD: CPT

## 2023-05-27 PROCEDURE — 85007 BL SMEAR W/DIFF WBC COUNT: CPT | Performed by: FAMILY MEDICINE

## 2023-05-27 PROCEDURE — 80053 COMPREHEN METABOLIC PANEL: CPT | Performed by: FAMILY MEDICINE

## 2023-05-27 PROCEDURE — 25010000002 FUROSEMIDE PER 20 MG: Performed by: FAMILY MEDICINE

## 2023-05-27 PROCEDURE — 94660 CPAP INITIATION&MGMT: CPT

## 2023-05-27 RX ORDER — POTASSIUM CHLORIDE 750 MG/1
40 CAPSULE, EXTENDED RELEASE ORAL DAILY
Status: DISPENSED | OUTPATIENT
Start: 2023-05-27 | End: 2023-05-29

## 2023-05-27 RX ADMIN — IPRATROPIUM BROMIDE AND ALBUTEROL SULFATE 3 ML: .5; 3 SOLUTION RESPIRATORY (INHALATION) at 19:37

## 2023-05-27 RX ADMIN — Medication 1 PATCH: at 17:02

## 2023-05-27 RX ADMIN — FUROSEMIDE 10 MG/HR: 10 INJECTION, SOLUTION INTRAVENOUS at 17:02

## 2023-05-27 RX ADMIN — POTASSIUM CHLORIDE 40 MEQ: 10 CAPSULE, COATED, EXTENDED RELEASE ORAL at 16:00

## 2023-05-27 RX ADMIN — Medication 1 APPLICATION: at 19:52

## 2023-05-27 RX ADMIN — Medication 10 ML: at 08:50

## 2023-05-27 RX ADMIN — APIXABAN 5 MG: 5 TABLET, FILM COATED ORAL at 19:52

## 2023-05-27 RX ADMIN — Medication 1 APPLICATION: at 08:49

## 2023-05-27 RX ADMIN — NYSTATIN: 100000 POWDER TOPICAL at 08:49

## 2023-05-27 RX ADMIN — PENTOXIFYLLINE 400 MG: 400 TABLET, EXTENDED RELEASE ORAL at 19:52

## 2023-05-27 RX ADMIN — NYSTATIN: 100000 POWDER TOPICAL at 19:53

## 2023-05-27 RX ADMIN — IPRATROPIUM BROMIDE AND ALBUTEROL SULFATE 3 ML: .5; 3 SOLUTION RESPIRATORY (INHALATION) at 14:01

## 2023-05-27 RX ADMIN — PENTOXIFYLLINE 400 MG: 400 TABLET, EXTENDED RELEASE ORAL at 08:50

## 2023-05-27 RX ADMIN — IPRATROPIUM BROMIDE AND ALBUTEROL SULFATE 3 ML: .5; 3 SOLUTION RESPIRATORY (INHALATION) at 07:33

## 2023-05-27 RX ADMIN — FUROSEMIDE 10 MG/HR: 10 INJECTION, SOLUTION INTRAVENOUS at 06:43

## 2023-05-27 RX ADMIN — ROSUVASTATIN CALCIUM 10 MG: 10 TABLET, FILM COATED ORAL at 08:50

## 2023-05-27 RX ADMIN — APIXABAN 5 MG: 5 TABLET, FILM COATED ORAL at 08:50

## 2023-05-27 NOTE — NURSING NOTE
Patient has increasing confusion. ABG obtained. MD ordered bipap. Notified respiratory who advises they will call Dr. Mclean.

## 2023-05-27 NOTE — NURSING NOTE
"This RN and a CNA attempted to wash and untangle patient's hair. The patient has a very large section of matted hair that was unable to successfully be brushed out. The patient states that she cannot care for her own hair due to arthritis pain and \"it has just gone too long without being brushed.\"   "

## 2023-05-27 NOTE — PROGRESS NOTES
Dr. Mclean aware of ABG results and Bipap ordered.  This RT will attempt to place patient on BiPap.

## 2023-05-27 NOTE — PROGRESS NOTES
[facility] Medicine Services  INPATIENT PROGRESS NOTE      Length of Stay: 2  Date of Admission: 5/25/2023  Primary Care Physician: Danielle Vaughn APRN    Subjective   Chief Complaint: Shortness of breath  HPI: Patient reports feeling like her breathing is improved.  Per report she has had intermittent confusion at times.  She is able to tell me she is in the hospital and has had difficulties with depression.  She still denies wishing to talk to psychiatry.    Review of Systems   All pertinent negatives and positives are as above. All other systems have been reviewed and are negative unless otherwise stated.     Objective    As of today 05/27/23  Temp:  [97.8 °F (36.6 °C)-98.4 °F (36.9 °C)] 98.2 °F (36.8 °C)  Heart Rate:  [77-83] 80  Resp:  [12-22] 18  BP: (101-134)/(56-80) 120/56    Physical Exam  Constitutional:       General: She is not in acute distress.     Appearance: She is not toxic-appearing.   HENT:      Head: Normocephalic and atraumatic.      Right Ear: External ear normal.      Left Ear: External ear normal.      Nose: Nose normal.      Mouth/Throat:      Pharynx: Oropharynx is clear.   Eyes:      Conjunctiva/sclera: Conjunctivae normal.   Cardiovascular:      Rate and Rhythm: Normal rate and regular rhythm.      Heart sounds: Normal heart sounds.   Pulmonary:      Comments: Diminished but otherwise clear breath sounds  Abdominal:      General: Bowel sounds are normal.      Palpations: Abdomen is soft.      Tenderness: There is no abdominal tenderness.   Musculoskeletal:         General: No deformity.      Right lower leg: Edema present.      Left lower leg: Edema present.   Skin:     General: Skin is warm and dry.      Capillary Refill: Capillary refill takes less than 2 seconds.   Neurological:      General: No focal deficit present.      Mental Status: She is alert and oriented to person, place, and time. Mental status is at baseline.    Psychiatric:         Behavior: Behavior normal.           Results Review:  I have reviewed the labs, radiology results, and diagnostic studies.    Laboratory Data:   Results from last 7 days   Lab Units 05/27/23  0520 05/26/23  0611 05/25/23  1212   SODIUM mmol/L 141 139 140   POTASSIUM mmol/L 3.8 4.1 4.7   CHLORIDE mmol/L 86* 92* 96*   CO2 mmol/L 50.0* 44.0* 39.0*   BUN mg/dL 8 10 8   CREATININE mg/dL 0.75 0.62 0.62   GLUCOSE mg/dL 144* 190* 155*   CALCIUM mg/dL 8.1* 8.1* 8.5*   BILIRUBIN mg/dL 0.9 0.9 0.7   ALK PHOS U/L 104 106 104   ALT (SGPT) U/L 7 7 7   AST (SGOT) U/L 13 15 12   ANION GAP mmol/L 5.0 3.0* 5.0     Estimated Creatinine Clearance: 102.1 mL/min (by C-G formula based on SCr of 0.75 mg/dL).  Results from last 7 days   Lab Units 05/26/23  0611   MAGNESIUM mg/dL 1.8         Results from last 7 days   Lab Units 05/27/23  0520 05/26/23  0611 05/25/23  1212   WBC 10*3/mm3 9.42 10.69 9.65   HEMOGLOBIN g/dL 13.5 13.5 14.1   HEMATOCRIT % 46.7* 46.9* 49.6*   PLATELETS 10*3/mm3 132* 232 205           Culture Data:   No results found for: BLOODCX  No results found for: URINECX  No results found for: RESPCX  No results found for: WOUNDCX  No results found for: STOOLCX  No components found for: BODYFLD    Radiology Data:   Imaging Results (Last 24 Hours)     ** No results found for the last 24 hours. **          I have reviewed the patient's current medications.     Assessment/Plan     Principal Problem:    Dyspnea, unspecified type  Active Problems:    Acute on chronic diastolic heart failure    Pulmonary hypertension    Anasarca    History of pulmonary embolus (PE)    Depression    Plan:  - Continue Lasix infusion  - Monitor I's and O's and daily weights.  Presently down 13 pounds today  - Continue other home medications as appropriate  - Continue working with PT and OT  - Her CO2 on her CMP this morning was around 50 so we will check an ABG.  Suspect some of her confusion likely related to elevated CO2 level.      - DVT prophylaxis with Eliquis  - CODE STATUS: DNR/DNI    Medical Decision Making  Number and Complexity of problems: 5 high complexity problems    Conditions and Status:        Condition is improving.     MDM Data    Tests considered but not ordered: None     Decision rules/scores evaluated (example [id number], [place], etc): None     Discussed with: patient     Treatment Plan  As above    Care Planning  Shared decision making: patient  Code status and discussions: full    Disposition  Social Determinants of Health that impact treatment or disposition: none  Disposition to be determined based on clinical course in the hospital      I have utilized all available immediate resources to obtain, update, or review the patient's current medications (including all prescriptions, over-the-counter products, herbals, cannabis/cannabidiol products, and vitamin/mineral/dietary (nutritional) supplements).   I confirmed that the patient's Advance Care Plan is present, code status is documented, or surrogate decision maker is listed in the patient's medical record.      Dexter Mclean MD

## 2023-05-27 NOTE — PLAN OF CARE
Goal Outcome Evaluation:  Plan of Care Reviewed With: patient        Progress: no change  Outcome Evaluation: VSS, pt resting well, no changes

## 2023-05-27 NOTE — SIGNIFICANT NOTE
05/27/23 1556   OTHER   Discipline physical therapy assistant   Rehab Time/Intention   Session Not Performed   (RT declined PT tx for today.)

## 2023-05-27 NOTE — NURSING NOTE
Patient calling frequently and stating that she is scared. When attempting to ease the patient's fears, she tells this RN that she feels high and short of breath. Assessed the patient's vitals. SpO2 was 89% on 5L. Increased O2 to 7L. SpO2 increased to 92%. MD had already ordered ABG. Patient immediately calmer and is now laying on left side attempting to sleep.

## 2023-05-28 LAB
ALBUMIN SERPL-MCNC: 3.1 G/DL (ref 3.5–5.2)
ALBUMIN/GLOB SERPL: 0.9 G/DL
ALP SERPL-CCNC: 104 U/L (ref 39–117)
ALT SERPL W P-5'-P-CCNC: 8 U/L (ref 1–33)
ANION GAP SERPL CALCULATED.3IONS-SCNC: 7 MMOL/L (ref 5–15)
ARTERIAL PATENCY WRIST A: ABNORMAL
AST SERPL-CCNC: 14 U/L (ref 1–32)
ATMOSPHERIC PRESS: 751 MMHG
BASE EXCESS BLDA CALC-SCNC: 23.4 MMOL/L (ref 0–2)
BASOPHILS # BLD AUTO: 0.06 10*3/MM3 (ref 0–0.2)
BASOPHILS NFR BLD AUTO: 0.7 % (ref 0–1.5)
BDY SITE: ABNORMAL
BILIRUB SERPL-MCNC: 1.1 MG/DL (ref 0–1.2)
BUN SERPL-MCNC: 7 MG/DL (ref 8–23)
BUN/CREAT SERPL: 13.5 (ref 7–25)
CALCIUM SPEC-SCNC: 8.5 MG/DL (ref 8.6–10.5)
CHLORIDE SERPL-SCNC: 86 MMOL/L (ref 98–107)
CO2 SERPL-SCNC: 45 MMOL/L (ref 22–29)
CREAT SERPL-MCNC: 0.52 MG/DL (ref 0.57–1)
DEPRECATED RDW RBC AUTO: 47.6 FL (ref 37–54)
EGFRCR SERPLBLD CKD-EPI 2021: 105.9 ML/MIN/1.73
EOSINOPHIL # BLD AUTO: 0.09 10*3/MM3 (ref 0–0.4)
EOSINOPHIL NFR BLD AUTO: 1 % (ref 0.3–6.2)
ERYTHROCYTE [DISTWIDTH] IN BLOOD BY AUTOMATED COUNT: 14.9 % (ref 12.3–15.4)
GAS FLOW AIRWAY: 6 LPM
GLOBULIN UR ELPH-MCNC: 3.3 GM/DL
GLUCOSE BLDC GLUCOMTR-MCNC: 150 MG/DL (ref 70–130)
GLUCOSE BLDC GLUCOMTR-MCNC: 155 MG/DL (ref 70–130)
GLUCOSE BLDC GLUCOMTR-MCNC: 173 MG/DL (ref 70–130)
GLUCOSE BLDC GLUCOMTR-MCNC: 231 MG/DL (ref 70–130)
GLUCOSE SERPL-MCNC: 142 MG/DL (ref 65–99)
HCO3 BLDA-SCNC: 50 MMOL/L (ref 20–26)
HCT VFR BLD AUTO: 46.1 % (ref 34–46.6)
HGB BLD-MCNC: 14.2 G/DL (ref 12–15.9)
IMM GRANULOCYTES # BLD AUTO: 0.03 10*3/MM3 (ref 0–0.05)
IMM GRANULOCYTES NFR BLD AUTO: 0.3 % (ref 0–0.5)
LYMPHOCYTES # BLD AUTO: 1.55 10*3/MM3 (ref 0.7–3.1)
LYMPHOCYTES NFR BLD AUTO: 17.6 % (ref 19.6–45.3)
Lab: ABNORMAL
MCH RBC QN AUTO: 27.1 PG (ref 26.6–33)
MCHC RBC AUTO-ENTMCNC: 30.8 G/DL (ref 31.5–35.7)
MCV RBC AUTO: 88 FL (ref 79–97)
MODALITY: ABNORMAL
MONOCYTES # BLD AUTO: 0.79 10*3/MM3 (ref 0.1–0.9)
MONOCYTES NFR BLD AUTO: 9 % (ref 5–12)
NEUTROPHILS NFR BLD AUTO: 6.29 10*3/MM3 (ref 1.7–7)
NEUTROPHILS NFR BLD AUTO: 71.4 % (ref 42.7–76)
NRBC BLD AUTO-RTO: 0 /100 WBC (ref 0–0.2)
PCO2 BLDA: 57.2 MM HG (ref 35–45)
PH BLDA: 7.55 PH UNITS (ref 7.35–7.45)
PLATELET # BLD AUTO: 183 10*3/MM3 (ref 140–450)
PMV BLD AUTO: 10.5 FL (ref 6–12)
PO2 BLDA: 73.4 MM HG (ref 83–108)
POTASSIUM SERPL-SCNC: 3.7 MMOL/L (ref 3.5–5.2)
PROT SERPL-MCNC: 6.4 G/DL (ref 6–8.5)
RBC # BLD AUTO: 5.24 10*6/MM3 (ref 3.77–5.28)
SAO2 % BLDCOA: 96.4 % (ref 94–99)
SODIUM SERPL-SCNC: 138 MMOL/L (ref 136–145)
VENTILATOR MODE: ABNORMAL
WBC NRBC COR # BLD: 8.81 10*3/MM3 (ref 3.4–10.8)

## 2023-05-28 PROCEDURE — 36600 WITHDRAWAL OF ARTERIAL BLOOD: CPT

## 2023-05-28 PROCEDURE — 94660 CPAP INITIATION&MGMT: CPT

## 2023-05-28 PROCEDURE — 80053 COMPREHEN METABOLIC PANEL: CPT | Performed by: FAMILY MEDICINE

## 2023-05-28 PROCEDURE — 94799 UNLISTED PULMONARY SVC/PX: CPT

## 2023-05-28 PROCEDURE — 25010000002 FUROSEMIDE PER 20 MG: Performed by: FAMILY MEDICINE

## 2023-05-28 PROCEDURE — 82948 REAGENT STRIP/BLOOD GLUCOSE: CPT

## 2023-05-28 PROCEDURE — 97535 SELF CARE MNGMENT TRAINING: CPT

## 2023-05-28 PROCEDURE — 63710000001 INSULIN ASPART PER 5 UNITS: Performed by: FAMILY MEDICINE

## 2023-05-28 PROCEDURE — 94760 N-INVAS EAR/PLS OXIMETRY 1: CPT

## 2023-05-28 PROCEDURE — 82803 BLOOD GASES ANY COMBINATION: CPT

## 2023-05-28 PROCEDURE — 85025 COMPLETE CBC W/AUTO DIFF WBC: CPT | Performed by: FAMILY MEDICINE

## 2023-05-28 RX ORDER — TRAMADOL HYDROCHLORIDE 50 MG/1
50 TABLET ORAL EVERY 8 HOURS PRN
Status: DISCONTINUED | OUTPATIENT
Start: 2023-05-28 | End: 2023-06-02 | Stop reason: HOSPADM

## 2023-05-28 RX ORDER — SUMATRIPTAN 6 MG/.5ML
6 INJECTION, SOLUTION SUBCUTANEOUS ONCE
Status: COMPLETED | OUTPATIENT
Start: 2023-05-28 | End: 2023-05-28

## 2023-05-28 RX ORDER — CYCLOBENZAPRINE HCL 10 MG
1 TABLET ORAL 3 TIMES DAILY PRN
Status: ON HOLD | COMMUNITY
Start: 2023-02-09 | End: 2023-05-28 | Stop reason: SDUPTHER

## 2023-05-28 RX ORDER — CYCLOBENZAPRINE HCL 10 MG
10 TABLET ORAL 3 TIMES DAILY PRN
Status: DISCONTINUED | OUTPATIENT
Start: 2023-05-28 | End: 2023-06-02 | Stop reason: HOSPADM

## 2023-05-28 RX ADMIN — INSULIN ASPART 2 UNITS: 100 INJECTION, SOLUTION INTRAVENOUS; SUBCUTANEOUS at 17:55

## 2023-05-28 RX ADMIN — FUROSEMIDE 10 MG/HR: 10 INJECTION, SOLUTION INTRAVENOUS at 02:44

## 2023-05-28 RX ADMIN — APIXABAN 5 MG: 5 TABLET, FILM COATED ORAL at 20:02

## 2023-05-28 RX ADMIN — Medication 1 APPLICATION: at 20:03

## 2023-05-28 RX ADMIN — Medication 1 PATCH: at 17:55

## 2023-05-28 RX ADMIN — APIXABAN 5 MG: 5 TABLET, FILM COATED ORAL at 09:00

## 2023-05-28 RX ADMIN — CYCLOBENZAPRINE HYDROCHLORIDE 10 MG: 10 TABLET, FILM COATED ORAL at 11:14

## 2023-05-28 RX ADMIN — ROSUVASTATIN CALCIUM 10 MG: 10 TABLET, FILM COATED ORAL at 09:00

## 2023-05-28 RX ADMIN — PENTOXIFYLLINE 400 MG: 400 TABLET, EXTENDED RELEASE ORAL at 20:01

## 2023-05-28 RX ADMIN — FUROSEMIDE 10 MG/HR: 10 INJECTION, SOLUTION INTRAVENOUS at 12:57

## 2023-05-28 RX ADMIN — Medication 10 ML: at 09:03

## 2023-05-28 RX ADMIN — IPRATROPIUM BROMIDE AND ALBUTEROL SULFATE 3 ML: .5; 3 SOLUTION RESPIRATORY (INHALATION) at 14:14

## 2023-05-28 RX ADMIN — NYSTATIN: 100000 POWDER TOPICAL at 09:00

## 2023-05-28 RX ADMIN — PENTOXIFYLLINE 400 MG: 400 TABLET, EXTENDED RELEASE ORAL at 09:02

## 2023-05-28 RX ADMIN — IPRATROPIUM BROMIDE AND ALBUTEROL SULFATE 3 ML: .5; 3 SOLUTION RESPIRATORY (INHALATION) at 06:39

## 2023-05-28 RX ADMIN — Medication 10 ML: at 20:02

## 2023-05-28 RX ADMIN — SUMATRIPTAN 6 MG: 6 INJECTION, SOLUTION SUBCUTANEOUS at 11:02

## 2023-05-28 RX ADMIN — Medication 1 APPLICATION: at 09:00

## 2023-05-28 RX ADMIN — INSULIN ASPART 2 UNITS: 100 INJECTION, SOLUTION INTRAVENOUS; SUBCUTANEOUS at 09:01

## 2023-05-28 NOTE — PLAN OF CARE
Goal Outcome Evaluation:  Plan of Care Reviewed With: patient        Progress: no change  Outcome Evaluation: VSS; patient more alert and oriented today. Patient denies pain at this time. no new complaints

## 2023-05-28 NOTE — THERAPY TREATMENT NOTE
Patient Name: Stephanie Bradshaw  : 1960    MRN: [id number]                              Today's Date: 2023       Admit Date: 2023    Visit Dx:     ICD-10-CM ICD-9-CM   1. Dyspnea, unspecified type  R06.00 786.09   2. Abnormal EKG  R94.31 794.31   3. Acute on chronic respiratory failure with hypoxia  J96.21 518.84     799.02   4. Impaired mobility and ADLs  Z74.09 V49.89    Z78.9    5. Decreased functional mobility  R26.89 781.99     Patient Active Problem List   Diagnosis   • Multiple subsegmental pulmonary emboli without acute cor pulmonale   • Cellulitis   • Cellulitis   • Ulises hematuria   • Mixed hyperlipidemia   • Morbid obesity with BMI of 45.0-49.9, adult   • Leg edema   • Venous stasis dermatitis of both lower extremities   • Lymphedema   • Borderline diabetic   • Cardiomegaly   • Dyspnea on exertion   • Elevated blood pressure reading without diagnosis of hypertension   • Right ventricular cardiac abnormality   • Hypersomnia with sleep apnea   • Ascending aorta dilation   • Venous stasis dermatitis of both lower extremities   • Mucopurulent chronic bronchitis   • Dyspnea, unspecified type   • Acute on chronic diastolic heart failure   • Pulmonary hypertension   • Anasarca   • History of pulmonary embolus (PE)   • Depression   • Acute respiratory failure with hypoxia   • Acute on chronic respiratory failure with hypoxia and hypercapnia     Past Medical History:   Diagnosis Date   • Controlled type 2 diabetes mellitus without complication, without long-term current use of insulin    • COPD (chronic obstructive pulmonary disease)    • DVT (deep venous thrombosis) 2022    left popliteal   • Hyperlipemia, mixed    • Pulmonary embolism 2022    right     Past Surgical History:   Procedure Laterality Date   • CYSTOSCOPY N/A 2022    Procedure: CYSTOSCOPY;  Surgeon: Davide Moore MD;  Location: [facility];  Service: Urology;  Laterality: N/A;   • TUBAL ABDOMINAL LIGATION         General Information     Row Name 05/28/23 1443          OT Time and Intention    Document Type evaluation;therapy note (daily note)  -TO     Mode of Treatment occupational therapy  -TO     Row Name 05/28/23 1443          General Information    Patient Profile Reviewed yes  -TO     Existing Precautions/Restrictions oxygen therapy device and L/min;fall  -TO     Row Name 05/28/23 1443          Cognition    Orientation Status (Cognition) oriented x 4  -TO     Row Name 05/28/23 1443          Safety Issues, Functional Mobility    Impairments Affecting Function (Mobility) endurance/activity tolerance;strength;shortness of breath;visual/perceptual  -TO           User Key  (r) = Recorded By, (t) = Taken By, (c) = Cosigned By    Initials Name Provider Type    TO Harley Ibrahim COTA Occupational Therapist Assistant                 Mobility/ADL's     Row Name 05/28/23 1443          Bed Mobility    Bed Mobility supine-sit;scooting/bridging;rolling right  -TO     Rolling Right [place] (Bed Mobility) standby assist  -TO     Scooting/Bridging [place] (Bed Mobility) standby assist  -TO     Supine-Sit [place] (Bed Mobility) standby assist  -TO     Sit-Supine [place] (Bed Mobility) standby assist  -TO     Assistive Device (Bed Mobility) head of bed elevated;bed rails  -TO     Row Name 05/28/23 1443          Transfers    Transfers sit-stand transfer;stand-sit transfer  -TO     Row Name 05/28/23 1443          Sit-Stand Transfer    Sit-Stand [place] (Transfers) contact guard  -TO     Row Name 05/28/23 1443          Stand-Sit Transfer    Stand-Sit [place] (Transfers) contact guard  -TO     Row Name 05/28/23 1443          Activities of Daily Living    BADL Assessment/Intervention bathing;upper body dressing;lower body dressing;grooming  -TO     Row Name 05/28/23 1443          Lower Body Dressing Assessment/Training    [place] Level (Lower Body Dressing) socks;doff;don;dependent (less than 25% patient  effort);lower body dressing skills  -TO     Position (Lower Body Dressing) edge of bed sitting  -TO     Row Name 05/28/23 1443          Bathing Assessment/Intervention    [place] Level (Bathing) bathing skills;lower body;upper body;standby assist;verbal cues;moderate assist (50% patient effort)  -TO     Position (Bathing) edge of bed sitting  -TO     Row Name 05/28/23 1443          Upper Body Dressing Assessment/Training    [place] Level (Upper Body Dressing) upper body dressing skills;doff;don;contact guard assist  HG  -TO     Position (Upper Body Dressing) edge of bed sitting  -TO     Row Name 05/28/23 1443          Grooming Assessment/Training    [place] Level (Grooming) grooming skills;oral care regimen;wash face, hands;independent  -TO     Position (Grooming) edge of bed sitting  -TO           User Key  (r) = Recorded By, (t) = Taken By, (c) = Cosigned By    Initials Name Provider Type    TO Harley Ibrahim COTA Occupational Therapist Assistant               Obj/Interventions    No documentation.                Goals/Plan     Row Name 05/28/23 1443          Transfer Goal 1 (OT)    Activity/Assistive Device (Transfer Goal 1, OT) toilet  -TO     [place] Level/Cues Needed (Transfer Goal 1, OT) modified independence  -TO     Time Frame (Transfer Goal 1, OT) long term goal (LTG);by discharge  -TO     Progress/Outcome (Transfer Goal 1, OT) goal not met  -TO     Row Name 05/28/23 1443          Bathing Goal 1 (OT)    Activity/Device (Bathing Goal 1, OT) lower body bathing  -TO     [place] Level/Cues Needed (Bathing Goal 1, OT) modified independence  -TO     Time Frame (Bathing Goal 1, OT) long term goal (LTG);by discharge  -TO     Progress/Outcomes (Bathing Goal 1, OT) goal not met  -TO     Row Name 05/28/23 1443          Dressing Goal 1 (OT)    Activity/Device (Dressing Goal 1, OT) lower body dressing  -TO     [place]/Cues Needed (Dressing Goal 1, OT) modified independence  -TO     Time  Frame (Dressing Goal 1, OT) long term goal (LTG);by discharge  -TO     Progress/Outcome (Dressing Goal 1, OT) goal not met  -TO     [facility] Name 05/28/23 1443          Toileting Goal 1 (OT)    Activity/Device (Toileting Goal 1, OT) toileting skills, all  -TO     [place] Level/Cues Needed (Toileting Goal 1, OT) independent  -TO     Time Frame (Toileting Goal 1, OT) long term goal (LTG);by discharge  -TO     Progress/Outcome (Toileting Goal 1, OT) goal not met  -TO           User Key  (r) = Recorded By, (t) = Taken By, (c) = Cosigned By    Initials Name Provider Type    TO Harley Ibrahim COTA Occupational Therapist Assistant               Clinical Impression     Row Name 05/28/23 1443          Pain Assessment    Pretreatment Pain Rating 1/10  -TO     Posttreatment Pain Rating 4/10  -TO     Row Name 05/28/23 [facility]          Plan of Care Review    Plan of Care Reviewed With patient  -TO     Progress improving  -TO     Outcome Evaluation Pt sup>sit EOB SBA with VCs, HOB/bedrails. Pt performed grooming task of face wash/oral care I. Pt SBA for UB bathing and mod A for LB bathing for feet and backside while standing with CGA for sit<>stand ~ 30 sec. Pt able to bridge to HOB SBA x 2 and roll L<>R x 2 SBA ith VCs for IV.  -TO     Row Name 05/28/23 1443          Therapy Assessment/Plan (OT)    Rehab Potential (OT) good, to achieve stated therapy goals  -TO     Criteria for Skilled Therapeutic Interventions Met (OT) yes;meets criteria;skilled treatment is necessary  -TO     Therapy Frequency (OT) other (see comments)  5-7 d/wk  -TO     Row Name 05/28/23 1443          Therapy Plan Review/Discharge Plan (OT)    Anticipated Discharge Disposition (OT) home with assist  -TO     Row Name 05/28/23 1443          Vital Signs    Pretreatment Heart Rate (beats/min) 91  -TO     Posttreatment Heart Rate (beats/min) 94  -TO     Pre SpO2 (%) 96  -TO     O2 Delivery Pre Treatment supplemental O2  -TO     Intra SpO2 (%) 87  -TO     O2  Delivery Intra Treatment supplemental O2  -TO     Post SpO2 (%) 92  -TO     O2 Delivery Post Treatment supplemental O2  -TO     Pre Patient Position Supine  -TO     Intra Patient Position Sitting  -TO     Post Patient Position Supine  -TO     Row Name 05/28/23 1443          Positioning and Restraints    In Bed notified nsg;supine;call light within reach;encouraged to call for assist;exit alarm on  -TO           User Key  (r) = Recorded By, (t) = Taken By, (c) = Cosigned By    Initials Name Provider Type    TO Harley Ibrahim COTA Occupational Therapist Assistant               Outcome Measures     Row Name 05/28/23 1443          How much help from another is currently needed...    Putting on and taking off regular lower body clothing? 2  -TO     Bathing (including washing, rinsing, and drying) 2  -TO     Toileting (which includes using toilet bed pan or urinal) 2  -TO     Putting on and taking off regular upper body clothing 3  -TO     Taking care of personal grooming (such as brushing teeth) 4  -TO     Eating meals 4  -TO     AM-PAC 6 Clicks Score (OT) 17  -TO     Row Name 05/28/23 0744          How much help from another person do you currently need...    Turning from your back to your side while in flat bed without using bedrails? 3  -HE     Moving from lying on back to sitting on the side of a flat bed without bedrails? 3  -HE     Moving to and from a bed to a chair (including a wheelchair)? 3  -HE     Standing up from a chair using your arms (e.g., wheelchair, bedside chair)? 3  -HE     Climbing 3-5 steps with a railing? 1  -HE     To walk in hospital room? 2  -HE     AM-PAC 6 Clicks Score (PT) 15  -HE     Highest level of mobility 4 --> Transferred to chair/commode  -HE           User Key  (r) = Recorded By, (t) = Taken By, (c) = Cosigned By    Initials Name Provider Type    TO Harley Ibrahim COTA Occupational Therapist Assistant    Diana Cadena RN Registered Nurse                Occupational  Therapy Education     Title: PT OT SLP Therapies (In Progress)     Topic: Occupational Therapy (In Progress)     Point: ADL training (Done)     Description:   Instruct learner(s) on proper safety adaptation and remediation techniques during self care or transfers.   Instruct in proper use of assistive devices.              Learning Progress Summary           Patient Acceptance, E,TB, VU by  at 5/26/2023 0910    Comment: OT POC, role of OT, transfer training                   Point: Home exercise program (Not Started)     Description:   Instruct learner(s) on appropriate technique for monitoring, assisting and/or progressing therapeutic exercises/activities.              Learner Progress:  Not documented in this visit.          Point: Precautions (Done)     Description:   Instruct learner(s) on prescribed precautions during self-care and functional transfers.              Learning Progress Summary           Patient Acceptance, E,TB, VU by  at 5/26/2023 0910    Comment: OT POC, role of OT, transfer training                   Point: Body mechanics (Done)     Description:   Instruct learner(s) on proper positioning and spine alignment during self-care, functional mobility activities and/or exercises.              Learning Progress Summary           Patient Acceptance, E,TB, VU by  at 5/26/2023 0910    Comment: OT POC, role of OT, transfer training                               User Key     Initials Effective Dates Name Provider Type Discipline     08/11/22 -  Sarah Zimmerman OT Occupational Therapist OT              OT Recommendation and Plan  Therapy Frequency (OT): other (see comments) (5-7 d/wk)  Plan of Care Review  Plan of Care Reviewed With: patient  Progress: improving  Outcome Evaluation: Pt sup>sit EOB SBA with VCs, HOB/bedrails. Pt performed grooming task of face wash/oral care I. Pt SBA for UB bathing and mod A for LB bathing for feet and backside while standing with CGA for sit<>stand ~ 30 sec. Pt  able to bridge to HOB SBA x 2 and roll L<>R x 2 SBA ith VCs for IV.     Time Calculation:    Time Calculation- OT     Row Name 05/28/23 1629             Time Calculation- OT    OT Start Time 1443  -TO      OT Stop Time 1540  -TO      OT Time Calculation (min) 57 min  -TO      Total Timed Code Minutes- OT 57 minute(s)  -TO      OT Received On 05/28/23  -TO         Timed Charges    02882 - OT Self Care/Mgmt Minutes 57  -TO         Total Minutes    Timed Charges Total Minutes 57  -TO       Total Minutes 57  -TO            User Key  (r) = Recorded By, (t) = Taken By, (c) = Cosigned By    Initials Name Provider Type    TO Harley Ibrahim COTA Occupational Therapist Assistant              Therapy Charges for Today     Code Description Service Date Service Provider Modifiers Qty    [id number] HC OT SELF CARE/MGMT/TRAIN EA 15 MIN 5/28/2023 Harley Ibrahim COTA GO 4               VENITA Espinal  5/28/2023

## 2023-05-28 NOTE — SIGNIFICANT NOTE
Upon entering pt. Room she stated she was not doing the BIPAP thing.  I explained the benefits of BIPAP and it usage.  We will continue to work with the patient.  Weaned oxygen to 4.5 lpm to maintain SATs >88%.

## 2023-05-28 NOTE — SIGNIFICANT NOTE
05/28/23 1651   OTHER   Discipline physical therapy assistant   Rehab Time/Intention   Session Not Performed patient/family declined treatment  (Pt declined tx due to fatigue from just receiving bath with OT. Req to check back tomorrow.)

## 2023-05-28 NOTE — PROGRESS NOTES
Item 0 Points 1 Point 2 Points 3 Points 4 Points Subtotal   Mental Status Alert, oriented, cooperative Lethargic, follows commands Confused, not following commands Obtunded or Somnolent Comatose 0     Respiratory Pattern Regular RR 8-16 breaths/minute Increased RR 18-25 breaths/minute Dyspnea on exertion, irregular RR 26-30 breaths/minute Shortness of breath,  RR 31-35 breaths/minute Accessory muscle use, severe SOB  RR > 35 breaths/minute 0     Breath Sounds Clear Decreased unilaterally Decreased bilaterally Basilar crackles Wheezing and/or rhonchi 1       Cough Strong, spontaneous, non-productive Strong productive Weak, non-productive Weak, productive or weak with rhonchi Absent or may require suctioning 0     Pulmonary Status Nonsmoker, no previous history, >1 year quit < 1 PPD  <1 year quit > or = 1 PPD Diagnosed pulmonary disease (severe or chronic) Severe or chronic pulmonary disease with exacerbation 3     Surgical Status None General surgery (non-abdominal or non-thoracic) Lower abdominal Thoracic or upper abdominal Thoracic with pulmonary disease 0     Chest X-ray Clear Chronic changes Infiltrates, atelectasis or pleural effusion Infiltrates in > 1 lobe Diffuse infiltrates and atelectasis and/or effusions 0     Activity   Level Ambulatory Ambulatory with assistance Non-ambulatory Paraplegic Quadriplegic 1          Total Score      Score    Drug Therapy Frequency 20 or >    Q4 Duoneb with Q2 Albuterol PRN 15-19    Q6 Duoneb with Q4 Albuterol PRN 10-14    QID Duoneb with Q4 Albuterol PRN 5-9    TID Duoneb with Q6 Albuterol PRN 0-4    Q4 PRN Duoneb                  Lung Expansion Therapy (PEP) Bronchopulmonary Hygiene (CPT)   Q4 & PRN - Severe atelectasis, poor oxygenation Q4 - Copious secretions, dyspnea, unable to sleep   QID - High risk for persistent atelectasis, existence of atelectasis QID & Q4 PRN - Moderate secretion production   TID - At risk for developing atelectasis TID - Small amounts of  secretions with poor cough   BID - Prevention of atelectasis BID - Unable to breathe deeply and cough spontaneously     RT Comments / Recommendations:    RT eval completed.  We will reevaluate in 48 hours.  At this time conintue with current orders.

## 2023-05-28 NOTE — SIGNIFICANT NOTE
Patient in no distress at this moment.  BiPAP on standby.  Oxygen SATs 99% on 6 lpm and weaned to 5 lpm to maintain SATs 90% or greater.  We will continue to wean oxygen as tolerated.

## 2023-05-28 NOTE — PROGRESS NOTES
[facility] Medicine Services  INPATIENT PROGRESS NOTE      Length of Stay: 3  Date of Admission: 5/25/2023  Primary Care Physician: Danielle Vaughn APRN    Subjective   Chief Complaint: Shortness of breath  HPI: Continues to report improvement and breathing better.  She is down another 10 pounds today.  No new concerns.    Review of Systems   All pertinent negatives and positives are as above. All other systems have been reviewed and are negative unless otherwise stated.     Objective    As of today 05/28/23  Temp:  [97 °F (36.1 °C)-98.5 °F (36.9 °C)] 98 °F (36.7 °C)  Heart Rate:  [75-84] 80  Resp:  [15-25] 18  BP: (109-129)/(57-66) 109/57    Physical Exam  Constitutional:       General: She is not in acute distress.     Appearance: She is not toxic-appearing.   HENT:      Head: Normocephalic and atraumatic.      Right Ear: External ear normal.      Left Ear: External ear normal.      Nose: Nose normal.      Mouth/Throat:      Pharynx: Oropharynx is clear.   Eyes:      Conjunctiva/sclera: Conjunctivae normal.   Cardiovascular:      Rate and Rhythm: Normal rate and regular rhythm.      Heart sounds: Normal heart sounds.   Pulmonary:      Comments: Diminished but otherwise clear breath sounds  Abdominal:      General: Bowel sounds are normal.      Palpations: Abdomen is soft.      Tenderness: There is no abdominal tenderness.   Musculoskeletal:         General: No deformity.      Right lower leg: Edema present.      Left lower leg: Edema present.   Skin:     General: Skin is warm and dry.      Capillary Refill: Capillary refill takes less than 2 seconds.   Neurological:      General: No focal deficit present.      Mental Status: She is alert and oriented to person, place, and time. Mental status is at baseline.   Psychiatric:         Behavior: Behavior normal.           Results Review:  I have reviewed the labs, radiology results, and diagnostic  studies.    Laboratory Data:   Results from last 7 days   Lab Units 05/28/23  0512 05/27/23  0520 05/26/23  0611   SODIUM mmol/L 138 141 139   POTASSIUM mmol/L 3.7 3.8 4.1   CHLORIDE mmol/L 86* 86* 92*   CO2 mmol/L 45.0* 50.0* 44.0*   BUN mg/dL 7* 8 10   CREATININE mg/dL 0.52* 0.75 0.62   GLUCOSE mg/dL 142* 144* 190*   CALCIUM mg/dL 8.5* 8.1* 8.1*   BILIRUBIN mg/dL 1.1 0.9 0.9   ALK PHOS U/L 104 104 106   ALT (SGPT) U/L 8 7 7   AST (SGOT) U/L 14 13 15   ANION GAP mmol/L 7.0 5.0 3.0*     Estimated Creatinine Clearance: 144.4 mL/min (A) (by C-G formula based on SCr of 0.52 mg/dL (L)).  Results from last 7 days   Lab Units 05/26/23  0611   MAGNESIUM mg/dL 1.8         Results from last 7 days   Lab Units 05/28/23  0512 05/27/23  0520 05/26/23  0611 05/25/23  1212   WBC 10*3/mm3 8.81 9.42 10.69 9.65   HEMOGLOBIN g/dL 14.2 13.5 13.5 14.1   HEMATOCRIT % 46.1 46.7* 46.9* 49.6*   PLATELETS 10*3/mm3 183 132* 232 205           Culture Data:   No results found for: BLOODCX  No results found for: URINECX  No results found for: RESPCX  No results found for: WOUNDCX  No results found for: STOOLCX  No components found for: BODYFLD    Radiology Data:   Imaging Results (Last 24 Hours)     ** No results found for the last 24 hours. **          I have reviewed the patient's current medications.     Assessment/Plan     Principal Problem:    Dyspnea, unspecified type  Active Problems:    Acute on chronic diastolic heart failure    Pulmonary hypertension    Anasarca    History of pulmonary embolus (PE)    Depression    Acute on chronic respiratory failure with hypoxia and hypercapnia    Plan:  - Continue Lasix infusion  - Monitor I's and O's and daily weights.  Presently down 13 pounds today  - Continue other home medications as appropriate  - Continue working with PT and OT  - ABG yesterday noted to have hypercapnia and patient was confused at the time likely secondary to CO2 narcosis  - Patient was placed on BiPAP with good improvement  in her mentation as well as her CO2 levels  - This appears to be likely chronic process that she had metabolic compensation on her ABG  - Watch for any worsening mentation and if she becomes more confused and placed back on BiPAP please  - Continue noninvasive ventilation at night and as needed throughout the day.  - DVT prophylaxis with Eliquis  - CODE STATUS: DNR/DNI    Medical Decision Making  Number and Complexity of problems: 5 high complexity problems    Conditions and Status:        Condition is improving.     MDM Data    Tests considered but not ordered: None     Decision rules/scores evaluated (example [id number], [place], etc): None     Discussed with: patient     Treatment Plan  As above    Care Planning  Shared decision making: patient  Code status and discussions: full    Disposition  Social Determinants of Health that impact treatment or disposition: none  Disposition to be determined based on clinical course in the hospital      I have utilized all available immediate resources to obtain, update, or review the patient's current medications (including all prescriptions, over-the-counter products, herbals, cannabis/cannabidiol products, and vitamin/mineral/dietary (nutritional) supplements).   I confirmed that the patient's Advance Care Plan is present, code status is documented, or surrogate decision maker is listed in the patient's medical record.      Dexter Mclean MD

## 2023-05-28 NOTE — PLAN OF CARE
Goal Outcome Evaluation:  Plan of Care Reviewed With: patient        Progress: improving  Outcome Evaluation: Pt sup>sit EOB SBA with VCs, HOB/bedrails. Pt performed grooming task of face wash/oral care I. Pt SBA for UB bathing and mod A for LB bathing for feet and backside while standing with CGA for sit<>stand ~ 30 sec. Pt able to bridge to HOB SBA x 2 and roll L<>R x 2 SBA ith VCs for IV.

## 2023-05-29 LAB
ALBUMIN SERPL-MCNC: 3.3 G/DL (ref 3.5–5.2)
ALBUMIN/GLOB SERPL: 1 G/DL
ALP SERPL-CCNC: 107 U/L (ref 39–117)
ALT SERPL W P-5'-P-CCNC: 8 U/L (ref 1–33)
ANION GAP SERPL CALCULATED.3IONS-SCNC: 8 MMOL/L (ref 5–15)
AST SERPL-CCNC: 15 U/L (ref 1–32)
BASOPHILS # BLD AUTO: 0.08 10*3/MM3 (ref 0–0.2)
BASOPHILS NFR BLD AUTO: 1 % (ref 0–1.5)
BILIRUB SERPL-MCNC: 1.2 MG/DL (ref 0–1.2)
BUN SERPL-MCNC: 10 MG/DL (ref 8–23)
BUN/CREAT SERPL: 17.2 (ref 7–25)
CALCIUM SPEC-SCNC: 9 MG/DL (ref 8.6–10.5)
CHLORIDE SERPL-SCNC: 87 MMOL/L (ref 98–107)
CO2 SERPL-SCNC: 40 MMOL/L (ref 22–29)
CREAT SERPL-MCNC: 0.58 MG/DL (ref 0.57–1)
DEPRECATED RDW RBC AUTO: 47.7 FL (ref 37–54)
EGFRCR SERPLBLD CKD-EPI 2021: 103.1 ML/MIN/1.73
EOSINOPHIL # BLD AUTO: 0.14 10*3/MM3 (ref 0–0.4)
EOSINOPHIL NFR BLD AUTO: 1.7 % (ref 0.3–6.2)
ERYTHROCYTE [DISTWIDTH] IN BLOOD BY AUTOMATED COUNT: 15 % (ref 12.3–15.4)
GLOBULIN UR ELPH-MCNC: 3.3 GM/DL
GLUCOSE BLDC GLUCOMTR-MCNC: 135 MG/DL (ref 70–130)
GLUCOSE BLDC GLUCOMTR-MCNC: 148 MG/DL (ref 70–130)
GLUCOSE BLDC GLUCOMTR-MCNC: 190 MG/DL (ref 70–130)
GLUCOSE BLDC GLUCOMTR-MCNC: 222 MG/DL (ref 70–130)
GLUCOSE SERPL-MCNC: 146 MG/DL (ref 65–99)
HCT VFR BLD AUTO: 49.7 % (ref 34–46.6)
HGB BLD-MCNC: 15.1 G/DL (ref 12–15.9)
IMM GRANULOCYTES # BLD AUTO: 0.03 10*3/MM3 (ref 0–0.05)
IMM GRANULOCYTES NFR BLD AUTO: 0.4 % (ref 0–0.5)
LYMPHOCYTES # BLD AUTO: 1.63 10*3/MM3 (ref 0.7–3.1)
LYMPHOCYTES NFR BLD AUTO: 20 % (ref 19.6–45.3)
MCH RBC QN AUTO: 26.5 PG (ref 26.6–33)
MCHC RBC AUTO-ENTMCNC: 30.4 G/DL (ref 31.5–35.7)
MCV RBC AUTO: 87.3 FL (ref 79–97)
MONOCYTES # BLD AUTO: 0.76 10*3/MM3 (ref 0.1–0.9)
MONOCYTES NFR BLD AUTO: 9.3 % (ref 5–12)
NEUTROPHILS NFR BLD AUTO: 5.5 10*3/MM3 (ref 1.7–7)
NEUTROPHILS NFR BLD AUTO: 67.6 % (ref 42.7–76)
NRBC BLD AUTO-RTO: 0 /100 WBC (ref 0–0.2)
PLATELET # BLD AUTO: 181 10*3/MM3 (ref 140–450)
PMV BLD AUTO: 11.7 FL (ref 6–12)
POTASSIUM SERPL-SCNC: 3.6 MMOL/L (ref 3.5–5.2)
POTASSIUM SERPL-SCNC: 4.5 MMOL/L (ref 3.5–5.2)
PROT SERPL-MCNC: 6.6 G/DL (ref 6–8.5)
RBC # BLD AUTO: 5.69 10*6/MM3 (ref 3.77–5.28)
SODIUM SERPL-SCNC: 135 MMOL/L (ref 136–145)
WBC NRBC COR # BLD: 8.14 10*3/MM3 (ref 3.4–10.8)

## 2023-05-29 PROCEDURE — 97530 THERAPEUTIC ACTIVITIES: CPT

## 2023-05-29 PROCEDURE — 63710000001 INSULIN ASPART PER 5 UNITS: Performed by: FAMILY MEDICINE

## 2023-05-29 PROCEDURE — 25010000002 FUROSEMIDE PER 20 MG: Performed by: FAMILY MEDICINE

## 2023-05-29 PROCEDURE — 82948 REAGENT STRIP/BLOOD GLUCOSE: CPT

## 2023-05-29 PROCEDURE — 94799 UNLISTED PULMONARY SVC/PX: CPT

## 2023-05-29 PROCEDURE — 85025 COMPLETE CBC W/AUTO DIFF WBC: CPT | Performed by: FAMILY MEDICINE

## 2023-05-29 PROCEDURE — 94664 DEMO&/EVAL PT USE INHALER: CPT

## 2023-05-29 PROCEDURE — 80053 COMPREHEN METABOLIC PANEL: CPT | Performed by: FAMILY MEDICINE

## 2023-05-29 PROCEDURE — 94660 CPAP INITIATION&MGMT: CPT

## 2023-05-29 PROCEDURE — 84132 ASSAY OF SERUM POTASSIUM: CPT

## 2023-05-29 PROCEDURE — 94760 N-INVAS EAR/PLS OXIMETRY 1: CPT

## 2023-05-29 RX ORDER — POTASSIUM CHLORIDE 20 MEQ/1
40 TABLET, EXTENDED RELEASE ORAL EVERY 4 HOURS
Status: COMPLETED | OUTPATIENT
Start: 2023-05-29 | End: 2023-05-29

## 2023-05-29 RX ADMIN — Medication 1 APPLICATION: at 09:31

## 2023-05-29 RX ADMIN — Medication 1 PATCH: at 18:14

## 2023-05-29 RX ADMIN — PENTOXIFYLLINE 400 MG: 400 TABLET, EXTENDED RELEASE ORAL at 09:31

## 2023-05-29 RX ADMIN — POTASSIUM CHLORIDE 40 MEQ: 20 TABLET, EXTENDED RELEASE ORAL at 13:05

## 2023-05-29 RX ADMIN — NYSTATIN: 100000 POWDER TOPICAL at 20:46

## 2023-05-29 RX ADMIN — Medication 1 APPLICATION: at 20:46

## 2023-05-29 RX ADMIN — FUROSEMIDE 10 MG/HR: 10 INJECTION, SOLUTION INTRAVENOUS at 14:41

## 2023-05-29 RX ADMIN — POTASSIUM CHLORIDE 40 MEQ: 20 TABLET, EXTENDED RELEASE ORAL at 09:31

## 2023-05-29 RX ADMIN — APIXABAN 5 MG: 5 TABLET, FILM COATED ORAL at 20:45

## 2023-05-29 RX ADMIN — FUROSEMIDE 10 MG/HR: 10 INJECTION, SOLUTION INTRAVENOUS at 00:18

## 2023-05-29 RX ADMIN — APIXABAN 5 MG: 5 TABLET, FILM COATED ORAL at 09:31

## 2023-05-29 RX ADMIN — NYSTATIN: 100000 POWDER TOPICAL at 09:31

## 2023-05-29 RX ADMIN — IPRATROPIUM BROMIDE AND ALBUTEROL SULFATE 3 ML: .5; 3 SOLUTION RESPIRATORY (INHALATION) at 06:53

## 2023-05-29 RX ADMIN — INSULIN ASPART 2 UNITS: 100 INJECTION, SOLUTION INTRAVENOUS; SUBCUTANEOUS at 13:04

## 2023-05-29 RX ADMIN — PENTOXIFYLLINE 400 MG: 400 TABLET, EXTENDED RELEASE ORAL at 20:45

## 2023-05-29 RX ADMIN — ROSUVASTATIN CALCIUM 10 MG: 10 TABLET, FILM COATED ORAL at 09:31

## 2023-05-29 RX ADMIN — IPRATROPIUM BROMIDE AND ALBUTEROL SULFATE 3 ML: .5; 3 SOLUTION RESPIRATORY (INHALATION) at 20:26

## 2023-05-29 NOTE — PROGRESS NOTES
[facility] Medicine Services  INPATIENT PROGRESS NOTE    Length of Stay: 4  Date of Admission: 5/25/2023  Primary Care Physician: Danielle Vaughn APRN    Subjective   (S) Admitted for shortness of breath and weight gain due to stopped taking her diuretics  Today, she is feeling better, has lost weight; no chest pain, no fever    Review of Systems   All other systems reviewed and are negative.       All pertinent negatives and positives are as above. All other systems have been reviewed and are negative unless otherwise stated.     Prior to Admission medications    Medication Sig Start Date End Date Taking? Authorizing Provider   cyclobenzaprine (FLEXERIL) 10 MG tablet Take 1 tablet by mouth 3 (Three) Times a Day As Needed. 12/20/16  Yes    rosuvastatin (CRESTOR) 10 MG tablet Take 1 tablet by mouth Daily. 8/25/22  Yes Janiya Montenegro MD   tiotropium bromide-olodaterol (STIOLTO RESPIMAT) 2.5-2.5 MCG/ACT aerosol solution inhaler Inhale 2 puffs Daily. 8/23/22  Yes Mimi Kern APRN   traMADol (ULTRAM) 50 MG tablet Take 1 tablet by mouth Every 6 (Six) Hours As Needed for Moderate Pain. 8/25/22  Yes ProviderJaniya MD   VENTOLIN  (90 BASE) MCG/ACT inhaler  1/16/17  Yes ProviderJaniya MD   apixaban (ELIQUIS) 5 MG tablet tablet Take 1 tablet by mouth Every 12 (Twelve) Hours. Indications: DVT/PE (active thrombosis) 9/22/22   Rafael Cisse APRN   furosemide (LASIX) 40 MG tablet Take 1 tablet by mouth 2 (Two) Times a Day for 30 days. 8/19/22 9/18/22  Dexter Mclean MD   gabapentin (NEURONTIN) 400 MG capsule Take 1 capsule by mouth 3 (Three) Times a Day.    Provider, MD Janiya   nystatin (MYCOSTATIN) 374958 UNIT/GM powder Apply  topically to the appropriate area as directed 4 (Four) Times a Day. 8/25/22 8/26/23  Janiya Montenegro MD   pentoxifylline (TRENtal) 400 MG CR tablet Take 1 tablet by mouth 2 (Two) Times a Day. 8/29/22   Betito  ROSANNE Washington       apixaban, 5 mg, Oral, Q12H  Bag Balm, 1 application, Topical, BID  Insulin Aspart, 0-7 Units, Subcutaneous, TID AC  ipratropium-albuterol, 3 mL, Nebulization, TID - RT  nicotine, 1 patch, Transdermal, Q24H  nystatin, , Topical, Q12H  pentoxifylline, 400 mg, Oral, BID  rosuvastatin, 10 mg, Oral, Daily  sodium chloride, 10 mL, Intravenous, Q12H      furosemide (LASIX) 100 mg in 100mL NS infusion, 10 mg/hr, Last Rate: 10 mg/hr (05/29/23 1441)        Objective    Temp:  [96.8 °F (36 °C)-98.2 °F (36.8 °C)] 97.8 °F (36.6 °C)  Heart Rate:  [80-93] 90  Resp:  [16-20] 20  BP: (112-141)/(62-73) 132/73    Physical Exam  Constitutional:       Appearance: Normal appearance.   HENT:      Head: Normocephalic.      Nose: Nose normal.      Mouth/Throat:      Mouth: Mucous membranes are moist.   Eyes:      Extraocular Movements: Extraocular movements intact.   Cardiovascular:      Rate and Rhythm: Normal rate and regular rhythm.      Heart sounds: Normal heart sounds.   Pulmonary:      Effort: Pulmonary effort is normal.      Breath sounds: Normal breath sounds.   Abdominal:      General: Bowel sounds are normal.      Palpations: Abdomen is soft.   Musculoskeletal:         General: Normal range of motion.      Cervical back: Normal range of motion and neck supple.   Skin:     General: Skin is warm.   Neurological:      General: No focal deficit present.      Mental Status: She is alert. Mental status is at baseline.   Psychiatric:         Behavior: Behavior normal.         Results Review:  I have reviewed the labs, radiology results, and diagnostic studies.    Laboratory Data:   Results from last 7 days   Lab Units 05/29/23  0546 05/28/23  0512 05/27/23  0520   SODIUM mmol/L 135* 138 141   POTASSIUM mmol/L 3.6 3.7 3.8   CHLORIDE mmol/L 87* 86* 86*   CO2 mmol/L 40.0* 45.0* 50.0*   BUN mg/dL 10 7* 8   CREATININE mg/dL 0.58 0.52* 0.75   GLUCOSE mg/dL 146* 142* 144*   CALCIUM mg/dL 9.0 8.5* 8.1*   BILIRUBIN mg/dL  1.2 1.1 0.9   ALK PHOS U/L 107 104 104   ALT (SGPT) U/L 8 8 7   AST (SGOT) U/L 15 14 13   ANION GAP mmol/L 8.0 7.0 5.0     Estimated Creatinine Clearance: 121.7 mL/min (by C-G formula based on SCr of 0.58 mg/dL).  Results from last 7 days   Lab Units 05/26/23  0611   MAGNESIUM mg/dL 1.8         Results from last 7 days   Lab Units 05/29/23  0546 05/28/23  0512 05/27/23  0520 05/26/23  0611 05/25/23  1212   WBC 10*3/mm3 8.14 8.81 9.42 10.69 9.65   HEMOGLOBIN g/dL 15.1 14.2 13.5 13.5 14.1   HEMATOCRIT % 49.7* 46.1 46.7* 46.9* 49.6*   PLATELETS 10*3/mm3 181 183 132* 232 205           Culture Data:   No results found for: BLOODCX  No results found for: URINECX  No results found for: RESPCX  No results found for: WOUNDCX  No results found for: STOOLCX  No components found for: BODYFLD    Radiology Data:   Imaging Results (Last 24 Hours)     ** No results found for the last 24 hours. **          I have reviewed the patient's current medications.     Assessment/Plan   Acute on chronic respiratory failure with hypoxemia     Due to below     Wean oxygen as able    Acute on chronic diastolic CHF     Likely right heart failure; lungs are clear; CTA chest with no PE     Continue with lasix drip; monitor daily renal function    Chronic medical problems     Hx of PE/DVT     Pulmonary hypertension     Chronic recurrent depression     Morbid obesity     Type II DM     Hyperlipidemia     COPD, no AE, no home oxygen     SNEHA; does not tolerate mask      Medical Decision Making  Number and Complexity of problems: 2 major complexes    Conditions and Status:        Condition is improving.     MDM Data  External documents reviewed: previous medical records  My EKG interpretation: n/a  My plain film interpretation: n/a  Discussed with: patient     Treatment Plan  See above    Care Planning  Shared decision making:her friend Sonia  Nancy status and discussions: DNR/DNI    Disposition  Social Determinants of Health that impact treatment or  disposition: none  I expect the patient to be discharged: in progress    I confirmed that the patient's Advance Care Plan is present, code status is documented, or surrogate decision maker is listed in the patient's medical record.     Boby Hamm MD

## 2023-05-29 NOTE — PLAN OF CARE
Goal Outcome Evaluation:  Plan of Care Reviewed With: patient           Outcome Evaluation: PT tx completed. Patient sitting EOB and willing to ambulation. CGA for sit<>stand transfer x3 and ambulation 15'x3 fwd/bkwd with RW. Patient presents with decreased gait speed, step length, and mild unsteadiness. Spoke with patient about ways to improve activity tolerance and independence. No new goals met. Patient reports she is thinking about going to a halfway in [place] near her sister.      PT Evaluation Complexity  History, PT Evaluation Complexity: 3 or more personal factors and/or comorbidities  Examination of Body Systems (PT Eval Complexity): total of 4 or more elements  Clinical Presentation (PT Evaluation Complexity): evolving  Clinical Decision Making (PT Evaluation Complexity): moderate complexity  Overall Complexity (PT Evaluation Complexity): moderate complexity

## 2023-05-29 NOTE — PLAN OF CARE
Goal Outcome Evaluation:              Outcome Evaluation: lasix gtt infusing per order. VSS. No c/o pain. Fall precautions in place. will continue to monitor.

## 2023-05-29 NOTE — PLAN OF CARE
Goal Outcome Evaluation:  Plan of Care Reviewed With: patient        Progress: improving  Outcome Evaluation: Resting well between care. Urine output good. Lasix drip infusing. Purewick in place. No BM noted this shift.

## 2023-05-29 NOTE — PAYOR COMM NOTE
"Ruby Brooks RN [facility]  [phone]    Phone  [phone]     Fax    Stephanie Bradshaw (63 y.o. Female)     Date of Birth   1960    Social Security Number       Address   [address] [place] [address] [address] [id number]    Home Phone   [phone]    MRN   [id number]       Temple   None    Marital Status                               Admission Date   5/25/23    Admission Type   Emergency    Admitting Provider   Dexter Mclean MD    Attending Provider   Dexter Mclean MD    Department, Room/Bed   [address], 422/1       Discharge Date       Discharge Disposition       Discharge Destination                               Attending Provider: Dexter Mclean MD    Allergies: Penicillins, Penicillins    Isolation: None   Infection: None   Code Status: No CPR    Ht: 157.5 cm (62\")   Wt: 114 kg (250 lb 12.8 oz)    Admission Cmt: None   Principal Problem: Dyspnea, unspecified type [R06.00]                 Active Insurance as of 5/25/2023     Primary Coverage     Payor Plan Insurance Group Employer/Plan Group    [employer] [place] [employer] [place]      Payor Plan Address Payor Plan Phone Number Payor Plan Fax Number Effective Dates    [address] [address] [phone]  8/9/2022 - None Entered    [facility] [id number]       Subscriber Name Subscriber Birth Date Member ID       STEPHANIE BRADSHAW 1960 [id number]                 Emergency Contacts      (Rel.) Home Phone Work Phone Mobile Phone    Sonia Correia (Friend) [phone] -- [phone]    Hieu Neumann (Son) [phone] -- [phone]    Hieu Crump (Son) [phone] -- --            Vital Signs (last day)     Date/Time Temp Temp src Pulse Resp BP Patient Position SpO2    05/29/23 0716 97.3 (36.3) Temporal 88 16 141/71 -- 93    05/29/23 0700 -- -- 85 18 -- -- --    05/29/23 0653 -- -- 80 18 -- -- 91    05/29/23 0337 96.8 (36) Temporal 85 18 120/66 Lying " 92    05/29/23 0059 -- -- 86 -- -- -- --    05/28/23 1933 98.2 (36.8) -- 86 16 118/65 -- 92    05/28/23 1727 -- -- 86 -- -- -- --    05/28/23 1552 98 (36.7) Temporal 84 18 116/62 Lying 95    05/28/23 1414 -- -- 78 18 -- -- 94    05/28/23 1059 98 (36.7) Temporal 80 18 109/57 Lying 94    05/28/23 0730 98.2 (36.8) -- 84 18 111/58 -- 94    05/28/23 0725 -- -- 82 -- -- -- --    05/28/23 0707 -- -- -- -- -- -- 92    05/28/23 0639 -- -- 79 15 -- -- 99    05/28/23 0241 97 (36.1) Temporal 82 18 123/66 Lying 94    05/28/23 0120 -- -- 84 25 -- -- 96    05/28/23 0037 -- -- 80 -- -- -- --          Oxygen Therapy (last day)     Date/Time SpO2 Device (Oxygen Therapy) Flow (L/min) Oxygen Concentration (%) ETCO2 (mmHg)    05/29/23 0716 93 nasal cannula;humidified 5 -- --    05/29/23 0700 -- humidified;nasal cannula 5 -- --    05/29/23 0653 91 nasal cannula;humidified 5 -- --    05/29/23 0337 92 nasal cannula 5 -- --    05/28/23 1933 92 nasal cannula 5 -- --    05/28/23 1552 95 nasal cannula 5 -- --    05/28/23 1414 94 nasal cannula 5 -- --    05/28/23 1059 94 nasal cannula 5 -- --    05/28/23 0730 94 -- -- -- --    05/28/23 0707 92 nasal cannula;humidified 5 -- --    05/28/23 0651 -- -- 5  -- --    05/28/23 0639 99 nasal cannula 6 -- --    05/28/23 0241 94 nasal cannula 6 -- --    05/28/23 0120 96 NPPV/NIV -- 50 --          Current Facility-Administered Medications   Medication Dose Route Frequency Provider Last Rate Last Admin   • acetaminophen (TYLENOL) tablet 650 mg  650 mg Oral Q4H PRN Dexter Mclean MD        Or   • acetaminophen (TYLENOL) 160 MG/5ML solution 650 mg  650 mg Oral Q4H PRN Dexter Mclean MD        Or   • acetaminophen (TYLENOL) suppository 650 mg  650 mg Rectal Q4H PRN Dexter Mclean MD       • albuterol (PROVENTIL) nebulizer solution 0.083% 2.5 mg/3mL  2.5 mg Nebulization Q6H PRN Dexter Mclean MD       • apixaban (ELIQUIS) tablet 5 mg  5 mg Oral Q12H Dexter Mclean MD   5 mg at 05/28/23 2002   • Bag Balm  ointment ointment 1 application  1 application Topical BID Dexter Mclean MD   1 application at 05/28/23 2003   • sennosides-docusate (PERICOLACE) 8.6-50 MG per tablet 2 tablet  2 tablet Oral BID PRN Dexter Mclean MD        And   • polyethylene glycol (MIRALAX) packet 17 g  17 g Oral Daily PRN Dexter Mclean MD        And   • bisacodyl (DULCOLAX) EC tablet 5 mg  5 mg Oral Daily PRN Dexter Mclean MD        And   • bisacodyl (DULCOLAX) suppository 10 mg  10 mg Rectal Daily PRN Dexter Mclean MD       • calcium carbonate (TUMS) chewable tablet 500 mg (200 mg elemental)  2 tablet Oral BID PRN Dexter Mclean MD       • Calcium Replacement - Follow Nurse / BPA Driven Protocol   Does not apply PRN Dexter Mclean MD       • cyclobenzaprine (FLEXERIL) tablet 10 mg  10 mg Oral TID PRN Stephen Fiore MD   10 mg at 05/28/23 1114   • dextrose (D50W) (25 g/50 mL) IV injection 25 g  25 g Intravenous Q15 Min PRN Dexter Mclean MD       • dextrose (GLUTOSE) oral gel 15 g  15 g Oral Q15 Min PRN Dexter Mclean MD       • furosemide (LASIX) 100 mg in sodium chloride 0.9 % 100 mL infusion  10 mg/hr Intravenous Continuous Dexter Mclean MD 10 mL/hr at 05/29/23 0018 10 mg/hr at 05/29/23 0018   • glucagon (human recombinant) (GLUCAGEN DIAGNOSTIC) injection 1 mg  1 mg Intramuscular Q15 Min PRN Dexter Mclean MD       • HYDROcodone-acetaminophen (NORCO) 5-325 MG per tablet 1 tablet  1 tablet Oral Q4H PRN Dexter Mclean MD       • Insulin Aspart (novoLOG) injection 0-7 Units  0-7 Units Subcutaneous TID AC Dexter Mclean MD   2 Units at 05/28/23 1755   • ipratropium-albuterol (DUO-NEB) nebulizer solution 3 mL  3 mL Nebulization TID - RT Dexter Mclean MD   3 mL at 05/29/23 0653   • Magnesium Standard Dose Replacement - Follow Nurse / BPA Driven Protocol   Does not apply PRN Dexter Mclean MD       • melatonin tablet 5 mg  5 mg Oral Nightly PRN Dexter Mclean MD       • morphine injection 1 mg  1 mg Intravenous Q4H PRN Vinay,  Dexter BONILLA MD        And   • naloxone (NARCAN) injection 0.4 mg  0.4 mg Intravenous Q5 Min PRN Dexter Mclean MD       • nicotine (NICODERM CQ) 21 MG/24HR patch 1 patch  1 patch Transdermal Q24H Dexter Mclean MD   1 patch at 05/28/23 1755   • nystatin (MYCOSTATIN) powder   Topical Q12H Dexter Mclean MD   Given at 05/28/23 0900   • ondansetron (ZOFRAN) tablet 4 mg  4 mg Oral Q6H PRN Dexter Mclean MD   4 mg at 05/26/23 0554    Or   • ondansetron (ZOFRAN) injection 4 mg  4 mg Intravenous Q6H PRN Dexter Mclean MD       • pentoxifylline (TRENtal) CR tablet 400 mg  400 mg Oral BID Dexter Mclean MD   400 mg at 05/28/23 2001   • Phosphorus Replacement - Follow Nurse / BPA Driven Protocol   Does not apply PRN Dexter Mclean MD       • potassium chloride (K-DUR,KLOR-CON) CR tablet 40 mEq  40 mEq Oral Q4H Amandeep Bowen MD       • potassium chloride (MICRO-K) CR capsule 40 mEq  40 mEq Oral Daily Dexter Mclean MD   40 mEq at 05/27/23 1600   • Potassium Replacement - Follow Nurse / BPA Driven Protocol   Does not apply PRN Dexter Mclean MD       • rosuvastatin (CRESTOR) tablet 10 mg  10 mg Oral Daily Dexter Mclean MD   10 mg at 05/28/23 0900   • sodium chloride 0.9 % flush 10 mL  10 mL Intravenous PRN Edward Law DO       • sodium chloride 0.9 % flush 10 mL  10 mL Intravenous Q12H Dexter Mclean MD   10 mL at 05/28/23 2002   • sodium chloride 0.9 % flush 10 mL  10 mL Intravenous PRN Dexter Mclean MD       • sodium chloride 0.9 % infusion 40 mL  40 mL Intravenous PRN Dexter Mclean MD       • traMADol (ULTRAM) tablet 50 mg  50 mg Oral Q8H PRN Dexter Mclean MD            Physician Progress Notes (last 48 hours)      Dexter Mclean MD at 05/28/23 1211               [facility] Medicine Services  INPATIENT PROGRESS NOTE      Length of Stay: 3  Date of Admission: 5/25/2023  Primary Care Physician: Danielle Vaughn APRN    Subjective   Chief Complaint:  Shortness of breath  HPI: Continues to report improvement and breathing better.  She is down another 10 pounds today.  No new concerns.    Review of Systems   All pertinent negatives and positives are as above. All other systems have been reviewed and are negative unless otherwise stated.     Objective    As of today 05/28/23  Temp:  [97 °F (36.1 °C)-98.5 °F (36.9 °C)] 98 °F (36.7 °C)  Heart Rate:  [75-84] 80  Resp:  [15-25] 18  BP: (109-129)/(57-66) 109/57    Physical Exam  Constitutional:       General: She is not in acute distress.     Appearance: She is not toxic-appearing.   HENT:      Head: Normocephalic and atraumatic.      Right Ear: External ear normal.      Left Ear: External ear normal.      Nose: Nose normal.      Mouth/Throat:      Pharynx: Oropharynx is clear.   Eyes:      Conjunctiva/sclera: Conjunctivae normal.   Cardiovascular:      Rate and Rhythm: Normal rate and regular rhythm.      Heart sounds: Normal heart sounds.   Pulmonary:      Comments: Diminished but otherwise clear breath sounds  Abdominal:      General: Bowel sounds are normal.      Palpations: Abdomen is soft.      Tenderness: There is no abdominal tenderness.   Musculoskeletal:         General: No deformity.      Right lower leg: Edema present.      Left lower leg: Edema present.   Skin:     General: Skin is warm and dry.      Capillary Refill: Capillary refill takes less than 2 seconds.   Neurological:      General: No focal deficit present.      Mental Status: She is alert and oriented to person, place, and time. Mental status is at baseline.   Psychiatric:         Behavior: Behavior normal.           Results Review:  I have reviewed the labs, radiology results, and diagnostic studies.    Laboratory Data:   Results from last 7 days   Lab Units 05/28/23  0512 05/27/23  0520 05/26/23  0611   SODIUM mmol/L 138 141 139   POTASSIUM mmol/L 3.7 3.8 4.1   CHLORIDE mmol/L 86* 86* 92*   CO2 mmol/L 45.0* 50.0* 44.0*   BUN mg/dL 7* 8 10    CREATININE mg/dL 0.52* 0.75 0.62   GLUCOSE mg/dL 142* 144* 190*   CALCIUM mg/dL 8.5* 8.1* 8.1*   BILIRUBIN mg/dL 1.1 0.9 0.9   ALK PHOS U/L 104 104 106   ALT (SGPT) U/L 8 7 7   AST (SGOT) U/L 14 13 15   ANION GAP mmol/L 7.0 5.0 3.0*     Estimated Creatinine Clearance: 144.4 mL/min (A) (by C-G formula based on SCr of 0.52 mg/dL (L)).  Results from last 7 days   Lab Units 05/26/23  0611   MAGNESIUM mg/dL 1.8         Results from last 7 days   Lab Units 05/28/23  0512 05/27/23  0520 05/26/23  0611 05/25/23  1212   WBC 10*3/mm3 8.81 9.42 10.69 9.65   HEMOGLOBIN g/dL 14.2 13.5 13.5 14.1   HEMATOCRIT % 46.1 46.7* 46.9* 49.6*   PLATELETS 10*3/mm3 183 132* 232 205           Culture Data:   No results found for: BLOODCX  No results found for: URINECX  No results found for: RESPCX  No results found for: WOUNDCX  No results found for: STOOLCX  No components found for: BODYFLD    Radiology Data:   Imaging Results (Last 24 Hours)     ** No results found for the last 24 hours. **          I have reviewed the patient's current medications.     Assessment/Plan     Principal Problem:    Dyspnea, unspecified type  Active Problems:    Acute on chronic diastolic heart failure    Pulmonary hypertension    Anasarca    History of pulmonary embolus (PE)    Depression    Acute on chronic respiratory failure with hypoxia and hypercapnia    Plan:  - Continue Lasix infusion  - Monitor I's and O's and daily weights.  Presently down 13 pounds today  - Continue other home medications as appropriate  - Continue working with PT and OT  - ABG yesterday noted to have hypercapnia and patient was confused at the time likely secondary to CO2 narcosis  - Patient was placed on BiPAP with good improvement in her mentation as well as her CO2 levels  - This appears to be likely chronic process that she had metabolic compensation on her ABG  - Watch for any worsening mentation and if she becomes more confused and placed back on BiPAP please  - Continue  noninvasive ventilation at night and as needed throughout the day.  - DVT prophylaxis with Eliquis  - CODE STATUS: DNR/DNI    Medical Decision Making  Number and Complexity of problems: 5 high complexity problems    Conditions and Status:        Condition is improving.     MDM Data    Tests considered but not ordered: None     Decision rules/scores evaluated (example [id number], Wells, etc): None     Discussed with: patient     Treatment Plan  As above    Care Planning  Shared decision making: patient  Code status and discussions: full    Disposition  Social Determinants of Health that impact treatment or disposition: none  Disposition to be determined based on clinical course in the hospital      I have utilized all available immediate resources to obtain, update, or review the patient's current medications (including all prescriptions, over-the-counter products, herbals, cannabis/cannabidiol products, and vitamin/mineral/dietary (nutritional) supplements).   I confirmed that the patient's Advance Care Plan is present, code status is documented, or surrogate decision maker is listed in the patient's medical record.      Dexter Mclean MD      Electronically signed by Dexter Mclean MD at 05/28/23 1212     Dexter Mclean MD at 05/27/23 1120               [facility] Medicine Services  INPATIENT PROGRESS NOTE      Length of Stay: 2  Date of Admission: 5/25/2023  Primary Care Physician: Danielle Vaughn APRN    Subjective   Chief Complaint: Shortness of breath  HPI: Patient reports feeling like her breathing is improved.  Per report she has had intermittent confusion at times.  She is able to tell me she is in the hospital and has had difficulties with depression.  She still denies wishing to talk to psychiatry.    Review of Systems   All pertinent negatives and positives are as above. All other systems have been reviewed and are negative unless otherwise stated.      Objective    As of today 05/27/23  Temp:  [97.8 °F (36.6 °C)-98.4 °F (36.9 °C)] 98.2 °F (36.8 °C)  Heart Rate:  [77-83] 80  Resp:  [12-22] 18  BP: (101-134)/(56-80) 120/56    Physical Exam  Constitutional:       General: She is not in acute distress.     Appearance: She is not toxic-appearing.   HENT:      Head: Normocephalic and atraumatic.      Right Ear: External ear normal.      Left Ear: External ear normal.      Nose: Nose normal.      Mouth/Throat:      Pharynx: Oropharynx is clear.   Eyes:      Conjunctiva/sclera: Conjunctivae normal.   Cardiovascular:      Rate and Rhythm: Normal rate and regular rhythm.      Heart sounds: Normal heart sounds.   Pulmonary:      Comments: Diminished but otherwise clear breath sounds  Abdominal:      General: Bowel sounds are normal.      Palpations: Abdomen is soft.      Tenderness: There is no abdominal tenderness.   Musculoskeletal:         General: No deformity.      Right lower leg: Edema present.      Left lower leg: Edema present.   Skin:     General: Skin is warm and dry.      Capillary Refill: Capillary refill takes less than 2 seconds.   Neurological:      General: No focal deficit present.      Mental Status: She is alert and oriented to person, place, and time. Mental status is at baseline.   Psychiatric:         Behavior: Behavior normal.           Results Review:  I have reviewed the labs, radiology results, and diagnostic studies.    Laboratory Data:   Results from last 7 days   Lab Units 05/27/23  0520 05/26/23  0611 05/25/23  1212   SODIUM mmol/L 141 139 140   POTASSIUM mmol/L 3.8 4.1 4.7   CHLORIDE mmol/L 86* 92* 96*   CO2 mmol/L 50.0* 44.0* 39.0*   BUN mg/dL 8 10 8   CREATININE mg/dL 0.75 0.62 0.62   GLUCOSE mg/dL 144* 190* 155*   CALCIUM mg/dL 8.1* 8.1* 8.5*   BILIRUBIN mg/dL 0.9 0.9 0.7   ALK PHOS U/L 104 106 104   ALT (SGPT) U/L 7 7 7   AST (SGOT) U/L 13 15 12   ANION GAP mmol/L 5.0 3.0* 5.0     Estimated Creatinine Clearance: 102.1 mL/min (by C-G  formula based on SCr of 0.75 mg/dL).  Results from last 7 days   Lab Units 05/26/23  0611   MAGNESIUM mg/dL 1.8         Results from last 7 days   Lab Units 05/27/23  0520 05/26/23  0611 05/25/23  1212   WBC 10*3/mm3 9.42 10.69 9.65   HEMOGLOBIN g/dL 13.5 13.5 14.1   HEMATOCRIT % 46.7* 46.9* 49.6*   PLATELETS 10*3/mm3 132* 232 205           Culture Data:   No results found for: BLOODCX  No results found for: URINECX  No results found for: RESPCX  No results found for: WOUNDCX  No results found for: STOOLCX  No components found for: BODYFLD    Radiology Data:   Imaging Results (Last 24 Hours)     ** No results found for the last 24 hours. **          I have reviewed the patient's current medications.     Assessment/Plan     Principal Problem:    Dyspnea, unspecified type  Active Problems:    Acute on chronic diastolic heart failure    Pulmonary hypertension    Anasarca    History of pulmonary embolus (PE)    Depression    Plan:  - Continue Lasix infusion  - Monitor I's and O's and daily weights.  Presently down 13 pounds today  - Continue other home medications as appropriate  - Continue working with PT and OT  - Her CO2 on her CMP this morning was around 50 so we will check an ABG.  Suspect some of her confusion likely related to elevated CO2 level.     - DVT prophylaxis with Eliquis  - CODE STATUS: DNR/DNI    Medical Decision Making  Number and Complexity of problems: 5 high complexity problems    Conditions and Status:        Condition is improving.     MDM Data    Tests considered but not ordered: None     Decision rules/scores evaluated (example [id number], Wells, etc): None     Discussed with: patient     Treatment Plan  As above    Care Planning  Shared decision making: patient  Code status and discussions: full    Disposition  Social Determinants of Health that impact treatment or disposition: none  Disposition to be determined based on clinical course in the hospital      I have utilized all available  immediate resources to obtain, update, or review the patient's current medications (including all prescriptions, over-the-counter products, herbals, cannabis/cannabidiol products, and vitamin/mineral/dietary (nutritional) supplements).   I confirmed that the patient's Advance Care Plan is present, code status is documented, or surrogate decision maker is listed in the patient's medical record.      Dexter Mclean MD      Electronically signed by Dexter Mclean MD at 05/27/23 1121

## 2023-05-29 NOTE — THERAPY TREATMENT NOTE
Patient Name: Stephanie Bradshaw  : 1960    MRN: [id number]                              Today's Date: 2023       Admit Date: 2023    Visit Dx:     ICD-10-CM ICD-9-CM   1. Dyspnea, unspecified type  R06.00 786.09   2. Abnormal EKG  R94.31 794.31   3. Acute on chronic respiratory failure with hypoxia  J96.21 518.84     799.02   4. Impaired mobility and ADLs  Z74.09 V49.89    Z78.9    5. Decreased functional mobility  R26.89 781.99     Patient Active Problem List   Diagnosis   • Multiple subsegmental pulmonary emboli without acute cor pulmonale   • Cellulitis   • Cellulitis   • Ulises hematuria   • Mixed hyperlipidemia   • Morbid obesity with BMI of 45.0-49.9, adult   • Leg edema   • Venous stasis dermatitis of both lower extremities   • Lymphedema   • Borderline diabetic   • Cardiomegaly   • Dyspnea on exertion   • Elevated blood pressure reading without diagnosis of hypertension   • Right ventricular cardiac abnormality   • Hypersomnia with sleep apnea   • Ascending aorta dilation   • Venous stasis dermatitis of both lower extremities   • Mucopurulent chronic bronchitis   • Dyspnea, unspecified type   • Acute on chronic diastolic heart failure   • Pulmonary hypertension   • Anasarca   • History of pulmonary embolus (PE)   • Depression   • Acute respiratory failure with hypoxia   • Acute on chronic respiratory failure with hypoxia and hypercapnia     Past Medical History:   Diagnosis Date   • Controlled type 2 diabetes mellitus without complication, without long-term current use of insulin    • COPD (chronic obstructive pulmonary disease)    • DVT (deep venous thrombosis) 2022    left popliteal   • Hyperlipemia, mixed    • Pulmonary embolism 2022    right     Past Surgical History:   Procedure Laterality Date   • CYSTOSCOPY N/A 2022    Procedure: CYSTOSCOPY;  Surgeon: Davide Moore MD;  Location: [facility];  Service: Urology;  Laterality: N/A;   • TUBAL ABDOMINAL LIGATION         General Information     Row Name 05/29/23 1450          Physical Therapy Time and Intention    Document Type therapy note (daily note)  -LR     Mode of Treatment individual therapy;physical therapy  -LR     Row Name 05/29/23 1450          General Information    Patient Profile Reviewed yes  -LR     Existing Precautions/Restrictions oxygen therapy device and L/min;fall  -LR     Row Name 05/29/23 1450          Cognition    Orientation Status (Cognition) oriented x 4  -LR           User Key  (r) = Recorded By, (t) = Taken By, (c) = Cosigned By    Initials Name Provider Type    LR Jake Turner Physical Therapist               Mobility     Row Name 05/29/23 1450          Sit-Stand Transfer    Sit-Stand [place] (Transfers) contact guard  -LR     Assistive Device (Sit-Stand Transfers) walker, front-wheeled  -LR     Row Name 05/29/23 1450          Gait/Stairs (Locomotion)    [place] Level (Gait) contact guard  -LR     Assistive Device (Gait) walker, front-wheeled  -LR     Distance in Feet (Gait) 15'x3 fwd/bkwds  -LR     Deviations/Abnormal Patterns (Gait) gait speed decreased;base of support, wide;kiel decreased;festinating/shuffling  -LR           User Key  (r) = Recorded By, (t) = Taken By, (c) = Cosigned By    Initials Name Provider Type    LR Jake Turner Physical Therapist               Obj/Interventions    No documentation.                Goals/Plan     Row Name 05/29/23 1450          Bed Mobility Goal 1 (PT)    Activity/Assistive Device (Bed Mobility Goal 1, PT) supine to sit;sit to supine  -LR     [place] Level/Cues Needed (Bed Mobility Goal 1, PT) modified independence  -LR     Time Frame (Bed Mobility Goal 1, PT) by discharge  -LR     Progress/Outcomes (Bed Mobility Goal 1, PT) goal not met  -LR     Row Name 05/29/23 1450          Transfer Goal 1 (PT)    Activity/Assistive Device (Transfer Goal 1, PT) sit-to-stand/stand-to-sit;bed-to-chair/chair-to-bed  -LR     [place] Level/Cues  Needed (Transfer Goal 1, PT) modified independence  -LR     Time Frame (Transfer Goal 1, PT) by discharge  -LR     Progress/Outcome (Transfer Goal 1, PT) goal not met  -LR     Row Name 05/29/23 1450          Gait Training Goal 1 (PT)    Activity/Assistive Device (Gait Training Goal 1, PT) gait (walking locomotion);assistive device use  -LR     [place] Level (Gait Training Goal 1, PT) modified independence  -LR     Distance (Gait Training Goal 1, PT) 50'x3  -LR     Time Frame (Gait Training Goal 1, PT) by discharge  -LR     Progress/Outcome (Gait Training Goal 1, PT) goal not met  -LR           User Key  (r) = Recorded By, (t) = Taken By, (c) = Cosigned By    Initials Name Provider Type    LR Jake Turner Physical Therapist               Clinical Impression     Row Name 05/29/23 1450          Pain    Pretreatment Pain Rating 0/10 - no pain  -LR     Posttreatment Pain Rating 0/10 - no pain  -LR     Row Name 05/29/23 1450          Plan of Care Review    Plan of Care Reviewed With patient  -LR     Outcome Evaluation PT tx completed. Patient sitting EOB and willing to ambulation. CGA for sit<>stand transfer x3 and ambulation 15'x3 fwd/bkwd with RW. Patient presents with decreased gait speed, step length, and mild unsteadiness. Spoke with patient about ways to improve activity tolerance and independence. No new goals met. Patient reports she is thinking about going to a detention in [place] near her sister.  -LR     Row Name 05/29/23 1453          Vital Signs    Pre Systolic BP Rehab 132  -LR     Pre Treatment Diastolic BP 73  -LR     Pretreatment Heart Rate (beats/min) 84  -LR     Pre SpO2 (%) 93  -LR     O2 Delivery Pre Treatment supplemental O2  5L  -LR     Pre Patient Position Sitting  -LR     Row Name 05/29/23 1450          Positioning and Restraints    Pre-Treatment Position in bed  -LR     Post Treatment Position bed  -LR     In Bed notified nsg;call light within reach;encouraged to call for assist;sitting  EOB;exit alarm on  -LR           User Key  (r) = Recorded By, (t) = Taken By, (c) = Cosigned By    Initials Name Provider Type    Jake Silva Physical Therapist               Outcome Measures     Row Name 05/29/23 1450          How much help from another person do you currently need...    Turning from your back to your side while in flat bed without using bedrails? 3  -LR     Moving from lying on back to sitting on the side of a flat bed without bedrails? 3  -LR     Moving to and from a bed to a chair (including a wheelchair)? 3  -LR     Standing up from a chair using your arms (e.g., wheelchair, bedside chair)? 3  -LR     Climbing 3-5 steps with a railing? 3  -LR     To walk in hospital room? 3  -LR     AM-PAC 6 Clicks Score (PT) 18  -LR     Highest level of mobility 6 --> Walked 10 steps or more  -LR     Row Name 05/29/23 1516          Functional Assessment    Outcome Measure Options AM-PAC 6 Clicks Basic Mobility (PT)  -LR           User Key  (r) = Recorded By, (t) = Taken By, (c) = Cosigned By    Initials Name Provider Type    Jake Silva Physical Therapist                             Physical Therapy Education     Title: PT OT SLP Therapies (In Progress)     Topic: Physical Therapy (Done)     Point: Mobility training (Done)     Learning Progress Summary           Patient Acceptance, E,TB, VU,NR by LR at 5/26/2023 1306    Comment: Educated on PT POC and goals.                   Point: Home exercise program (Done)     Learning Progress Summary           Patient Acceptance, E,TB, VU,NR by LR at 5/26/2023 1306    Comment: Educated on PT POC and goals.                   Point: Body mechanics (Done)     Learning Progress Summary           Patient Acceptance, E,TB, VU,NR by LR at 5/26/2023 1306    Comment: Educated on PT POC and goals.                   Point: Precautions (Done)     Learning Progress Summary           Patient Acceptance, E,TB, VU,NR by LR at 5/26/2023 1306    Comment: Educated on PT POC  and goals.                               User Key     Initials Effective Dates Name Provider Type Discipline    LR 06/16/21 -  Jake Turner Physical Therapist PT              PT Recommendation and Plan     Plan of Care Reviewed With: patient  Outcome Evaluation: PT tx completed. Patient sitting EOB and willing to ambulation. CGA for sit<>stand transfer x3 and ambulation 15'x3 fwd/bkwd with RW. Patient presents with decreased gait speed, step length, and mild unsteadiness. Spoke with patient about ways to improve activity tolerance and independence. No new goals met. Patient reports she is thinking about going to a [facility] in [place] near her sister.     Time Calculation:    PT Charges     Row Name 05/29/23 1537             Time Calculation    Start Time 1450  -LR      Stop Time 1520  -LR      Time Calculation (min) 30 min  -LR      PT Received On 05/29/23  -LR         Time Calculation- PT    Total Timed Code Minutes- PT 30 minute(s)  -LR         Timed Charges    06943 - PT Therapeutic Activity Minutes 30  -LR         Total Minutes    Timed Charges Total Minutes 30  -LR       Total Minutes 30  -LR            User Key  (r) = Recorded By, (t) = Taken By, (c) = Cosigned By    Initials Name Provider Type    LR Jake Turner Physical Therapist              Therapy Charges for Today     Code Description Service Date Service Provider Modifiers Qty    39446723306 HC PT THERAPEUTIC ACT EA 15 MIN 5/29/2023 Jake Turner GP 2          PT G-Codes  Outcome Measure Options: AM-PAC 6 Clicks Basic Mobility (PT)  AM-PAC 6 Clicks Score (PT): 18  AM-PAC 6 Clicks Score (OT): 17  PT Discharge Summary  Anticipated Discharge Disposition (PT): home with assist, home with home health, assisted living, skilled nursing facility    Jake Turner  5/29/2023

## 2023-05-30 LAB
ALBUMIN SERPL-MCNC: 3.4 G/DL (ref 3.5–5.2)
ALBUMIN/GLOB SERPL: 0.9 G/DL
ALP SERPL-CCNC: 102 U/L (ref 39–117)
ALT SERPL W P-5'-P-CCNC: 9 U/L (ref 1–33)
ANION GAP SERPL CALCULATED.3IONS-SCNC: 10 MMOL/L (ref 5–15)
AST SERPL-CCNC: 17 U/L (ref 1–32)
BASOPHILS # BLD AUTO: 0.07 10*3/MM3 (ref 0–0.2)
BASOPHILS NFR BLD AUTO: 1 % (ref 0–1.5)
BILIRUB SERPL-MCNC: 1.1 MG/DL (ref 0–1.2)
BUN SERPL-MCNC: 17 MG/DL (ref 8–23)
BUN/CREAT SERPL: 24.6 (ref 7–25)
CALCIUM SPEC-SCNC: 9 MG/DL (ref 8.6–10.5)
CHLORIDE SERPL-SCNC: 90 MMOL/L (ref 98–107)
CO2 SERPL-SCNC: 35 MMOL/L (ref 22–29)
CREAT SERPL-MCNC: 0.69 MG/DL (ref 0.57–1)
DEPRECATED RDW RBC AUTO: 47.9 FL (ref 37–54)
EGFRCR SERPLBLD CKD-EPI 2021: 98.9 ML/MIN/1.73
EOSINOPHIL # BLD AUTO: 0.15 10*3/MM3 (ref 0–0.4)
EOSINOPHIL NFR BLD AUTO: 2 % (ref 0.3–6.2)
ERYTHROCYTE [DISTWIDTH] IN BLOOD BY AUTOMATED COUNT: 15.4 % (ref 12.3–15.4)
GLOBULIN UR ELPH-MCNC: 3.6 GM/DL
GLUCOSE BLDC GLUCOMTR-MCNC: 165 MG/DL (ref 70–130)
GLUCOSE BLDC GLUCOMTR-MCNC: 186 MG/DL (ref 70–130)
GLUCOSE BLDC GLUCOMTR-MCNC: 239 MG/DL (ref 70–130)
GLUCOSE BLDC GLUCOMTR-MCNC: 312 MG/DL (ref 70–130)
GLUCOSE SERPL-MCNC: 166 MG/DL (ref 65–99)
HCT VFR BLD AUTO: 50.3 % (ref 34–46.6)
HGB BLD-MCNC: 15.5 G/DL (ref 12–15.9)
IMM GRANULOCYTES # BLD AUTO: 0.02 10*3/MM3 (ref 0–0.05)
IMM GRANULOCYTES NFR BLD AUTO: 0.3 % (ref 0–0.5)
LYMPHOCYTES # BLD AUTO: 1.63 10*3/MM3 (ref 0.7–3.1)
LYMPHOCYTES NFR BLD AUTO: 22.3 % (ref 19.6–45.3)
MAGNESIUM SERPL-MCNC: 2 MG/DL (ref 1.6–2.4)
MCH RBC QN AUTO: 26.5 PG (ref 26.6–33)
MCHC RBC AUTO-ENTMCNC: 30.8 G/DL (ref 31.5–35.7)
MCV RBC AUTO: 86 FL (ref 79–97)
MONOCYTES # BLD AUTO: 0.59 10*3/MM3 (ref 0.1–0.9)
MONOCYTES NFR BLD AUTO: 8.1 % (ref 5–12)
NEUTROPHILS NFR BLD AUTO: 4.86 10*3/MM3 (ref 1.7–7)
NEUTROPHILS NFR BLD AUTO: 66.3 % (ref 42.7–76)
NRBC BLD AUTO-RTO: 0 /100 WBC (ref 0–0.2)
PLATELET # BLD AUTO: 174 10*3/MM3 (ref 140–450)
PMV BLD AUTO: 11 FL (ref 6–12)
POTASSIUM SERPL-SCNC: 3.7 MMOL/L (ref 3.5–5.2)
PROT SERPL-MCNC: 7 G/DL (ref 6–8.5)
RBC # BLD AUTO: 5.85 10*6/MM3 (ref 3.77–5.28)
SODIUM SERPL-SCNC: 135 MMOL/L (ref 136–145)
WBC NRBC COR # BLD: 7.32 10*3/MM3 (ref 3.4–10.8)

## 2023-05-30 PROCEDURE — 94799 UNLISTED PULMONARY SVC/PX: CPT

## 2023-05-30 PROCEDURE — 97535 SELF CARE MNGMENT TRAINING: CPT

## 2023-05-30 PROCEDURE — 25010000002 FUROSEMIDE PER 20 MG: Performed by: FAMILY MEDICINE

## 2023-05-30 PROCEDURE — 97530 THERAPEUTIC ACTIVITIES: CPT

## 2023-05-30 PROCEDURE — 94664 DEMO&/EVAL PT USE INHALER: CPT

## 2023-05-30 PROCEDURE — 97116 GAIT TRAINING THERAPY: CPT

## 2023-05-30 PROCEDURE — 82948 REAGENT STRIP/BLOOD GLUCOSE: CPT

## 2023-05-30 PROCEDURE — 94660 CPAP INITIATION&MGMT: CPT

## 2023-05-30 PROCEDURE — 80053 COMPREHEN METABOLIC PANEL: CPT | Performed by: FAMILY MEDICINE

## 2023-05-30 PROCEDURE — 63710000001 INSULIN ASPART PER 5 UNITS: Performed by: FAMILY MEDICINE

## 2023-05-30 PROCEDURE — 85025 COMPLETE CBC W/AUTO DIFF WBC: CPT | Performed by: FAMILY MEDICINE

## 2023-05-30 PROCEDURE — 94760 N-INVAS EAR/PLS OXIMETRY 1: CPT

## 2023-05-30 PROCEDURE — 25010000002 FUROSEMIDE PER 20 MG: Performed by: INTERNAL MEDICINE

## 2023-05-30 PROCEDURE — 83735 ASSAY OF MAGNESIUM: CPT | Performed by: INTERNAL MEDICINE

## 2023-05-30 RX ORDER — FUROSEMIDE 10 MG/ML
40 INJECTION INTRAMUSCULAR; INTRAVENOUS EVERY 8 HOURS
Status: DISCONTINUED | OUTPATIENT
Start: 2023-05-30 | End: 2023-06-01 | Stop reason: SDUPTHER

## 2023-05-30 RX ADMIN — Medication 10 ML: at 09:34

## 2023-05-30 RX ADMIN — PENTOXIFYLLINE 400 MG: 400 TABLET, EXTENDED RELEASE ORAL at 20:47

## 2023-05-30 RX ADMIN — APIXABAN 5 MG: 5 TABLET, FILM COATED ORAL at 09:32

## 2023-05-30 RX ADMIN — ROSUVASTATIN CALCIUM 10 MG: 10 TABLET, FILM COATED ORAL at 09:32

## 2023-05-30 RX ADMIN — FUROSEMIDE 10 MG/HR: 10 INJECTION, SOLUTION INTRAVENOUS at 04:01

## 2023-05-30 RX ADMIN — Medication 1 APPLICATION: at 20:46

## 2023-05-30 RX ADMIN — PENTOXIFYLLINE 400 MG: 400 TABLET, EXTENDED RELEASE ORAL at 09:32

## 2023-05-30 RX ADMIN — NYSTATIN: 100000 POWDER TOPICAL at 09:32

## 2023-05-30 RX ADMIN — NYSTATIN: 100000 POWDER TOPICAL at 20:46

## 2023-05-30 RX ADMIN — Medication 10 ML: at 20:47

## 2023-05-30 RX ADMIN — Medication 1 PATCH: at 17:39

## 2023-05-30 RX ADMIN — IPRATROPIUM BROMIDE AND ALBUTEROL SULFATE 3 ML: .5; 3 SOLUTION RESPIRATORY (INHALATION) at 08:11

## 2023-05-30 RX ADMIN — FUROSEMIDE 40 MG: 10 INJECTION, SOLUTION INTRAVENOUS at 23:40

## 2023-05-30 RX ADMIN — APIXABAN 5 MG: 5 TABLET, FILM COATED ORAL at 20:47

## 2023-05-30 RX ADMIN — IPRATROPIUM BROMIDE AND ALBUTEROL SULFATE 3 ML: .5; 3 SOLUTION RESPIRATORY (INHALATION) at 14:46

## 2023-05-30 RX ADMIN — FUROSEMIDE 40 MG: 10 INJECTION, SOLUTION INTRAVENOUS at 15:15

## 2023-05-30 RX ADMIN — INSULIN ASPART 2 UNITS: 100 INJECTION, SOLUTION INTRAVENOUS; SUBCUTANEOUS at 17:37

## 2023-05-30 RX ADMIN — INSULIN ASPART 2 UNITS: 100 INJECTION, SOLUTION INTRAVENOUS; SUBCUTANEOUS at 09:00

## 2023-05-30 RX ADMIN — IPRATROPIUM BROMIDE AND ALBUTEROL SULFATE 3 ML: .5; 3 SOLUTION RESPIRATORY (INHALATION) at 20:38

## 2023-05-30 RX ADMIN — INSULIN ASPART 3 UNITS: 100 INJECTION, SOLUTION INTRAVENOUS; SUBCUTANEOUS at 10:40

## 2023-05-30 RX ADMIN — Medication 1 APPLICATION: at 09:32

## 2023-05-30 NOTE — PROGRESS NOTES
[facility] Medicine Services  INPATIENT PROGRESS NOTE    Length of Stay: 5  Date of Admission: 5/25/2023  Primary Care Physician: Danielle Vaughn APRN    Subjective   (S) Admitted for shortness of breath and weight gain due to stopped taking her diuretics  Today, good urine output; weaning her off oxygen, from 6L to 3 L    Review of Systems   All other systems reviewed and are negative.       All pertinent negatives and positives are as above. All other systems have been reviewed and are negative unless otherwise stated.     Prior to Admission medications    Medication Sig Start Date End Date Taking? Authorizing Provider   cyclobenzaprine (FLEXERIL) 10 MG tablet Take 1 tablet by mouth 3 (Three) Times a Day As Needed. 12/20/16  Yes    rosuvastatin (CRESTOR) 10 MG tablet Take 1 tablet by mouth Daily. 8/25/22  Yes Janiya Montenegro MD   tiotropium bromide-olodaterol (STIOLTO RESPIMAT) 2.5-2.5 MCG/ACT aerosol solution inhaler Inhale 2 puffs Daily. 8/23/22  Yes Mimi Kern APRN   traMADol (ULTRAM) 50 MG tablet Take 1 tablet by mouth Every 6 (Six) Hours As Needed for Moderate Pain. 8/25/22  Yes ProviderJaniya MD   VENTOLIN  (90 BASE) MCG/ACT inhaler  1/16/17  Yes ProviderJaniya MD   apixaban (ELIQUIS) 5 MG tablet tablet Take 1 tablet by mouth Every 12 (Twelve) Hours. Indications: DVT/PE (active thrombosis) 9/22/22   Rafael Cisse APRN   furosemide (LASIX) 40 MG tablet Take 1 tablet by mouth 2 (Two) Times a Day for 30 days. 8/19/22 9/18/22  Dexter Mclean MD   gabapentin (NEURONTIN) 400 MG capsule Take 1 capsule by mouth 3 (Three) Times a Day.    Provider, MD Janiya   nystatin (MYCOSTATIN) 800818 UNIT/GM powder Apply  topically to the appropriate area as directed 4 (Four) Times a Day. 8/25/22 8/26/23  Janiya Montenegro MD   pentoxifylline (TRENtal) 400 MG CR tablet Take 1 tablet by mouth 2 (Two) Times a Day. 8/29/22   Rafael Cisse  C, APRN       apixaban, 5 mg, Oral, Q12H  Bag Balm, 1 application, Topical, BID  Insulin Aspart, 0-7 Units, Subcutaneous, TID AC  ipratropium-albuterol, 3 mL, Nebulization, TID - RT  nicotine, 1 patch, Transdermal, Q24H  nystatin, , Topical, Q12H  pentoxifylline, 400 mg, Oral, BID  rosuvastatin, 10 mg, Oral, Daily  sodium chloride, 10 mL, Intravenous, Q12H      furosemide (LASIX) 100 mg in 100mL NS infusion, 10 mg/hr, Last Rate: 10 mg/hr (05/30/23 0401)        Objective    Temp:  [97.8 °F (36.6 °C)-98.4 °F (36.9 °C)] 98 °F (36.7 °C)  Heart Rate:  [75-91] 84  Resp:  [18-20] 18  BP: (119-132)/(64-73) 119/64    Physical Exam  Constitutional:       Appearance: Normal appearance. She is obese.      Comments: As today   HENT:      Head: Normocephalic.      Nose: Nose normal.      Mouth/Throat:      Mouth: Mucous membranes are moist.   Eyes:      Extraocular Movements: Extraocular movements intact.   Cardiovascular:      Rate and Rhythm: Normal rate and regular rhythm.      Heart sounds: Normal heart sounds.   Pulmonary:      Effort: Pulmonary effort is normal.      Breath sounds: Normal breath sounds.   Abdominal:      General: Bowel sounds are normal.      Palpations: Abdomen is soft.   Musculoskeletal:         General: Normal range of motion.      Cervical back: Normal range of motion and neck supple.   Skin:     General: Skin is warm.   Neurological:      General: No focal deficit present.      Mental Status: She is alert. Mental status is at baseline.   Psychiatric:         Behavior: Behavior normal.         Results Review:  I have reviewed the labs, radiology results, and diagnostic studies.    Laboratory Data:   Results from last 7 days   Lab Units 05/30/23  0652 05/29/23  1450 05/29/23  0546 05/28/23  0512   SODIUM mmol/L 135*  --  135* 138   POTASSIUM mmol/L 3.7 4.5 3.6 3.7   CHLORIDE mmol/L 90*  --  87* 86*   CO2 mmol/L 35.0*  --  40.0* 45.0*   BUN mg/dL 17  --  10 7*   CREATININE mg/dL 0.69  --  0.58 0.52*    GLUCOSE mg/dL 166*  --  146* 142*   CALCIUM mg/dL 9.0  --  9.0 8.5*   BILIRUBIN mg/dL 1.1  --  1.2 1.1   ALK PHOS U/L 102  --  107 104   ALT (SGPT) U/L 9  --  8 8   AST (SGOT) U/L 17  --  15 14   ANION GAP mmol/L 10.0  --  8.0 7.0     Estimated Creatinine Clearance: 101.2 mL/min (by C-G formula based on SCr of 0.69 mg/dL).  Results from last 7 days   Lab Units 05/30/23  0652 05/26/23  0611   MAGNESIUM mg/dL 2.0 1.8         Results from last 7 days   Lab Units 05/30/23  0652 05/29/23  0546 05/28/23  0512 05/27/23  0520 05/26/23  0611   WBC 10*3/mm3 7.32 8.14 8.81 9.42 10.69   HEMOGLOBIN g/dL 15.5 15.1 14.2 13.5 13.5   HEMATOCRIT % 50.3* 49.7* 46.1 46.7* 46.9*   PLATELETS 10*3/mm3 174 181 183 132* 232           Culture Data:   No results found for: BLOODCX  No results found for: URINECX  No results found for: RESPCX  No results found for: WOUNDCX  No results found for: STOOLCX  No components found for: BODYFLD    Radiology Data:   Imaging Results (Last 24 Hours)     ** No results found for the last 24 hours. **          I have reviewed the patient's current medications.     Assessment/Plan   Acute on chronic respiratory failure with hypoxemia     Due to below     Wean oxygen as able; on 3 L now, from 6 L    Acute on chronic diastolic CHF     Likely right heart failure; lungs are clear; CTA chest with no PE     discontinue lasix drip and changed it to 40 mg iv q 8h; monitor daily renal function      Weight went down from 296 lbs to 248 lbs in the last 5 days    Chronic medical problems     Hx of PE/DVT     Pulmonary hypertension     Chronic recurrent depression     Morbid obesity     Type II DM     Hyperlipidemia     COPD, no AE, no home oxygen     SNEHA; does not tolerate mask      Medical Decision Making  Number and Complexity of problems: 2 major complexes    Conditions and Status:        Condition is improving.     MDM Data  External documents reviewed: previous medical records  My EKG interpretation: n/a  My plain  film interpretation: n/a  Discussed with: patient     Treatment Plan  See above    Care Planning  Shared decision making:her friend Sonia  Code status and discussions: DNR/DNI    Disposition  Social Determinants of Health that impact treatment or disposition: none  I expect the patient to be discharged: in progress    I confirmed that the patient's Advance Care Plan is present, code status is documented, or surrogate decision maker is listed in the patient's medical record.     Boby Hamm MD

## 2023-05-30 NOTE — THERAPY TREATMENT NOTE
Patient Name: Stephanie Bradshaw  : 1960    MRN: [id number]                              Today's Date: 2023       Admit Date: 2023    Visit Dx:     ICD-10-CM ICD-9-CM   1. Dyspnea, unspecified type  R06.00 786.09   2. Abnormal EKG  R94.31 794.31   3. Acute on chronic respiratory failure with hypoxia  J96.21 518.84     799.02   4. Impaired mobility and ADLs  Z74.09 V49.89    Z78.9    5. Decreased functional mobility  R26.89 781.99     Patient Active Problem List   Diagnosis   • Multiple subsegmental pulmonary emboli without acute cor pulmonale   • Cellulitis   • Cellulitis   • Ulises hematuria   • Mixed hyperlipidemia   • Morbid obesity with BMI of 45.0-49.9, adult   • Leg edema   • Venous stasis dermatitis of both lower extremities   • Lymphedema   • Borderline diabetic   • Cardiomegaly   • Dyspnea on exertion   • Elevated blood pressure reading without diagnosis of hypertension   • Right ventricular cardiac abnormality   • Hypersomnia with sleep apnea   • Ascending aorta dilation   • Venous stasis dermatitis of both lower extremities   • Mucopurulent chronic bronchitis   • Dyspnea, unspecified type   • Acute on chronic diastolic heart failure   • Pulmonary hypertension   • Anasarca   • History of pulmonary embolus (PE)   • Depression   • Acute respiratory failure with hypoxia   • Acute on chronic respiratory failure with hypoxia and hypercapnia     Past Medical History:   Diagnosis Date   • Controlled type 2 diabetes mellitus without complication, without long-term current use of insulin    • COPD (chronic obstructive pulmonary disease)    • DVT (deep venous thrombosis) 2022    left popliteal   • Hyperlipemia, mixed    • Pulmonary embolism 2022    right     Past Surgical History:   Procedure Laterality Date   • CYSTOSCOPY N/A 2022    Procedure: CYSTOSCOPY;  Surgeon: Davide Moore MD;  Location: [facility];  Service: Urology;  Laterality: N/A;   • TUBAL ABDOMINAL LIGATION         General Information     Row Name 05/30/23 0840          OT Time and Intention    Document Type therapy note (daily note)  -CM     Mode of Treatment individual therapy;occupational therapy  -CM     Row Name 05/30/23 0840          General Information    Patient Profile Reviewed yes  -CM     Existing Precautions/Restrictions oxygen therapy device and L/min;fall  -CM     Row Name 05/30/23 0840          Cognition    Orientation Status (Cognition) oriented x 4  -CM     Row Name 05/30/23 0840          Safety Issues, Functional Mobility    Impairments Affecting Function (Mobility) endurance/activity tolerance;strength;shortness of breath;visual/perceptual  -CM           User Key  (r) = Recorded By, (t) = Taken By, (c) = Cosigned By    Initials Name Provider Type    CM Karly Segovia OT Occupational Therapist                 Mobility/ADL's     Row Name 05/30/23 0840          Bed Mobility    Bed Mobility supine-sit  -CM     Supine-Sit [place] (Bed Mobility) modified independence  -CM     Row Name 05/30/23 0840          Transfers    Transfers sit-stand transfer;stand-sit transfer;toilet transfer;bed-chair transfer  -CM     Row Name 05/30/23 0840          Bed-Chair Transfer    Bed-Chair [place] (Transfers) standby assist  -CM     Assistive Device (Bed-Chair Transfers) walker, front-wheeled  -CM     Row Name 05/30/23 0840          Sit-Stand Transfer    Sit-Stand [place] (Transfers) standby assist  -CM     Assistive Device (Sit-Stand Transfers) walker, front-wheeled  -CM     Row Name 05/30/23 0840          Stand-Sit Transfer    Stand-Sit [place] (Transfers) standby assist  -CM     Assistive Device (Stand-Sit Transfers) walker, front-wheeled  -CM     Row Name 05/30/23 0840          Toilet Transfer    Type (Toilet Transfer) stand-sit;sit-stand  -CM     [place] Level (Toilet Transfer) standby assist  -CM     Assistive Device (Toilet Transfer) walker, front-wheeled  -CM     Row Name 05/30/23 0840           Functional Mobility    Functional Mobility- Ind. Level contact guard assist  -CM     Functional Mobility- Device walker, front-wheeled  -CM     Functional Mobility-Distance (Feet) 20  -CM     Row Name 05/30/23 0840          Activities of Daily Living    BADL Assessment/Intervention bathing;upper body dressing;lower body dressing;grooming;toileting  -CM     Row Name 05/30/23 0840          Lower Body Dressing Assessment/Training    [place] Level (Lower Body Dressing) socks;doff;don;lower body dressing skills;set up  -CM     Position (Lower Body Dressing) edge of bed sitting  -CM     Row Name 05/30/23 0840          Bathing Assessment/Intervention    [place] Level (Bathing) bathing skills;upper body;set up;lower body;moderate assist (50% patient effort)  -CM     Position (Bathing) edge of bed sitting  -CM     Row Name 05/30/23 08          Upper Body Dressing Assessment/Training    [place] Level (Upper Body Dressing) upper body dressing skills;doff;don;other (see comments);set up  HG  -CM     Position (Upper Body Dressing) edge of bed sitting  -CM     Row Name 05/30/23 08          Grooming Assessment/Training    [place] Level (Grooming) grooming skills;oral care regimen;wash face, hands;standby assist  -CM     Position (Grooming) sink side;edge of bed sitting  -CM     Row Name 05/30/23 0840          Toileting Assessment/Training    [place] Level (Toileting) toileting skills;adjust/manage clothing;perform perineal hygiene;independent  -CM     Position (Toileting) supported sitting  -CM           User Key  (r) = Recorded By, (t) = Taken By, (c) = Cosigned By    Initials Name Provider Type    Karly Mason OT Occupational Therapist               Obj/Interventions    No documentation.                Goals/Plan     Row Name 05/30/23 0840          Transfer Goal 1 (OT)    Activity/Assistive Device (Transfer Goal 1, OT) toilet  -CM     [place] Level/Cues Needed (Transfer Goal 1, OT) modified  independence  -CM     Time Frame (Transfer Goal 1, OT) long term goal (LTG);by discharge  -CM     Progress/Outcome (Transfer Goal 1, OT) goal not met  -CM     Row Name 05/30/23 [facility]          Bathing Goal 1 (OT)    Activity/Device (Bathing Goal 1, OT) lower body bathing  -CM     [place] Level/Cues Needed (Bathing Goal 1, OT) modified independence  -CM     Time Frame (Bathing Goal 1, OT) long term goal (LTG);by discharge  -CM     Progress/Outcomes (Bathing Goal 1, OT) goal not met;good progress toward goal  -CM     Row Name 05/30/23 [facility]          Dressing Goal 1 (OT)    Activity/Device (Dressing Goal 1, OT) lower body dressing  -CM     [place]/Cues Needed (Dressing Goal 1, OT) modified independence  -CM     Time Frame (Dressing Goal 1, OT) long term goal (LTG);by discharge  -CM     Progress/Outcome (Dressing Goal 1, OT) goal not met;good progress toward goal  -CM     Row Name 05/30/23 [facility]          Toileting Goal 1 (OT)    Activity/Device (Toileting Goal 1, OT) toileting skills, all  -CM     [place] Level/Cues Needed (Toileting Goal 1, OT) independent  -CM     Time Frame (Toileting Goal 1, OT) long term goal (LTG);by discharge  -CM     Progress/Outcome (Toileting Goal 1, OT) goal met   -CM           User Key  (r) = Recorded By, (t) = Taken By, (c) = Cosigned By    Initials Name Provider Type    Karly Mason OT Occupational Therapist               Clinical Impression     Row Name 05/30/23 [facility]          Pain Assessment    Pretreatment Pain Rating 0/10 - no pain  -CM     Posttreatment Pain Rating 0/10 - no pain  -CM     Row Name 05/30/23 [facility]          Plan of Care Review    Plan of Care Reviewed With patient  -CM     Outcome Evaluation OT tx completed. Pt sidelying in bed upon arrival. Alert and agreeable to therapy. VSS throughout session with SpO2 above 92% througout session on 6lpm. Bed mobility Mod I. Pt completed UE bathing and dressing after set-up sitting EOB. Pt completed LB dressing after  set-up and required Mod A for LB bathing d/t fatigue. Pt completd STS and toilet t/f with SBA and FWW. I for anjelica-care after toileting. SBA for grooming standing at the sink. Pt finished grooming sitting EOB d/t LB pain. Pt completed bed t/f with SBA. Pt was left sitting up in chair with exit alarm on and all needs in reach. RN came into room and decreased pts O2 from 6lpm to 3lpm with pts SpO2 at 93% on 3lpm. Cont OT POC. Good progression towards goals. One goal met.  -CM     Row Name 05/30/23 0840          Therapy Assessment/Plan (OT)    Rehab Potential (OT) good, to achieve stated therapy goals  -CM     Criteria for Skilled Therapeutic Interventions Met (OT) yes;meets criteria;skilled treatment is necessary  -CM     Therapy Frequency (OT) other (see comments)  5-7 d/wk  -CM     Row Name 05/30/23 0840          Vital Signs    Pre SpO2 (%) 97  -CM     O2 Delivery Pre Treatment supplemental O2  6lpm  -CM     Intra SpO2 (%) 97  -CM     O2 Delivery Intra Treatment supplemental O2  -CM     Post SpO2 (%) 93  -CM     O2 Delivery Post Treatment supplemental O2  3lpm. RN weaned pt down  -CM     Pre Patient Position Sitting  -CM     Intra Patient Position Sitting  -CM     Post Patient Position Sitting  -CM     Row Name 05/30/23 0840          Positioning and Restraints    Pre-Treatment Position in bed  -CM     Post Treatment Position chair  -CM     In Chair notified nsg;reclined;encouraged to call for assist;call light within reach;exit alarm on;with nsg  -CM           User Key  (r) = Recorded By, (t) = Taken By, (c) = Cosigned By    Initials Name Provider Type    Karly Mason OT Occupational Therapist               Outcome Measures    No documentation.                 Occupational Therapy Education     Title: PT OT SLP Therapies (In Progress)     Topic: Occupational Therapy (In Progress)     Point: ADL training (Done)     Description:   Instruct learner(s) on proper safety adaptation and remediation techniques during  self care or transfers.   Instruct in proper use of assistive devices.              Learning Progress Summary           Patient Acceptance, E,TB, VU by  at 5/26/2023 0910    Comment: OT POC, role of OT, transfer training                   Point: Home exercise program (Not Started)     Description:   Instruct learner(s) on appropriate technique for monitoring, assisting and/or progressing therapeutic exercises/activities.              Learner Progress:  Not documented in this visit.          Point: Precautions (Done)     Description:   Instruct learner(s) on prescribed precautions during self-care and functional transfers.              Learning Progress Summary           Patient Acceptance, E,TB, VU by  at 5/26/2023 0910    Comment: OT POC, role of OT, transfer training                   Point: Body mechanics (Done)     Description:   Instruct learner(s) on proper positioning and spine alignment during self-care, functional mobility activities and/or exercises.              Learning Progress Summary           Patient Acceptance, E,TB, VU by  at 5/26/2023 0910    Comment: OT POC, role of OT, transfer training                               User Key     Initials Effective Dates Name Provider Type Discipline     08/11/22 -  Sarah Zimmerman OT Occupational Therapist OT              OT Recommendation and Plan  Therapy Frequency (OT): other (see comments) (5-7 d/wk)  Plan of Care Review  Plan of Care Reviewed With: patient  Outcome Evaluation: OT tx completed. Pt sidelying in bed upon arrival. Alert and agreeable to therapy. VSS throughout session with SpO2 above 92% througout session on 6lpm. Bed mobility Mod I. Pt completed UE bathing and dressing after set-up sitting EOB. Pt completed LB dressing after set-up and required Mod A for LB bathing d/t fatigue. Pt completd STS and toilet t/f with SBA and FWW. I for anjelica-care after toileting. SBA for grooming standing at the sink. Pt finished grooming sitting EOB d/t LB  pain. Pt completed bed t/f with SBA. Pt was left sitting up in chair with exit alarm on and all needs in reach. RN came into room and decreased pts O2 from 6lpm to 3lpm with pts SpO2 at 93% on 3lpm. Cont OT POC. Good progression towards goals. One goal met.     Time Calculation:    Time Calculation- OT     Row Name 05/30/23 0956             Time Calculation- OT    OT Start Time 0840  -CM      OT Stop Time 0950  -CM      OT Time Calculation (min) 70 min  -CM      Total Timed Code Minutes- OT 70 minute(s)  -CM      OT Received On 05/30/23  -CM         Timed Charges    04214 - OT Therapeutic Activity Minutes 15  -CM      22959 - OT Self Care/Mgmt Minutes 55  -CM         Total Minutes    Timed Charges Total Minutes 70  -CM       Total Minutes 70  -CM            User Key  (r) = Recorded By, (t) = Taken By, (c) = Cosigned By    Initials Name Provider Type    Karly Mason OT Occupational Therapist              Therapy Charges for Today     Code Description Service Date Service Provider Modifiers Qty    46404479918 HC OT SELF CARE/MGMT/TRAIN EA 15 MIN 5/30/2023 Karly Segovia OT GO 4    31417311978 HC OT THERAPEUTIC ACT EA 15 MIN 5/30/2023 Karly Segovia OT GO 1               Karly Segovia OT  5/30/2023

## 2023-05-30 NOTE — PLAN OF CARE
Goal Outcome Evaluation:  Plan of Care Reviewed With: patient           Outcome Evaluation: pt t/fers sit to stand w/ SBA, ambulates ~18' w/ RW w/ SBA, sit to sup and rolling L Mod Ind

## 2023-05-30 NOTE — PLAN OF CARE
Goal Outcome Evaluation:  Plan of Care Reviewed With: patient        Progress: no change  Outcome Evaluation: VSS, 6L NC, I.V. Lasix infusing cont. per orders, no complaints, resting in between care.

## 2023-05-30 NOTE — PLAN OF CARE
Goal Outcome Evaluation:  Plan of Care Reviewed With: patient           Outcome Evaluation: IV lasix gtt stopped and IV push administered. pt weaned to 2L o2. tolerating well. rested most of shift. ambulating with SBA and walker to bathroom. will monitor.

## 2023-05-30 NOTE — THERAPY TREATMENT NOTE
Acute Care - Physical Therapy Treatment Note  [facility]     Patient Name: Stephanie Bradshaw  : 1960  MRN: [id number]  Today's Date: 2023      Visit Dx:     ICD-10-CM ICD-9-CM   1. Dyspnea, unspecified type  R06.00 786.09   2. Abnormal EKG  R94.31 794.31   3. Acute on chronic respiratory failure with hypoxia  J96.21 518.84     799.02   4. Impaired mobility and ADLs  Z74.09 V49.89    Z78.9    5. Decreased functional mobility  R26.89 781.99     Patient Active Problem List   Diagnosis   • Multiple subsegmental pulmonary emboli without acute cor pulmonale   • Cellulitis   • Cellulitis   • Ulises hematuria   • Mixed hyperlipidemia   • Morbid obesity with BMI of 45.0-49.9, adult   • Leg edema   • Venous stasis dermatitis of both lower extremities   • Lymphedema   • Borderline diabetic   • Cardiomegaly   • Dyspnea on exertion   • Elevated blood pressure reading without diagnosis of hypertension   • Right ventricular cardiac abnormality   • Hypersomnia with sleep apnea   • Ascending aorta dilation   • Venous stasis dermatitis of both lower extremities   • Mucopurulent chronic bronchitis   • Dyspnea, unspecified type   • Acute on chronic diastolic heart failure   • Pulmonary hypertension   • Anasarca   • History of pulmonary embolus (PE)   • Depression   • Acute respiratory failure with hypoxia   • Acute on chronic respiratory failure with hypoxia and hypercapnia     Past Medical History:   Diagnosis Date   • Controlled type 2 diabetes mellitus without complication, without long-term current use of insulin    • COPD (chronic obstructive pulmonary disease)    • DVT (deep venous thrombosis) 2022    left popliteal   • Hyperlipemia, mixed    • Pulmonary embolism 2022    right     Past Surgical History:   Procedure Laterality Date   • CYSTOSCOPY N/A 2022    Procedure: CYSTOSCOPY;  Surgeon: Davide Moore MD;  Location: [facility];  Service: Urology;  Laterality: N/A;   • TUBAL ABDOMINAL LIGATION        PT Assessment (last 12 hours)     PT Evaluation and Treatment     Row Name 05/30/23 1308          Physical Therapy Time and Intention    Subjective Information no complaints  -     Document Type therapy note (daily note)  -     Mode of Treatment individual therapy;physical therapy  -     Comment initially defers PT tx, then requesting to go to bed  -     Row Name 05/30/23 1308          General Information    Patient Profile Reviewed yes  -     Existing Precautions/Restrictions oxygen therapy device and L/min;fall  -     Row Name 05/30/23 1308          Pain    Pretreatment Pain Rating 0/10 - no pain  -     Posttreatment Pain Rating 0/10 - no pain  -     Row Name 05/30/23 1308          Cognition    Orientation Status (Cognition) oriented x 4  -     Row Name 05/30/23 1308          Bed Mobility    Rolling Right [place] (Bed Mobility) modified independence  -     Sit-Supine [place] (Bed Mobility) modified independence  -     Assistive Device (Bed Mobility) bed rails;head of bed elevated  -[facility] Name 05/30/23 1308          Transfers    Transfers sit-stand transfer;stand-sit transfer  -     Row Name 05/30/23 1308          Sit-Stand Transfer    Sit-Stand [place] (Transfers) standby assist  -     Assistive Device (Sit-Stand Transfers) walker, front-wheeled  -     Row Name 05/30/23 1308          Stand-Sit Transfer    Stand-Sit [place] (Transfers) standby assist  -     Assistive Device (Stand-Sit Transfers) walker, front-wheeled  -[facility] Name 05/30/23 1308          Gait/Stairs (Locomotion)    [place] Level (Gait) standby assist  -     Assistive Device (Gait) walker, front-wheeled  -     Distance in Feet (Gait) 18'  -     Deviations/Abnormal Patterns (Gait) gait speed decreased;base of support, wide;kiel decreased  -     Row Name             Wound 05/26/23 1628 Bilateral anterior groin MASD (Moisture associated skin damage)    Wound - Properties  Group Placement Date: 05/26/23  -HE Placement Time: 1628  -HE Present on Hospital Admission: Y  -HE Side: Bilateral  -HE Orientation: anterior  -HE Location: groin  -HE Primary Wound Type: MASD  -HE Additional Comments: Nystatin Powder ordered and applied  -HE    Retired Wound - Properties Group Placement Date: 05/26/23  -HE Placement Time: 1628  -HE Present on Hospital Admission: Y  -HE Side: Bilateral  -HE Orientation: anterior  -HE Location: groin  -HE Primary Wound Type: MASD  -HE Additional Comments: Nystatin Powder ordered and applied  -HE    Retired Wound - Properties Group Date first assessed: 05/26/23  -HE Time first assessed: 1628  -HE Present on Hospital Admission: Y  -HE Side: Bilateral  -HE Location: groin  -HE Primary Wound Type: MASD  -HE Additional Comments: Nystatin Powder ordered and applied  -HE    Row Name 05/30/23 1308          Plan of Care Review    Plan of Care Reviewed With patient  -JW     Outcome Evaluation pt t/fers sit to stand w/ SBA, ambulates ~18' w/ RW w/ SBA, sit to sup and rolling L Mod Ind  -     Row Name 05/30/23 1308          Vital Signs    Pretreatment Heart Rate (beats/min) 97  -     Posttreatment Heart Rate (beats/min) 100  -     Pre SpO2 (%) 98  -     O2 Delivery Pre Treatment supplemental O2  -     Post SpO2 (%) 95  -     O2 Delivery Post Treatment supplemental O2  -     Pre Patient Position Sitting  -     Intra Patient Position Standing  -     Post Patient Position Side Lying  -     Row Name 05/30/23 1308          Positioning and Restraints    Pre-Treatment Position sitting in chair/recliner  -     Post Treatment Position bed  -     In Bed side lying left;call light within reach;encouraged to call for assist  -     Row Name 05/30/23 1308          Bed Mobility Goal 1 (PT)    Activity/Assistive Device (Bed Mobility Goal 1, PT) supine to sit;sit to supine  -     [place] Level/Cues Needed (Bed Mobility Goal 1, PT) modified independence  -      Time Frame (Bed Mobility Goal 1, PT) by discharge  -     Progress/Outcomes (Bed Mobility Goal 1, PT) goal not met  -     Row Name 05/30/23 1308          Transfer Goal 1 (PT)    Activity/Assistive Device (Transfer Goal 1, PT) sit-to-stand/stand-to-sit;bed-to-chair/chair-to-bed  -     [place] Level/Cues Needed (Transfer Goal 1, PT) modified independence  -JW     Time Frame (Transfer Goal 1, PT) by discharge  -     Progress/Outcome (Transfer Goal 1, PT) goal not met  -     Row Name 05/30/23 1308          Gait Training Goal 1 (PT)    Activity/Assistive Device (Gait Training Goal 1, PT) gait (walking locomotion);assistive device use  -     [place] Level (Gait Training Goal 1, PT) modified independence  -     Distance (Gait Training Goal 1, PT) 50'x3  -JW     Time Frame (Gait Training Goal 1, PT) by discharge  -     Progress/Outcome (Gait Training Goal 1, PT) goal not met  -           User Key  (r) = Recorded By, (t) = Taken By, (c) = Cosigned By    Initials Name Provider Type    Kat Boyd PTA Physical Therapist Assistant    Diana Cadena RN Registered Nurse                Physical Therapy Education     Title: PT OT SLP Therapies (In Progress)     Topic: Physical Therapy (Done)     Point: Mobility training (Done)     Learning Progress Summary           Patient Acceptance, E,TB, VU,NR by LR at 5/26/2023 1306    Comment: Educated on PT POC and goals.                   Point: Home exercise program (Done)     Learning Progress Summary           Patient Acceptance, E,TB, VU,NR by LR at 5/26/2023 1306    Comment: Educated on PT POC and goals.                   Point: Body mechanics (Done)     Learning Progress Summary           Patient Acceptance, E,TB, VU,NR by LR at 5/26/2023 1306    Comment: Educated on PT POC and goals.                   Point: Precautions (Done)     Learning Progress Summary           Patient Acceptance, E,TB, VU,NR by LR at 5/26/2023 1306    Comment:  Educated on PT POC and goals.                               User Key     Initials Effective Dates Name Provider Type Discipline    LR 06/16/21 -  Jake Turner Physical Therapist PT              PT Recommendation and Plan  Anticipated Discharge Disposition (PT): home with assist, home with home health, assisted living, skilled nursing facility  Plan of Care Reviewed With: patient  Outcome Evaluation: pt t/fers sit to stand w/ SBA, ambulates ~18' w/ RW w/ SBA, sit to sup and rolling L Mod Ind   Outcome Measures     Row Name 05/30/23 1400             How much help from another person do you currently need...    Turning from your back to your side while in flat bed without using bedrails? 4  -JW      Moving from lying on back to sitting on the side of a flat bed without bedrails? 4  -JW      Moving to and from a bed to a chair (including a wheelchair)? 3  -JW      Standing up from a chair using your arms (e.g., wheelchair, bedside chair)? 3  -JW      Climbing 3-5 steps with a railing? 2  -JW      To walk in hospital room? 3  -JW      AM-PAC 6 Clicks Score (PT) 19  -JW         Functional Assessment    Outcome Measure Options AM-PAC 6 Clicks Basic Mobility (PT)  -            User Key  (r) = Recorded By, (t) = Taken By, (c) = Cosigned By    Initials Name Provider Type    Kat Boyd PTA Physical Therapist Assistant                 Time Calculation:    PT Charges     Row Name 05/30/23 1308             Time Calculation    Start Time 1308  -      Stop Time 1323  -      Time Calculation (min) 15 min  -      PT Received On 05/30/23  -         Time Calculation- PT    Total Timed Code Minutes- PT 15 minute(s)  -            User Key  (r) = Recorded By, (t) = Taken By, (c) = Cosigned By    Initials Name Provider Type    Kat Boyd PTA Physical Therapist Assistant              Therapy Charges for Today     Code Description Service Date Service Provider Modifiers Qty    71835045083 HC GAIT  TRAINING EA 15 MIN 5/30/2023 Kat Quevedo, PTA GP 1          PT G-Codes  Outcome Measure Options: AM-PAC 6 Clicks Basic Mobility (PT)  AM-PAC 6 Clicks Score (PT): 19  AM-PAC 6 Clicks Score (OT): 17    Kat Quevedo, TIMMY  5/30/2023

## 2023-05-30 NOTE — PLAN OF CARE
Goal Outcome Evaluation:  Plan of Care Reviewed With: patient           Outcome Evaluation: OT tx completed. Pt sidelying in bed upon arrival. Alert and agreeable to therapy. VSS throughout session with SpO2 above 92% througout session on 6lpm. Bed mobility Mod I. Pt completed UE bathing and dressing after set-up sitting EOB. Pt completed LB dressing after set-up and required Mod A for LB bathing d/t fatigue. Pt completd STS and toilet t/f with SBA and FWW. I for anjelica-care after toileting. SBA for grooming standing at the sink. Pt finished grooming sitting EOB d/t LB pain. Pt completed bed t/f with SBA. Pt was left sitting up in chair with exit alarm on and all needs in reach. RN came into room and decreased pts O2 from 6lpm to 3lpm with pts SpO2 at 93% on 3lpm. Cont OT POC. Good progression towards goals. One goal met.

## 2023-05-31 LAB
ALBUMIN SERPL-MCNC: 3.2 G/DL (ref 3.5–5.2)
ALBUMIN/GLOB SERPL: 0.9 G/DL
ALP SERPL-CCNC: 97 U/L (ref 39–117)
ALT SERPL W P-5'-P-CCNC: 12 U/L (ref 1–33)
ANION GAP SERPL CALCULATED.3IONS-SCNC: 12 MMOL/L (ref 5–15)
AST SERPL-CCNC: 24 U/L (ref 1–32)
BASOPHILS # BLD AUTO: 0.06 10*3/MM3 (ref 0–0.2)
BASOPHILS NFR BLD AUTO: 0.8 % (ref 0–1.5)
BILIRUB SERPL-MCNC: 1 MG/DL (ref 0–1.2)
BUN SERPL-MCNC: 17 MG/DL (ref 8–23)
BUN/CREAT SERPL: 27.9 (ref 7–25)
CALCIUM SPEC-SCNC: 9.2 MG/DL (ref 8.6–10.5)
CHLORIDE SERPL-SCNC: 91 MMOL/L (ref 98–107)
CO2 SERPL-SCNC: 32 MMOL/L (ref 22–29)
CREAT SERPL-MCNC: 0.61 MG/DL (ref 0.57–1)
DEPRECATED RDW RBC AUTO: 48.1 FL (ref 37–54)
EGFRCR SERPLBLD CKD-EPI 2021: 101.9 ML/MIN/1.73
EOSINOPHIL # BLD AUTO: 0.16 10*3/MM3 (ref 0–0.4)
EOSINOPHIL NFR BLD AUTO: 2 % (ref 0.3–6.2)
ERYTHROCYTE [DISTWIDTH] IN BLOOD BY AUTOMATED COUNT: 15.3 % (ref 12.3–15.4)
GLOBULIN UR ELPH-MCNC: 3.5 GM/DL
GLUCOSE BLDC GLUCOMTR-MCNC: 163 MG/DL (ref 70–130)
GLUCOSE BLDC GLUCOMTR-MCNC: 187 MG/DL (ref 70–130)
GLUCOSE BLDC GLUCOMTR-MCNC: 201 MG/DL (ref 70–130)
GLUCOSE BLDC GLUCOMTR-MCNC: 283 MG/DL (ref 70–130)
GLUCOSE SERPL-MCNC: 170 MG/DL (ref 65–99)
HCT VFR BLD AUTO: 50.6 % (ref 34–46.6)
HGB BLD-MCNC: 15.5 G/DL (ref 12–15.9)
IMM GRANULOCYTES # BLD AUTO: 0.02 10*3/MM3 (ref 0–0.05)
IMM GRANULOCYTES NFR BLD AUTO: 0.3 % (ref 0–0.5)
LYMPHOCYTES # BLD AUTO: 1.62 10*3/MM3 (ref 0.7–3.1)
LYMPHOCYTES NFR BLD AUTO: 20.7 % (ref 19.6–45.3)
MAGNESIUM SERPL-MCNC: 2.1 MG/DL (ref 1.6–2.4)
MCH RBC QN AUTO: 26.3 PG (ref 26.6–33)
MCHC RBC AUTO-ENTMCNC: 30.6 G/DL (ref 31.5–35.7)
MCV RBC AUTO: 85.8 FL (ref 79–97)
MONOCYTES # BLD AUTO: 0.71 10*3/MM3 (ref 0.1–0.9)
MONOCYTES NFR BLD AUTO: 9.1 % (ref 5–12)
NEUTROPHILS NFR BLD AUTO: 5.25 10*3/MM3 (ref 1.7–7)
NEUTROPHILS NFR BLD AUTO: 67.1 % (ref 42.7–76)
NRBC BLD AUTO-RTO: 0 /100 WBC (ref 0–0.2)
PLATELET # BLD AUTO: 162 10*3/MM3 (ref 140–450)
PMV BLD AUTO: 11.1 FL (ref 6–12)
POTASSIUM SERPL-SCNC: 3.6 MMOL/L (ref 3.5–5.2)
POTASSIUM SERPL-SCNC: 4 MMOL/L (ref 3.5–5.2)
PROT SERPL-MCNC: 6.7 G/DL (ref 6–8.5)
RBC # BLD AUTO: 5.9 10*6/MM3 (ref 3.77–5.28)
SODIUM SERPL-SCNC: 135 MMOL/L (ref 136–145)
WBC NRBC COR # BLD: 7.82 10*3/MM3 (ref 3.4–10.8)

## 2023-05-31 PROCEDURE — 84132 ASSAY OF SERUM POTASSIUM: CPT | Performed by: INTERNAL MEDICINE

## 2023-05-31 PROCEDURE — 83735 ASSAY OF MAGNESIUM: CPT | Performed by: INTERNAL MEDICINE

## 2023-05-31 PROCEDURE — 82948 REAGENT STRIP/BLOOD GLUCOSE: CPT

## 2023-05-31 PROCEDURE — 94664 DEMO&/EVAL PT USE INHALER: CPT

## 2023-05-31 PROCEDURE — 85025 COMPLETE CBC W/AUTO DIFF WBC: CPT | Performed by: FAMILY MEDICINE

## 2023-05-31 PROCEDURE — 94660 CPAP INITIATION&MGMT: CPT

## 2023-05-31 PROCEDURE — 25010000002 FUROSEMIDE PER 20 MG: Performed by: INTERNAL MEDICINE

## 2023-05-31 PROCEDURE — 63710000001 INSULIN ASPART PER 5 UNITS: Performed by: FAMILY MEDICINE

## 2023-05-31 PROCEDURE — 80053 COMPREHEN METABOLIC PANEL: CPT | Performed by: FAMILY MEDICINE

## 2023-05-31 PROCEDURE — 94760 N-INVAS EAR/PLS OXIMETRY 1: CPT

## 2023-05-31 PROCEDURE — 94799 UNLISTED PULMONARY SVC/PX: CPT

## 2023-05-31 RX ORDER — METOLAZONE 5 MG/1
5 TABLET ORAL ONCE
Status: COMPLETED | OUTPATIENT
Start: 2023-05-31 | End: 2023-05-31

## 2023-05-31 RX ORDER — POTASSIUM CHLORIDE 20 MEQ/1
40 TABLET, EXTENDED RELEASE ORAL EVERY 4 HOURS
Status: COMPLETED | OUTPATIENT
Start: 2023-05-31 | End: 2023-05-31

## 2023-05-31 RX ADMIN — IPRATROPIUM BROMIDE AND ALBUTEROL SULFATE 3 ML: .5; 3 SOLUTION RESPIRATORY (INHALATION) at 13:57

## 2023-05-31 RX ADMIN — POTASSIUM CHLORIDE 40 MEQ: 20 TABLET, EXTENDED RELEASE ORAL at 08:09

## 2023-05-31 RX ADMIN — METOLAZONE 5 MG: 5 TABLET ORAL at 14:24

## 2023-05-31 RX ADMIN — FUROSEMIDE 40 MG: 10 INJECTION, SOLUTION INTRAVENOUS at 06:01

## 2023-05-31 RX ADMIN — APIXABAN 5 MG: 5 TABLET, FILM COATED ORAL at 08:09

## 2023-05-31 RX ADMIN — NYSTATIN: 100000 POWDER TOPICAL at 20:39

## 2023-05-31 RX ADMIN — FUROSEMIDE 40 MG: 10 INJECTION, SOLUTION INTRAVENOUS at 14:55

## 2023-05-31 RX ADMIN — FUROSEMIDE 40 MG: 10 INJECTION, SOLUTION INTRAVENOUS at 23:38

## 2023-05-31 RX ADMIN — INSULIN ASPART 3 UNITS: 100 INJECTION, SOLUTION INTRAVENOUS; SUBCUTANEOUS at 12:30

## 2023-05-31 RX ADMIN — DOCUSATE SODIUM 50 MG AND SENNOSIDES 8.6 MG 2 TABLET: 8.6; 5 TABLET, FILM COATED ORAL at 08:09

## 2023-05-31 RX ADMIN — Medication 1 APPLICATION: at 08:13

## 2023-05-31 RX ADMIN — Medication 1 PATCH: at 17:32

## 2023-05-31 RX ADMIN — POTASSIUM CHLORIDE 40 MEQ: 20 TABLET, EXTENDED RELEASE ORAL at 13:02

## 2023-05-31 RX ADMIN — APIXABAN 5 MG: 5 TABLET, FILM COATED ORAL at 20:36

## 2023-05-31 RX ADMIN — ROSUVASTATIN CALCIUM 10 MG: 10 TABLET, FILM COATED ORAL at 08:09

## 2023-05-31 RX ADMIN — Medication 10 ML: at 20:37

## 2023-05-31 RX ADMIN — PENTOXIFYLLINE 400 MG: 400 TABLET, EXTENDED RELEASE ORAL at 20:36

## 2023-05-31 RX ADMIN — Medication 1 APPLICATION: at 20:37

## 2023-05-31 RX ADMIN — INSULIN ASPART 2 UNITS: 100 INJECTION, SOLUTION INTRAVENOUS; SUBCUTANEOUS at 08:09

## 2023-05-31 RX ADMIN — IPRATROPIUM BROMIDE AND ALBUTEROL SULFATE 3 ML: .5; 3 SOLUTION RESPIRATORY (INHALATION) at 07:17

## 2023-05-31 RX ADMIN — INSULIN ASPART 2 UNITS: 100 INJECTION, SOLUTION INTRAVENOUS; SUBCUTANEOUS at 17:30

## 2023-05-31 RX ADMIN — PENTOXIFYLLINE 400 MG: 400 TABLET, EXTENDED RELEASE ORAL at 08:09

## 2023-05-31 RX ADMIN — Medication 10 ML: at 08:13

## 2023-05-31 NOTE — PLAN OF CARE
Goal Outcome Evaluation:  Plan of Care Reviewed With: patient           Outcome Evaluation: pt had small BM this shift. night shift to give PRN mirlax. ambulating with stand by assist. will monitor. Weaned to 2 L O2. Tolerating well.

## 2023-05-31 NOTE — SIGNIFICANT NOTE
05/31/23 1528   OTHER   Discipline occupational therapy assistant   Rehab Time/Intention   Session Not Performed patient/family declined treatment

## 2023-05-31 NOTE — CONSULTS
Adult Nutrition  Assessment    Patient Name:  Stephanie Bradshaw  YOB: 1960  MRN: [id number]  Admit Date:  5/25/2023    Assessment Date:  5/31/2023    Comments:  64 yo female seen for LOS. Admit w/ A/C CHF, requiring 6L NC and edema (currently 2-3+ to all extremities/general)- has been in lasix drip and just transitioned to IV lasix TID. Hx DM, COPD. Cardiac diet, intakes average >75% meals for LOS. 5/25 admit wt 300# and # w/ BMI 45 (inidcates morbid obesity)- w/ continued edema/diuretics. Pt not very talkative- notes no concerns other than difficulty chewing salad/pineapple. RD has no other nutritional concerns as pt did not want diet educations.       Electronically signed by:  Sue Nichols RD  05/31/23 13:33 CDT   po supplement

## 2023-05-31 NOTE — SIGNIFICANT NOTE
"   05/31/23 0958   OTHER   Discipline physical therapy assistant   Rehab Time/Intention   Session Not Performed patient/family declined treatment  (Pt adamently declined tx today stating, \"I just want to rest.\" Pt declined for PTA to check back later and requested tx be held until tomorrow \"At least.\")       "

## 2023-05-31 NOTE — PLAN OF CARE
Goal Outcome Evaluation:  Plan of Care Reviewed With: patient        Progress: improving  Outcome Evaluation: VSS, 3L NC, IV Lasix given per orders, pt seems really motivated throughout shift, BLE still edematous, new IV, no complaints, resting in between care.

## 2023-05-31 NOTE — PROGRESS NOTES
[facility] Medicine Services  INPATIENT PROGRESS NOTE    Length of Stay: 6  Date of Admission: 5/25/2023  Primary Care Physician: Danielle Vaughn APRN    Subjective   (S) Admitted for shortness of breath and weight gain due to stopped taking her diuretics  Today, good urine output; weaning her off oxygen, from 6L to 3 L    Review of Systems   All other systems reviewed and are negative.       All pertinent negatives and positives are as above. All other systems have been reviewed and are negative unless otherwise stated.     Prior to Admission medications    Medication Sig Start Date End Date Taking? Authorizing Provider   cyclobenzaprine (FLEXERIL) 10 MG tablet Take 1 tablet by mouth 3 (Three) Times a Day As Needed. 12/20/16  Yes    rosuvastatin (CRESTOR) 10 MG tablet Take 1 tablet by mouth Daily. 8/25/22  Yes Janiya Montenegro MD   tiotropium bromide-olodaterol (STIOLTO RESPIMAT) 2.5-2.5 MCG/ACT aerosol solution inhaler Inhale 2 puffs Daily. 8/23/22  Yes Mimi Kern APRN   traMADol (ULTRAM) 50 MG tablet Take 1 tablet by mouth Every 6 (Six) Hours As Needed for Moderate Pain. 8/25/22  Yes ProviderJaniya MD   VENTOLIN  (90 BASE) MCG/ACT inhaler  1/16/17  Yes ProviderJaniya MD   apixaban (ELIQUIS) 5 MG tablet tablet Take 1 tablet by mouth Every 12 (Twelve) Hours. Indications: DVT/PE (active thrombosis) 9/22/22   Rafael Cisse APRN   furosemide (LASIX) 40 MG tablet Take 1 tablet by mouth 2 (Two) Times a Day for 30 days. 8/19/22 9/18/22  Dexter Mclean MD   gabapentin (NEURONTIN) 400 MG capsule Take 1 capsule by mouth 3 (Three) Times a Day.    Provider, MD Janiya   nystatin (MYCOSTATIN) 595767 UNIT/GM powder Apply  topically to the appropriate area as directed 4 (Four) Times a Day. 8/25/22 8/26/23  Janiya Montenegro MD   pentoxifylline (TRENtal) 400 MG CR tablet Take 1 tablet by mouth 2 (Two) Times a Day. 8/29/22   Rafael Cisse  C, APRN       apixaban, 5 mg, Oral, Q12H  Bag Balm, 1 application, Topical, BID  furosemide, 40 mg, Intravenous, Q8H  Insulin Aspart, 0-7 Units, Subcutaneous, TID AC  ipratropium-albuterol, 3 mL, Nebulization, TID - RT  nicotine, 1 patch, Transdermal, Q24H  nystatin, , Topical, Q12H  pentoxifylline, 400 mg, Oral, BID  rosuvastatin, 10 mg, Oral, Daily  sodium chloride, 10 mL, Intravenous, Q12H           Objective    Temp:  [98.4 °F (36.9 °C)-99.3 °F (37.4 °C)] 98.4 °F (36.9 °C)  Heart Rate:  [82-93] 83  Resp:  [14-18] 14  BP: (122-135)/(75-82) 122/75    Physical Exam  Constitutional:       Appearance: Normal appearance. She is obese.      Comments: As today   HENT:      Head: Normocephalic.      Nose: Nose normal.      Mouth/Throat:      Mouth: Mucous membranes are moist.   Eyes:      Extraocular Movements: Extraocular movements intact.   Cardiovascular:      Rate and Rhythm: Normal rate and regular rhythm.      Heart sounds: Normal heart sounds.   Pulmonary:      Effort: Pulmonary effort is normal.      Breath sounds: Normal breath sounds.   Abdominal:      General: Bowel sounds are normal.      Palpations: Abdomen is soft.   Musculoskeletal:         General: Normal range of motion.      Cervical back: Normal range of motion and neck supple.      Right lower leg: Edema present.      Left lower leg: Edema present.   Skin:     General: Skin is warm.   Neurological:      General: No focal deficit present.      Mental Status: She is alert. Mental status is at baseline.   Psychiatric:         Behavior: Behavior normal.         Results Review:  I have reviewed the labs, radiology results, and diagnostic studies.    Laboratory Data:   Results from last 7 days   Lab Units 05/31/23  0547 05/30/23  0652 05/29/23  1450 05/29/23  0546   SODIUM mmol/L 135* 135*  --  135*   POTASSIUM mmol/L 3.6 3.7 4.5 3.6   CHLORIDE mmol/L 91* 90*  --  87*   CO2 mmol/L 32.0* 35.0*  --  40.0*   BUN mg/dL 17 17  --  10   CREATININE mg/dL 0.61 0.69   --  0.58   GLUCOSE mg/dL 170* 166*  --  146*   CALCIUM mg/dL 9.2 9.0  --  9.0   BILIRUBIN mg/dL 1.0 1.1  --  1.2   ALK PHOS U/L 97 102  --  107   ALT (SGPT) U/L 12 9  --  8   AST (SGOT) U/L 24 17  --  15   ANION GAP mmol/L 12.0 10.0  --  8.0     Estimated Creatinine Clearance: 114.5 mL/min (by C-G formula based on SCr of 0.61 mg/dL).  Results from last 7 days   Lab Units 05/31/23  0547 05/30/23  0652 05/26/23  0611   MAGNESIUM mg/dL 2.1 2.0 1.8         Results from last 7 days   Lab Units 05/31/23  0547 05/30/23  0652 05/29/23  0546 05/28/23  0512 05/27/23  0520   WBC 10*3/mm3 7.82 7.32 8.14 8.81 9.42   HEMOGLOBIN g/dL 15.5 15.5 15.1 14.2 13.5   HEMATOCRIT % 50.6* 50.3* 49.7* 46.1 46.7*   PLATELETS 10*3/mm3 162 174 181 183 132*           Culture Data:   No results found for: BLOODCX  No results found for: URINECX  No results found for: RESPCX  No results found for: WOUNDCX  No results found for: STOOLCX  No components found for: BODYFLD    Radiology Data:   Imaging Results (Last 24 Hours)     ** No results found for the last 24 hours. **          I have reviewed the patient's current medications.     Assessment/Plan   Acute on chronic respiratory failure with hypoxemia     Due to below     Wean oxygen as able; on 2 L now, from 6 L    Acute on chronic diastolic CHF     Likely right heart failure; lungs are clear; CTA chest with no PE     On lasix 40 mg iv q 8h; monitor daily renal function      Weight went down from 296 lbs to 247 lbs in the last 6 days    Chronic medical problems     Hx of PE/DVT     Pulmonary hypertension     Chronic recurrent depression     Morbid obesity     Type II DM     Hyperlipidemia     COPD, no AE, no home oxygen     SNEHA; does not tolerate mask      Medical Decision Making  Number and Complexity of problems: 2 major complexes    Conditions and Status:        Condition is improving.     MDM Data  External documents reviewed: previous medical records  My EKG interpretation: n/a  My plain  film interpretation: n/a  Discussed with: patient     Treatment Plan  See above    Care Planning  Shared decision making:her friend Sonia  Code status and discussions: DNR/DNI    Disposition  Social Determinants of Health that impact treatment or disposition: none  I expect the patient to be discharged: in progress    I confirmed that the patient's Advance Care Plan is present, code status is documented, or surrogate decision maker is listed in the patient's medical record.     Boby Hamm MD

## 2023-05-31 NOTE — DISCHARGE PLACEMENT REQUEST
"Stephanie Bradshaw (63 y.o. Female)     Date of Birth   1960    Social Security Number       Address   [address] [place] [address]  [place] [address] [place], [place] [address]    Home Phone   [phone]    MRN   [id number]       Gnosticist   None    Marital Status                               Admission Date   5/25/23    Admission Type   Emergency    Admitting Provider   Dexter Mclean MD    Attending Provider   Dexter Mclean MD    Department, Room/Bed   [address], 422/1       Discharge Date       Discharge Disposition       Discharge Destination                               Attending Provider: Dexter Mclean MD    Allergies: Penicillins, Penicillins    Isolation: None   Infection: None   Code Status: No CPR    Ht: 157.5 cm (62\")   Wt: 112 kg (247 lb)    Admission Cmt: None   Principal Problem: Dyspnea, unspecified type [R06.00]                 Active Insurance as of 5/25/2023     Primary Coverage     Payor Plan Insurance Group Employer/Plan Group    [employer] [place] [employer] [place]ICA[place]      Payor Plan Address Payor Plan Phone Number Payor Plan Fax Number Effective Dates    [address] [address] [phone]  8/9/2022 - None Entered    [facility] [id number]       Subscriber Name Subscriber Birth Date Member ID       STEPHANIE BRADSHAW 1960 [id number]                 Emergency Contacts      (Rel.) Home Phone Work Phone Mobile Phone    Sonia Correia (Friend) [phone] -- [phone]    Hieu Neumann (Son) [phone] -- [phone]    Hieu Crump (Son) [phone] -- --              "

## 2023-06-01 ENCOUNTER — APPOINTMENT (OUTPATIENT)
Dept: ULTRASOUND IMAGING | Facility: HOSPITAL | Age: 63
DRG: 291 | End: 2023-06-01
Payer: COMMERCIAL

## 2023-06-01 ENCOUNTER — APPOINTMENT (OUTPATIENT)
Dept: INTERVENTIONAL RADIOLOGY/VASCULAR | Facility: HOSPITAL | Age: 63
DRG: 291 | End: 2023-06-01
Payer: COMMERCIAL

## 2023-06-01 LAB
ALBUMIN SERPL-MCNC: 3.3 G/DL (ref 3.5–5.2)
ALBUMIN/GLOB SERPL: 0.9 G/DL
ALP SERPL-CCNC: 93 U/L (ref 39–117)
ALT SERPL W P-5'-P-CCNC: 16 U/L (ref 1–33)
ANION GAP SERPL CALCULATED.3IONS-SCNC: 13 MMOL/L (ref 5–15)
AST SERPL-CCNC: 33 U/L (ref 1–32)
BASOPHILS # BLD AUTO: 0.1 10*3/MM3 (ref 0–0.2)
BASOPHILS NFR BLD AUTO: 1.3 % (ref 0–1.5)
BILIRUB SERPL-MCNC: 0.9 MG/DL (ref 0–1.2)
BUN SERPL-MCNC: 17 MG/DL (ref 8–23)
BUN/CREAT SERPL: 27.4 (ref 7–25)
CALCIUM SPEC-SCNC: 9.3 MG/DL (ref 8.6–10.5)
CHLORIDE SERPL-SCNC: 89 MMOL/L (ref 98–107)
CO2 SERPL-SCNC: 32 MMOL/L (ref 22–29)
CREAT SERPL-MCNC: 0.62 MG/DL (ref 0.57–1)
DEPRECATED RDW RBC AUTO: 48.2 FL (ref 37–54)
EGFRCR SERPLBLD CKD-EPI 2021: 101.5 ML/MIN/1.73
EOSINOPHIL # BLD AUTO: 0.18 10*3/MM3 (ref 0–0.4)
EOSINOPHIL NFR BLD AUTO: 2.3 % (ref 0.3–6.2)
ERYTHROCYTE [DISTWIDTH] IN BLOOD BY AUTOMATED COUNT: 15.6 % (ref 12.3–15.4)
GLOBULIN UR ELPH-MCNC: 3.5 GM/DL
GLUCOSE BLDC GLUCOMTR-MCNC: 157 MG/DL (ref 70–130)
GLUCOSE BLDC GLUCOMTR-MCNC: 233 MG/DL (ref 70–130)
GLUCOSE BLDC GLUCOMTR-MCNC: 287 MG/DL (ref 70–130)
GLUCOSE BLDC GLUCOMTR-MCNC: 366 MG/DL (ref 70–130)
GLUCOSE SERPL-MCNC: 160 MG/DL (ref 65–99)
GLUCOSE SERPL-MCNC: 429 MG/DL (ref 65–99)
HCT VFR BLD AUTO: 50.9 % (ref 34–46.6)
HGB BLD-MCNC: 15.9 G/DL (ref 12–15.9)
IMM GRANULOCYTES # BLD AUTO: 0.03 10*3/MM3 (ref 0–0.05)
IMM GRANULOCYTES NFR BLD AUTO: 0.4 % (ref 0–0.5)
LYMPHOCYTES # BLD AUTO: 1.71 10*3/MM3 (ref 0.7–3.1)
LYMPHOCYTES NFR BLD AUTO: 21.6 % (ref 19.6–45.3)
MAGNESIUM SERPL-MCNC: 2.2 MG/DL (ref 1.6–2.4)
MCH RBC QN AUTO: 26.7 PG (ref 26.6–33)
MCHC RBC AUTO-ENTMCNC: 31.2 G/DL (ref 31.5–35.7)
MCV RBC AUTO: 85.4 FL (ref 79–97)
MONOCYTES # BLD AUTO: 0.71 10*3/MM3 (ref 0.1–0.9)
MONOCYTES NFR BLD AUTO: 9 % (ref 5–12)
NEUTROPHILS NFR BLD AUTO: 5.19 10*3/MM3 (ref 1.7–7)
NEUTROPHILS NFR BLD AUTO: 65.4 % (ref 42.7–76)
NRBC BLD AUTO-RTO: 0 /100 WBC (ref 0–0.2)
PLATELET # BLD AUTO: 132 10*3/MM3 (ref 140–450)
PMV BLD AUTO: 10.3 FL (ref 6–12)
POTASSIUM SERPL-SCNC: 4 MMOL/L (ref 3.5–5.2)
PROT SERPL-MCNC: 6.8 G/DL (ref 6–8.5)
RBC # BLD AUTO: 5.96 10*6/MM3 (ref 3.77–5.28)
SODIUM SERPL-SCNC: 134 MMOL/L (ref 136–145)
WBC NRBC COR # BLD: 7.92 10*3/MM3 (ref 3.4–10.8)

## 2023-06-01 PROCEDURE — 82947 ASSAY GLUCOSE BLOOD QUANT: CPT | Performed by: INTERNAL MEDICINE

## 2023-06-01 PROCEDURE — 97110 THERAPEUTIC EXERCISES: CPT

## 2023-06-01 PROCEDURE — 97530 THERAPEUTIC ACTIVITIES: CPT

## 2023-06-01 PROCEDURE — 05HB33Z INSERTION OF INFUSION DEVICE INTO RIGHT BASILIC VEIN, PERCUTANEOUS APPROACH: ICD-10-PCS | Performed by: INTERNAL MEDICINE

## 2023-06-01 PROCEDURE — 94760 N-INVAS EAR/PLS OXIMETRY 1: CPT

## 2023-06-01 PROCEDURE — 63710000001 INSULIN ASPART PER 5 UNITS: Performed by: FAMILY MEDICINE

## 2023-06-01 PROCEDURE — C1751 CATH, INF, PER/CENT/MIDLINE: HCPCS

## 2023-06-01 PROCEDURE — 83735 ASSAY OF MAGNESIUM: CPT | Performed by: INTERNAL MEDICINE

## 2023-06-01 PROCEDURE — 94799 UNLISTED PULMONARY SVC/PX: CPT

## 2023-06-01 PROCEDURE — 25010000002 FUROSEMIDE PER 20 MG: Performed by: INTERNAL MEDICINE

## 2023-06-01 PROCEDURE — 85025 COMPLETE CBC W/AUTO DIFF WBC: CPT | Performed by: FAMILY MEDICINE

## 2023-06-01 PROCEDURE — 82948 REAGENT STRIP/BLOOD GLUCOSE: CPT

## 2023-06-01 PROCEDURE — 36410 VNPNXR 3YR/> PHY/QHP DX/THER: CPT

## 2023-06-01 PROCEDURE — 76937 US GUIDE VASCULAR ACCESS: CPT

## 2023-06-01 PROCEDURE — 63710000001 INSULIN ASPART PER 5 UNITS: Performed by: INTERNAL MEDICINE

## 2023-06-01 PROCEDURE — 80053 COMPREHEN METABOLIC PANEL: CPT | Performed by: FAMILY MEDICINE

## 2023-06-01 RX ORDER — INSULIN ASPART 100 [IU]/ML
0-9 INJECTION, SOLUTION INTRAVENOUS; SUBCUTANEOUS
Status: DISCONTINUED | OUTPATIENT
Start: 2023-06-02 | End: 2023-06-02 | Stop reason: HOSPADM

## 2023-06-01 RX ORDER — DEXTROSE MONOHYDRATE 25 G/50ML
25 INJECTION, SOLUTION INTRAVENOUS
Status: DISCONTINUED | OUTPATIENT
Start: 2023-06-01 | End: 2023-06-01

## 2023-06-01 RX ORDER — FUROSEMIDE 10 MG/ML
40 INJECTION INTRAMUSCULAR; INTRAVENOUS EVERY 8 HOURS
Status: DISCONTINUED | OUTPATIENT
Start: 2023-06-01 | End: 2023-06-02 | Stop reason: HOSPADM

## 2023-06-01 RX ORDER — METOLAZONE 5 MG/1
5 TABLET ORAL ONCE
Status: COMPLETED | OUTPATIENT
Start: 2023-06-01 | End: 2023-06-01

## 2023-06-01 RX ORDER — INSULIN ASPART 100 [IU]/ML
12 INJECTION, SOLUTION INTRAVENOUS; SUBCUTANEOUS ONCE
Status: COMPLETED | OUTPATIENT
Start: 2023-06-01 | End: 2023-06-01

## 2023-06-01 RX ORDER — NICOTINE POLACRILEX 4 MG
15 LOZENGE BUCCAL
Status: DISCONTINUED | OUTPATIENT
Start: 2023-06-01 | End: 2023-06-01

## 2023-06-01 RX ADMIN — ROSUVASTATIN CALCIUM 10 MG: 10 TABLET, FILM COATED ORAL at 08:48

## 2023-06-01 RX ADMIN — IPRATROPIUM BROMIDE AND ALBUTEROL SULFATE 3 ML: .5; 3 SOLUTION RESPIRATORY (INHALATION) at 07:49

## 2023-06-01 RX ADMIN — Medication 1 PATCH: at 18:20

## 2023-06-01 RX ADMIN — APIXABAN 5 MG: 5 TABLET, FILM COATED ORAL at 20:16

## 2023-06-01 RX ADMIN — PENTOXIFYLLINE 400 MG: 400 TABLET, EXTENDED RELEASE ORAL at 08:48

## 2023-06-01 RX ADMIN — FUROSEMIDE 40 MG: 10 INJECTION, SOLUTION INTRAVENOUS at 18:52

## 2023-06-01 RX ADMIN — Medication 10 ML: at 20:16

## 2023-06-01 RX ADMIN — IPRATROPIUM BROMIDE AND ALBUTEROL SULFATE 3 ML: .5; 3 SOLUTION RESPIRATORY (INHALATION) at 14:00

## 2023-06-01 RX ADMIN — IPRATROPIUM BROMIDE AND ALBUTEROL SULFATE 3 ML: .5; 3 SOLUTION RESPIRATORY (INHALATION) at 20:35

## 2023-06-01 RX ADMIN — Medication 1 APPLICATION: at 08:53

## 2023-06-01 RX ADMIN — DOCUSATE SODIUM 50 MG AND SENNOSIDES 8.6 MG 2 TABLET: 8.6; 5 TABLET, FILM COATED ORAL at 20:23

## 2023-06-01 RX ADMIN — METOLAZONE 5 MG: 5 TABLET ORAL at 18:20

## 2023-06-01 RX ADMIN — INSULIN ASPART 4 UNITS: 100 INJECTION, SOLUTION INTRAVENOUS; SUBCUTANEOUS at 12:45

## 2023-06-01 RX ADMIN — APIXABAN 5 MG: 5 TABLET, FILM COATED ORAL at 08:48

## 2023-06-01 RX ADMIN — Medication 1 APPLICATION: at 20:16

## 2023-06-01 RX ADMIN — INSULIN ASPART 12 UNITS: 100 INJECTION, SOLUTION INTRAVENOUS; SUBCUTANEOUS at 22:47

## 2023-06-01 RX ADMIN — INSULIN ASPART 2 UNITS: 100 INJECTION, SOLUTION INTRAVENOUS; SUBCUTANEOUS at 08:48

## 2023-06-01 RX ADMIN — NYSTATIN: 100000 POWDER TOPICAL at 20:16

## 2023-06-01 RX ADMIN — PENTOXIFYLLINE 400 MG: 400 TABLET, EXTENDED RELEASE ORAL at 20:16

## 2023-06-01 RX ADMIN — NYSTATIN: 100000 POWDER TOPICAL at 08:53

## 2023-06-01 RX ADMIN — INSULIN ASPART 3 UNITS: 100 INJECTION, SOLUTION INTRAVENOUS; SUBCUTANEOUS at 18:20

## 2023-06-01 RX ADMIN — POLYETHYLENE GLYCOL 3350 17 G: 17 POWDER, FOR SOLUTION ORAL at 08:48

## 2023-06-01 RX ADMIN — FUROSEMIDE 40 MG: 10 INJECTION, SOLUTION INTRAVENOUS at 13:22

## 2023-06-01 RX ADMIN — METOLAZONE 5 MG: 5 TABLET ORAL at 12:45

## 2023-06-01 NOTE — PLAN OF CARE
Goal Outcome Evaluation:  Plan of Care Reviewed With: patient           Outcome Evaluation: weaned to 2L o2. metolazone followed by lasix exactly 30 min after administration twice this shift. new midline placed due to poor IV access. fall precautions in place. will monitor.

## 2023-06-01 NOTE — PROGRESS NOTES
[facility] Medicine Services  INPATIENT PROGRESS NOTE    Length of Stay: 7  Date of Admission: 5/25/2023  Primary Care Physician: Danielle Vaughn APRN    Subjective   (S) Admitted for shortness of breath and weight gain due to stopped taking her diuretics  Today, still with shortness of breath and still on need of oxygen     Review of Systems   All other systems reviewed and are negative.       All pertinent negatives and positives are as above. All other systems have been reviewed and are negative unless otherwise stated.     Prior to Admission medications    Medication Sig Start Date End Date Taking? Authorizing Provider   cyclobenzaprine (FLEXERIL) 10 MG tablet Take 1 tablet by mouth 3 (Three) Times a Day As Needed. 12/20/16  Yes    rosuvastatin (CRESTOR) 10 MG tablet Take 1 tablet by mouth Daily. 8/25/22  Yes ProviderJaniya MD   tiotropium bromide-olodaterol (STIOLTO RESPIMAT) 2.5-2.5 MCG/ACT aerosol solution inhaler Inhale 2 puffs Daily. 8/23/22  Yes Mimi Kern APRN   traMADol (ULTRAM) 50 MG tablet Take 1 tablet by mouth Every 6 (Six) Hours As Needed for Moderate Pain. 8/25/22  Yes ProviderJaniya MD   VENTOLIN  (90 BASE) MCG/ACT inhaler  1/16/17  Yes ProviderJaniya MD   apixaban (ELIQUIS) 5 MG tablet tablet Take 1 tablet by mouth Every 12 (Twelve) Hours. Indications: DVT/PE (active thrombosis) 9/22/22   Rafael Cisse APRN   furosemide (LASIX) 40 MG tablet Take 1 tablet by mouth 2 (Two) Times a Day for 30 days. 8/19/22 9/18/22  Dexter Mclean MD   gabapentin (NEURONTIN) 400 MG capsule Take 1 capsule by mouth 3 (Three) Times a Day.    Provider, MD Janiya   nystatin (MYCOSTATIN) 392385 UNIT/GM powder Apply  topically to the appropriate area as directed 4 (Four) Times a Day. 8/25/22 8/26/23  Janiya Montenegro MD   pentoxifylline (TRENtal) 400 MG CR tablet Take 1 tablet by mouth 2 (Two) Times a Day. 8/29/22   Betito  ROSANNE Washington       apixaban, 5 mg, Oral, Q12H  Bag Balm, 1 application, Topical, BID  furosemide, 40 mg, Intravenous, Q8H  furosemide, 40 mg, Intravenous, Q8H  Insulin Aspart, 0-7 Units, Subcutaneous, TID AC  ipratropium-albuterol, 3 mL, Nebulization, TID - RT  metOLazone, 5 mg, Oral, Once  nicotine, 1 patch, Transdermal, Q24H  nystatin, , Topical, Q12H  pentoxifylline, 400 mg, Oral, BID  rosuvastatin, 10 mg, Oral, Daily  sodium chloride, 10 mL, Intravenous, Q12H           Objective    Temp:  [97.7 °F (36.5 °C)-98.4 °F (36.9 °C)] 98 °F (36.7 °C)  Heart Rate:  [82-91] 91  Resp:  [14-16] 16  BP: (122-136)/(64-82) 126/64    Physical Exam  Constitutional:       Appearance: Normal appearance. She is obese.      Comments: As today   HENT:      Head: Normocephalic.      Nose: Nose normal.      Mouth/Throat:      Mouth: Mucous membranes are moist.   Eyes:      Extraocular Movements: Extraocular movements intact.   Cardiovascular:      Rate and Rhythm: Normal rate and regular rhythm.      Heart sounds: Normal heart sounds.   Pulmonary:      Effort: Pulmonary effort is normal.      Breath sounds: Rales present.   Abdominal:      General: Bowel sounds are normal.      Palpations: Abdomen is soft.   Musculoskeletal:         General: Normal range of motion.      Cervical back: Normal range of motion and neck supple.      Right lower leg: Edema present.      Left lower leg: Edema present.   Skin:     General: Skin is warm.   Neurological:      General: No focal deficit present.      Mental Status: She is alert. Mental status is at baseline.   Psychiatric:         Behavior: Behavior normal.         Results Review:  I have reviewed the labs, radiology results, and diagnostic studies.    Laboratory Data:   Results from last 7 days   Lab Units 06/01/23  0545 05/31/23  1710 05/31/23  0547 05/30/23  0652   SODIUM mmol/L 134*  --  135* 135*   POTASSIUM mmol/L 4.0 4.0 3.6 3.7   CHLORIDE mmol/L 89*  --  91* 90*   CO2 mmol/L 32.0*  --   32.0* 35.0*   BUN mg/dL 17  --  17 17   CREATININE mg/dL 0.62  --  0.61 0.69   GLUCOSE mg/dL 160*  --  170* 166*   CALCIUM mg/dL 9.3  --  9.2 9.0   BILIRUBIN mg/dL 0.9  --  1.0 1.1   ALK PHOS U/L 93  --  97 102   ALT (SGPT) U/L 16  --  12 9   AST (SGOT) U/L 33*  --  24 17   ANION GAP mmol/L 13.0  --  12.0 10.0     Estimated Creatinine Clearance: 112.7 mL/min (by C-G formula based on SCr of 0.62 mg/dL).  Results from last 7 days   Lab Units 06/01/23  0545 05/31/23  0547 05/30/23  0652   MAGNESIUM mg/dL 2.2 2.1 2.0         Results from last 7 days   Lab Units 06/01/23  0545 05/31/23  0547 05/30/23  0652 05/29/23  0546 05/28/23  0512   WBC 10*3/mm3 7.92 7.82 7.32 8.14 8.81   HEMOGLOBIN g/dL 15.9 15.5 15.5 15.1 14.2   HEMATOCRIT % 50.9* 50.6* 50.3* 49.7* 46.1   PLATELETS 10*3/mm3 132* 162 174 181 183           Culture Data:   No results found for: BLOODCX  No results found for: URINECX  No results found for: RESPCX  No results found for: WOUNDCX  No results found for: STOOLCX  No components found for: BODYFLD    Radiology Data:   Imaging Results (Last 24 Hours)     ** No results found for the last 24 hours. **          I have reviewed the patient's current medications.     Assessment/Plan   Acute on chronic respiratory failure with hypoxemia     Due to below     Wean oxygen as able; on 2 L now, from 6 L    Acute on chronic diastolic CHF     Likely right heart failure; lungs are clear; CTA chest with no PE     On lasix 40 mg iv q 8h; monitor daily renal function      Weight went down from 296 lbs to 247 lbs in the last 6 days      Keep monitoring     Chronic medical problems     Hx of PE/DVT     Pulmonary hypertension     Chronic recurrent depression     Morbid obesity     Type II DM     Hyperlipidemia     COPD, no AE, no home oxygen     SNEHA; does not tolerate mask      Medical Decision Making  Number and Complexity of problems: 2 major complexes    Conditions and Status:        Condition is improving.     MDM  Data  External documents reviewed: previous medical records  My EKG interpretation: n/a  My plain film interpretation: n/a  Discussed with: patient     Treatment Plan  See above    Care Planning  Shared decision making:her friend Sonia  Code status and discussions: DNR/DNI    Disposition  Social Determinants of Health that impact treatment or disposition: none  I expect the patient to be discharged: in progress    I confirmed that the patient's Advance Care Plan is present, code status is documented, or surrogate decision maker is listed in the patient's medical record.     Boby Hamm MD

## 2023-06-01 NOTE — PROGRESS NOTES
Item 0 Points 1 Point 2 Points 3 Points 4 Points Subtotal   Mental Status Alert, oriented, cooperative Lethargic, follows commands Confused, not following commands Obtunded or Somnolent Comatose 0     Respiratory Pattern Regular RR 8-16 breaths/minute Increased RR 18-25 breaths/minute Dyspnea on exertion, irregular RR 26-30 breaths/minute Shortness of breath,  RR 31-35 breaths/minute Accessory muscle use, severe SOB  RR > 35 breaths/minute 0     Breath Sounds Clear Decreased unilaterally Decreased bilaterally Basilar crackles Wheezing and/or rhonchi 0     Cough Strong, spontaneous, non-productive Strong productive Weak, non-productive Weak, productive or weak with rhonchi Absent or may require suctioning 0     Pulmonary Status Nonsmoker, no previous history, >1 year quit < 1 PPD  <1 year quit > or = 1 PPD Diagnosed pulmonary disease (severe or chronic) Severe or chronic pulmonary disease with exacerbation 3     Surgical Status None General surgery (non-abdominal or non-thoracic) Lower abdominal Thoracic or upper abdominal Thoracic with pulmonary disease 0     Chest X-ray Clear Chronic changes Infiltrates, atelectasis or pleural effusion Infiltrates in > 1 lobe Diffuse infiltrates and atelectasis and/or effusions 0     Activity   Level Ambulatory Ambulatory with assistance Non-ambulatory Paraplegic Quadriplegic 1          Total Score   4   Score    Drug Therapy Frequency 20 or >    Q4 Duoneb with Q2 Albuterol PRN 15-19    Q6 Duoneb with Q4 Albuterol PRN 10-14    QID Duoneb with Q4 Albuterol PRN 5-9    TID Duoneb with Q6 Albuterol PRN 0-4    Q4 PRN Duoneb                  Lung Expansion Therapy (PEP) Bronchopulmonary Hygiene (CPT)   Q4 & PRN - Severe atelectasis, poor oxygenation Q4 - Copious secretions, dyspnea, unable to sleep   QID - High risk for persistent atelectasis, existence of atelectasis QID & Q4 PRN - Moderate secretion production   TID - At risk for developing atelectasis TID - Small amounts of secretions  with poor cough   BID - Prevention of atelectasis BID - Unable to breathe deeply and cough spontaneously     RT Comments / Recommendations:    RT eval completed and patient request to continue with TID nebs vs. PRN.  We will continue nebs as ordered.           right lower extremity swelling and redness.

## 2023-06-01 NOTE — PROGRESS NOTES
TWO PATIENT IDENTIFIERS WERE USED. THE PATIENT WAS DRAPED WITH A FULL BODY DRAPE AND THE PATIENT'S RIGHT ARM WAS PREPPED WITH CHLORA PREP. ULTRASOUND WAS USED TO LOCALIZE THERIGHT BASILIC VEIN. SUBCUTANEOUS TISSUE AT THE CATHETER SITE WAS INFILTRATED WITH 2% LIDOCAINE. UNDER ULTRASOUND GUIDANCE, THE VEIN WAS ACCESSED WITH A 21 GAUGE  NEEDLE. AN 0.018 WIRE WAS THEN THREADED THROUGH THE NEEDLE. THE 21 GAUGE NEEDLE WAS REMOVED AND A 4 [place]bourgish SHEATH WAS PLACED OVER THE WIRE INTO THE VEIN.THE MIDLINE CATHETER WAS TRIMMED TO 16CM. THE MIDLINE CATHETER WAS THEN PLACED OVER THE WIRE INTO THE VEIN, THE SHEATH WAS PEELED AWAY, WIRE WAS REMOVED. CATHETER WAS FLUSHED WITH NORMAL SALINE AND CATHETER TIP APPLIED. BIOPATCH PLACED. CATHETER SECURED WITH STAT LOCK AND TEGADERM. PATIENT TOLERATED PROCEDURE WELL. THIS WAS DONE IN THE ANGIOSUITE      IMPRESSION:SUCCESSFUL PLACEMENT OF DUAL LUMEN MIDLINE.           Sheba Vazquez RN  6/1/2023  13:16 CDT

## 2023-06-01 NOTE — PLAN OF CARE
Goal Outcome Evaluation:  Plan of Care Reviewed With: patient           Outcome Evaluation: pt t/fers SL to sit w/ Mod Ind, sit<>stand w/ SBA, pt ambulates ~16' w/ no AD w/ CGA, pt w/ increased unsteadiness but no LOB, pt performs B LE seated ther ex x 10 reps

## 2023-06-01 NOTE — THERAPY TREATMENT NOTE
Acute Care - Physical Therapy Treatment Note  [facility]     Patient Name: Stephanie Bradshaw  : 1960  MRN: [id number]  Today's Date: 2023      Visit Dx:     ICD-10-CM ICD-9-CM   1. Dyspnea, unspecified type  R06.00 786.09   2. Abnormal EKG  R94.31 794.31   3. Acute on chronic respiratory failure with hypoxia  J96.21 518.84     799.02   4. Impaired mobility and ADLs  Z74.09 V49.89    Z78.9    5. Decreased functional mobility  R26.89 781.99     Patient Active Problem List   Diagnosis   • Multiple subsegmental pulmonary emboli without acute cor pulmonale   • Cellulitis   • Cellulitis   • Ulises hematuria   • Mixed hyperlipidemia   • Morbid obesity with BMI of 45.0-49.9, adult   • Leg edema   • Venous stasis dermatitis of both lower extremities   • Lymphedema   • Borderline diabetic   • Cardiomegaly   • Dyspnea on exertion   • Elevated blood pressure reading without diagnosis of hypertension   • Right ventricular cardiac abnormality   • Hypersomnia with sleep apnea   • Ascending aorta dilation   • Venous stasis dermatitis of both lower extremities   • Mucopurulent chronic bronchitis   • Dyspnea, unspecified type   • Acute on chronic diastolic heart failure   • Pulmonary hypertension   • Anasarca   • History of pulmonary embolus (PE)   • Depression   • Acute respiratory failure with hypoxia   • Acute on chronic respiratory failure with hypoxia and hypercapnia     Past Medical History:   Diagnosis Date   • Controlled type 2 diabetes mellitus without complication, without long-term current use of insulin    • COPD (chronic obstructive pulmonary disease)    • DVT (deep venous thrombosis) 2022    left popliteal   • Hyperlipemia, mixed    • Pulmonary embolism 2022    right     Past Surgical History:   Procedure Laterality Date   • CYSTOSCOPY N/A 2022    Procedure: CYSTOSCOPY;  Surgeon: Davide Moore MD;  Location: [facility];  Service: Urology;  Laterality: N/A;   • TUBAL ABDOMINAL LIGATION        PT Assessment (last 12 hours)     PT Evaluation and Treatment     Row Name 06/01/23 0940          Physical Therapy Time and Intention    Subjective Information complains of;fatigue  -     Document Type therapy note (daily note)  -     Mode of Treatment individual therapy;physical therapy  -     Row Name 06/01/23 0940          General Information    Patient Profile Reviewed yes  -     Existing Precautions/Restrictions oxygen therapy device and L/min;fall  -     Row Name 06/01/23 0940          Pain    Pretreatment Pain Rating 0/10 - no pain  -     Posttreatment Pain Rating 0/10 - no pain  -     Row Name 06/01/23 0940          Cognition    Orientation Status (Cognition) oriented x 4  -     Row Name 06/01/23 0940          Bed Mobility    Bed Mobility sidelying-sit  -     Sidelying-Sit [place] (Bed Mobility) modified independence  -     Assistive Device (Bed Mobility) bed rails;head of bed elevated  -     Row Name 06/01/23 0940          Transfers    Transfers sit-stand transfer;stand-sit transfer  -     Row Name 06/01/23 0940          Sit-Stand Transfer    Sit-Stand [place] (Transfers) standby assist  -     Row Name 06/01/23 0940          Stand-Sit Transfer    Stand-Sit [place] (Transfers) standby assist  -     Row Name 06/01/23 0940          Gait/Stairs (Locomotion)    [place] Level (Gait) contact guard  -     Assistive Device (Gait) other (see comments)  no AD  -     Distance in Feet (Gait) 16'  -     Deviations/Abnormal Patterns (Gait) gait speed decreased;base of support, wide;kiel decreased  -     Comment, (Gait/Stairs) pt attempted gt w/out RW, pt w/ increased unsteadiness requiring CGA for gt, no LOB  -     Row Name 06/01/23 0940          Motor Skills    Comments, Therapeutic Exercise B LE seated ther ex:  AP, LAQ, Hip flexion, hip abd pillow squeeze x 10 reps  -     Row Name             Wound 05/26/23 1628 Bilateral anterior groin MASD (Moisture  associated skin damage)    Wound - Properties Group Placement Date: 05/26/23  -HE Placement Time: 1628  -HE Present on Hospital Admission: Y  -HE Side: Bilateral  -HE Orientation: anterior  -HE Location: groin  -HE Primary Wound Type: MASD  -HE Additional Comments: Nystatin Powder ordered and applied  -HE    Retired Wound - Properties Group Placement Date: 05/26/23  -HE Placement Time: 1628  -HE Present on Hospital Admission: Y  -HE Side: Bilateral  -HE Orientation: anterior  -HE Location: groin  -HE Primary Wound Type: MASD  -HE Additional Comments: Nystatin Powder ordered and applied  -HE    Retired Wound - Properties Group Date first assessed: 05/26/23  -HE Time first assessed: 1628  -HE Present on Hospital Admission: Y  -HE Side: Bilateral  -HE Location: groin  -HE Primary Wound Type: MASD  -HE Additional Comments: Nystatin Powder ordered and applied  -HE    Row Name 06/01/23 0940          Plan of Care Review    Plan of Care Reviewed With patient  -JW     Outcome Evaluation pt t/fers SL to sit w/ Mod Ind, sit<>stand w/ SBA, pt ambulates ~16' w/ no AD w/ CGA, pt w/ increased unsteadiness but no LOB, pt performs B LE seated ther ex x 10 reps  -     Row Name 06/01/23 0940          Vital Signs    Pretreatment Heart Rate (beats/min) 86  -     Posttreatment Heart Rate (beats/min) 95  -     Pre SpO2 (%) 98  -     O2 Delivery Pre Treatment supplemental O2  -     Post SpO2 (%) 97  -     O2 Delivery Post Treatment supplemental O2  -     Pre Patient Position Side Lying  -     Intra Patient Position Standing  -     Post Patient Position Sitting  -     Row Name 06/01/23 0940          Positioning and Restraints    Pre-Treatment Position in bed  -     Post Treatment Position chair  -JW     In Chair sitting;call light within reach;encouraged to call for assist;exit alarm on  -     Row Name 06/01/23 0906          Bed Mobility Goal 1 (PT)    Activity/Assistive Device (Bed Mobility Goal 1, PT) supine to  sit;sit to supine  -     [place] Level/Cues Needed (Bed Mobility Goal 1, PT) modified independence  -JW     Time Frame (Bed Mobility Goal 1, PT) by discharge  -     Progress/Outcomes (Bed Mobility Goal 1, PT) goal not met  -     Row Name 06/01/23 0940          Transfer Goal 1 (PT)    Activity/Assistive Device (Transfer Goal 1, PT) sit-to-stand/stand-to-sit;bed-to-chair/chair-to-bed  -     [place] Level/Cues Needed (Transfer Goal 1, PT) modified independence  -JW     Time Frame (Transfer Goal 1, PT) by discharge  -     Progress/Outcome (Transfer Goal 1, PT) goal not met  -     Row Name 06/01/23 0940          Gait Training Goal 1 (PT)    Activity/Assistive Device (Gait Training Goal 1, PT) gait (walking locomotion);assistive device use  -     [place] Level (Gait Training Goal 1, PT) modified independence  -JW     Distance (Gait Training Goal 1, PT) 50'x3  -JW     Time Frame (Gait Training Goal 1, PT) by discharge  -     Progress/Outcome (Gait Training Goal 1, PT) goal not met  -           User Key  (r) = Recorded By, (t) = Taken By, (c) = Cosigned By    Initials Name Provider Type    Kat Boyd PTA Physical Therapist Assistant    Diana Cadena, RN Registered Nurse                Physical Therapy Education     Title: PT OT SLP Therapies (In Progress)     Topic: Physical Therapy (Done)     Point: Mobility training (Done)     Learning Progress Summary           Patient Acceptance, E,TB, VU,NR by LR at 5/26/2023 1306    Comment: Educated on PT POC and goals.                   Point: Home exercise program (Done)     Learning Progress Summary           Patient Acceptance, E,TB, VU,NR by LR at 5/26/2023 1306    Comment: Educated on PT POC and goals.                   Point: Body mechanics (Done)     Learning Progress Summary           Patient Acceptance, E,TB, VU,NR by LR at 5/26/2023 1306    Comment: Educated on PT POC and goals.                   Point: Precautions  (Done)     Learning Progress Summary           Patient Acceptance, E,TB, VU,NR by LR at 5/26/2023 1306    Comment: Educated on PT POC and goals.                               User Key     Initials Effective Dates Name Provider Type Discipline    JESSICA 06/16/21 -  Jake Turner Physical Therapist PT              PT Recommendation and Plan  Anticipated Discharge Disposition (PT): home with assist, home with home health, assisted living, skilled nursing facility  Plan of Care Reviewed With: patient  Outcome Evaluation: pt t/fers SL to sit w/ Mod Ind, sit<>stand w/ SBA, pt ambulates ~16' w/ no AD w/ CGA, pt w/ increased unsteadiness but no LOB, pt performs B LE seated ther ex x 10 reps   Outcome Measures     Row Name 05/30/23 1400             How much help from another person do you currently need...    Turning from your back to your side while in flat bed without using bedrails? 4  -JW      Moving from lying on back to sitting on the side of a flat bed without bedrails? 4  -JW      Moving to and from a bed to a chair (including a wheelchair)? 3  -JW      Standing up from a chair using your arms (e.g., wheelchair, bedside chair)? 3  -JW      Climbing 3-5 steps with a railing? 2  -JW      To walk in hospital room? 3  -JW      AM-PAC 6 Clicks Score (PT) 19  -         Functional Assessment    Outcome Measure Options AM-PAC 6 Clicks Basic Mobility (PT)  -            User Key  (r) = Recorded By, (t) = Taken By, (c) = Cosigned By    Initials Name Provider Type    JW Kat Quevedo, PTA Physical Therapist Assistant                 Time Calculation:    PT Charges     Row Name 06/01/23 0940             Time Calculation    Start Time 0940  -      Stop Time 1003  -      Time Calculation (min) 23 min  -JW      PT Received On 06/01/23  -         Time Calculation- PT    Total Timed Code Minutes- PT 23 minute(s)  -            User Key  (r) = Recorded By, (t) = Taken By, (c) = Cosigned By    Initials Name Provider  Type    JW Kat Quevedo PTA Physical Therapist Assistant              Therapy Charges for Today     Code Description Service Date Service Provider Modifiers Qty    24780419058 HC PT THERAPEUTIC ACT EA 15 MIN 6/1/2023 Kat Quevedo, TIMMY GP 1    13777379224 HC PT THER PROC EA 15 MIN 6/1/2023 Kat Quevedo PTA GP 1          PT G-Codes  Outcome Measure Options: AM-PAC 6 Clicks Basic Mobility (PT)  AM-PAC 6 Clicks Score (PT): 19  AM-PAC 6 Clicks Score (OT): 17    Kat Quevedo PTA  6/1/2023

## 2023-06-01 NOTE — SIGNIFICANT NOTE
06/01/23 1529   OTHER   Discipline occupational therapy assistant   Rehab Time/Intention   Session Not Performed patient/family declined treatment

## 2023-06-02 ENCOUNTER — READMISSION MANAGEMENT (OUTPATIENT)
Dept: CALL CENTER | Facility: HOSPITAL | Age: 63
End: 2023-06-02

## 2023-06-02 ENCOUNTER — TRANSCRIBE ORDERS (OUTPATIENT)
Dept: HOME HEALTH SERVICES | Facility: HOME HEALTHCARE | Age: 63
End: 2023-06-02
Payer: COMMERCIAL

## 2023-06-02 VITALS
OXYGEN SATURATION: 93 % | TEMPERATURE: 97.8 F | HEART RATE: 80 BPM | DIASTOLIC BLOOD PRESSURE: 80 MMHG | WEIGHT: 252.8 LBS | RESPIRATION RATE: 20 BRPM | SYSTOLIC BLOOD PRESSURE: 129 MMHG | BODY MASS INDEX: 46.52 KG/M2 | HEIGHT: 62 IN

## 2023-06-02 DIAGNOSIS — I50.33 ACUTE ON CHRONIC DIASTOLIC HEART FAILURE: Primary | ICD-10-CM

## 2023-06-02 LAB
ALBUMIN SERPL-MCNC: 3.7 G/DL (ref 3.5–5.2)
ALBUMIN/GLOB SERPL: 0.9 G/DL
ALP SERPL-CCNC: 106 U/L (ref 39–117)
ALT SERPL W P-5'-P-CCNC: 17 U/L (ref 1–33)
ANION GAP SERPL CALCULATED.3IONS-SCNC: 18 MMOL/L (ref 5–15)
AST SERPL-CCNC: 29 U/L (ref 1–32)
BASOPHILS # BLD AUTO: 0.08 10*3/MM3 (ref 0–0.2)
BASOPHILS NFR BLD AUTO: 0.8 % (ref 0–1.5)
BILIRUB SERPL-MCNC: 1.1 MG/DL (ref 0–1.2)
BUN SERPL-MCNC: 18 MG/DL (ref 8–23)
BUN/CREAT SERPL: 25 (ref 7–25)
CALCIUM SPEC-SCNC: 9.7 MG/DL (ref 8.6–10.5)
CHLORIDE SERPL-SCNC: 80 MMOL/L (ref 98–107)
CO2 SERPL-SCNC: 33 MMOL/L (ref 22–29)
CREAT SERPL-MCNC: 0.72 MG/DL (ref 0.57–1)
DEPRECATED RDW RBC AUTO: 46.7 FL (ref 37–54)
EGFRCR SERPLBLD CKD-EPI 2021: 95.3 ML/MIN/1.73
EOSINOPHIL # BLD AUTO: 0.1 10*3/MM3 (ref 0–0.4)
EOSINOPHIL NFR BLD AUTO: 1 % (ref 0.3–6.2)
ERYTHROCYTE [DISTWIDTH] IN BLOOD BY AUTOMATED COUNT: 15.9 % (ref 12.3–15.4)
GLOBULIN UR ELPH-MCNC: 3.9 GM/DL
GLUCOSE BLDC GLUCOMTR-MCNC: 180 MG/DL (ref 70–130)
GLUCOSE BLDC GLUCOMTR-MCNC: 259 MG/DL (ref 70–130)
GLUCOSE SERPL-MCNC: 186 MG/DL (ref 65–99)
HCT VFR BLD AUTO: 50.6 % (ref 34–46.6)
HGB BLD-MCNC: 16.1 G/DL (ref 12–15.9)
IMM GRANULOCYTES # BLD AUTO: 0.03 10*3/MM3 (ref 0–0.05)
IMM GRANULOCYTES NFR BLD AUTO: 0.3 % (ref 0–0.5)
LYMPHOCYTES # BLD AUTO: 1.67 10*3/MM3 (ref 0.7–3.1)
LYMPHOCYTES NFR BLD AUTO: 17.1 % (ref 19.6–45.3)
MAGNESIUM SERPL-MCNC: 1.9 MG/DL (ref 1.6–2.4)
MCH RBC QN AUTO: 26.4 PG (ref 26.6–33)
MCHC RBC AUTO-ENTMCNC: 31.8 G/DL (ref 31.5–35.7)
MCV RBC AUTO: 83.1 FL (ref 79–97)
MONOCYTES # BLD AUTO: 0.92 10*3/MM3 (ref 0.1–0.9)
MONOCYTES NFR BLD AUTO: 9.4 % (ref 5–12)
NEUTROPHILS NFR BLD AUTO: 6.95 10*3/MM3 (ref 1.7–7)
NEUTROPHILS NFR BLD AUTO: 71.4 % (ref 42.7–76)
NRBC BLD AUTO-RTO: 0 /100 WBC (ref 0–0.2)
PLATELET # BLD AUTO: 188 10*3/MM3 (ref 140–450)
PMV BLD AUTO: 10.8 FL (ref 6–12)
POTASSIUM SERPL-SCNC: 3.2 MMOL/L (ref 3.5–5.2)
PROT SERPL-MCNC: 7.6 G/DL (ref 6–8.5)
RBC # BLD AUTO: 6.09 10*6/MM3 (ref 3.77–5.28)
SODIUM SERPL-SCNC: 131 MMOL/L (ref 136–145)
WBC NRBC COR # BLD: 9.75 10*3/MM3 (ref 3.4–10.8)

## 2023-06-02 PROCEDURE — 25010000002 FUROSEMIDE PER 20 MG: Performed by: INTERNAL MEDICINE

## 2023-06-02 PROCEDURE — 94799 UNLISTED PULMONARY SVC/PX: CPT

## 2023-06-02 PROCEDURE — 25010000002 ONDANSETRON PER 1 MG: Performed by: FAMILY MEDICINE

## 2023-06-02 PROCEDURE — 82948 REAGENT STRIP/BLOOD GLUCOSE: CPT

## 2023-06-02 PROCEDURE — 94664 DEMO&/EVAL PT USE INHALER: CPT

## 2023-06-02 PROCEDURE — 63710000001 INSULIN ASPART PER 5 UNITS: Performed by: INTERNAL MEDICINE

## 2023-06-02 PROCEDURE — 85025 COMPLETE CBC W/AUTO DIFF WBC: CPT | Performed by: FAMILY MEDICINE

## 2023-06-02 PROCEDURE — 80053 COMPREHEN METABOLIC PANEL: CPT | Performed by: FAMILY MEDICINE

## 2023-06-02 PROCEDURE — 94760 N-INVAS EAR/PLS OXIMETRY 1: CPT

## 2023-06-02 PROCEDURE — 97530 THERAPEUTIC ACTIVITIES: CPT

## 2023-06-02 PROCEDURE — 83735 ASSAY OF MAGNESIUM: CPT | Performed by: INTERNAL MEDICINE

## 2023-06-02 RX ORDER — POTASSIUM CHLORIDE 750 MG/1
40 CAPSULE, EXTENDED RELEASE ORAL ONCE
Status: COMPLETED | OUTPATIENT
Start: 2023-06-02 | End: 2023-06-02

## 2023-06-02 RX ORDER — FUROSEMIDE 40 MG/1
40 TABLET ORAL 2 TIMES DAILY
Qty: 60 TABLET | Refills: 0 | Status: SHIPPED | OUTPATIENT
Start: 2023-06-02 | End: 2023-07-02

## 2023-06-02 RX ORDER — POTASSIUM CHLORIDE 750 MG/1
10 TABLET, FILM COATED, EXTENDED RELEASE ORAL 2 TIMES DAILY
Qty: 40 TABLET | Refills: 0 | Status: SHIPPED | OUTPATIENT
Start: 2023-06-02 | End: 2023-06-22

## 2023-06-02 RX ORDER — POTASSIUM CHLORIDE 20 MEQ/1
40 TABLET, EXTENDED RELEASE ORAL EVERY 4 HOURS
Status: COMPLETED | OUTPATIENT
Start: 2023-06-02 | End: 2023-06-02

## 2023-06-02 RX ADMIN — PENTOXIFYLLINE 400 MG: 400 TABLET, EXTENDED RELEASE ORAL at 09:40

## 2023-06-02 RX ADMIN — POTASSIUM CHLORIDE 40 MEQ: 20 TABLET, EXTENDED RELEASE ORAL at 09:40

## 2023-06-02 RX ADMIN — Medication 10 ML: at 09:40

## 2023-06-02 RX ADMIN — FUROSEMIDE 40 MG: 10 INJECTION, SOLUTION INTRAVENOUS at 11:00

## 2023-06-02 RX ADMIN — Medication 1 APPLICATION: at 09:40

## 2023-06-02 RX ADMIN — IPRATROPIUM BROMIDE AND ALBUTEROL SULFATE 3 ML: .5; 3 SOLUTION RESPIRATORY (INHALATION) at 13:48

## 2023-06-02 RX ADMIN — ROSUVASTATIN CALCIUM 10 MG: 10 TABLET, FILM COATED ORAL at 09:40

## 2023-06-02 RX ADMIN — POTASSIUM CHLORIDE 40 MEQ: 20 TABLET, EXTENDED RELEASE ORAL at 13:30

## 2023-06-02 RX ADMIN — APIXABAN 5 MG: 5 TABLET, FILM COATED ORAL at 09:40

## 2023-06-02 RX ADMIN — POTASSIUM CHLORIDE 40 MEQ: 10 CAPSULE, COATED, EXTENDED RELEASE ORAL at 14:55

## 2023-06-02 RX ADMIN — NYSTATIN: 100000 POWDER TOPICAL at 09:40

## 2023-06-02 RX ADMIN — FUROSEMIDE 40 MG: 10 INJECTION, SOLUTION INTRAVENOUS at 03:04

## 2023-06-02 RX ADMIN — INSULIN ASPART 2 UNITS: 100 INJECTION, SOLUTION INTRAVENOUS; SUBCUTANEOUS at 09:39

## 2023-06-02 RX ADMIN — ONDANSETRON 4 MG: 2 INJECTION INTRAMUSCULAR; INTRAVENOUS at 06:13

## 2023-06-02 RX ADMIN — INSULIN ASPART 6 UNITS: 100 INJECTION, SOLUTION INTRAVENOUS; SUBCUTANEOUS at 11:00

## 2023-06-02 RX ADMIN — IPRATROPIUM BROMIDE AND ALBUTEROL SULFATE 3 ML: .5; 3 SOLUTION RESPIRATORY (INHALATION) at 07:17

## 2023-06-02 NOTE — SIGNIFICANT NOTE
06/02/23 0850   OTHER   Discipline physical therapy assistant   Rehab Time/Intention   Session Not Performed patient/family declined treatment  (defers PT tx this date, didn't sleep well last night)

## 2023-06-02 NOTE — PLAN OF CARE
Goal Outcome Evaluation:  Plan of Care Reviewed With: patient        Progress: improving  Outcome Evaluation: O2 weaned to room air-tolerating well, potassium replaced, resting well between care.

## 2023-06-02 NOTE — DISCHARGE PLACEMENT REQUEST
"Stephanie Bradshaw (63 y.o. Female)     Date of Birth   1960    Social Security Number       Address   [address] [place] [address] [address] [place], [place] [id number]    Home Phone   [phone]    MRN   [id number]       Jain   None    Marital Status                               Admission Date   23    Admission Type   Emergency    Admitting Provider   Dexter Mclean MD    Attending Provider   Dexter Mclean MD    Department, Room/Bed   [address], /1       Discharge Date       Discharge Disposition       Discharge Destination                               Attending Provider: Dexter Mclean MD    Allergies: Penicillins, Penicillins    Isolation: None   Infection: None   Code Status: No CPR    Ht: 157.5 cm (62\")   Wt: 115 kg (252 lb 12.8 oz)    Admission Cmt: None   Principal Problem: Dyspnea, unspecified type [R06.00]                 Active Insurance as of 2023     Primary Coverage     Payor Plan Insurance Group Employer/Plan Group    [employer] [place] [employer] [place]ICAID      Payor Plan Address Payor Plan Phone Number Payor Plan Fax Number Effective Dates    [address] [address] [phone]  2022 - None Entered    [facility] [id number]       Subscriber Name Subscriber Birth Date Member ID       STEPHANIE BRADSHAW 1960 [id number]                 Emergency Contacts      (Rel.) Home Phone Work Phone Mobile Phone    Sonia Correia (Friend) [phone] -- [phone]    Hieu Neumann (Son) [phone] -- [phone]    Hieu Crump (Son) [phone] -- --           [address] [address]  Phone:  [phone]  Fax:   Date: 2023      Ambulatory Referral to Home Health (Hospital)     Patient:  Stephanie Bradshaw MRN:  [id number]   [address] [place] [address] [address]  [facility] [place] [facility] [address] :  1960  SSN:    Phone: [phone] Sex:  F      INSURANCE PAYOR PLAN " GROUP # SUBSCRIBER ID   Primary:    [facility] [id number]   [id number]      Referring Provider Information:  PITTMANGAURAV AZULLO Phone: [phone] Fax: [phone]       Referral Information:   # Visits:  999 Referral Type: Home Health [42]   Urgency:  Routine Referral Reason: Specialty Services Required   Start Date: Jun 2, 2023 End Date:  To be determined by Insurer   Diagnosis: Impaired mobility and ADLs (Z74.09,Z78.9 [ICD-10-CM] V49.89 [ICD-9-CM])  Decreased functional mobility (R26.89 [ICD-10-CM] 781.99 [ICD-9-CM])  Acute respiratory failure with hypoxia (J96.01 [ICD-10-CM] 518.81 [ICD-9-CM])  Acute on chronic diastolic heart failure (I50.33 [ICD-10-CM] 428.33 [ICD-9-CM])      Refer to Dept:   Refer to Provider:   Refer to Provider Phone:   Refer to Facility:       Face to Face Visit Date: 6/2/2023  Follow-up provider for Plan of Care? I treated the patient in an acute care facility and will not continue treatment after discharge.  Follow-up provider: ROSENDA FINCH [[id number]]  Reason/Clinical Findings: weakness  Describe mobility limitations that make leaving home difficult: recent hospitalization  Nursing/Therapeutic Services Requested: Physical Therapy  Nursing/Therapeutic Services Requested: Occupational Therapy  Nursing/Therapeutic Services Requested: Skilled Nursing  Skilled nursing orders: O2 instruction (2 L)  PT orders: Therapeutic exercise  PT orders: Transfer training  PT orders: Strengthening  PT orders: Home safety assessment  Occupational orders: Activities of daily living  Occupational orders: Cognition  Occupational orders: Strengthening  Frequency: 1 Week 1     This document serves as a request of services and does not constitute Insurance authorization or approval of services.  To determine eligibility, please contact the members Insurance carrier to verify and review coverage.     If you have medical questions regarding this request for services. Please contact [facility]  [address] at [phone] during normal business hours.        Authorizing Provider:Boby Hamm MD  Authorizing Provider's NPI: [id number]  Order Entered By: Boby Hamm MD 6/2/2023 12:55 PM     Electronically signed by: Boby Hamm MD 6/2/2023 12:55 PM       Emergency Contact Information     Name Relation Home Work Sonia Soto [phone]    Hieu Neumann Son [phone]    Hieu Crump [phone]            Insurance Information                [facility] MEDICAID Phone: [phone]    Subscriber: Stephanie Bradshaw Subscriber#: [id number]    Group#: -- Precert#: --

## 2023-06-02 NOTE — PLAN OF CARE
Goal Outcome Evaluation:           Progress: no change  Outcome Evaluation: VSS. pt on 3L NC. midline CDI. meds given per orders. bed alarm refusal signed. No complaints at this time.

## 2023-06-02 NOTE — PROGRESS NOTES
[facility] Medicine Services  INPATIENT PROGRESS NOTE    Length of Stay: 8  Date of Admission: 5/25/2023  Primary Care Physician: Danielle Vaughn APRN    Subjective   (S) Admitted for shortness of breath and weight gain due to stopped taking her diuretics  Today, still with shortness of breath and still on need of oxygen     Review of Systems   All other systems reviewed and are negative.       All pertinent negatives and positives are as above. All other systems have been reviewed and are negative unless otherwise stated.     Prior to Admission medications    Medication Sig Start Date End Date Taking? Authorizing Provider   cyclobenzaprine (FLEXERIL) 10 MG tablet Take 1 tablet by mouth 3 (Three) Times a Day As Needed. 12/20/16  Yes    rosuvastatin (CRESTOR) 10 MG tablet Take 1 tablet by mouth Daily. 8/25/22  Yes ProviderJaniya MD   tiotropium bromide-olodaterol (STIOLTO RESPIMAT) 2.5-2.5 MCG/ACT aerosol solution inhaler Inhale 2 puffs Daily. 8/23/22  Yes Mimi Kern APRN   traMADol (ULTRAM) 50 MG tablet Take 1 tablet by mouth Every 6 (Six) Hours As Needed for Moderate Pain. 8/25/22  Yes ProviderJaniya MD   VENTOLIN  (90 BASE) MCG/ACT inhaler  1/16/17  Yes ProviderJaniya MD   apixaban (ELIQUIS) 5 MG tablet tablet Take 1 tablet by mouth Every 12 (Twelve) Hours. Indications: DVT/PE (active thrombosis) 9/22/22   Rafael Cisse APRN   furosemide (LASIX) 40 MG tablet Take 1 tablet by mouth 2 (Two) Times a Day for 30 days. 8/19/22 9/18/22  Dexter Mclean MD   gabapentin (NEURONTIN) 400 MG capsule Take 1 capsule by mouth 3 (Three) Times a Day.    Provider, MD Janiya   nystatin (MYCOSTATIN) 943358 UNIT/GM powder Apply  topically to the appropriate area as directed 4 (Four) Times a Day. 8/25/22 8/26/23  Janiya Montenegro MD   pentoxifylline (TRENtal) 400 MG CR tablet Take 1 tablet by mouth 2 (Two) Times a Day. 8/29/22   Betito  ROSANNE Washington       apixaban, 5 mg, Oral, Q12H  Bag Balm, 1 application, Topical, BID  furosemide, 40 mg, Intravenous, Q8H  Insulin Aspart, 0-9 Units, Subcutaneous, TID AC  ipratropium-albuterol, 3 mL, Nebulization, TID - RT  nicotine, 1 patch, Transdermal, Q24H  nystatin, , Topical, Q12H  pentoxifylline, 400 mg, Oral, BID  potassium chloride ER, 40 mEq, Oral, Q4H  potassium chloride, 40 mEq, Oral, Once  rosuvastatin, 10 mg, Oral, Daily  sodium chloride, 10 mL, Intravenous, Q12H           Objective    Temp:  [98.4 °F (36.9 °C)-98.7 °F (37.1 °C)] 98.5 °F (36.9 °C)  Heart Rate:  [75-99] 91  Resp:  [16-18] 18  BP: (109-124)/(62-79) 123/70    Physical Exam  Constitutional:       Appearance: Normal appearance. She is obese.      Comments: As today   HENT:      Head: Normocephalic.      Nose: Nose normal.      Mouth/Throat:      Mouth: Mucous membranes are moist.   Eyes:      Extraocular Movements: Extraocular movements intact.   Cardiovascular:      Rate and Rhythm: Normal rate and regular rhythm.      Heart sounds: Normal heart sounds.   Pulmonary:      Effort: Pulmonary effort is normal.      Breath sounds: Rales present.   Abdominal:      General: Bowel sounds are normal.      Palpations: Abdomen is soft.   Musculoskeletal:         General: Normal range of motion.      Cervical back: Normal range of motion and neck supple.      Right lower leg: Edema present.      Left lower leg: Edema present.   Skin:     General: Skin is warm.   Neurological:      General: No focal deficit present.      Mental Status: She is alert. Mental status is at baseline.   Psychiatric:         Behavior: Behavior normal.         Results Review:  I have reviewed the labs, radiology results, and diagnostic studies.    Laboratory Data:   Results from last 7 days   Lab Units 06/02/23  0602 06/01/23 2129 06/01/23  0545 05/31/23  1710 05/31/23  0547   SODIUM mmol/L 131*  --  134*  --  135*   POTASSIUM mmol/L 3.2*  --  4.0 4.0 3.6   CHLORIDE  mmol/L 80*  --  89*  --  91*   CO2 mmol/L 33.0*  --  32.0*  --  32.0*   BUN mg/dL 18  --  17  --  17   CREATININE mg/dL 0.72  --  0.62  --  0.61   GLUCOSE mg/dL 186* 429* 160*  --  170*   CALCIUM mg/dL 9.7  --  9.3  --  9.2   BILIRUBIN mg/dL 1.1  --  0.9  --  1.0   ALK PHOS U/L 106  --  93  --  97   ALT (SGPT) U/L 17  --  16  --  12   AST (SGOT) U/L 29  --  33*  --  24   ANION GAP mmol/L 18.0*  --  13.0  --  12.0     Estimated Creatinine Clearance: 98.6 mL/min (by C-G formula based on SCr of 0.72 mg/dL).  Results from last 7 days   Lab Units 06/02/23  0602 06/01/23  0545 05/31/23  0547   MAGNESIUM mg/dL 1.9 2.2 2.1         Results from last 7 days   Lab Units 06/02/23  0602 06/01/23  0545 05/31/23  0547 05/30/23  0652 05/29/23  0546   WBC 10*3/mm3 9.75 7.92 7.82 7.32 8.14   HEMOGLOBIN g/dL 16.1* 15.9 15.5 15.5 15.1   HEMATOCRIT % 50.6* 50.9* 50.6* 50.3* 49.7*   PLATELETS 10*3/mm3 188 132* 162 174 181           Culture Data:   No results found for: BLOODCX  No results found for: URINECX  No results found for: RESPCX  No results found for: WOUNDCX  No results found for: STOOLCX  No components found for: BODYFLD    Radiology Data:   Imaging Results (Last 24 Hours)     Procedure Component Value Units Date/Time    US Guided Vascular Access [[id number]] Collected: 06/01/23 1504     Updated: 06/01/23 1508    Narrative:      US GUIDANCE VASCULAR    HISTORY: access    COMPARISON: None      Impression:      Grayscale sonographic images were obtained during ultrasound guided vascular  access.    The accessed blood vessel appears to be patent. The vessel was accessed under  direct ultrasound guidance.    Please refer to the procedure note for additional details.      IR Insert Midline Without Port Pump 5 Plus [[id number]] Resulted: 06/01/23 1317     Updated: 06/01/23 1317    Narrative:      This procedure was auto-finalized with no dictation required.          I have reviewed the patient's current medications.     Assessment/Plan    Acute on chronic respiratory failure with hypoxemia     Due to below     Wean oxygen as able; on 2 L now, from 6 L    Acute on chronic diastolic CHF     Likely right heart failure; lungs are clear; CTA chest with no PE     On lasix 40 mg iv q 8h; monitor daily renal function      Weight went down from 296 lbs to 247 lbs in the last 6 days      Keep monitoring     Chronic medical problems     Hx of PE/DVT     Pulmonary hypertension     Chronic recurrent depression     Morbid obesity     Type II DM     Hyperlipidemia     COPD, no AE, no home oxygen     SNEHA; does not tolerate mask      Medical Decision Making  Number and Complexity of problems: 2 major complexes    Conditions and Status:        Condition is improving.     MDM Data  External documents reviewed: previous medical records  My EKG interpretation: n/a  My plain film interpretation: n/a  Discussed with: patient     Treatment Plan  See above    Care Planning  Shared decision making:her friend Sonia  Code status and discussions: DNR/DNI    Disposition  Social Determinants of Health that impact treatment or disposition: none  I expect the patient to be discharged: in progress    I confirmed that the patient's Advance Care Plan is present, code status is documented, or surrogate decision maker is listed in the patient's medical record.     Boby Hamm MD

## 2023-06-02 NOTE — DISCHARGE SUMMARY
[facility]  DISCHARGE SUMMARY       Date of Admission: 5/25/2023  Date of Discharge:  6/2/2023  Primary Care Physician: Danielle Vaughn APRN    Presenting Problem/History of Present Illness:  Abnormal EKG [R94.31]  Acute on chronic respiratory failure with hypoxia [J96.21]  Dyspnea, unspecified type [R06.00]       Final Discharge Diagnoses:  Acute on chronic respiratory failure with hypoxemia     Due to below     Wean oxygen as able; on 2 L now, from 6 L and then weaned off; she is going home with not oxygen;      Acute on chronic diastolic CHF     Likely right heart failure; lungs are clear;will go home with lasix po bid     Resolved;      Chronic medical problems     Hx of PE/DVT     Pulmonary hypertension     Chronic recurrent depression     Morbid obesity     Type II DM     Hyperlipidemia     COPD, no AE, no home oxygen     SNEHA; does not tolerate mask    Consults:   Consults       No orders found from 4/26/2023 to 5/26/2023.            Procedures Performed:                 Pertinent Test Results:   Lab Results (most recent)       Procedure Component Value Units Date/Time    POC Glucose Once [[id number]]  (Abnormal) Collected: 06/02/23 1034    Specimen: Blood Updated: 06/02/23 1056     Glucose 259 mg/dL      Comment: RN NotifiedOperator: [id number] FIDELIA WAITEMeter ID: [id number]       POC Glucose Once [[id number]]  (Abnormal) Collected: 06/02/23 0727    Specimen: Blood Updated: 06/02/23 0803     Glucose 180 mg/dL      Comment: Result Not ConfirmedOperator: [id number] FIDELIA WAITEMeter ID: [id number]       Comprehensive Metabolic Panel [[id number]]  (Abnormal) Collected: 06/02/23 0602    Specimen: Blood Updated: 06/02/23 0712     Glucose 186 mg/dL      BUN 18 mg/dL      Creatinine 0.72 mg/dL      Sodium 131 mmol/L      Potassium 3.2 mmol/L      Comment: Slight hemolysis detected by analyzer. Results may be affected.        Chloride 80 mmol/L       CO2 33.0 mmol/L      Calcium 9.7 mg/dL      Total Protein 7.6 g/dL      Albumin 3.7 g/dL      ALT (SGPT) 17 U/L      AST (SGOT) 29 U/L      Alkaline Phosphatase 106 U/L      Total Bilirubin 1.1 mg/dL      Globulin 3.9 gm/dL      A/G Ratio 0.9 g/dL      BUN/Creatinine Ratio 25.0     Anion Gap 18.0 mmol/L      eGFR 95.3 mL/min/1.73     Narrative:      GFR Normal >60  Chronic Kidney Disease <60  Kidney Failure <15      Magnesium [[id number]]  (Normal) Collected: 06/02/23 0602    Specimen: Blood Updated: 06/02/23 0712     Magnesium 1.9 mg/dL     CBC Auto Differential [[id number]]  (Abnormal) Collected: 06/02/23 0602    Specimen: Blood Updated: 06/02/23 0700     WBC 9.75 10*3/mm3      RBC 6.09 10*6/mm3      Hemoglobin 16.1 g/dL      Hematocrit 50.6 %      MCV 83.1 fL      MCH 26.4 pg      MCHC 31.8 g/dL      RDW 15.9 %      RDW-SD 46.7 fl      MPV 10.8 fL      Platelets 188 10*3/mm3      Neutrophil % 71.4 %      Lymphocyte % 17.1 %      Monocyte % 9.4 %      Eosinophil % 1.0 %      Basophil % 0.8 %      Immature Grans % 0.3 %      Neutrophils, Absolute 6.95 10*3/mm3      Lymphocytes, Absolute 1.67 10*3/mm3      Monocytes, Absolute 0.92 10*3/mm3      Eosinophils, Absolute 0.10 10*3/mm3      Basophils, Absolute 0.08 10*3/mm3      Immature Grans, Absolute 0.03 10*3/mm3      nRBC 0.0 /100 WBC     Glucose, Random [[id number]]  (Abnormal) Collected: 06/01/23 2129    Specimen: Blood Updated: 06/01/23 2230     Glucose 429 mg/dL     CBC Auto Differential [[id number]]  (Abnormal) Collected: 06/01/23 0545    Specimen: Blood Updated: 06/01/23 0723     WBC 7.92 10*3/mm3      RBC 5.96 10*6/mm3      Hemoglobin 15.9 g/dL      Hematocrit 50.9 %      MCV 85.4 fL      MCH 26.7 pg      MCHC 31.2 g/dL      RDW 15.6 %      RDW-SD 48.2 fl      MPV 10.3 fL      Platelets 132 10*3/mm3      Neutrophil % 65.4 %      Lymphocyte % 21.6 %      Monocyte % 9.0 %      Eosinophil % 2.3 %      Basophil % 1.3 %      Immature Grans % 0.4 %       Neutrophils, Absolute 5.19 10*3/mm3      Lymphocytes, Absolute 1.71 10*3/mm3      Monocytes, Absolute 0.71 10*3/mm3      Eosinophils, Absolute 0.18 10*3/mm3      Basophils, Absolute 0.10 10*3/mm3      Immature Grans, Absolute 0.03 10*3/mm3      nRBC 0.0 /100 WBC     Comprehensive Metabolic Panel [[id number]]  (Abnormal) Collected: 06/01/23 0545    Specimen: Blood Updated: 06/01/23 0647     Glucose 160 mg/dL      BUN 17 mg/dL      Creatinine 0.62 mg/dL      Sodium 134 mmol/L      Potassium 4.0 mmol/L      Comment: Slight hemolysis detected by analyzer. Results may be affected.        Chloride 89 mmol/L      CO2 32.0 mmol/L      Calcium 9.3 mg/dL      Total Protein 6.8 g/dL      Albumin 3.3 g/dL      ALT (SGPT) 16 U/L      AST (SGOT) 33 U/L      Comment: Slight hemolysis detected by analyzer. Results may be affected.        Alkaline Phosphatase 93 U/L      Total Bilirubin 0.9 mg/dL      Globulin 3.5 gm/dL      A/G Ratio 0.9 g/dL      BUN/Creatinine Ratio 27.4     Anion Gap 13.0 mmol/L      eGFR 101.5 mL/min/1.73     Narrative:      GFR Normal >60  Chronic Kidney Disease <60  Kidney Failure <15      Magnesium [[id number]]  (Normal) Collected: 06/01/23 0545    Specimen: Blood Updated: 06/01/23 0647     Magnesium 2.2 mg/dL     Potassium [[id number]]  (Normal) Collected: 05/31/23 1710    Specimen: Blood Updated: 05/31/23 1809     Potassium 4.0 mmol/L     Potassium [[id number]]  (Normal) Collected: 05/29/23 1450    Specimen: Blood Updated: 05/29/23 1542     Potassium 4.5 mmol/L     Blood Gas, Arterial - [[id number]]  (Abnormal) Collected: 05/28/23 0535    Specimen: Arterial Blood Updated: 05/28/23 0534     Site Right Radial     Manas's Test N/A     pH, Arterial 7.550 pH units      Comment: 83 Value above reference range        pCO2, Arterial 57.2 mm Hg      Comment: 83 Value above reference range        pO2, Arterial 73.4 mm Hg      Comment: 84 Value below reference range        HCO3, Arterial 50.0 mmol/L      Comment: 83 Value  above reference range        Base Excess, Arterial 23.4 mmol/L      Comment: 83 Value above reference range        O2 Saturation, Arterial 96.4 %      Barometric Pressure for Blood Gas 751 mmHg      Modality Nasal Cannula     Flow Rate 6.0 lpm      Ventilator Mode NA     Collected by Leah LÓPEZ     Comment: Meter: [id number]     :  [id number]       Blood Gas, Arterial - [[id number]]  (Abnormal) Collected: 05/27/23 1407    Specimen: Arterial Blood Updated: 05/27/23 1419     Site Left Radial     Manas's Test N/A     pH, Arterial 7.481 pH units      Comment: 83 Value above reference range        pCO2, Arterial 73.2 mm Hg      Comment: 86 Value above critical limit        pO2, Arterial 71.3 mm Hg      Comment: 84 Value below reference range        HCO3, Arterial 54.5 mmol/L      Comment: 83 Value above reference range        Base Excess, Arterial 25.5 mmol/L      Comment: 83 Value above reference range        O2 Saturation, Arterial 96.0 %      Barometric Pressure for Blood Gas 752 mmHg      Modality BiPap     FIO2 50 %      Ventilator Mode AVAP     Set Tidal Volume 425     Set Mech Resp Rate 20.0     EPAP 8     Comment: Meter: [id number]     :  [id number]        Collected by mi/ke    Scan Slide [[id number]] Collected: 05/27/23 0520    Specimen: Blood Updated: 05/27/23 0801     RBC Morphology Normal     WBC Morphology Normal     Platelet Estimate Decreased     Comment: Plt count falsely decreased due to clumping        Clumped Platelets Present    Urinalysis, Microscopic Only - Urine, Clean Catch [[id number]]  (Abnormal) Collected: 05/26/23 1611    Specimen: Urine, Clean Catch Updated: 05/26/23 1628     RBC, UA 0-2 /HPF      WBC, UA 0-2 /HPF      Comment: Urine culture not indicated.        Bacteria, UA None Seen /HPF      Squamous Epithelial Cells, UA 0-2 /HPF      Hyaline Casts, UA None Seen /LPF      Methodology Automated Microscopy    Urinalysis With Culture If Indicated - Urine, Clean Catch  [[id number]]  (Abnormal) Collected: 05/26/23 1611    Specimen: Urine, Clean Catch Updated: 05/26/23 1628     Color, UA Yellow     Appearance, UA Clear     pH, UA 5.5     Specific Gravity, UA 1.009     Glucose, UA Negative     Ketones, UA Negative     Bilirubin, UA Negative     Blood, UA Moderate (2+)     Protein, UA Negative     Leuk Esterase, UA Negative     Nitrite, UA Negative     Urobilinogen, UA 1.0 E.U./dL    Narrative:      In absence of clinical symptoms, the presence of pyuria, bacteria, and/or nitrites on the urinalysis result does not correlate with infection.    TSH Rfx On Abnormal To Free T4 [[id number]]  (Normal) Collected: 05/25/23 1831    Specimen: Blood Updated: 05/26/23 0142     TSH 1.960 uIU/mL     Hemoglobin A1c [[id number]]  (Abnormal) Collected: 05/25/23 1212    Specimen: Blood Updated: 05/25/23 1814     Hemoglobin A1C 8.10 %     Narrative:      Hemoglobin A1C Ranges:    Increased Risk for Diabetes  5.7% to 6.4%  Diabetes                     >= 6.5%  Diabetic Goal                < 7.0%    Extra Tubes [[id number]] Collected: 05/25/23 1307    Specimen: Blood, Venous Line Updated: 05/25/23 1715    Narrative:      The following orders were created for panel order Extra Tubes.  Procedure                               Abnormality         Status                     ---------                               -----------         ------                     Aguilar Top[[id number]]                                         Final result                 Please view results for these tests on the individual orders.    Gray Top [[id number]] Collected: 05/25/23 1307    Specimen: Blood Updated: 05/25/23 1715     Extra Tube Hold for add-ons.     Comment: Auto resulted.       Single High Sensitivity Troponin T [[id number]]  (Abnormal) Collected: 05/25/23 1414    Specimen: Blood Updated: 05/25/23 1438     HS Troponin T 14 ng/L     Narrative:      High Sensitive Troponin T Reference Range:  <10.0 ng/L- Negative Female for  AMI  <15.0 ng/L- Negative Male for AMI  >=10 - Abnormal Female indicating possible myocardial injury.  >=15 - Abnormal Male indicating possible myocardial injury.   Clinicians would have to utilize clinical acumen, EKG, Troponin, and serial changes to determine if it is an Acute Myocardial Infarction or myocardial injury due to an underlying chronic condition.         COVID-19 and FLU A/B PCR - Swab, Nasopharynx [[id number]]  (Normal) Collected: 05/25/23 1313    Specimen: Swab from Nasopharynx Updated: 05/25/23 1414     COVID19 Not Detected     Influenza A PCR Not Detected     Influenza B PCR Not Detected    Narrative:      Fact sheet for providers: https://www.fda.gov/media/956244/download    Fact sheet for patients: https://www.fda.gov/media/690779/download    Test performed by PCR.    [place] Draw [[id number]] Collected: 05/25/23 1212    Specimen: Blood Updated: 05/25/23 1315    Narrative:      The following orders were created for panel order [place] Draw.  Procedure                               Abnormality         Status                     ---------                               -----------         ------                     Green Top (Gel)[[id number]]                                  Final result               Lavender Top[[id number]]                                     Final result               Gold Top - SST[[id number]]                                   Final result               Light Blue Top[[id number]]                                   Final result                 Please view results for these tests on the individual orders.    Light Blue Top [[id number]] Collected: 05/25/23 1212    Specimen: Blood Updated: 05/25/23 1315     Extra Tube Hold for add-ons.     Comment: Auto resulted       Green Top (Gel) [[id number]] Collected: 05/25/23 1212    Specimen: Blood Updated: 05/25/23 1315     Extra Tube Hold for add-ons.     Comment: Auto resulted.       Gold Top - SST [[id number]] Collected: 05/25/23 1212    Specimen:  "Blood Updated: 05/25/23 1315     Extra Tube Hold for add-ons.     Comment: Auto resulted.       Radha Leung [[id number]] Collected: 05/25/23 1212    Specimen: Blood Updated: 05/25/23 1315     Extra Tube hold for add-on     Comment: Auto resulted       D-dimer, Quantitative [[id number]]  (Abnormal) Collected: 05/25/23 1212    Specimen: Blood Updated: 05/25/23 1253     D-Dimer, Quantitative 1,000 ng/mL (FEU)     Narrative:      According to the assay 's published package insert, a normal (<500 ng/mL (FEU)) D-dimer result in conjunction with a non-high clinical probability assessment, excludes deep vein thrombosis (DVT) and pulmonary embolism (PE) with high sensitivity.    D-dimer values increase with age and this can make VTE exclusion of an older population difficult. To address this, the [employer], based on best available evidence and recent guidelines, recommends that clinicians use age-adjusted D-dimer thresholds in patients greater than 50 years of age with: a) a low probability of PE who do not meet all Pulmonary Embolism Rule Out Criteria, or b) in those with intermediate probability of PE.   The formula for an age-adjusted D-dimer cut-off is \"age*10\".  For example, a 60 year old patient would have an age-adjusted cut-off of 600 ng/mL (FEU) and an 80 year old 800 ng/mL (FEU).      Single High Sensitivity Troponin T [[id number]]  (Abnormal) Collected: 05/25/23 1212    Specimen: Blood Updated: 05/25/23 1241     HS Troponin T 14 ng/L     Narrative:      High Sensitive Troponin T Reference Range:  <10.0 ng/L- Negative Female for AMI  <15.0 ng/L- Negative Male for AMI  >=10 - Abnormal Female indicating possible myocardial injury.  >=15 - Abnormal Male indicating possible myocardial injury.   Clinicians would have to utilize clinical acumen, EKG, Troponin, and serial changes to determine if it is an Acute Myocardial Infarction or myocardial injury due to an underlying chronic " condition.         BNP [[id number]]  (Abnormal) Collected: 05/25/23 1212    Specimen: Blood Updated: 05/25/23 1239     proBNP 1,054.0 pg/mL     Narrative:      Among patients with dyspnea, NT-proBNP is highly sensitive for the detection of acute congestive heart failure. In addition NT-proBNP of <300 pg/ml effectively rules out acute congestive heart failure with 99% negative predictive value.      CBC & Differential [[id number]]  (Abnormal) Collected: 05/25/23 1212    Specimen: Blood Updated: 05/25/23 1223    Narrative:      The following orders were created for panel order CBC & Differential.  Procedure                               Abnormality         Status                     ---------                               -----------         ------                     CBC Auto Differential[[id number]]        Abnormal            Final result                 Please view results for these tests on the individual orders.          Imaging Results (Most Recent)       Procedure Component Value Units Date/Time    US Guided Vascular Access [[id number]] Collected: 06/01/23 1504     Updated: 06/01/23 1508    Narrative:      US GUIDANCE VASCULAR    HISTORY: access    COMPARISON: None      Impression:      Grayscale sonographic images were obtained during ultrasound guided vascular  access.    The accessed blood vessel appears to be patent. The vessel was accessed under  direct ultrasound guidance.    Please refer to the procedure note for additional details.      IR Insert Midline Without Port Pump 5 Plus [[id number]] Resulted: 06/01/23 1317     Updated: 06/01/23 1317    Narrative:      This procedure was auto-finalized with no dictation required.    CT Angiogram Chest [[id number]] Collected: 05/25/23 1616     Updated: 05/25/23 1726    Narrative:      EXAM:  CTA chest with contrast, PE protocol.    COMPARISON:  CTA chest with contrast, 8/9/2022.    HISTORY:  History of PE, elevated D-dimer and hypoxia with noncompliance  of  anticoagulation.    TECHNIQUE:  Intravenous contrast was administered and axial images from the thoracic inlet  through the diaphragm were performed followed by 2D multiplanar reformats.  MIPS  were reconstructed and reviewed.    FINDINGS:  The lungs are clear and well-expanded with the exception of minimal right  basilar atelectasis.  No consolidation.    There is probably mild enlargement of the right heart.  No pleural or  pericardial effusion.  Esophagus is grossly normal in course and caliber.  No  pathologically enlarged lymphadenopathy identified in the chest by size  criteria.    No thoracic aortic dissection or dilatation appreciated.  Main pulmonary artery  is enlarged, measuring 4.4 cm.  No pulmonary embolism identified.    No acute process in the visualized upper abdomen or involving the osseous  structures.      Impression:      1.  No acute findings in the chest.  No pulmonary embolism.    2.  Enlargement of the main pulmonary artery measuring up to 4.4 cm.  This  suggests pulmonary hypertension.  There is probably also mild enlargement of the  right heart.    XR Chest 2 View [[id number]] Collected: 05/25/23 1248     Updated: 05/25/23 1307    Narrative:      TWO VIEW CHEST:    INDICATION:  Triage protocol.    FINDINGS:  Cardiomegaly.  No focal consolidation.  Osseous structures are age-appropriate.      Impression:      impression:  No acute disease.            Chief Complaint on Day of Discharge: none    Hospital Course:  The patient is a 63 y.o. female who presented to [facility] with shortness of breath due to acute on chronic diastolic congestive heart failure; she was placed on furosemide drip, then transitioned to iv lasix and will go home with PO lasix; she lost around 50 labs since admission. We were able to wean her oxygen after losing that amount of fluid    Condition on Discharge: stable    Physical Exam on Discharge:  /80 (BP Location: Left arm, Patient Position:  "Sitting)   Pulse 80   Temp 97.8 °F (36.6 °C) (Temporal)   Resp 20   Ht 157.5 cm (62\")   Wt 115 kg (252 lb 12.8 oz)   LMP  (LMP Unknown)   SpO2 93%   BMI 46.24 kg/m²     Physical Exam  Vitals reviewed.   Constitutional:       Appearance: She is obese.   HENT:      Head: Normocephalic.      Nose: Nose normal.      Mouth/Throat:      Mouth: Mucous membranes are moist.   Eyes:      Extraocular Movements: Extraocular movements intact.   Cardiovascular:      Rate and Rhythm: Regular rhythm.      Heart sounds: Normal heart sounds.   Pulmonary:      Breath sounds: Normal breath sounds.   Abdominal:      General: Bowel sounds are normal.      Palpations: Abdomen is soft.   Musculoskeletal:         General: Normal range of motion.      Cervical back: Normal range of motion and neck supple.   Skin:     General: Skin is warm.   Neurological:      General: No focal deficit present.      Mental Status: She is alert. Mental status is at baseline.   Psychiatric:         Behavior: Behavior normal.         [place] [facility] [place] Medications:     Discharge Medications        New Medications        Instructions Start Date   potassium chloride 10 MEQ CR tablet   10 mEq, Oral, 2 Times Daily             Continue These Medications        Instructions Start Date   apixaban 5 MG tablet tablet  Commonly known as: ELIQUIS   5 mg, Oral, Every 12 Hours Scheduled      furosemide 40 MG tablet  Commonly known as: LASIX   40 mg, Oral, 2 Times Daily      gabapentin 400 MG capsule  Commonly known as: NEURONTIN   400 mg, Oral, 3 Times Daily      pentoxifylline 400 MG CR tablet  Commonly known as: TRENtal   400 mg, Oral, 2 Times Daily      rosuvastatin 10 MG tablet  Commonly known as: CRESTOR   10 mg, Oral, Daily      tiotropium bromide-olodaterol 2.5-2.5 MCG/ACT aerosol solution inhaler  Commonly known as: STIOLTO RESPIMAT   2 puffs, Inhalation, Daily      traMADol 50 MG tablet  Commonly known as: ULTRAM   50 " mg, Oral, Every 6 Hours PRN      Ventolin  (90 Base) MCG/ACT inhaler  Generic drug: albuterol sulfate HFA   No dose, route, or frequency recorded.             Stop These Medications      cyclobenzaprine 10 MG tablet  Commonly known as: FLEXERIL     nystatin 506708 UNIT/GM powder  Commonly known as: MYCOSTATIN               Discharge Diet: heart healthy diet    Activity at Discharge: as tolerated    Discharge Care Plan/Instructions: see chart    Follow-up Appointments:   No future appointments.    Test Results Pending at Discharge:     Boby Hamm MD    Time: 33

## 2023-06-02 NOTE — PLAN OF CARE
Goal Outcome Evaluation:  Plan of Care Reviewed With: patient           Outcome Evaluation: OT tx complete. Pt sittong EOB upon arrival and agreebale to therapy. Pt perf sit<>stand with SBA. Pt ambulated 128' in hallway with CGA using FWW. VSS throughout session with SpO2 remaining above 90% while ambulating. Nsg aware. Pt left sitting EOB at end of session and all needs in reach. (Simultaneous filing. User may not have seen previous data.)

## 2023-06-03 NOTE — OUTREACH NOTE
Prep Survey      Flowsheet Row Responses   [facility] facility patient discharged from? [place]   Is LACE score < 7 ? No   Eligibility Readm Mgmt   Discharge diagnosis Dyspnea, unspecified type   Does the patient have one of the following disease processes/diagnoses(primary or secondary)? Other   Does the patient have Home health ordered? Yes   What is the Home health agency?  [facility]   Is there a DME ordered? No   Prep survey completed? Yes            Leigh Ann WELCH - Registered Nurse

## 2023-06-04 ENCOUNTER — HOME CARE VISIT (OUTPATIENT)
Dept: HOME HEALTH SERVICES | Facility: CLINIC | Age: 63
End: 2023-06-04
Payer: COMMERCIAL

## 2023-06-04 VITALS
SYSTOLIC BLOOD PRESSURE: 126 MMHG | HEART RATE: 90 BPM | DIASTOLIC BLOOD PRESSURE: 70 MMHG | OXYGEN SATURATION: 94 % | RESPIRATION RATE: 18 BRPM | TEMPERATURE: 97.1 F

## 2023-06-04 PROCEDURE — G0299 HHS/HOSPICE OF RN EA 15 MIN: HCPCS

## 2023-06-04 NOTE — CASE COMMUNICATION
"Medical Necessity and focus of care: Patient recently hospitalized for CHF.  Pt lives alone, son lives in [place].  Patient has general pain relieved by flexeril and neurontin.  Patient is a smoker.    Caregiver Status: lives alone  Patient's goal(s): \"lose weight, get stronger\"  GG Discharge Goal:independence in transfers  Medication [place]: Patient taking neurontin PRN.    Environmental/ Physical issues:none  Any additional needs:     Based upon record review and collaboration conference, the recommended frequency for this patient is   SN-----1w4  PT-----eval and treat  OT----eval and treat  Provider___Dr. Hamm_______ Notified @ ___6/4/23______ and agrees with POC   "

## 2023-06-05 ENCOUNTER — HOME CARE VISIT (OUTPATIENT)
Dept: HOME HEALTH SERVICES | Facility: CLINIC | Age: 63
End: 2023-06-05
Payer: COMMERCIAL

## 2023-06-05 VITALS
DIASTOLIC BLOOD PRESSURE: 72 MMHG | OXYGEN SATURATION: 94 % | RESPIRATION RATE: 18 BRPM | HEART RATE: 88 BPM | SYSTOLIC BLOOD PRESSURE: 126 MMHG | TEMPERATURE: 98.1 F

## 2023-06-05 PROCEDURE — G0152 HHCP-SERV OF OT,EA 15 MIN: HCPCS

## 2023-06-05 NOTE — PAYOR COMM NOTE
"Julieta Denson  [facility]  Case Management Extender  [phone] phone  [phone] fax      Auth# [id number]     Stephanie Bradshaw (63 y.o. Female)       Date of Birth   1960    Social Security Number       Address   [address] [place] [address] [address] [place] [address]    Home Phone   [phone]    MRN   [id number]       Moravian   None    Marital Status                               Admission Date   5/25/23    Admission Type   Emergency    Admitting Provider   Dexter Mclean MD    Attending Provider       Department, Room/Bed   [address], 422/1       Discharge Date   6/2/2023    Discharge Disposition   Home-Health Care [facility] [place] [facility]    Discharge Destination                                 Attending Provider: (none)   Allergies: Penicillins, Penicillins    Isolation: None   Infection: None   Code Status: No CPR    Ht: 157.5 cm (62\")   Wt: 115 kg (252 lb 12.8 oz)    Admission Cmt: None   Principal Problem: Dyspnea, unspecified type [R06.00]                   Active Insurance as of 5/25/2023       Primary Coverage       Payor Plan Insurance Group Employer/Plan Group    [employer] [place] [employer] [place]ICA[place]        Payor Plan Address Payor Plan Phone Number Payor Plan Fax Number Effective Dates    [address] [address] [phone]  8/9/2022 - None Entered    [facility] [id number]         Subscriber Name Subscriber Birth Date Member ID       STEPHANIE BRADSHAW 1960 [id number]                     Emergency Contacts        (Rel.) Home Phone Work Phone Mobile Phone    Sonia Correia (Friend) [phone] -- [phone]    Hieu Neumann (Son) [phone] -- [phone]    Hieu Crump (Son) [phone] -- --                 Discharge Summary        Boby Hamm MD at 06/02/23 [address] [place] [address] Medicine Services  DISCHARGE SUMMARY       Date of Admission: 5/25/2023  Date of Discharge: "  6/2/2023  Primary Care Physician: Danielle Vaughn APRN    Presenting Problem/History of Present Illness:  Abnormal EKG [R94.31]  Acute on chronic respiratory failure with hypoxia [J96.21]  Dyspnea, unspecified type [R06.00]       Final Discharge Diagnoses:  Acute on chronic respiratory failure with hypoxemia     Due to below     Wean oxygen as able; on 2 L now, from 6 L and then weaned off; she is going home with not oxygen;      Acute on chronic diastolic CHF     Likely right heart failure; lungs are clear;will go home with lasix po bid     Resolved;      Chronic medical problems     Hx of PE/DVT     Pulmonary hypertension     Chronic recurrent depression     Morbid obesity     Type II DM     Hyperlipidemia     COPD, no AE, no home oxygen     SNEHA; does not tolerate mask    Consults:   Consults       No orders found from 4/26/2023 to 5/26/2023.            Procedures Performed:                 Pertinent Test Results:   Lab Results (most recent)       Procedure Component Value Units Date/Time    POC Glucose Once [[id number]]  (Abnormal) Collected: 06/02/23 1034    Specimen: Blood Updated: 06/02/23 1056     Glucose 259 mg/dL      Comment: RN NotifiedOperator: [id number] FIDELIA WAITEMeter ID: [id number]       POC Glucose Once [[id number]]  (Abnormal) Collected: 06/02/23 0727    Specimen: Blood Updated: 06/02/23 0803     Glucose 180 mg/dL      Comment: Result Not ConfirmedOperator: [id number] FIDELIA KERRRunic GamesMeter ID: [id number]       Comprehensive Metabolic Panel [[id number]]  (Abnormal) Collected: 06/02/23 0602    Specimen: Blood Updated: 06/02/23 0712     Glucose 186 mg/dL      BUN 18 mg/dL      Creatinine 0.72 mg/dL      Sodium 131 mmol/L      Potassium 3.2 mmol/L      Comment: Slight hemolysis detected by analyzer. Results may be affected.        Chloride 80 mmol/L      CO2 33.0 mmol/L      Calcium 9.7 mg/dL      Total Protein 7.6 g/dL      Albumin 3.7 g/dL      ALT (SGPT) 17 U/L      AST (SGOT) 29 U/L       Alkaline Phosphatase 106 U/L      Total Bilirubin 1.1 mg/dL      Globulin 3.9 gm/dL      A/G Ratio 0.9 g/dL      BUN/Creatinine Ratio 25.0     Anion Gap 18.0 mmol/L      eGFR 95.3 mL/min/1.73     Narrative:      GFR Normal >60  Chronic Kidney Disease <60  Kidney Failure <15      Magnesium [[id number]]  (Normal) Collected: 06/02/23 0602    Specimen: Blood Updated: 06/02/23 0712     Magnesium 1.9 mg/dL     CBC Auto Differential [[id number]]  (Abnormal) Collected: 06/02/23 0602    Specimen: Blood Updated: 06/02/23 0700     WBC 9.75 10*3/mm3      RBC 6.09 10*6/mm3      Hemoglobin 16.1 g/dL      Hematocrit 50.6 %      MCV 83.1 fL      MCH 26.4 pg      MCHC 31.8 g/dL      RDW 15.9 %      RDW-SD 46.7 fl      MPV 10.8 fL      Platelets 188 10*3/mm3      Neutrophil % 71.4 %      Lymphocyte % 17.1 %      Monocyte % 9.4 %      Eosinophil % 1.0 %      Basophil % 0.8 %      Immature Grans % 0.3 %      Neutrophils, Absolute 6.95 10*3/mm3      Lymphocytes, Absolute 1.67 10*3/mm3      Monocytes, Absolute 0.92 10*3/mm3      Eosinophils, Absolute 0.10 10*3/mm3      Basophils, Absolute 0.08 10*3/mm3      Immature Grans, Absolute 0.03 10*3/mm3      nRBC 0.0 /100 WBC     Glucose, Random [[id number]]  (Abnormal) Collected: 06/01/23 2129    Specimen: Blood Updated: 06/01/23 2230     Glucose 429 mg/dL     CBC Auto Differential [[id number]]  (Abnormal) Collected: 06/01/23 0545    Specimen: Blood Updated: 06/01/23 0723     WBC 7.92 10*3/mm3      RBC 5.96 10*6/mm3      Hemoglobin 15.9 g/dL      Hematocrit 50.9 %      MCV 85.4 fL      MCH 26.7 pg      MCHC 31.2 g/dL      RDW 15.6 %      RDW-SD 48.2 fl      MPV 10.3 fL      Platelets 132 10*3/mm3      Neutrophil % 65.4 %      Lymphocyte % 21.6 %      Monocyte % 9.0 %      Eosinophil % 2.3 %      Basophil % 1.3 %      Immature Grans % 0.4 %      Neutrophils, Absolute 5.19 10*3/mm3      Lymphocytes, Absolute 1.71 10*3/mm3      Monocytes, Absolute 0.71 10*3/mm3      Eosinophils, Absolute 0.18 10*3/mm3       Basophils, Absolute 0.10 10*3/mm3      Immature Grans, Absolute 0.03 10*3/mm3      nRBC 0.0 /100 WBC     Comprehensive Metabolic Panel [[id number]]  (Abnormal) Collected: 06/01/23 0545    Specimen: Blood Updated: 06/01/23 0647     Glucose 160 mg/dL      BUN 17 mg/dL      Creatinine 0.62 mg/dL      Sodium 134 mmol/L      Potassium 4.0 mmol/L      Comment: Slight hemolysis detected by analyzer. Results may be affected.        Chloride 89 mmol/L      CO2 32.0 mmol/L      Calcium 9.3 mg/dL      Total Protein 6.8 g/dL      Albumin 3.3 g/dL      ALT (SGPT) 16 U/L      AST (SGOT) 33 U/L      Comment: Slight hemolysis detected by analyzer. Results may be affected.        Alkaline Phosphatase 93 U/L      Total Bilirubin 0.9 mg/dL      Globulin 3.5 gm/dL      A/G Ratio 0.9 g/dL      BUN/Creatinine Ratio 27.4     Anion Gap 13.0 mmol/L      eGFR 101.5 mL/min/1.73     Narrative:      GFR Normal >60  Chronic Kidney Disease <60  Kidney Failure <15      Magnesium [[id number]]  (Normal) Collected: 06/01/23 0545    Specimen: Blood Updated: 06/01/23 0647     Magnesium 2.2 mg/dL     Potassium [[id number]]  (Normal) Collected: 05/31/23 1710    Specimen: Blood Updated: 05/31/23 1809     Potassium 4.0 mmol/L     Potassium [[id number]]  (Normal) Collected: 05/29/23 1450    Specimen: Blood Updated: 05/29/23 1542     Potassium 4.5 mmol/L     Blood Gas, Arterial - [[id number]]  (Abnormal) Collected: 05/28/23 0535    Specimen: Arterial Blood Updated: 05/28/23 0534     Site Right Radial     Manas's Test N/A     pH, Arterial 7.550 pH units      Comment: 83 Value above reference range        pCO2, Arterial 57.2 mm Hg      Comment: 83 Value above reference range        pO2, Arterial 73.4 mm Hg      Comment: 84 Value below reference range        HCO3, Arterial 50.0 mmol/L      Comment: 83 Value above reference range        Base Excess, Arterial 23.4 mmol/L      Comment: 83 Value above reference range        O2 Saturation, Arterial 96.4 %       Barometric Pressure for Blood Gas 751 mmHg      Modality Nasal Cannula     Flow Rate 6.0 lpm      Ventilator Mode NA     Collected by [facility]     Comment: Meter: [id number]     :  [id number]       Blood Gas, Arterial - [[id number]]  (Abnormal) Collected: 05/27/23 1407    Specimen: Arterial Blood Updated: 05/27/23 1419     Site Left Radial     Manas's Test N/A     pH, Arterial 7.481 pH units      Comment: 83 Value above reference range        pCO2, Arterial 73.2 mm Hg      Comment: 86 Value above critical limit        pO2, Arterial 71.3 mm Hg      Comment: 84 Value below reference range        HCO3, Arterial 54.5 mmol/L      Comment: 83 Value above reference range        Base Excess, Arterial 25.5 mmol/L      Comment: 83 Value above reference range        O2 Saturation, Arterial 96.0 %      Barometric Pressure for Blood Gas 752 mmHg      Modality BiPap     FIO2 50 %      Ventilator Mode AVAP     Set Tidal Volume 425     Set Mech Resp Rate 20.0     EPAP 8     Comment: Meter: [id number]     :  [id number]        Collected by mi/ke    Scan Slide [[id number]] Collected: 05/27/23 0520    Specimen: Blood Updated: 05/27/23 0801     RBC Morphology Normal     WBC Morphology Normal     Platelet Estimate Decreased     Comment: Plt count falsely decreased due to clumping        Clumped Platelets Present    Urinalysis, Microscopic Only - Urine, Clean Catch [[id number]]  (Abnormal) Collected: 05/26/23 1611    Specimen: Urine, Clean Catch Updated: 05/26/23 1628     RBC, UA 0-2 /HPF      WBC, UA 0-2 /HPF      Comment: Urine culture not indicated.        Bacteria, UA None Seen /HPF      Squamous Epithelial Cells, UA 0-2 /HPF      Hyaline Casts, UA None Seen /LPF      Methodology Automated Microscopy    Urinalysis With Culture If Indicated - Urine, Clean Catch [[id number]]  (Abnormal) Collected: 05/26/23 1611    Specimen: Urine, Clean Catch Updated: 05/26/23 1628     Color, UA Yellow     Appearance, UA Clear      pH, UA 5.5     Specific Gravity, UA 1.009     Glucose, UA Negative     Ketones, UA Negative     Bilirubin, UA Negative     Blood, UA Moderate (2+)     Protein, UA Negative     Leuk Esterase, UA Negative     Nitrite, UA Negative     Urobilinogen, UA 1.0 E.U./dL    Narrative:      In absence of clinical symptoms, the presence of pyuria, bacteria, and/or nitrites on the urinalysis result does not correlate with infection.    TSH Rfx On Abnormal To Free T4 [[id number]]  (Normal) Collected: 05/25/23 1831    Specimen: Blood Updated: 05/26/23 0142     TSH 1.960 uIU/mL     Hemoglobin A1c [[id number]]  (Abnormal) Collected: 05/25/23 1212    Specimen: Blood Updated: 05/25/23 1814     Hemoglobin A1C 8.10 %     Narrative:      Hemoglobin A1C Ranges:    Increased Risk for Diabetes  5.7% to 6.4%  Diabetes                     >= 6.5%  Diabetic Goal                < 7.0%    Extra Tubes [[id number]] Collected: 05/25/23 1307    Specimen: Blood, Venous Line Updated: 05/25/23 6945    Narrative:      The following orders were created for panel order Extra Tubes.  Procedure                               Abnormality         Status                     ---------                               -----------         ------                     Aguilar Top[[id number]]                                         Final result                 Please view results for these tests on the individual orders.    Gray Top [[id number]] Collected: 05/25/23 1307    Specimen: Blood Updated: 05/25/23 1715     Extra Tube Hold for add-ons.     Comment: Auto resulted.       Single High Sensitivity Troponin T [[id number]]  (Abnormal) Collected: 05/25/23 1414    Specimen: Blood Updated: 05/25/23 1438     HS Troponin T 14 ng/L     Narrative:      High Sensitive Troponin T Reference Range:  <10.0 ng/L- Negative Female for AMI  <15.0 ng/L- Negative Male for AMI  >=10 - Abnormal Female indicating possible myocardial injury.  >=15 - Abnormal Male indicating possible myocardial  injury.   Clinicians would have to utilize clinical acumen, EKG, Troponin, and serial changes to determine if it is an Acute Myocardial Infarction or myocardial injury due to an underlying chronic condition.         COVID-19 and FLU A/B PCR - Swab, Nasopharynx [[id number]]  (Normal) Collected: 05/25/23 1313    Specimen: Swab from Nasopharynx Updated: 05/25/23 1414     COVID19 Not Detected     Influenza A PCR Not Detected     Influenza B PCR Not Detected    Narrative:      Fact sheet for providers: https://www.fda.gov/media/866019/download    Fact sheet for patients: https://www.fda.gov/media/747590/download    Test performed by PCR.    [place] Draw [[id number]] Collected: 05/25/23 1212    Specimen: Blood Updated: 05/25/23 1315    Narrative:      The following orders were created for panel order [place] Draw.  Procedure                               Abnormality         Status                     ---------                               -----------         ------                     Green Top (Gel)[[id number]]                                  Final result               Lavender Top[[id number]]                                     Final result               Gold Top - SST[[id number]]                                   Final result               Light Blue Top[[id number]]                                   Final result                 Please view results for these tests on the individual orders.    Light Blue Top [[id number]] Collected: 05/25/23 1212    Specimen: Blood Updated: 05/25/23 1315     Extra Tube Hold for add-ons.     Comment: Auto resulted       Green Top (Gel) [[id number]] Collected: 05/25/23 1212    Specimen: Blood Updated: 05/25/23 1315     Extra Tube Hold for add-ons.     Comment: Auto resulted.       Gold Top - SST [[id number]] Collected: 05/25/23 1212    Specimen: Blood Updated: 05/25/23 1315     Extra Tube Hold for add-ons.     Comment: Auto resulted.       Lavender Top [[id number]] Collected: 05/25/23 1212     "Specimen: Blood Updated: 05/25/23 1315     Extra Tube hold for add-on     Comment: Auto resulted       D-dimer, Quantitative [[id number]]  (Abnormal) Collected: 05/25/23 1212    Specimen: Blood Updated: 05/25/23 1253     D-Dimer, Quantitative 1,000 ng/mL (FEU)     Narrative:      According to the assay 's published package insert, a normal (<500 ng/mL (FEU)) D-dimer result in conjunction with a non-high clinical probability assessment, excludes deep vein thrombosis (DVT) and pulmonary embolism (PE) with high sensitivity.    D-dimer values increase with age and this can make VTE exclusion of an older population difficult. To address this, the American College of Physicians, based on best available evidence and recent guidelines, recommends that clinicians use age-adjusted D-dimer thresholds in patients greater than 50 years of age with: a) a low probability of PE who do not meet all Pulmonary Embolism Rule Out Criteria, or b) in those with intermediate probability of PE.   The formula for an age-adjusted D-dimer cut-off is \"age*10\".  For example, a 60 year old patient would have an age-adjusted cut-off of 600 ng/mL (FEU) and an 80 year old 800 ng/mL (FEU).      Single High Sensitivity Troponin T [[id number]]  (Abnormal) Collected: 05/25/23 1212    Specimen: Blood Updated: 05/25/23 1241     HS Troponin T 14 ng/L     Narrative:      High Sensitive Troponin T Reference Range:  <10.0 ng/L- Negative Female for AMI  <15.0 ng/L- Negative Male for AMI  >=10 - Abnormal Female indicating possible myocardial injury.  >=15 - Abnormal Male indicating possible myocardial injury.   Clinicians would have to utilize clinical acumen, EKG, Troponin, and serial changes to determine if it is an Acute Myocardial Infarction or myocardial injury due to an underlying chronic condition.         BNP [[id number]]  (Abnormal) Collected: 05/25/23 1212    Specimen: Blood Updated: 05/25/23 1239     proBNP 1,054.0 pg/mL     Narrative:      " Among patients with dyspnea, NT-proBNP is highly sensitive for the detection of acute congestive heart failure. In addition NT-proBNP of <300 pg/ml effectively rules out acute congestive heart failure with 99% negative predictive value.      CBC & Differential [[id number]]  (Abnormal) Collected: 05/25/23 1212    Specimen: Blood Updated: 05/25/23 1223    Narrative:      The following orders were created for panel order CBC & Differential.  Procedure                               Abnormality         Status                     ---------                               -----------         ------                     CBC Auto Differential[[id number]]        Abnormal            Final result                 Please view results for these tests on the individual orders.          Imaging Results (Most Recent)       Procedure Component Value Units Date/Time    US Guided Vascular Access [[id number]] Collected: 06/01/23 1504     Updated: 06/01/23 1508    Narrative:      US GUIDANCE VASCULAR    HISTORY: access    COMPARISON: None      Impression:      Grayscale sonographic images were obtained during ultrasound guided vascular  access.    The accessed blood vessel appears to be patent. The vessel was accessed under  direct ultrasound guidance.    Please refer to the procedure note for additional details.      IR Insert Midline Without Port Pump 5 Plus [[id number]] Resulted: 06/01/23 1317     Updated: 06/01/23 1317    Narrative:      This procedure was auto-finalized with no dictation required.    CT Angiogram Chest [[id number]] Collected: 05/25/23 1616     Updated: 05/25/23 1726    Narrative:      EXAM:  CTA chest with contrast, PE protocol.    COMPARISON:  CTA chest with contrast, 8/9/2022.    HISTORY:  History of PE, elevated D-dimer and hypoxia with noncompliance of  anticoagulation.    TECHNIQUE:  Intravenous contrast was administered and axial images from the thoracic inlet  through the diaphragm were performed followed by 2D  "multiplanar reformats.  MIPS  were reconstructed and reviewed.    FINDINGS:  The lungs are clear and well-expanded with the exception of minimal right  basilar atelectasis.  No consolidation.    There is probably mild enlargement of the right heart.  No pleural or  pericardial effusion.  Esophagus is grossly normal in course and caliber.  No  pathologically enlarged lymphadenopathy identified in the chest by size  criteria.    No thoracic aortic dissection or dilatation appreciated.  Main pulmonary artery  is enlarged, measuring 4.4 cm.  No pulmonary embolism identified.    No acute process in the visualized upper abdomen or involving the osseous  structures.      Impression:      1.  No acute findings in the chest.  No pulmonary embolism.    2.  Enlargement of the main pulmonary artery measuring up to 4.4 cm.  This  suggests pulmonary hypertension.  There is probably also mild enlargement of the  right heart.    XR Chest 2 View [[id number]] Collected: 05/25/23 1248     Updated: 05/25/23 1307    Narrative:      TWO VIEW CHEST:    INDICATION:  Triage protocol.    FINDINGS:  Cardiomegaly.  No focal consolidation.  Osseous structures are age-appropriate.      Impression:      impression:  No acute disease.            Chief Complaint on Day of Discharge: none    Hospital Course:  The patient is a 63 y.o. female who presented to [facility] with shortness of breath due to acute on chronic diastolic congestive heart failure; she was placed on furosemide drip, then transitioned to iv lasix and will go home with PO lasix; she lost around 50 labs since admission. We were able to wean her oxygen after losing that amount of fluid    Condition on Discharge: stable    Physical Exam on Discharge:  /80 (BP Location: Left arm, Patient Position: Sitting)   Pulse 80   Temp 97.8 °F (36.6 °C) (Temporal)   Resp 20   Ht 157.5 cm (62\")   Wt 115 kg (252 lb 12.8 oz)   LMP  (LMP Unknown)   SpO2 93%   BMI 46.24 " kg/m²     Physical Exam  Vitals reviewed.   Constitutional:       Appearance: She is obese.   HENT:      Head: Normocephalic.      Nose: Nose normal.      Mouth/Throat:      Mouth: Mucous membranes are moist.   Eyes:      Extraocular Movements: Extraocular movements intact.   Cardiovascular:      Rate and Rhythm: Regular rhythm.      Heart sounds: Normal heart sounds.   Pulmonary:      Breath sounds: Normal breath sounds.   Abdominal:      General: Bowel sounds are normal.      Palpations: Abdomen is soft.   Musculoskeletal:         General: Normal range of motion.      Cervical back: Normal range of motion and neck supple.   Skin:     General: Skin is warm.   Neurological:      General: No focal deficit present.      Mental Status: She is alert. Mental status is at baseline.   Psychiatric:         Behavior: Behavior normal.         Discharge Disposition:  [place] [facility]    Discharge Medications:     Discharge Medications        New Medications        Instructions Start Date   potassium chloride 10 MEQ CR tablet   10 mEq, Oral, 2 Times Daily             Continue These Medications        Instructions Start Date   apixaban 5 MG tablet tablet  Commonly known as: ELIQUIS   5 mg, Oral, Every 12 Hours Scheduled      furosemide 40 MG tablet  Commonly known as: LASIX   40 mg, Oral, 2 Times Daily      gabapentin 400 MG capsule  Commonly known as: NEURONTIN   400 mg, Oral, 3 Times Daily      pentoxifylline 400 MG CR tablet  Commonly known as: TRENtal   400 mg, Oral, 2 Times Daily      rosuvastatin 10 MG tablet  Commonly known as: CRESTOR   10 mg, Oral, Daily      tiotropium bromide-olodaterol 2.5-2.5 MCG/ACT aerosol solution inhaler  Commonly known as: STIOLTO RESPIMAT   2 puffs, Inhalation, Daily      traMADol 50 MG tablet  Commonly known as: ULTRAM   50 mg, Oral, Every 6 Hours PRN      Ventolin  (90 Base) MCG/ACT inhaler  Generic drug: albuterol sulfate HFA   No dose, route, or frequency recorded.              Stop These Medications      cyclobenzaprine 10 MG tablet  Commonly known as: FLEXERIL     nystatin 007776 UNIT/GM powder  Commonly known as: MYCOSTATIN               Discharge Diet: heart healthy diet    Activity at Discharge: as tolerated    Discharge Care Plan/Instructions: see chart    Follow-up Appointments:   No future appointments.    Test Results Pending at Discharge:     Gaurav Pittman MD    Time: 33              Electronically signed by Gaurav Pittman MD at 06/04/23 1740       Discharge Order (From admission, onward)       Start     Ordered    06/02/23 1441  Discharge patient  Once        Expected Discharge Date: 06/02/23    Expected Discharge Time: Afternoon    Discharge Disposition: Home-Health Care [facility] [place]    Physician of Record for Attribution - Please select from Treatment Team: GAURAV PITTMAN [[id number]]    Review needed by CMO to determine Physician of Record: No       Question Answer Comment   Physician of Record for Attribution - Please select from Treatment Team GAURAV PITTMAN    Review needed by CMO to determine Physician of Record No        06/02/23 1441

## 2023-06-05 NOTE — Clinical Note
"[facility]  / Case Conference Note  Medical Necessity and focus of care:   PATIENT PRESENTS WITH WEAKNESS AND DECREASED ACTIVITY TOLREANCE AFTER RECENT HOSPITALIZATION FOR ACUTE ON CHRONIC RESPIRATORY FAILURE, FLUID OVERLOAD, AND CHF EXACERBATION.  SHE REQUIRES SKILLED OT SERVICES FOR EDUCATION FOR SAFETY/FALL PREVENTION AND B UE STRENGTHENING HEP.   Caregiver Status: SELF, FRIENDS  Patient's goal(s): \"GET STRONGER\"  Environmental/ Physical issues: CLUTTERED LIVING ENVIRONMENT  Any additional needs: NONE    Based upon record review and collaboration conference, the recommended frequency for this patient is   SN-----  PT-----  OT---- 1 WEEK 3  ST-----  HHA---  MSW---  Provider TALYA LAY___ Notified 6/5/23__ and agrees with POC     Please verify your eval  scores: (Answer the scores  that pertain to your area of focus remove the others)   : 5  : 2  : 3    "

## 2023-06-05 NOTE — HOME HEALTH
OCCUPATIONAL THERAPY EVALUATION     REASON FOR REFERRAL:  62 Y/O FEMALE HOSPITALIZED AT [facility] 5/25/23-6/2/23 WITH COMPLAINTS OF WEIGHT GAIN, EDEMA, SHORTNESS OF BREATH AND TREATED FOR ACUTE ON CHRONIC RESPIRATORY FAILURE WITH HYPOXIA, ACUTE ON CHRONIC CHF, ANASARCA.  PATIENT REPORTED THAT SHE IS WEAK AND WOULD LIKE B UE HEP.     PAST MEDICAL HISTORY: CHF, COPD, PE HTN, DVT, DM, HLD, DEPRESSION.  PATIENT REPORTED HISTORY OF ARTHRITIS, NEUROPATHY, SCIATICA, AND BONE SPURS ON HER SPINE.    HOME SOCIAL ENVIRONMENT: PATIENT LIVES ALONE IN ONE LEVEL APARTMENT WITH NO STEPS INTO APARTMENT.  SHE REPORTED THAT SHE HAS A FRIEND THAT TAKES HER TO APPOINTMENTS AND CAN ASSIST PRN.    PRIOR LEVEL OF FUNCTION:  INDEPENDENT WITH ADLS, FUNCTIONAL TRNASFERS/FUNCTIONAL MOBILITY WITHOUT AD INSIDE HOME HOWEVER USED ROLLATOR WALKER TO WALK LONGER DISTANCES.  NEIGHBOR TAKES TRASH OUT TO [place].  FRIEND USUALLY GETS HER GROCERIES.  DROVE SELF AT TIMES AND INDEPENDENT OUT IN THE COMMUNITY.      CLINICAL FINDINGS:  UPPER EXTREMITY ASSESSMENT:  AROM B UES: WFL AND STRENGTH:  3+/5 GROSSLY THROUGHOUT.  /PINCH:  RIGHT HAND DOMINANT, B  STRENGTHS: 4-/5.                              FINE MOTOR COORDINATION:  WFL            UPPER EXTREMITY GROSS MOTOR COORDINATION:  WFL  SENSATION:  INTACT B UES GROSSLY THROUGHOUT EXCEPT COMPLAINTS OF INTERMITTENT NUMBNESS B HANDS DUE TO CARPAL TUNNEL PER PAITENT'S REPORT.  EDEMA: MODERATE EDEMA NOTED B LE.                                                                  FUNCTIONAL ENDURANCE FOR SAFE ACTIVITIES OF DAILY LIVING/INSTRUMENTAL ACTIVITIES OF DAILY LIVING:  Limited endurance noted throughout evaluation requiring frequent rest breaks.       BALANCE:             SITTING BALANCE:  GOOD  STANDING BALANCE:  FAIR  WEIGHT BEARING STATUS/PRECAUTIONS:  WBAT/HIGH FALL RISK  ASSISTIVE DEVICES: ROLLATOR WALKER, SHOWER CHAIR WITH TUB/SHOWER COMBO.     MEDICAL NECESSITY:  PATIENT PRESENTS WITH WEAKNESS AND  "DECREASED ACTIVITY TOLREANCE AFTER RECENT HOSPITALIZATION FOR ACUTE ON CHRONIC RESPIRATORY FAILURE, FLUID OVERLOAD, AND CHF EXACERBATION.  SHE REQUIRES SKILLED OT SERVICES FOR EDUCATION FOR SAFETY/FALL PREVENTION AND B UE STRENGTHENING HEP.      PATIENT GOAL FOR THIS EPISODE OF CARE: \"GET STRONGER\"  REHAB POTENTIAL   GOOD FOR GOALS  DATE OF NEXT APPOINTMENT WITH DOCTOR: 6/8/23 TALYA FINCH APRN.    ANTICIPATED DISCHARGE PLAN:  TO SELF/CAREGIVER  PATIENT / CAREGIVER AGREE WITH DISCHARGE PLAN: YES  COMMUNICATION / CARE COORDINATION:  Case communication note to admitting RN with Functional [place] Scores/# of visits.  WISH TO ADDRESS BATHING WITH OT: NO"

## 2023-06-06 ENCOUNTER — READMISSION MANAGEMENT (OUTPATIENT)
Dept: CALL CENTER | Facility: HOSPITAL | Age: 63
End: 2023-06-06
Payer: COMMERCIAL

## 2023-06-06 ENCOUNTER — HOME CARE VISIT (OUTPATIENT)
Dept: HOME HEALTH SERVICES | Facility: CLINIC | Age: 63
End: 2023-06-06
Payer: COMMERCIAL

## 2023-06-06 NOTE — OUTREACH NOTE
Medical Week 1 Survey      Flowsheet Row Responses   [facility] patient discharged from? [facility]   Does the patient have one of the following disease processes/diagnoses(primary or secondary)? Other   Week 1 attempt successful? Yes   Call start time 1605   Call end time 1610   Discharge diagnosis Dyspnea, unspecified type   Person spoke with today (if not patient) and relationship pt   Meds reviewed with patient/caregiver? Yes   Is the patient having any side effects they believe may be caused by any medication additions or changes? No   Does the patient have all medications ordered at discharge? Yes   Is the patient taking all medications as directed (includes completed medication regime)? Yes   Does the patient have a primary care provider?  Yes   Does the patient have an appointment with their PCP within 7 days of discharge? Yes   Has the patient kept scheduled appointments due by today? N/A   What is the Home health agency?  [facility]   Has home health visited the patient within 72 hours of discharge? Call prior to 72 hours   Psychosocial issues? No   Did the patient receive a copy of their discharge instructions? Yes   Nursing interventions Reviewed instructions with patient   What is the patient's perception of their health status since discharge? Improving   Is the patient/caregiver able to teach back signs and symptoms related to disease process for when to call PCP? Yes   Is the patient/caregiver able to teach back signs and symptoms related to disease process for when to call 911? Yes   Is the patient/caregiver able to teach back the hierarchy of who to call/visit for symptoms/problems? PCP, Specialist, Home health nurse, Urgent Care, ED, 911 Yes   If the patient is a current smoker, are they able to teach back resources for cessation? Not a smoker   Week 1 call completed? Yes   Is the patient interested in additional calls from an ambulatory ?  NOTE:  applies to high risk  patients requiring additional follow-up. No   Wrap up additional comments Pt states she is doing better, and denies any SOB.Reviewed AVS/medication with pt,  pt was confused about the medication that was stopped, and was advised to consult with her PCP about these medications. Pt has upcoming PCP fu appt.            Sil HOGAN - Registered Nurse

## 2023-06-08 NOTE — CASE COMMUNICATION
Please forward to ROSANNE Warner    Patient missed a PT evaluation visit from [facility] on 6/6/23.     Reason: PT attempted to see patient for skilled PT eval on 6/6/23. Patient requesting to start skilled PT next week. PT agreeable and will receive new order for skilled home care PT eval for next week per patient request.       For your records only.   As per home health protocol, MD must be notified of missed/cancelled v isits; therefore the prescribed frequency was not met.

## 2023-06-12 ENCOUNTER — HOME CARE VISIT (OUTPATIENT)
Dept: HOME HEALTH SERVICES | Facility: CLINIC | Age: 63
End: 2023-06-12
Payer: COMMERCIAL

## 2023-06-12 VITALS
RESPIRATION RATE: 18 BRPM | HEART RATE: 88 BPM | DIASTOLIC BLOOD PRESSURE: 80 MMHG | SYSTOLIC BLOOD PRESSURE: 130 MMHG | TEMPERATURE: 97.1 F | OXYGEN SATURATION: 94 %

## 2023-06-12 PROCEDURE — G0158 HHC OT ASSISTANT EA 15: HCPCS

## 2023-06-12 NOTE — CASE COMMUNICATION
Patient aware and agreeable to DC next session.  Patient on track to meet all goals.    Davide MONTOYA  6/12/23

## 2023-06-13 ENCOUNTER — HOME CARE VISIT (OUTPATIENT)
Dept: HOME HEALTH SERVICES | Facility: CLINIC | Age: 63
End: 2023-06-13
Payer: COMMERCIAL

## 2023-06-13 VITALS
HEART RATE: 95 BPM | RESPIRATION RATE: 18 BRPM | TEMPERATURE: 97.5 F | DIASTOLIC BLOOD PRESSURE: 78 MMHG | OXYGEN SATURATION: 93 % | SYSTOLIC BLOOD PRESSURE: 138 MMHG

## 2023-06-13 PROCEDURE — G0299 HHS/HOSPICE OF RN EA 15 MIN: HCPCS

## 2023-06-13 PROCEDURE — G0151 HHCP-SERV OF PT,EA 15 MIN: HCPCS

## 2023-06-14 NOTE — HOME HEALTH
Patient is a 62 y/o female who was seen for physical therapy evaluation on 6/13/23 secondary to transferring to  [facility] with shortness of breath due to acute on chronic diastolic congestive heart failure; she was placed on furosemide drip, then transitioned to iv lasix and will go home with PO lasix; she lost around 50 labs since admission. able to wean her oxygen after losing that amount of fluid and able to stabilze in the hospital before returning back home. Patient received referral to skilled home care services to help manage CHF and d/t increased weakness following hospitalization     Focus of care: The PT focus of care for this patient will include but but be limited to: bilateral LE strength training to improve functional mobility, preserve muscle tone and increase muscular endurance to help reduce levels of fatigue and improve activity tolerance, gait and transfer training to aid in reduction of fall risk and improve patient safety w/ mobility, balance training to prevent falls over course care and improve functional balance outcome scores.      PMH includes: Controlled type 2 diabetes mellitus without complication, without long-term current use of insulin COPD (chronic obstructive pulmonary disease) DVT (deep venous thrombosis) 08/2022 left popliteal Hyperlipemia, mixed Pulmonary embolism      Upon physical therapy evalaution, patient presents with strength, transfer, balance, endurance, and gait deficits as noted in physical therapy assessment. These deficits are limiting patient's overall functional capacity w/ independence, activity, ADLs, and IADLs. Patient will benefit from skilled physical therapy to address the defcitis noted above and recorded over course of care to allow patient to return to PLOF and max level of independence.    Subjective: Patient states that she is doing fair at this time. Reports that she is getting a little better eahc day and trying to walk longer  distances but limited by her low back pain. Patient states that she wants to try to build her strength to reduce her back pain and walk on her own again to become more indep and stronger.

## 2023-06-15 ENCOUNTER — READMISSION MANAGEMENT (OUTPATIENT)
Dept: CALL CENTER | Facility: HOSPITAL | Age: 63
End: 2023-06-15
Payer: COMMERCIAL

## 2023-06-15 NOTE — OUTREACH NOTE
Medical Week 2 Survey      Flowsheet Row Responses   [facility] patient discharged from? [facility]   Does the patient have one of the following disease processes/diagnoses(primary or secondary)? Other   Week 2 attempt successful? Yes   Call start time 1430   Discharge diagnosis Dyspnea, unspecified type   Call end time 1434   Meds reviewed with patient/caregiver? Yes   Is the patient having any side effects they believe may be caused by any medication additions or changes? No   Does the patient have all medications ordered at discharge? Yes   Is the patient taking all medications as directed (includes completed medication regime)? Yes   Medication comments reviewed lasix dosing   Does the patient have a primary care provider?  Yes   Does the patient have an appointment with their PCP within 7 days of discharge? Yes   Comments regarding PCP Completed PCP on 6/8/23   Has the patient kept scheduled appointments due by today? Yes   What is the Home health agency?  [facility]   Has home health visited the patient within 72 hours of discharge? Yes   Home health comments PT, OT and SN   Psychosocial issues? No   Did the patient receive a copy of their discharge instructions? Yes   Nursing interventions Reviewed instructions with patient   What is the patient's perception of their health status since discharge? Same  [Reports that  nurse told her she was a little puffy--unaware of actual fluid gain as not been weighing, reviewed wt gain parameters and encouraged to weigh. Noted pt lost 50# from diuresis while inpt,  pt v/u of instructions provided. NO questions today]   Is the patient/caregiver able to teach back signs and symptoms related to disease process for when to call PCP? Yes   Is the patient/caregiver able to teach back signs and symptoms related to disease process for when to call 911? Yes   If the patient is a current smoker, are they able to teach back resources for cessation? Not a smoker    Week 2 Call Completed? Yes            DOMINICK ESPINOZA - Registered Nurse

## 2023-06-16 VITALS
RESPIRATION RATE: 20 BRPM | HEART RATE: 88 BPM | DIASTOLIC BLOOD PRESSURE: 72 MMHG | SYSTOLIC BLOOD PRESSURE: 136 MMHG | OXYGEN SATURATION: 97 % | TEMPERATURE: 98.1 F

## 2023-06-16 NOTE — CASE COMMUNICATION
"[facility]  / Case Conference Note  Medical Necessity and focus of care:Patient is a 62 y/o female who was seen for physical therapy evaluation on 6/13/23 secondary to transferring to  [facility] with shortness of breath due to acute on chronic diastolic congestive heart failure; she was placed on furosemide drip, then transitioned to iv lasix and will go home with PO lasix; she lost around 50 labs since admission. able to  wean her oxygen after losing that amount of fluid and able to stabilze in the hospital before returning back home. Patient received referral to skilled home care services to help manage CHF and d/t increased weakness following hospitalization  PT focus of care for this patient will include but but be limited to: bilateral LE strength training to improve functional mobility, preserve muscle tone and increase muscular endurance to help re duce levels of fatigue and improve activity tolerance, gait and transfer training to aid in reduction of fall risk and improve patient safety w/ mobility, balance training to prevent falls over course care and improve functional balance outcome scores.  Caregiver Status: lives alone, friends can assist when needed  Patient's goal(s): \"get stronger, reduce back pain, walk longer distances without cane\"  GG Discharge Goal: 6  Medication I ssus:n/a   Environmental/ Physical issues: cluttered and dirty living area, apartment has roached present  Any additional needs: n/a    Based upon record review and collaboration conference, the recommended frequency for this patient is   SN-----  PT-----1w1, 2w2, 1w2  OT----  ST-----  HHA---  MSW---  Provider__ROSANNE Warner________ Notified @ ____6/13/23_____ and agrees with POC     Please verify your eval  scores: (Answer the s cores  that pertain to your area of focus remove the others)    3   2  "

## 2023-07-05 NOTE — ED NOTES
Nursing report ED to floor  Stephanie Bradshaw  63 y.o.  female    HPI:   Chief Complaint   Patient presents with    Shortness of Breath       Admitting doctor:   Dexter Mclean MD    Consulting provider(s):  Consults       No orders found from 4/26/2023 to 5/26/2023.             Admitting diagnosis:   The primary encounter diagnosis was Dyspnea, unspecified type. Diagnoses of Abnormal EKG and Acute on chronic respiratory failure with hypoxia were also pertinent to this visit.    Code status:   Current Code Status       Date Active Code Status Order ID Comments User Context       Prior            Allergies:   Penicillins and Penicillins    Intake and Output  No intake or output data in the 24 hours ending 05/25/23 1647    Weight:       05/25/23  1109   Weight: 136 kg (300 lb)       Most recent vitals:   Vitals:    05/25/23 1312 05/25/23 1320 05/25/23 1459 05/25/23 1600   BP: 156/100 156/100 151/76 160/81   BP Location:  Left arm Left arm Left arm   Patient Position:  Sitting Sitting Sitting   Pulse: 88 83 90 89   Resp:  21 21 22   Temp:       TempSrc:       SpO2:  93% 91% 90%   Weight:       Height:         Oxygen Therapy: 3l nc    Active LDAs/IV Access:   Lines, Drains & Airways       Active LDAs       Name Placement date Placement time Site Days    Peripheral IV 05/25/23 1214 Right Antecubital 05/25/23  1214  Antecubital  less than 1                    Labs (abnormal labs have a star):   Labs Reviewed   COMPREHENSIVE METABOLIC PANEL - Abnormal; Notable for the following components:       Result Value    Glucose 155 (*)     Chloride 96 (*)     CO2 39.0 (*)     Calcium 8.5 (*)     Albumin 3.2 (*)     All other components within normal limits    Narrative:     GFR Normal >60  Chronic Kidney Disease <60  Kidney Failure <15     BNP (IN-HOUSE) - Abnormal; Notable for the following components:    proBNP 1,054.0 (*)     All other components within normal limits    Narrative:     Among patients with dyspnea, NT-proBNP is  Pt [facility].    Nursing staff to call in report to [phone] [phone].   highly sensitive for the detection of acute congestive heart failure. In addition NT-proBNP of <300 pg/ml effectively rules out acute congestive heart failure with 99% negative predictive value.     SINGLE HSTROPONIN T - Abnormal; Notable for the following components:    HS Troponin T 14 (*)     All other components within normal limits    Narrative:     High Sensitive Troponin T Reference Range:  <10.0 ng/L- Negative Female for AMI  <15.0 ng/L- Negative Male for AMI  >=10 - Abnormal Female indicating possible myocardial injury.  >=15 - Abnormal Male indicating possible myocardial injury.   Clinicians would have to utilize clinical acumen, EKG, Troponin, and serial changes to determine if it is an Acute Myocardial Infarction or myocardial injury due to an underlying chronic condition.        CBC WITH AUTO DIFFERENTIAL - Abnormal; Notable for the following components:    RBC 5.29 (*)     Hematocrit 49.6 (*)     MCHC 28.4 (*)     Lymphocyte % 18.7 (*)     Immature Grans % 0.6 (*)     Immature Grans, Absolute 0.06 (*)     All other components within normal limits   D-DIMER, QUANTITATIVE - Abnormal; Notable for the following components:    D-Dimer, Quantitative 1,000 (*)     All other components within normal limits    Narrative:     According to the assay 's published package insert, a normal (<500 ng/mL (FEU)) D-dimer result in conjunction with a non-high clinical probability assessment, excludes deep vein thrombosis (DVT) and pulmonary embolism (PE) with high sensitivity.    D-dimer values increase with age and this can make VTE exclusion of an older population difficult. To address this, the American College of [employer], based on best available evidence and recent guidelines, recommends that clinicians use age-adjusted D-dimer thresholds in patients greater than 50 years of age with: a) a low probability of PE who do not meet all Pulmonary Embolism Rule Out Criteria, or b) in those with intermediate  "probability of PE.   The formula for an age-adjusted D-dimer cut-off is \"age*10\".  For example, a 60 year old patient would have an age-adjusted cut-off of 600 ng/mL (FEU) and an 80 year old 800 ng/mL (FEU).     SINGLE HSTROPONIN T - Abnormal; Notable for the following components:    HS Troponin T 14 (*)     All other components within normal limits    Narrative:     High Sensitive Troponin T Reference Range:  <10.0 ng/L- Negative Female for AMI  <15.0 ng/L- Negative Male for AMI  >=10 - Abnormal Female indicating possible myocardial injury.  >=15 - Abnormal Male indicating possible myocardial injury.   Clinicians would have to utilize clinical acumen, EKG, Troponin, and serial changes to determine if it is an Acute Myocardial Infarction or myocardial injury due to an underlying chronic condition.        COVID-19 AND FLU A/B, NP SWAB IN TRANSPORT MEDIA 8-12 HR TAT - Normal    Narrative:     Fact sheet for providers: https://www.fda.gov/media/141840/download    Fact sheet for patients: https://www.fda.gov/media/901966/download    Test performed by PCR.   RAINBOW DRAW    Narrative:     The following orders were created for panel order [place] Draw.  Procedure                               Abnormality         Status                     ---------                               -----------         ------                     Green Top (Gel)[[id number]]                                  Final result               Lavender Top[[id number]]                                     Final result               Gold Top - SST[[id number]]                                   Final result               Light Blue Top[[id number]]                                   Final result                 Please view results for these tests on the individual orders.   CBC AND DIFFERENTIAL    Narrative:     The following orders were created for panel order CBC & Differential.  Procedure                               Abnormality         Status                   "   ---------                               -----------         ------                     CBC Auto Differential[[id number]]        Abnormal            Final result                 Please view results for these tests on the individual orders.   GREEN TOP   LAVENDER TOP   GOLD TOP - SST   LIGHT BLUE TOP   EXTRA TUBES    Narrative:     The following orders were created for panel order Extra Tubes.  Procedure                               Abnormality         Status                     ---------                               -----------         ------                     Aguilar Top[[id number]]                                         In process                   Please view results for these tests on the individual orders.   GRAY TOP       Meds given in ED:   Medications   sodium chloride 0.9 % flush 10 mL (has no administration in time range)   furosemide (LASIX) injection 80 mg (80 mg Intravenous Given 5/25/23 1311)   iopamidol (ISOVUE-370) 76 % injection 100 mL (74 mL Intravenous Given 5/25/23 1525)           NIH Stroke Scale:       Isolation/Infection(s):  No active isolations   No active infections     COVID Testing  Collected no   Resulted na    Nursing report ED to floor:  Mental status: aox4  Ambulatory status: with assist  Precautions: fall precautions    ED nurse phone extentsion- *[phone]**

## 2023-07-27 ENCOUNTER — OFFICE VISIT (OUTPATIENT)
Dept: CARDIOLOGY | Facility: CLINIC | Age: 63
End: 2023-07-27
Payer: COMMERCIAL

## 2023-07-27 VITALS
WEIGHT: 244 LBS | SYSTOLIC BLOOD PRESSURE: 150 MMHG | OXYGEN SATURATION: 94 % | HEIGHT: 62 IN | HEART RATE: 84 BPM | DIASTOLIC BLOOD PRESSURE: 94 MMHG | BODY MASS INDEX: 44.9 KG/M2

## 2023-07-27 DIAGNOSIS — J44.9 COPD, MODERATE: ICD-10-CM

## 2023-07-27 DIAGNOSIS — I50.32 CHRONIC HEART FAILURE WITH PRESERVED EJECTION FRACTION (HFPEF): Primary | ICD-10-CM

## 2023-07-27 DIAGNOSIS — I77.810 ASCENDING AORTA DILATION: ICD-10-CM

## 2023-07-27 DIAGNOSIS — Q20.8 RIGHT VENTRICULAR CARDIAC ABNORMALITY: ICD-10-CM

## 2023-07-27 DIAGNOSIS — E66.01 CLASS 3 SEVERE OBESITY DUE TO EXCESS CALORIES WITH SERIOUS COMORBIDITY AND BODY MASS INDEX (BMI) OF 40.0 TO 44.9 IN ADULT: ICD-10-CM

## 2023-07-27 DIAGNOSIS — Z72.0 TOBACCO USE: ICD-10-CM

## 2023-07-27 PROCEDURE — 1159F MED LIST DOCD IN RCRD: CPT | Performed by: NURSE PRACTITIONER

## 2023-07-27 PROCEDURE — 1160F RVW MEDS BY RX/DR IN RCRD: CPT | Performed by: NURSE PRACTITIONER

## 2023-07-27 PROCEDURE — 99214 OFFICE O/P EST MOD 30 MIN: CPT | Performed by: NURSE PRACTITIONER

## 2023-07-27 RX ORDER — FUROSEMIDE 20 MG/1
20 TABLET ORAL 2 TIMES DAILY
Qty: 180 TABLET | Refills: 1 | Status: SHIPPED | OUTPATIENT
Start: 2023-07-27

## 2023-07-27 RX ORDER — VALSARTAN 40 MG/1
40 TABLET ORAL DAILY
Qty: 90 TABLET | Refills: 1 | Status: SHIPPED | OUTPATIENT
Start: 2023-07-27

## 2023-07-27 NOTE — PROGRESS NOTES
Right ventricular cardiac abnormality (Chief Complaint )      History of Present Illness    Stephanie Bradshaw is a 63 y.o.  female with past medical history of hyperlipidemia, type 2 diabetes, obesity, current tobacco use, COPD, peripheral edema/venous stasis.  Patient is following with CT vascular surgery ROSANNE Barrett for leg swelling.  She presented originally due to CXR showing cardiomegaly.  Normal proBNP.  Echocardiogram showing normal biventricular function, grade 1 DD, right ventricular cavity is mildly dilated, trace MR, trace TR, there is mild dilation of the proximal ascending aorta at 3.9cm. Patient reports hx of asthma and COPD. She is on ventolin inhaler. Likely has SNEHA which is undiagnosed. She does have hypersomnia and snoring.     Since her last visit patient was hospitalized for provoked DVT/PE secondary to pneumonia.  She was placed on Eliquis. She report dyspnea is improving. She continues to have BLE edema.  She has failed unna boot therapy and referral submitted to tactile for flexi touch per [facility]. She denies chest pain, palpitations, PND, orthopnea, dizziness or syncope. CTA chest showing enlargement of the main pulmonary artery at 4.4 cm, suggest pulmonary hypertension, probably also mild enlargement of the right heart. CTA was negative for PE on 5/25/23.  She was admitted to the hospital in May 2023 for acute hypoxic respiratory failure and diastolic heart failure.  She was diuresed with Lasix and weight decreased from 296 lbs to 247 lbs.  Currently for GDMT she is managed with valsartan 40mg, Lasix 20mg BID. She reports doing well since hospitalization.  Weight is stable.  Still has some leg swelling.        Past Medical History:   Diagnosis Date    Controlled type 2 diabetes mellitus without complication, without long-term current use of insulin     COPD (chronic obstructive pulmonary disease)     DVT (deep venous thrombosis) 08/2022    left popliteal    Hyperlipemia, mixed      "Pulmonary embolism 08/2022    right     Past Surgical History:   Procedure Laterality Date    CYSTOSCOPY N/A 8/18/2022    Procedure: CYSTOSCOPY;  Surgeon: Rocheport, Davide ESPINOZA MD;  Location: [facility];  Service: Urology;  Laterality: N/A;    TUBAL ABDOMINAL LIGATION       Social History     Socioeconomic History    Marital status:    Tobacco Use    Smoking status: Every Day     Packs/day: 2.00     Years: 30.00     Pack years: 60.00     Types: Cigarettes     Passive exposure: Current    Smokeless tobacco: Never    Tobacco comments:     2 cigarettes daily   Vaping Use    Vaping Use: Never used   Substance and Sexual Activity    Alcohol use: Yes     Comment: occasionally    Drug use: Never    Sexual activity: Defer     Family History   Problem Relation Age of Onset    Prostate cancer Father     Uterine cancer Mother     Lung cancer Mother     Throat cancer Paternal Grandfather     Liver cancer Maternal Grandmother     Alcohol abuse Maternal Grandmother     Lung cancer Maternal Aunt     Lung cancer Maternal Aunt     COPD Other     Heart disease Other     Hypertension Other     Diabetes Other     Breast cancer Neg Hx     Ovarian cancer Neg Hx     Colon cancer Neg Hx        ALLERGIES:  Allergies   Allergen Reactions    Penicillins Rash     Patient states she is allergic to all \"cillins\".    Penicillins Rash         Review of Systems   Constitutional: Negative for chills, fever, malaise/fatigue and weight gain.   HENT:  Negative for nosebleeds and tinnitus.    Eyes:  Negative for blurred vision and double vision.   Cardiovascular:  Positive for dyspnea on exertion and leg swelling. Negative for chest pain, irregular heartbeat, palpitations and syncope.   Respiratory:  Positive for shortness of breath. Negative for cough, sleep disturbances due to breathing and snoring.    Endocrine: Negative for polydipsia, polyphagia and polyuria.   Hematologic/Lymphatic: Negative for bleeding problem. Does not bruise/bleed easily. "   Skin:  Negative for color change and suspicious lesions.   Musculoskeletal:  Negative for falls and myalgias.   Gastrointestinal:  Negative for bloating, heartburn and hematochezia.   Genitourinary:  Negative for dysuria and hematuria.   Neurological:  Negative for dizziness, headaches, seizures, vertigo and weakness.   Psychiatric/Behavioral:  Negative for altered mental status and depression. The patient does not have insomnia and is not nervous/anxious.    Allergic/Immunologic: Negative for environmental allergies and persistent infections.     Current Outpatient Medications   Medication Sig Dispense Refill    furosemide (LASIX) 20 MG tablet Take 1 tablet by mouth 2 (Two) Times a Day. 180 tablet 1    gabapentin (NEURONTIN) 400 MG capsule Take 1 capsule by mouth 3 (Three) Times a Day.      pentoxifylline (TRENtal) 400 MG CR tablet Take 1 tablet by mouth 2 (Two) Times a Day. 60 tablet 5    rosuvastatin (CRESTOR) 10 MG tablet Take 1 tablet by mouth Daily.      tiotropium bromide-olodaterol (STIOLTO RESPIMAT) 2.5-2.5 MCG/ACT aerosol solution inhaler Inhale 2 puffs Daily. 4 g 3    traMADol (ULTRAM) 50 MG tablet Take 1 tablet by mouth Every 6 (Six) Hours As Needed for Moderate Pain.      VENTOLIN  (90 BASE) MCG/ACT inhaler       apixaban (ELIQUIS) 2.5 MG tablet tablet Take 1 tablet by mouth 2 (Two) Times a Day. 60 tablet 11    valsartan (DIOVAN) 40 MG tablet Take 1 tablet by mouth Daily. 90 tablet 1     No current facility-administered medications for this visit.       OBJECTIVE:    Physical Exam:   Vitals reviewed.   Constitutional:       General: Not in acute distress.     Appearance: Normal appearance. Well-developed. Not toxic-appearing or diaphoretic.   Eyes:      General: Lids are normal.      Conjunctiva/sclera: Conjunctivae normal.   HENT:      Head: Normocephalic and atraumatic.      Right Ear: External ear normal.      Left Ear: External ear normal.   Neck:      Vascular: No JVD.   Pulmonary:       "Effort: Pulmonary effort is normal. No respiratory distress.      Breath sounds: Decreased breath sounds present. No wheezing. No rales.   Chest:      Chest wall: Not tender to palpatation.   Cardiovascular:      5th ICS   Normal rate. Regular rhythm. Normal S1 with normal intensity. s1 greater than s2   Normal S2 with normal intensity.       Murmurs: There is no murmur.      No gallop. No S3 and S4 gallop. No click. No rub.   Edema:     Peripheral edema present.     Pretibial: bilateral 2+ edema of the pretibial area.     Ankle: bilateral 2+ edema of the ankle.     Feet: bilateral 2+ edema of the feet.  Abdominal:      General: Bowel sounds are normal. There is no distension.      Palpations: Abdomen is soft.      Tenderness: There is no abdominal tenderness.   Musculoskeletal:      Cervical back: Normal range of motion and neck supple. Skin:     General: Skin is warm and dry.      Coloration: Skin is not pale.      Findings: No erythema or rash.        Neurological:      Mental Status: Alert and oriented to person, place, and time.      Gait: Gait normal.   Psychiatric:         Behavior: Behavior normal.         Thought Content: Thought content normal.         Judgment: Judgment normal.     Vitals:    07/27/23 1123   BP: 150/94   BP Location: Left arm   Patient Position: Sitting   Cuff Size: Adult   Pulse: 84   SpO2: 94%   Weight: 111 kg (244 lb)   Height: 157.5 cm (62\")       DATA REVIEWED:   Results for orders placed during the hospital encounter of 08/09/22    Adult Transthoracic Echo Complete W/ Cont if Necessary Per Protocol    Interpretation Summary  · The right atrial cavity is mild to moderately dilated.  · The right ventricular cavity is mildly dilated.  · Left ventricular wall thickness is consistent with borderline concentric hypertrophy.  · Left ventricular ejection fraction appears to be 56 - 60%.      No radiology results for the last 30 days.  CXR:     CT:     Labs: BMP, CBC, LIPID, TSH  Lab Results "   Component Value Date    GLUCOSE 186 (H) 06/02/2023    CALCIUM 9.7 06/02/2023     (L) 06/02/2023    K 3.2 (L) 06/02/2023    CO2 33.0 (H) 06/02/2023    CL 80 (L) 06/02/2023    BUN 18 06/02/2023    CREATININE 0.72 06/02/2023    EGFRIFNONA 106 07/26/2021    BCR 25.0 06/02/2023    ANIONGAP 18.0 (H) 06/02/2023     Lab Results   Component Value Date    WBC 9.75 06/02/2023    HGB 16.1 (H) 06/02/2023    HCT 50.6 (H) 06/02/2023    MCV 83.1 06/02/2023     06/02/2023     Lab Results   Component Value Date    CHOL 243 (H) 07/26/2021     Lab Results   Component Value Date    TRIG 175 (H) 07/26/2021     Lab Results   Component Value Date    HDL 34 (L) 07/26/2021     No components found for: LDLCALC  Lab Results   Component Value Date     (H) 07/26/2021     No results found for: HDLLDLRATIO  No components found for: CHOLHDL  Lab Results   Component Value Date    TSH 1.960 05/25/2023     Lab Results   Component Value Date    PROBNP 1,054.0 (H) 05/25/2023     EKG:           Venous Duplex BLE:  Appears negative for DVT of the lower extremities  Appears negative for reflux of the GSV bilaterally  Duplicated LT GSV  Limited views of calf veins due to obesity and body habitus  RT SFJ REFLUX 1.97sec  LT SFJ REFLUX 1.57 sec           The following portions of the patient's history were reviewed and updated as appropriate: allergies, current medications, past family history, past medical history, past social history, past surgical history and problem list.  Old records reviewed and pertinent information is included in the above objective data.     ASSESSMENT/PLAN:       Diagnosis Plan   1. Chronic heart failure with preserved ejection fraction (HFpEF)  valsartan (DIOVAN) 40 MG tablet    furosemide (LASIX) 20 MG tablet      2. Right ventricular cardiac abnormality        3. Ascending aorta dilation        4. COPD, moderate        5. Class 3 severe obesity due to excess calories with serious comorbidity and body mass  index (BMI) of 40.0 to 44.9 in adult        6. Tobacco use            #1. Chronic HFpEF:  LVEF at 56-60%.  She is euvolemic upon exam with adequate perfusion.  Blood pressure still slightly elevated.  She will continue with guideline directed medical therapy.  -Continue valsartan 40 mg daily  -Continue Lasix 20 mg twice daily  -Can consider beta-blocker, MRA, SGLT 2    Reiterated all educational points this visit.  Continue daily weight monitoring.  Continue restricted dietary sodium intake.  Discussed patient action plan for heart failure. Contact information for this office verbalized.  Recommended avoiding NSAIDs use.  Discussed warning signs requiring additional medical attention for heart failure.   Education points repeated back by patient.     #2. Right ventricular abnormality: mild RVC dilation. RVEF is normal. No evidence of PAH. Have recommended patient for SNEHA evaluation. PFT showed moderate restriction and she was started on inhalers.     #3. Ascending aorta dilation: mild dilation of the proximal ascending aorta at 3.9cm. She will continue lipitor. Recent echo during hospitalization unchanged. She will continue with clinical surveillance.     #4. COPD: Stiolto and Albuterol inhalers.       Follow up: 3 months    I spent 35 minutes caring for Stephanie on this date of service. This time includes time spent by me in the following activities: preparing for the visit, reviewing tests, obtaining and/or reviewing a separately obtained history, performing a medically appropriate examination and/or evaluation, referring and communicating with other health care professionals and documenting information in the medical record-2    Stephanie Bradshaw  reports that she has been smoking cigarettes. She has a 60.00 pack-year smoking history. She has been exposed to tobacco smoke. She has never used smokeless tobacco. I have educated her on the risk of diseases from using tobacco products such as cancer, COPD and  heart disease.     I advised her to quit and she is not willing to quit.    I spent 3  minutes counseling the patient.                This document has been electronically signed by ROSANNE Swanson on September 5, 2023 11:05 CDT

## 2023-08-04 ENCOUNTER — OFFICE VISIT (OUTPATIENT)
Dept: CARDIAC SURGERY | Facility: CLINIC | Age: 63
End: 2023-08-04
Payer: COMMERCIAL

## 2023-08-04 VITALS
DIASTOLIC BLOOD PRESSURE: 82 MMHG | SYSTOLIC BLOOD PRESSURE: 140 MMHG | WEIGHT: 247 LBS | BODY MASS INDEX: 45.45 KG/M2 | OXYGEN SATURATION: 94 % | HEIGHT: 62 IN | HEART RATE: 80 BPM

## 2023-08-04 DIAGNOSIS — Z86.711 HISTORY OF PULMONARY EMBOLUS (PE): ICD-10-CM

## 2023-08-04 DIAGNOSIS — Z86.718 HISTORY OF DVT (DEEP VEIN THROMBOSIS): ICD-10-CM

## 2023-08-04 DIAGNOSIS — D75.1 ERYTHROCYTOSIS: Primary | ICD-10-CM

## 2023-08-04 DIAGNOSIS — F17.200 SMOKER: ICD-10-CM

## 2023-08-04 DIAGNOSIS — E78.2 MIXED HYPERLIPIDEMIA: ICD-10-CM

## 2023-08-04 PROCEDURE — 1159F MED LIST DOCD IN RCRD: CPT | Performed by: NURSE PRACTITIONER

## 2023-08-04 PROCEDURE — 99214 OFFICE O/P EST MOD 30 MIN: CPT | Performed by: NURSE PRACTITIONER

## 2023-08-04 PROCEDURE — 1160F RVW MEDS BY RX/DR IN RCRD: CPT | Performed by: NURSE PRACTITIONER

## 2023-09-05 PROBLEM — I50.32 CHRONIC HEART FAILURE WITH PRESERVED EJECTION FRACTION (HFPEF): Status: ACTIVE | Noted: 2023-09-05

## 2023-09-07 ENCOUNTER — CONSULT (OUTPATIENT)
Dept: ONCOLOGY | Facility: CLINIC | Age: 63
End: 2023-09-07
Payer: COMMERCIAL

## 2023-09-07 ENCOUNTER — LAB (OUTPATIENT)
Dept: ONCOLOGY | Facility: HOSPITAL | Age: 63
End: 2023-09-07
Payer: COMMERCIAL

## 2023-09-07 VITALS
DIASTOLIC BLOOD PRESSURE: 92 MMHG | WEIGHT: 262.5 LBS | HEART RATE: 79 BPM | SYSTOLIC BLOOD PRESSURE: 145 MMHG | HEIGHT: 62 IN | BODY MASS INDEX: 48.31 KG/M2 | OXYGEN SATURATION: 90 %

## 2023-09-07 DIAGNOSIS — R06.83 SNORING: ICD-10-CM

## 2023-09-07 DIAGNOSIS — D75.1 ERYTHROCYTOSIS: Primary | ICD-10-CM

## 2023-09-07 DIAGNOSIS — I26.94 MULTIPLE SUBSEGMENTAL PULMONARY EMBOLI WITHOUT ACUTE COR PULMONALE: ICD-10-CM

## 2023-09-07 LAB
ANION GAP SERPL CALCULATED.3IONS-SCNC: 4 MMOL/L (ref 5–15)
BASOPHILS # BLD AUTO: 0.08 10*3/MM3 (ref 0–0.2)
BASOPHILS NFR BLD AUTO: 1 % (ref 0–1.5)
BUN SERPL-MCNC: 7 MG/DL (ref 8–23)
BUN/CREAT SERPL: 13.5 (ref 7–25)
CALCIUM SPEC-SCNC: 8.5 MG/DL (ref 8.6–10.5)
CHLORIDE SERPL-SCNC: 99 MMOL/L (ref 98–107)
CO2 SERPL-SCNC: 38 MMOL/L (ref 22–29)
CREAT SERPL-MCNC: 0.52 MG/DL (ref 0.57–1)
DEPRECATED RDW RBC AUTO: 50.2 FL (ref 37–54)
EGFRCR SERPLBLD CKD-EPI 2021: 105.9 ML/MIN/1.73
EOSINOPHIL # BLD AUTO: 0.09 10*3/MM3 (ref 0–0.4)
EOSINOPHIL NFR BLD AUTO: 1.1 % (ref 0.3–6.2)
ERYTHROCYTE [DISTWIDTH] IN BLOOD BY AUTOMATED COUNT: 15 % (ref 12.3–15.4)
GLUCOSE SERPL-MCNC: 134 MG/DL (ref 65–99)
HCT VFR BLD AUTO: 43.4 % (ref 34–46.6)
HGB BLD-MCNC: 13.3 G/DL (ref 12–15.9)
IMM GRANULOCYTES # BLD AUTO: 0.02 10*3/MM3 (ref 0–0.05)
IMM GRANULOCYTES NFR BLD AUTO: 0.3 % (ref 0–0.5)
LYMPHOCYTES # BLD AUTO: 2.12 10*3/MM3 (ref 0.7–3.1)
LYMPHOCYTES NFR BLD AUTO: 26.8 % (ref 19.6–45.3)
MCH RBC QN AUTO: 28.9 PG (ref 26.6–33)
MCHC RBC AUTO-ENTMCNC: 30.6 G/DL (ref 31.5–35.7)
MCV RBC AUTO: 94.1 FL (ref 79–97)
MONOCYTES # BLD AUTO: 0.67 10*3/MM3 (ref 0.1–0.9)
MONOCYTES NFR BLD AUTO: 8.5 % (ref 5–12)
NEUTROPHILS NFR BLD AUTO: 4.93 10*3/MM3 (ref 1.7–7)
NEUTROPHILS NFR BLD AUTO: 62.3 % (ref 42.7–76)
NRBC BLD AUTO-RTO: 0.5 /100 WBC (ref 0–0.2)
PLATELET # BLD AUTO: 220 10*3/MM3 (ref 140–450)
PMV BLD AUTO: 11.3 FL (ref 6–12)
POTASSIUM SERPL-SCNC: 3.9 MMOL/L (ref 3.5–5.2)
RBC # BLD AUTO: 4.61 10*6/MM3 (ref 3.77–5.28)
SODIUM SERPL-SCNC: 141 MMOL/L (ref 136–145)
WBC NRBC COR # BLD: 7.91 10*3/MM3 (ref 3.4–10.8)

## 2023-09-07 PROCEDURE — 82668 ASSAY OF ERYTHROPOIETIN: CPT | Performed by: INTERNAL MEDICINE

## 2023-09-07 PROCEDURE — 80048 BASIC METABOLIC PNL TOTAL CA: CPT | Performed by: INTERNAL MEDICINE

## 2023-09-07 PROCEDURE — 81241 F5 GENE: CPT | Performed by: INTERNAL MEDICINE

## 2023-09-07 PROCEDURE — 86147 CARDIOLIPIN ANTIBODY EA IG: CPT | Performed by: INTERNAL MEDICINE

## 2023-09-07 PROCEDURE — 81240 F2 GENE: CPT | Performed by: INTERNAL MEDICINE

## 2023-09-07 PROCEDURE — 86146 BETA-2 GLYCOPROTEIN ANTIBODY: CPT | Performed by: INTERNAL MEDICINE

## 2023-09-07 PROCEDURE — 85025 COMPLETE CBC W/AUTO DIFF WBC: CPT | Performed by: INTERNAL MEDICINE

## 2023-09-07 RX ORDER — NICOTINE 21 MG/24HR
1 PATCH, TRANSDERMAL 24 HOURS TRANSDERMAL EVERY 24 HOURS
Qty: 28 EACH | Refills: 1 | Status: SHIPPED | OUTPATIENT
Start: 2023-09-07

## 2023-09-07 NOTE — PROGRESS NOTES
DATE OF CONSULT: 9/7/2023    REQUESTING SOURCE:         REASON FOR CONSULTATION: Erythrocytosis, subsegmental pulmonary embolism and DVT history on Eliquis      HISTORY OF PRESENT ILLNESS:    63-year-old female with medical problem consisting of chronic obstructive pulmonary disease with continuous nicotine addiction currently smoking about pack to pack and half per day, type 2 diabetes mellitus, arthritis affecting hands, knee, chronic back pain with sciatica, dyslipidemia, subsegmental pulmonary embolism and DVT diagnosed in August 2022 for which patient is currently on Eliquis 2.5 mg twice daily has been referred to [facility] for further evaluation and recommendation regarding erythrocytosis and history of DVT and pulmonary embolism.  Patient is poor historian.  History was obtained in part by patient's sister over the phone.  As per patient's sister there is multiple family members with history of blood clot.  Denies any bleeding while taking Eliquis.  Admits to snoring at night.  Complains of chronic shortness of breath.  Complains of cough productive of clear sputum.  Denies any hemoptysis.  Complains of weight fluctuation secondary to fluid retention.  Complains of tingling and numbness affecting bilateral thigh.  Denies any fevers.            PAST MEDICAL HISTORY:    Past Medical History:   Diagnosis Date    Controlled type 2 diabetes mellitus without complication, without long-term current use of insulin     COPD (chronic obstructive pulmonary disease)     DVT (deep venous thrombosis) 08/2022    left popliteal    Hyperlipemia, mixed     Pulmonary embolism 08/2022    right       PAST SURGICAL HISTORY:  Past Surgical History:   Procedure Laterality Date    CYSTOSCOPY N/A 8/18/2022    Procedure: CYSTOSCOPY;  Surgeon: Teresa, Davide ESPINOZA MD;  Location: [facility];  Service: Urology;  Laterality: N/A;    TUBAL ABDOMINAL LIGATION         ALLERGIES:    Allergies   Allergen Reactions    Penicillins Rash      "Patient states she is allergic to all \"cillins\".    Penicillins Rash       SOCIAL HISTORY:   Social History     Tobacco Use    Smoking status: Every Day     Packs/day: 2.00     Years: 30.00     Pack years: 60.00     Types: Cigarettes     Passive exposure: Current    Smokeless tobacco: Never    Tobacco comments:     2 cigarettes daily   Vaping Use    Vaping Use: Never used   Substance Use Topics    Alcohol use: Yes     Comment: occasionally    Drug use: Never       CURRENT MEDICATIONS:    Current Outpatient Medications   Medication Sig Dispense Refill    apixaban (ELIQUIS) 2.5 MG tablet tablet Take 1 tablet by mouth 2 (Two) Times a Day. 60 tablet 11    furosemide (LASIX) 20 MG tablet Take 1 tablet by mouth 2 (Two) Times a Day. 180 tablet 1    gabapentin (NEURONTIN) 300 MG capsule Take 1 capsule by mouth 3 (Three) Times a Day.      pentoxifylline (TRENtal) 400 MG CR tablet Take 1 tablet by mouth 2 (Two) Times a Day. 60 tablet 5    rosuvastatin (CRESTOR) 10 MG tablet Take 1 tablet by mouth Daily.      tiotropium bromide-olodaterol (STIOLTO RESPIMAT) 2.5-2.5 MCG/ACT aerosol solution inhaler Inhale 2 puffs Daily. 4 g 3    traMADol (ULTRAM) 50 MG tablet Take 1 tablet by mouth Every 6 (Six) Hours As Needed for Moderate Pain.      valsartan (DIOVAN) 40 MG tablet Take 1 tablet by mouth Daily. 90 tablet 1    VENTOLIN  (90 BASE) MCG/ACT inhaler        No current facility-administered medications for this visit.        HOME MEDICATIONS:   Current Outpatient Medications on File Prior to Visit   Medication Sig Dispense Refill    apixaban (ELIQUIS) 2.5 MG tablet tablet Take 1 tablet by mouth 2 (Two) Times a Day. 60 tablet 11    furosemide (LASIX) 20 MG tablet Take 1 tablet by mouth 2 (Two) Times a Day. 180 tablet 1    gabapentin (NEURONTIN) 300 MG capsule Take 1 capsule by mouth 3 (Three) Times a Day.      pentoxifylline (TRENtal) 400 MG CR tablet Take 1 tablet by mouth 2 (Two) Times a Day. 60 tablet 5    rosuvastatin " "(CRESTOR) 10 MG tablet Take 1 tablet by mouth Daily.      tiotropium bromide-olodaterol (STIOLTO RESPIMAT) 2.5-2.5 MCG/ACT aerosol solution inhaler Inhale 2 puffs Daily. 4 g 3    traMADol (ULTRAM) 50 MG tablet Take 1 tablet by mouth Every 6 (Six) Hours As Needed for Moderate Pain.      valsartan (DIOVAN) 40 MG tablet Take 1 tablet by mouth Daily. 90 tablet 1    VENTOLIN  (90 BASE) MCG/ACT inhaler        No current facility-administered medications on file prior to visit.       FAMILY HISTORY:    Family History   Problem Relation Age of Onset    Prostate cancer Father     Uterine cancer Mother     Lung cancer Mother     Throat cancer Paternal Grandfather     Liver cancer Maternal Grandmother     Alcohol abuse Maternal Grandmother     Lung cancer Maternal Aunt     Lung cancer Maternal Aunt     COPD Other     Heart disease Other     Hypertension Other     Diabetes Other     Breast cancer Neg Hx     Ovarian cancer Neg Hx     Colon cancer Neg Hx          Review of Systems   Constitutional:  Positive for fatigue.   Respiratory:  Positive for cough and shortness of breath.    Cardiovascular:  Positive for leg swelling.   Gastrointestinal:  Positive for nausea.   Musculoskeletal:  Positive for arthralgias and back pain.   Neurological:  Positive for numbness.    A comprehensive 14 point review of systems was performed and was negative except as mentioned.      OBJECTIVEBEGIN@     Vitals:    09/07/23 1105   BP: 145/92   Pulse: 79   SpO2: 90%  Comment: Resting on room air   Weight: 119 kg (262 lb 8 oz)   Height: 157.5 cm (62\")   PainSc:   8   PainLoc: Back  Comment: legs and back          No data to display                Physical Exam  Constitutional:       Appearance: She is obese.   Cardiovascular:      Rate and Rhythm: Normal rate and regular rhythm.   Pulmonary:      Breath sounds: Normal breath sounds.   Neurological:      Mental Status: She is alert and oriented to person, place, and time.         DIAGNOSTIC " DATA:    WBC   Date Value Ref Range Status   06/02/2023 9.75 3.40 - 10.80 10*3/mm3 Final     RBC   Date Value Ref Range Status   06/02/2023 6.09 (H) 3.77 - 5.28 10*6/mm3 Final     Hemoglobin   Date Value Ref Range Status   06/02/2023 16.1 (H) 12.0 - 15.9 g/dL Final     Hematocrit   Date Value Ref Range Status   06/02/2023 50.6 (H) 34.0 - 46.6 % Final     MCV   Date Value Ref Range Status   06/02/2023 83.1 79.0 - 97.0 fL Final     MCH   Date Value Ref Range Status   06/02/2023 26.4 (L) 26.6 - 33.0 pg Final     MCHC   Date Value Ref Range Status   06/02/2023 31.8 31.5 - 35.7 g/dL Final     RDW   Date Value Ref Range Status   06/02/2023 15.9 (H) 12.3 - 15.4 % Final     RDW-SD   Date Value Ref Range Status   06/02/2023 46.7 37.0 - 54.0 fl Final     MPV   Date Value Ref Range Status   06/02/2023 10.8 6.0 - 12.0 fL Final     Platelets   Date Value Ref Range Status   06/02/2023 188 140 - 450 10*3/mm3 Final     Neutrophil %   Date Value Ref Range Status   06/02/2023 71.4 42.7 - 76.0 % Final     Lymphocyte %   Date Value Ref Range Status   06/02/2023 17.1 (L) 19.6 - 45.3 % Final     Monocyte %   Date Value Ref Range Status   06/02/2023 9.4 5.0 - 12.0 % Final     Eosinophil %   Date Value Ref Range Status   06/02/2023 1.0 0.3 - 6.2 % Final     Basophil %   Date Value Ref Range Status   06/02/2023 0.8 0.0 - 1.5 % Final     Immature Grans %   Date Value Ref Range Status   06/02/2023 0.3 0.0 - 0.5 % Final     Neutrophils, Absolute   Date Value Ref Range Status   06/02/2023 6.95 1.70 - 7.00 10*3/mm3 Final     Lymphocytes, Absolute   Date Value Ref Range Status   06/02/2023 1.67 0.70 - 3.10 10*3/mm3 Final     Monocytes, Absolute   Date Value Ref Range Status   06/02/2023 0.92 (H) 0.10 - 0.90 10*3/mm3 Final     Eosinophils, Absolute   Date Value Ref Range Status   06/02/2023 0.10 0.00 - 0.40 10*3/mm3 Final     Basophils, Absolute   Date Value Ref Range Status   06/02/2023 0.08 0.00 - 0.20 10*3/mm3 Final     Immature Grans, Absolute    Date Value Ref Range Status   06/02/2023 0.03 0.00 - 0.05 10*3/mm3 Final     nRBC   Date Value Ref Range Status   06/02/2023 0.0 0.0 - 0.2 /100 WBC Final     Glucose   Date Value Ref Range Status   06/02/2023 186 (H) 65 - 99 mg/dL Final     Sodium   Date Value Ref Range Status   06/02/2023 131 (L) 136 - 145 mmol/L Final     Potassium   Date Value Ref Range Status   06/02/2023 3.2 (L) 3.5 - 5.2 mmol/L Final     Comment:     Slight hemolysis detected by analyzer. Results may be affected.     CO2   Date Value Ref Range Status   06/02/2023 33.0 (H) 22.0 - 29.0 mmol/L Final     Chloride   Date Value Ref Range Status   06/02/2023 80 (L) 98 - 107 mmol/L Final     Anion Gap   Date Value Ref Range Status   06/02/2023 18.0 (H) 5.0 - 15.0 mmol/L Final     Creatinine   Date Value Ref Range Status   06/02/2023 0.72 0.57 - 1.00 mg/dL Final     BUN   Date Value Ref Range Status   06/02/2023 18 8 - 23 mg/dL Final     BUN/Creatinine Ratio   Date Value Ref Range Status   06/02/2023 25.0 7.0 - 25.0 Final     Calcium   Date Value Ref Range Status   06/02/2023 9.7 8.6 - 10.5 mg/dL Final     eGFR Non  Amer   Date Value Ref Range Status   07/26/2021 106 >60 mL/min/1.73 Final     Alkaline Phosphatase   Date Value Ref Range Status   06/02/2023 106 39 - 117 U/L Final     Total Protein   Date Value Ref Range Status   06/02/2023 7.6 6.0 - 8.5 g/dL Final     ALT (SGPT)   Date Value Ref Range Status   06/02/2023 17 1 - 33 U/L Final     AST (SGOT)   Date Value Ref Range Status   06/02/2023 29 1 - 32 U/L Final     Total Bilirubin   Date Value Ref Range Status   06/02/2023 1.1 0.0 - 1.2 mg/dL Final     Albumin   Date Value Ref Range Status   06/02/2023 3.7 3.5 - 5.2 g/dL Final     Globulin   Date Value Ref Range Status   06/02/2023 3.9 gm/dL Final     No results found for: IRON, TIBC, LABIRON, FERRITIN, [id number], FOLATE  Lab Results   Component Value Date    REFLABREPO  08/26/2022     [facility]  [address]    " [place] [address]  Phone: [phone] or [phone]  Fax: [phone]  Michael Horan M.D., Medical Director    PATIENT NAME                           LABORATORY NO.  [address] NUGENT                  [id number]  [id number]                         AGE              SEX  SSN           CLIENT REF #  [facility]        62      1960  F    [id number]   [id number]    [facility]                       REQUESTING M.D.     ATTENDING M.D.     COPY TO[address] ALDRIDGE, HUA FINCH, [facility] 1, 2ND FLOOR          DATE COLLECTED      DATE RECEIVED      DATE [place] [address]             2022    DIAGNOSIS:  ENDOMETRIUM, BIOPSY:  Scant benign superficial endometrium, consistent with atrophy  Bits of benign squamous epithelium are also included    JBS/pah    CLINICAL HISTORY:  Postmenopausal bleeding    SPECIMENS RECEIVED:  ENDOMETRIUM, BIOPSY    MICROSCOPIC DESCRIPTION:  Tissue blocks are prepared and slides are examined microscopically on all  specimens. See diagnosis for details.    Professional interpretation rendered by Hieu Kebede M.D. at [facility]  [place], [address] [place] [address] , [place] [address].    GROSS DESCRIPTION:  Specimen is received in 1 formalin filled container listed as \"endometrial  biopsy\" and consists of multiple portions of tan soft tissue mixed with mucus  measuring 0.5 x 0.3 x 0.1 cm in aggregate.  Specimen is filtered and submitted  entirely in 1 cassette.  BW    REVIEWED, DIAGNOSED AND ELECTRONICALLY  SIGNED BY:    Hieu Kebede M.D.  CPT CODES:  [id number]           Radiology Data :  No radiology results for the last 7 days    Pathology :  * Cannot find OR log *    Assessment and plan:    1.  Erythrocytosis:  - Differential diagnosis for patient's erythrocytosis includes secondary causes like smoking plus or minus sleep apnea versus primary bone " marrow pathology  - Due to hematocrit being in a stable range between 48 and 54 almost 1 year polycythemia vera is less likely here.  - Patient does smoke about pack and half per day.  Patient was counseled about smoking cessation  - Patient is currently on Eliquis 2.5 mg twice daily  - Recommend repeat blood work today consisting of CBC with differential, BMP, erythropoietin level.  - Hemoglobin today is 13.3 with hematocrit of 43.4.  No need for any therapeutic phlebotomy at present.  - We will have patient return to clinic in 2 months with repeat CBC and CMP on that day.    2.  Subsegmental right-sided pulmonary embolism and popliteal vein DVT diagnosed in August 2022  - Patient is currently on Eliquis 2.5 mg twice daily.  - CT angiogram on May 25, 2023 was negative for any residual pulmonary embolism.  Doppler ultrasound in August 2023 is negative for any residual DVT.  - Due to her family history of multiple member having clotting problems we will get hypercoagulable work-up consisting of factor V Leyden, prothrombin gene mutation, anticardiolipin antibodies, beta-2 glycoprotein antibodies today.    3.  Snoring:  - Patient is complaining of fatigue, snoring at night and she has a erythrocytosis.  Symptoms are suggestive of obstructive sleep apnea  - Referral has been placed for sleep medicine.  Patient is agreeable to referral to sleep medicine for evaluation of sleep apnea    4.  Diabetes mellitus    5. Stephanie Bradshaw  reports that she has been smoking cigarettes. She has a 60.00 pack-year smoking history. She has been exposed to tobacco smoke. She has never used smokeless tobacco.. I have educated her on the risk of diseases from using tobacco products such as cancer, COPD, heart disease, and cataracts.     I advised her to quit and she is willing to quit. We have discussed the following method/s for tobacco cessation: Nicotine patch.  Together we have set a quit date for 2 months.  She will follow up  with me in 2 months or sooner to check on her progress.    I spent 4 minutes counseling the patient.    6.  Prescriptions: Prescription for nicotine patch has been sent to patient's pharmacy today            PHQ-9 Total Score: 0   -Patient is not homicidal or suicidal.  No acute intervention required.       Stephanie Bradshaw reports a pain score of 8.  Given her pain assessment as noted, treatment options were discussed and the following options were decided upon as a follow-up plan to address the patient's pain: continuation of current treatment plan for pain.             Thank you for this consultation.    Ian Childs MD  9/7/2023  11:15 CDT        Part of this note may be an electronic transcription/translation of spoken language to printed text using the Dragon Dictation System.

## 2023-09-08 ENCOUNTER — TELEPHONE (OUTPATIENT)
Dept: ONCOLOGY | Facility: CLINIC | Age: 63
End: 2023-09-08
Payer: COMMERCIAL

## 2023-09-08 LAB
CARDIOLIPIN IGA SER IA-ACNC: <9 APL U/ML (ref 0–11)
CARDIOLIPIN IGG SER IA-ACNC: <9 GPL U/ML (ref 0–14)
CARDIOLIPIN IGM SER IA-ACNC: <9 MPL U/ML (ref 0–12)
EPO SERPL-ACNC: 537.9 MIU/ML (ref 2.6–18.5)
F5 GENE MUT ANL BLD/T: NORMAL
FACTOR II, DNA ANALYSIS: NORMAL

## 2023-09-08 NOTE — TELEPHONE ENCOUNTER
----- Message from Ian Childs MD sent at 9/8/2023  6:57 AM CDT -----  Please let patient know, her white blood cell count was normal at 7.9, hemoglobin was normal at 13.3, platelet counts were normal at 220,000.  Creatinine and potassium was also within normal limit.  Thank you

## 2023-09-09 LAB
B2 GLYCOPROT1 IGA SER-ACNC: <9 GPI IGA UNITS (ref 0–25)
B2 GLYCOPROT1 IGG SER-ACNC: <9 GPI IGG UNITS (ref 0–20)
B2 GLYCOPROT1 IGM SER-ACNC: <9 GPI IGM UNITS (ref 0–32)

## 2023-09-26 ENCOUNTER — APPOINTMENT (OUTPATIENT)
Dept: GENERAL RADIOLOGY | Facility: HOSPITAL | Age: 63
End: 2023-09-26
Payer: COMMERCIAL

## 2023-09-26 ENCOUNTER — APPOINTMENT (OUTPATIENT)
Dept: ULTRASOUND IMAGING | Facility: HOSPITAL | Age: 63
End: 2023-09-26
Payer: COMMERCIAL

## 2023-09-26 ENCOUNTER — APPOINTMENT (OUTPATIENT)
Dept: CT IMAGING | Facility: HOSPITAL | Age: 63
End: 2023-09-26
Payer: COMMERCIAL

## 2023-09-26 ENCOUNTER — APPOINTMENT (OUTPATIENT)
Dept: INTERVENTIONAL RADIOLOGY/VASCULAR | Facility: HOSPITAL | Age: 63
End: 2023-09-26
Payer: COMMERCIAL

## 2023-09-26 ENCOUNTER — HOSPITAL ENCOUNTER (INPATIENT)
Facility: HOSPITAL | Age: 63
LOS: 4 days | Discharge: STILL ADMITTED OR ADMINISTRATIVE | End: 2023-09-30
Attending: STUDENT IN AN ORGANIZED HEALTH CARE EDUCATION/TRAINING PROGRAM
Payer: COMMERCIAL

## 2023-09-26 DIAGNOSIS — R09.02 HYPOXIA: ICD-10-CM

## 2023-09-26 DIAGNOSIS — J44.9 COPD, MODERATE: ICD-10-CM

## 2023-09-26 DIAGNOSIS — R06.02 SOB (SHORTNESS OF BREATH): ICD-10-CM

## 2023-09-26 DIAGNOSIS — R79.89 ELEVATED TROPONIN: Primary | ICD-10-CM

## 2023-09-26 PROBLEM — I21.4 NSTEMI (NON-ST ELEVATED MYOCARDIAL INFARCTION): Status: ACTIVE | Noted: 2023-09-26

## 2023-09-26 LAB
ALBUMIN SERPL-MCNC: 3.2 G/DL (ref 3.5–5.2)
ALBUMIN/GLOB SERPL: 1.1 G/DL
ALP SERPL-CCNC: 109 U/L (ref 39–117)
ALT SERPL W P-5'-P-CCNC: 41 U/L (ref 1–33)
ANION GAP SERPL CALCULATED.3IONS-SCNC: 6 MMOL/L (ref 5–15)
APTT PPP: 26.3 SECONDS (ref 20–40.3)
APTT PPP: 35.6 SECONDS (ref 20–40.3)
ARTERIAL PATENCY WRIST A: POSITIVE
AST SERPL-CCNC: 195 U/L (ref 1–32)
ATMOSPHERIC PRESS: 750 MMHG
BASE EXCESS BLDA CALC-SCNC: 10.8 MMOL/L (ref 0–2)
BASOPHILS # BLD AUTO: 0.05 10*3/MM3 (ref 0–0.2)
BASOPHILS NFR BLD AUTO: 0.6 % (ref 0–1.5)
BDY SITE: ABNORMAL
BILIRUB SERPL-MCNC: 0.6 MG/DL (ref 0–1.2)
BUN SERPL-MCNC: 9 MG/DL (ref 8–23)
BUN/CREAT SERPL: 11.7 (ref 7–25)
CALCIUM SPEC-SCNC: 8.3 MG/DL (ref 8.6–10.5)
CHLORIDE SERPL-SCNC: 100 MMOL/L (ref 98–107)
CO2 SERPL-SCNC: 37 MMOL/L (ref 22–29)
CREAT SERPL-MCNC: 0.77 MG/DL (ref 0.57–1)
D-LACTATE SERPL-SCNC: 3.3 MMOL/L (ref 0.5–2)
D-LACTATE SERPL-SCNC: 4.8 MMOL/L (ref 0.5–2)
DEPRECATED RDW RBC AUTO: 54.8 FL (ref 37–54)
EGFRCR SERPLBLD CKD-EPI 2021: 87.9 ML/MIN/1.73
EOSINOPHIL # BLD AUTO: 0.01 10*3/MM3 (ref 0–0.4)
EOSINOPHIL NFR BLD AUTO: 0.1 % (ref 0.3–6.2)
ERYTHROCYTE [DISTWIDTH] IN BLOOD BY AUTOMATED COUNT: 16.3 % (ref 12.3–15.4)
GAS FLOW AIRWAY: 4.5 LPM
GEN 5 2HR TROPONIN T REFLEX: 1517 NG/L
GLOBULIN UR ELPH-MCNC: 3 GM/DL
GLUCOSE SERPL-MCNC: 159 MG/DL (ref 65–99)
HCO3 BLDA-SCNC: 39.1 MMOL/L (ref 20–26)
HCT VFR BLD AUTO: 44 % (ref 34–46.6)
HGB BLD-MCNC: 12.6 G/DL (ref 12–15.9)
HOLD SPECIMEN: NORMAL
IMM GRANULOCYTES # BLD AUTO: 0.05 10*3/MM3 (ref 0–0.05)
IMM GRANULOCYTES NFR BLD AUTO: 0.6 % (ref 0–0.5)
INR PPP: 1.14 (ref 0.8–1.2)
LYMPHOCYTES # BLD AUTO: 1.47 10*3/MM3 (ref 0.7–3.1)
LYMPHOCYTES NFR BLD AUTO: 19 % (ref 19.6–45.3)
Lab: ABNORMAL
MAGNESIUM SERPL-MCNC: 2 MG/DL (ref 1.6–2.4)
MCH RBC QN AUTO: 26.1 PG (ref 26.6–33)
MCHC RBC AUTO-ENTMCNC: 28.6 G/DL (ref 31.5–35.7)
MCV RBC AUTO: 91.1 FL (ref 79–97)
MODALITY: ABNORMAL
MONOCYTES # BLD AUTO: 1.2 10*3/MM3 (ref 0.1–0.9)
MONOCYTES NFR BLD AUTO: 15.5 % (ref 5–12)
NEUTROPHILS NFR BLD AUTO: 4.96 10*3/MM3 (ref 1.7–7)
NEUTROPHILS NFR BLD AUTO: 64.2 % (ref 42.7–76)
NRBC BLD AUTO-RTO: 0.6 /100 WBC (ref 0–0.2)
NT-PROBNP SERPL-MCNC: 3578 PG/ML (ref 0–900)
PCO2 BLDA: 69.1 MM HG (ref 35–45)
PH BLDA: 7.36 PH UNITS (ref 7.35–7.45)
PHOSPHATE SERPL-MCNC: 3.1 MG/DL (ref 2.5–4.5)
PLATELET # BLD AUTO: 199 10*3/MM3 (ref 140–450)
PMV BLD AUTO: 10.5 FL (ref 6–12)
PO2 BLDA: 89.6 MM HG (ref 83–108)
POTASSIUM SERPL-SCNC: 4.8 MMOL/L (ref 3.5–5.2)
PROT SERPL-MCNC: 6.2 G/DL (ref 6–8.5)
PROTHROMBIN TIME: 14.5 SECONDS (ref 11.1–15.3)
RBC # BLD AUTO: 4.83 10*6/MM3 (ref 3.77–5.28)
SAO2 % BLDCOA: 98.1 % (ref 94–99)
SODIUM SERPL-SCNC: 143 MMOL/L (ref 136–145)
TROPONIN T DELTA: -94 NG/L
TROPONIN T SERPL HS-MCNC: 1611 NG/L
VENTILATOR MODE: ABNORMAL
WBC NRBC COR # BLD: 7.74 10*3/MM3 (ref 3.4–10.8)
WHOLE BLOOD HOLD COAG: NORMAL
WHOLE BLOOD HOLD COAG: NORMAL

## 2023-09-26 PROCEDURE — 94640 AIRWAY INHALATION TREATMENT: CPT

## 2023-09-26 PROCEDURE — 36410 VNPNXR 3YR/> PHY/QHP DX/THER: CPT

## 2023-09-26 PROCEDURE — 25010000002 METHYLPREDNISOLONE PER 125 MG: Performed by: STUDENT IN AN ORGANIZED HEALTH CARE EDUCATION/TRAINING PROGRAM

## 2023-09-26 PROCEDURE — 25010000002 FUROSEMIDE PER 20 MG: Performed by: STUDENT IN AN ORGANIZED HEALTH CARE EDUCATION/TRAINING PROGRAM

## 2023-09-26 PROCEDURE — 25010000002 LEVOFLOXACIN PER 250 MG

## 2023-09-26 PROCEDURE — 94799 UNLISTED PULMONARY SVC/PX: CPT

## 2023-09-26 PROCEDURE — 99223 1ST HOSP IP/OBS HIGH 75: CPT | Performed by: INTERNAL MEDICINE

## 2023-09-26 PROCEDURE — C1751 CATH, INF, PER/CENT/MIDLINE: HCPCS

## 2023-09-26 PROCEDURE — 71275 CT ANGIOGRAPHY CHEST: CPT

## 2023-09-26 PROCEDURE — 82803 BLOOD GASES ANY COMBINATION: CPT

## 2023-09-26 PROCEDURE — 83735 ASSAY OF MAGNESIUM: CPT

## 2023-09-26 PROCEDURE — 85025 COMPLETE CBC W/AUTO DIFF WBC: CPT

## 2023-09-26 PROCEDURE — 25010000002 FUROSEMIDE PER 20 MG

## 2023-09-26 PROCEDURE — 87040 BLOOD CULTURE FOR BACTERIA: CPT

## 2023-09-26 PROCEDURE — 93010 ELECTROCARDIOGRAM REPORT: CPT | Performed by: INTERNAL MEDICINE

## 2023-09-26 PROCEDURE — 36600 WITHDRAWAL OF ARTERIAL BLOOD: CPT

## 2023-09-26 PROCEDURE — 80053 COMPREHEN METABOLIC PANEL: CPT | Performed by: STUDENT IN AN ORGANIZED HEALTH CARE EDUCATION/TRAINING PROGRAM

## 2023-09-26 PROCEDURE — 85730 THROMBOPLASTIN TIME PARTIAL: CPT | Performed by: STUDENT IN AN ORGANIZED HEALTH CARE EDUCATION/TRAINING PROGRAM

## 2023-09-26 PROCEDURE — 71046 X-RAY EXAM CHEST 2 VIEWS: CPT

## 2023-09-26 PROCEDURE — 93005 ELECTROCARDIOGRAM TRACING: CPT | Performed by: STUDENT IN AN ORGANIZED HEALTH CARE EDUCATION/TRAINING PROGRAM

## 2023-09-26 PROCEDURE — 25010000002 HEPARIN (PORCINE) PER 1000 UNITS

## 2023-09-26 PROCEDURE — 84100 ASSAY OF PHOSPHORUS: CPT

## 2023-09-26 PROCEDURE — 94760 N-INVAS EAR/PLS OXIMETRY 1: CPT

## 2023-09-26 PROCEDURE — 83605 ASSAY OF LACTIC ACID: CPT

## 2023-09-26 PROCEDURE — 36415 COLL VENOUS BLD VENIPUNCTURE: CPT

## 2023-09-26 PROCEDURE — 76937 US GUIDE VASCULAR ACCESS: CPT

## 2023-09-26 PROCEDURE — 25010000002 HEPARIN (PORCINE) PER 1000 UNITS: Performed by: STUDENT IN AN ORGANIZED HEALTH CARE EDUCATION/TRAINING PROGRAM

## 2023-09-26 PROCEDURE — 25510000001 IOPAMIDOL PER 1 ML: Performed by: STUDENT IN AN ORGANIZED HEALTH CARE EDUCATION/TRAINING PROGRAM

## 2023-09-26 PROCEDURE — 85610 PROTHROMBIN TIME: CPT | Performed by: STUDENT IN AN ORGANIZED HEALTH CARE EDUCATION/TRAINING PROGRAM

## 2023-09-26 PROCEDURE — 93005 ELECTROCARDIOGRAM TRACING: CPT

## 2023-09-26 PROCEDURE — 99285 EMERGENCY DEPT VISIT HI MDM: CPT

## 2023-09-26 PROCEDURE — 84484 ASSAY OF TROPONIN QUANT: CPT | Performed by: STUDENT IN AN ORGANIZED HEALTH CARE EDUCATION/TRAINING PROGRAM

## 2023-09-26 PROCEDURE — 83880 ASSAY OF NATRIURETIC PEPTIDE: CPT | Performed by: STUDENT IN AN ORGANIZED HEALTH CARE EDUCATION/TRAINING PROGRAM

## 2023-09-26 PROCEDURE — 85730 THROMBOPLASTIN TIME PARTIAL: CPT

## 2023-09-26 RX ORDER — TRAMADOL HYDROCHLORIDE 50 MG/1
50 TABLET ORAL EVERY 6 HOURS PRN
Status: DISCONTINUED | OUTPATIENT
Start: 2023-09-26 | End: 2023-10-01 | Stop reason: HOSPADM

## 2023-09-26 RX ORDER — HEPARIN SODIUM 5000 [USP'U]/ML
30 INJECTION, SOLUTION INTRAVENOUS; SUBCUTANEOUS AS NEEDED
Status: DISCONTINUED | OUTPATIENT
Start: 2023-09-26 | End: 2023-10-01 | Stop reason: HOSPADM

## 2023-09-26 RX ORDER — PENTOXIFYLLINE 400 MG/1
400 TABLET, EXTENDED RELEASE ORAL 2 TIMES DAILY
Status: DISCONTINUED | OUTPATIENT
Start: 2023-09-26 | End: 2023-10-01 | Stop reason: HOSPADM

## 2023-09-26 RX ORDER — ASPIRIN 81 MG/1
324 TABLET, CHEWABLE ORAL ONCE
Status: COMPLETED | OUTPATIENT
Start: 2023-09-26 | End: 2023-09-26

## 2023-09-26 RX ORDER — NITROGLYCERIN 0.4 MG/1
0.4 TABLET SUBLINGUAL
Status: DISCONTINUED | OUTPATIENT
Start: 2023-09-26 | End: 2023-10-01 | Stop reason: HOSPADM

## 2023-09-26 RX ORDER — AMOXICILLIN 250 MG
2 CAPSULE ORAL 2 TIMES DAILY
Status: DISCONTINUED | OUTPATIENT
Start: 2023-09-26 | End: 2023-10-01 | Stop reason: HOSPADM

## 2023-09-26 RX ORDER — METHYLPREDNISOLONE SODIUM SUCCINATE 125 MG/2ML
125 INJECTION, POWDER, LYOPHILIZED, FOR SOLUTION INTRAMUSCULAR; INTRAVENOUS ONCE
Status: COMPLETED | OUTPATIENT
Start: 2023-09-26 | End: 2023-09-26

## 2023-09-26 RX ORDER — HEPARIN SODIUM 5000 [USP'U]/ML
4000 INJECTION, SOLUTION INTRAVENOUS; SUBCUTANEOUS AS NEEDED
Status: DISCONTINUED | OUTPATIENT
Start: 2023-09-26 | End: 2023-10-01 | Stop reason: HOSPADM

## 2023-09-26 RX ORDER — FUROSEMIDE 10 MG/ML
80 INJECTION INTRAMUSCULAR; INTRAVENOUS ONCE
Status: COMPLETED | OUTPATIENT
Start: 2023-09-26 | End: 2023-09-26

## 2023-09-26 RX ORDER — SODIUM CHLORIDE 9 MG/ML
50 INJECTION, SOLUTION INTRAVENOUS CONTINUOUS
Status: DISCONTINUED | OUTPATIENT
Start: 2023-09-26 | End: 2023-09-26

## 2023-09-26 RX ORDER — BISACODYL 5 MG/1
5 TABLET, DELAYED RELEASE ORAL DAILY PRN
Status: DISCONTINUED | OUTPATIENT
Start: 2023-09-26 | End: 2023-10-01 | Stop reason: HOSPADM

## 2023-09-26 RX ORDER — LEVOFLOXACIN 5 MG/ML
750 INJECTION, SOLUTION INTRAVENOUS EVERY 24 HOURS
Status: COMPLETED | OUTPATIENT
Start: 2023-09-26 | End: 2023-09-30

## 2023-09-26 RX ORDER — VALSARTAN 40 MG/1
40 TABLET ORAL DAILY
Status: DISCONTINUED | OUTPATIENT
Start: 2023-09-27 | End: 2023-09-28

## 2023-09-26 RX ORDER — GABAPENTIN 300 MG/1
300 CAPSULE ORAL 3 TIMES DAILY
Status: DISCONTINUED | OUTPATIENT
Start: 2023-09-26 | End: 2023-10-01 | Stop reason: HOSPADM

## 2023-09-26 RX ORDER — SODIUM CHLORIDE 0.9 % (FLUSH) 0.9 %
10 SYRINGE (ML) INJECTION EVERY 12 HOURS SCHEDULED
Status: DISCONTINUED | OUTPATIENT
Start: 2023-09-26 | End: 2023-10-01 | Stop reason: HOSPADM

## 2023-09-26 RX ORDER — SODIUM CHLORIDE 0.9 % (FLUSH) 0.9 %
10 SYRINGE (ML) INJECTION AS NEEDED
Status: DISCONTINUED | OUTPATIENT
Start: 2023-09-26 | End: 2023-10-01 | Stop reason: HOSPADM

## 2023-09-26 RX ORDER — FUROSEMIDE 10 MG/ML
40 INJECTION INTRAMUSCULAR; INTRAVENOUS EVERY 12 HOURS
Status: DISCONTINUED | OUTPATIENT
Start: 2023-09-26 | End: 2023-09-28

## 2023-09-26 RX ORDER — ALBUTEROL SULFATE 90 UG/1
2 AEROSOL, METERED RESPIRATORY (INHALATION)
Status: DISCONTINUED | OUTPATIENT
Start: 2023-09-26 | End: 2023-09-29

## 2023-09-26 RX ORDER — ROSUVASTATIN CALCIUM 10 MG/1
10 TABLET, COATED ORAL NIGHTLY
Status: DISCONTINUED | OUTPATIENT
Start: 2023-09-26 | End: 2023-10-01 | Stop reason: HOSPADM

## 2023-09-26 RX ORDER — NICOTINE 21 MG/24HR
1 PATCH, TRANSDERMAL 24 HOURS TRANSDERMAL EVERY 24 HOURS
Status: DISCONTINUED | OUTPATIENT
Start: 2023-09-26 | End: 2023-10-01 | Stop reason: HOSPADM

## 2023-09-26 RX ORDER — HEPARIN SODIUM 10000 [USP'U]/100ML
13.2 INJECTION, SOLUTION INTRAVENOUS
Status: DISCONTINUED | OUTPATIENT
Start: 2023-09-26 | End: 2023-10-01 | Stop reason: HOSPADM

## 2023-09-26 RX ORDER — SODIUM CHLORIDE 9 MG/ML
40 INJECTION, SOLUTION INTRAVENOUS AS NEEDED
Status: DISCONTINUED | OUTPATIENT
Start: 2023-09-26 | End: 2023-10-01 | Stop reason: HOSPADM

## 2023-09-26 RX ORDER — CARVEDILOL 3.12 MG/1
3.12 TABLET ORAL 2 TIMES DAILY WITH MEALS
Status: DISCONTINUED | OUTPATIENT
Start: 2023-09-26 | End: 2023-09-28

## 2023-09-26 RX ORDER — BISACODYL 10 MG
10 SUPPOSITORY, RECTAL RECTAL DAILY PRN
Status: DISCONTINUED | OUTPATIENT
Start: 2023-09-26 | End: 2023-10-01 | Stop reason: HOSPADM

## 2023-09-26 RX ORDER — ONDANSETRON 4 MG/1
4 TABLET, FILM COATED ORAL EVERY 6 HOURS PRN
Status: DISCONTINUED | OUTPATIENT
Start: 2023-09-26 | End: 2023-10-01 | Stop reason: HOSPADM

## 2023-09-26 RX ORDER — ASPIRIN 81 MG/1
81 TABLET ORAL DAILY
Status: DISCONTINUED | OUTPATIENT
Start: 2023-09-27 | End: 2023-10-01 | Stop reason: HOSPADM

## 2023-09-26 RX ORDER — POLYETHYLENE GLYCOL 3350 17 G/17G
17 POWDER, FOR SOLUTION ORAL DAILY PRN
Status: DISCONTINUED | OUTPATIENT
Start: 2023-09-26 | End: 2023-10-01 | Stop reason: HOSPADM

## 2023-09-26 RX ORDER — IPRATROPIUM BROMIDE AND ALBUTEROL SULFATE 2.5; .5 MG/3ML; MG/3ML
6 SOLUTION RESPIRATORY (INHALATION) ONCE
Status: COMPLETED | OUTPATIENT
Start: 2023-09-26 | End: 2023-09-26

## 2023-09-26 RX ORDER — ONDANSETRON 2 MG/ML
4 INJECTION INTRAMUSCULAR; INTRAVENOUS EVERY 6 HOURS PRN
Status: DISCONTINUED | OUTPATIENT
Start: 2023-09-26 | End: 2023-10-01 | Stop reason: HOSPADM

## 2023-09-26 RX ADMIN — CARVEDILOL 3.12 MG: 3.12 TABLET, FILM COATED ORAL at 21:29

## 2023-09-26 RX ADMIN — LEVOFLOXACIN 750 MG: 5 INJECTION, SOLUTION INTRAVENOUS at 21:44

## 2023-09-26 RX ADMIN — DOCUSATE SODIUM 50 MG AND SENNOSIDES 8.6 MG 2 TABLET: 8.6; 5 TABLET, FILM COATED ORAL at 21:29

## 2023-09-26 RX ADMIN — IPRATROPIUM BROMIDE AND ALBUTEROL SULFATE 6 ML: .5; 3 SOLUTION RESPIRATORY (INHALATION) at 13:42

## 2023-09-26 RX ADMIN — GABAPENTIN 300 MG: 300 CAPSULE ORAL at 21:29

## 2023-09-26 RX ADMIN — HEPARIN SODIUM 3700 UNITS: 5000 INJECTION INTRAVENOUS; SUBCUTANEOUS at 23:39

## 2023-09-26 RX ADMIN — ASPIRIN 324 MG: 81 TABLET, CHEWABLE ORAL at 14:34

## 2023-09-26 RX ADMIN — PENTOXIFYLLINE 400 MG: 400 TABLET, EXTENDED RELEASE ORAL at 21:29

## 2023-09-26 RX ADMIN — ROSUVASTATIN CALCIUM 10 MG: 10 TABLET, FILM COATED ORAL at 21:29

## 2023-09-26 RX ADMIN — IOPAMIDOL 60 ML: 755 INJECTION, SOLUTION INTRAVENOUS at 15:36

## 2023-09-26 RX ADMIN — HEPARIN SODIUM 8.2 UNITS/KG/HR: 10000 INJECTION, SOLUTION INTRAVENOUS at 15:23

## 2023-09-26 RX ADMIN — METHYLPREDNISOLONE SODIUM SUCCINATE 125 MG: 125 INJECTION INTRAMUSCULAR; INTRAVENOUS at 14:35

## 2023-09-26 RX ADMIN — FUROSEMIDE 80 MG: 40 INJECTION, SOLUTION INTRAMUSCULAR; INTRAVENOUS at 16:53

## 2023-09-26 RX ADMIN — FUROSEMIDE 40 MG: 40 INJECTION, SOLUTION INTRAMUSCULAR; INTRAVENOUS at 21:29

## 2023-09-26 RX ADMIN — NICOTINE 1 PATCH: 21 PATCH, EXTENDED RELEASE TRANSDERMAL at 21:30

## 2023-09-26 NOTE — ED NOTES
Pt states hx of COPD. Pt c/o sob that started a week ago. Pt does not use home O2 at this time. Pt is requesting a PICC line due to hard stick from last hospital stay.

## 2023-09-26 NOTE — H&P
[facility] Medicine Admission      Date of Admission: 9/26/2023      Primary Care Physician: Danielle Vaughn APRN      Chief Complaint: cough, shortness and leg edema    HPI:63-year-old pleasant female comes to the ER chief complaint shortness of breath dyspnea over the last couple of weeks, leg edema, yellow sputum, fever. She has a history of COPD and heart failure. She is unable to lay flat. She endorses coughing and wheezing. Her inhalers and not been helping at home. She endorses her leg swelling as well. She does not wear oxygen at home. She denies other symptoms. with past medical history of hyperlipidemia, type 2 diabetes, obesity, current tobacco use, COPD, peripheral edema/venous stasis. Patient is following with CT vascular surgery ROSANNE Barrett for leg swelling. Echocardiogram showing normal biventricular function, grade 1 DD, right ventricular cavity is mildly dilated, trace MR, trace TR, there is mild dilation of the proximal ascending aorta at 3.9cm. Patient reports hx of asthma and COPD. She is on ventolin inhaler. Likely has SNEHA which is undiagnosed. She does have hypersomnia and snoring. DVT / PE. CTA chest showing enlargement of the main pulmonary artery at 4.4 cm, suggest pulmonary hypertension, probably also mild enlargement of the right heart. CTA was negative for PE on 5/25/23.  She was admitted to the hospital in May 2023 for acute hypoxic respiratory failure and diastolic heart failure.        Past Medical History:   Diagnosis Date    Controlled type 2 diabetes mellitus without complication, without long-term current use of insulin     COPD (chronic obstructive pulmonary disease)     DVT (deep venous thrombosis) 08/2022    left popliteal    Hyperlipemia, mixed     Pulmonary embolism 08/2022    right      Past Surgical History:   Procedure Laterality Date    CYSTOSCOPY N/A 8/18/2022    Procedure: CYSTOSCOPY;  Surgeon: Davide Moore  "MD ALEXIS;  Location: [facility];  Service: Urology;  Laterality: N/A;    TUBAL ABDOMINAL LIGATION        Social History     Socioeconomic History    Marital status:    Tobacco Use    Smoking status: Every Day     Packs/day: 2.00     Years: 30.00     Pack years: 60.00     Types: Cigarettes     Passive exposure: Current    Smokeless tobacco: Never    Tobacco comments:     2 cigarettes daily   Vaping Use    Vaping Use: Never used   Substance and Sexual Activity    Alcohol use: Yes     Comment: occasionally    Drug use: Never    Sexual activity: Defer      Allergies   Allergen Reactions    Penicillins Rash     Patient states she is allergic to all \"cillins\".    Penicillins Rash        Concurrent Medical History:  has a past medical history of Controlled type 2 diabetes mellitus without complication, without long-term current use of insulin, COPD (chronic obstructive pulmonary disease), DVT (deep venous thrombosis) (08/2022), Hyperlipemia, mixed, and Pulmonary embolism (08/2022).    Past Surgical History:  has a past surgical history that includes Tubal ligation and Cystoscopy (N/A, 8/18/2022).    Family History: family history includes Alcohol abuse in her maternal grandmother; COPD in an other family member; Diabetes in an other family member; Heart disease in an other family member; Hypertension in an other family member; Liver cancer in her maternal grandmother; Lung cancer in her maternal aunt, maternal aunt, and mother; Prostate cancer in her father; Throat cancer in her paternal grandfather; Uterine cancer in her mother.     Social History:  reports that she has been smoking cigarettes. She has a 60.00 pack-year smoking history. She has been exposed to tobacco smoke. She has never used smokeless tobacco. She reports current alcohol use. She reports that she does not use drugs.    Allergies:   Allergies   Allergen Reactions    Penicillins Rash     Patient states she is allergic to all \"cillins\".    Penicillins " Rash       Medications:   Prior to Admission medications    Medication Sig Start Date End Date Taking? Authorizing Provider   apixaban (ELIQUIS) 2.5 MG tablet tablet Take 1 tablet by mouth 2 (Two) Times a Day. 8/4/23   Rafael Cisse APRN   furosemide (LASIX) 20 MG tablet Take 1 tablet by mouth 2 (Two) Times a Day. 7/27/23   Mavis Avelar APRN   gabapentin (NEURONTIN) 300 MG capsule Take 1 capsule by mouth 3 (Three) Times a Day.    Janiya Montenegro MD   nicotine (NICODERM CQ) 21 MG/24HR patch Place 1 patch on the skin as directed by provider Daily. 9/7/23   Ian Childs MD   pentoxifylline (TRENtal) 400 MG CR tablet Take 1 tablet by mouth 2 (Two) Times a Day. 8/29/22   Rafael Cisse APRN   rosuvastatin (CRESTOR) 10 MG tablet Take 1 tablet by mouth Daily. 8/25/22   Janiya Montenegro MD   tiotropium bromide-olodaterol (STIOLTO RESPIMAT) 2.5-2.5 MCG/ACT aerosol solution inhaler Inhale 2 puffs Daily. 8/23/22   iMmi Kern APRN   traMADol (ULTRAM) 50 MG tablet Take 1 tablet by mouth Every 6 (Six) Hours As Needed for Moderate Pain. 8/25/22   Janiya Montenegro MD   valsartan (DIOVAN) 40 MG tablet Take 1 tablet by mouth Daily. 7/27/23   Mavis Avelar APRN   VENTOLIN  (90 BASE) MCG/ACT inhaler  1/16/17   Janiya Montenegro MD     I have utilized all available immediate resources to obtain, update, or review the patient's current medications (including all prescriptions, over-the-counter products, herbals, cannabis/cannabidiol products, and vitamin/mineral/dietary (nutritional) supplements).      Review of Systems:  Review of Systems   Constitutional:  Negative for activity change, appetite change, chills, diaphoresis and fatigue.   HENT:  Negative for congestion, ear discharge, hearing loss, rhinorrhea, sinus pain, sneezing and sore throat.    Eyes:  Negative for photophobia, discharge and visual disturbance.   Respiratory:  Negative for cough, chest tightness, shortness of  breath and wheezing.    Cardiovascular:  Negative for chest pain and palpitations.   Gastrointestinal:  Negative for abdominal distention, abdominal pain, blood in stool, diarrhea, nausea and vomiting.   Endocrine: Negative for cold intolerance, heat intolerance, polydipsia, polyphagia and polyuria.   Genitourinary:  Negative for dysuria, flank pain, hematuria and urgency.   Musculoskeletal:  Negative for arthralgias, joint swelling and myalgias.   Skin:  Negative for color change.   Allergic/Immunologic: Negative for food allergies.   Neurological:  Negative for dizziness, seizures, syncope, speech difficulty, weakness and headaches.   Hematological:  Negative for adenopathy. Does not bruise/bleed easily.   Psychiatric/Behavioral:  Negative for confusion, hallucinations, self-injury and suicidal ideas. The patient is not nervous/anxious.     Otherwise complete ROS is negative except as mentioned above.    Physical Exam:   Heart Rate:  [85-94] 88  Resp:  [18] 18  BP: (134-143)/(77-90) 139/86  Physical Exam  Constitutional:       Appearance: Normal appearance.   HENT:      Head: Normocephalic and atraumatic.      Right Ear: Tympanic membrane normal.      Left Ear: Tympanic membrane normal.      Nose: Nose normal.      Mouth/Throat:      Mouth: Mucous membranes are moist.   Eyes:      Pupils: Pupils are equal, round, and reactive to light.   Cardiovascular:      Rate and Rhythm: Normal rate and regular rhythm.      Pulses: Normal pulses.      Heart sounds: Normal heart sounds.   Pulmonary:      Effort: Pulmonary effort is normal.      Breath sounds: Rales present.   Abdominal:      General: Abdomen is flat. Bowel sounds are normal.      Palpations: Abdomen is soft.   Musculoskeletal:         General: Normal range of motion.      Cervical back: Normal range of motion and neck supple.      Right lower leg: Edema present.      Left lower leg: Edema present.   Skin:     General: Skin is warm and dry.      Capillary  Refill: Capillary refill takes less than 2 seconds.   Neurological:      General: No focal deficit present.      Mental Status: She is alert and oriented to person, place, and time.   Psychiatric:         Mood and Affect: Mood normal.         Behavior: Behavior normal.         Thought Content: Thought content normal.         Judgment: Judgment normal.         Results Reviewed:  I have personally reviewed current lab, radiology, and data and agree with results.  Lab Results (last 24 hours)       Procedure Component Value Units Date/Time    High Sensitivity Troponin T 2Hr [[id number]]  (Abnormal) Collected: 09/26/23 1422    Specimen: Blood Updated: 09/26/23 1454     HS Troponin T 1,517 ng/L      Troponin T Delta -94 ng/L     Narrative:      High Sensitive Troponin T Reference Range:  <10.0 ng/L- Negative Female for AMI  <15.0 ng/L- Negative Male for AMI  >=10 - Abnormal Female indicating possible myocardial injury.  >=15 - Abnormal Male indicating possible myocardial injury.   Clinicians would have to utilize clinical acumen, EKG, Troponin, and serial changes to determine if it is an Acute Myocardial Infarction or myocardial injury due to an underlying chronic condition.         aPTT [[id number]]  (Normal) Collected: 09/26/23 1422    Specimen: Blood Updated: 09/26/23 1450     PTT 26.3 seconds     Narrative:      The recommended Heparin therapeutic range is 68-97 seconds.    Protime-INR [[id number]]  (Normal) Collected: 09/26/23 1422    Specimen: Blood Updated: 09/26/23 1450     Protime 14.5 Seconds      INR 1.14    Narrative:      Therapeutic range for most indications is 2.0-3.0 INR,  or 2.5-3.5 for mechanical heart valves.    [place] Draw [[id number]] Collected: 09/26/23 1422    Specimen: Blood Updated: 09/26/23 1430    Narrative:      The following orders were created for panel order [place] Draw.  Procedure                               Abnormality         Status                     ---------                                -----------         ------                     Green Top (Gel)[[id number]]                                                             Lavender Top[[id number]]                                                                Gold Top - SST[[id number]]                                                              Light Blue Top[[id number]]                                   In process                   Please view results for these tests on the individual orders.    Light Blue Top [[id number]] Collected: 09/26/23 1422    Specimen: Blood Updated: 09/26/23 1427    Single High Sensitivity Troponin T [[id number]]  (Abnormal) Collected: 09/26/23 1238    Specimen: Blood Updated: 09/26/23 1403     HS Troponin T 1,611 ng/L     Narrative:      High Sensitive Troponin T Reference Range:  <10.0 ng/L- Negative Female for AMI  <15.0 ng/L- Negative Male for AMI  >=10 - Abnormal Female indicating possible myocardial injury.  >=15 - Abnormal Male indicating possible myocardial injury.   Clinicians would have to utilize clinical acumen, EKG, Troponin, and serial changes to determine if it is an Acute Myocardial Infarction or myocardial injury due to an underlying chronic condition.         Blood Gas, Arterial - [[id number]]  (Abnormal) Collected: 09/26/23 1406    Specimen: Arterial Blood Updated: 09/26/23 1401     Site Left Radial     Manas's Test Positive     pH, Arterial 7.361 pH units      pCO2, Arterial 69.1 mm Hg      Comment: 86 Value above critical limit        pO2, Arterial 89.6 mm Hg      HCO3, Arterial 39.1 mmol/L      Comment: 83 Value above reference range        Base Excess, Arterial 10.8 mmol/L      Comment: 83 Value above reference range        O2 Saturation, Arterial 98.1 %      Barometric Pressure for Blood Gas 750 mmHg      Modality Nasal Cannula     Flow Rate 4.5 lpm      Ventilator Mode NA     Collected by Jenny Villa     Comment: Meter: [id number]     :  [id number]       Extra Tubes [[id number]]  Collected: 09/26/23 1245    Specimen: Blood, Venous Line Updated: 09/26/23 1400    Narrative:      The following orders were created for panel order Extra Tubes.  Procedure                               Abnormality         Status                     ---------                               -----------         ------                     Gold Top - SST[[id number]]                                   Final result               Light Blue Top[[id number]]                                   Final result                 Please view results for these tests on the individual orders.    Gold Top - SST [[id number]] Collected: 09/26/23 1245    Specimen: Blood Updated: 09/26/23 1400     Extra Tube Hold for add-ons.     Comment: Auto resulted.       Light Blue Top [[id number]] Collected: 09/26/23 1245    Specimen: Blood Updated: 09/26/23 1400     Extra Tube Hold for add-ons.     Comment: Auto resulted       BNP [[id number]]  (Abnormal) Collected: 09/26/23 1238    Specimen: Blood Updated: 09/26/23 1356     proBNP 3,578.0 pg/mL     Narrative:      This assay is used as an aid in the diagnosis of individuals suspected of having heart failure. It can be used as an aid in the diagnosis of acute decompensated heart failure (ADHF) in patients presenting with signs and symptoms of ADHF to the emergency department (ED). In addition, NT-proBNP of <300 pg/mL indicates ADHF is not likely.    Comprehensive Metabolic Panel [[id number]]  (Abnormal) Collected: 09/26/23 1238    Specimen: Blood Updated: 09/26/23 1320     Glucose 159 mg/dL      BUN 9 mg/dL      Creatinine 0.77 mg/dL      Sodium 143 mmol/L      Potassium 4.8 mmol/L      Chloride 100 mmol/L      CO2 37.0 mmol/L      Calcium 8.3 mg/dL      Total Protein 6.2 g/dL      Albumin 3.2 g/dL      ALT (SGPT) 41 U/L      AST (SGOT) 195 U/L      Alkaline Phosphatase 109 U/L      Total Bilirubin 0.6 mg/dL      Globulin 3.0 gm/dL      A/G Ratio 1.1 g/dL      BUN/Creatinine Ratio 11.7     Anion Gap 6.0  mmol/L      eGFR 87.9 mL/min/1.73     Narrative:      GFR Normal >60  Chronic Kidney Disease <60  Kidney Failure <15      CBC & Differential [[id number]]  (Abnormal) Collected: 09/26/23 1238    Specimen: Blood Updated: 09/26/23 1249    Narrative:      The following orders were created for panel order CBC & Differential.  Procedure                               Abnormality         Status                     ---------                               -----------         ------                     CBC Auto Differential[[id number]]        Abnormal            Final result                 Please view results for these tests on the individual orders.    CBC Auto Differential [[id number]]  (Abnormal) Collected: 09/26/23 1238    Specimen: Blood Updated: 09/26/23 1249     WBC 7.74 10*3/mm3      RBC 4.83 10*6/mm3      Hemoglobin 12.6 g/dL      Hematocrit 44.0 %      MCV 91.1 fL      MCH 26.1 pg      MCHC 28.6 g/dL      RDW 16.3 %      RDW-SD 54.8 fl      MPV 10.5 fL      Platelets 199 10*3/mm3      Neutrophil % 64.2 %      Lymphocyte % 19.0 %      Monocyte % 15.5 %      Eosinophil % 0.1 %      Basophil % 0.6 %      Immature Grans % 0.6 %      Neutrophils, Absolute 4.96 10*3/mm3      Lymphocytes, Absolute 1.47 10*3/mm3      Monocytes, Absolute 1.20 10*3/mm3      Eosinophils, Absolute 0.01 10*3/mm3      Basophils, Absolute 0.05 10*3/mm3      Immature Grans, Absolute 0.05 10*3/mm3      nRBC 0.6 /100 WBC           Imaging Results (Last 24 Hours)       Procedure Component Value Units Date/Time    US Guided Vascular Access [[id number]] Collected: 09/26/23 1423     Updated: 09/26/23 1427    Narrative:      The right  arm was utilized for vascular access.  Images demonstrate patency of  the basilic.  The vessel was accessed under direct ultrasound guidance.  Permanent images were saved in the chart/PACS system.    IR Insert Midline Without Port Pump 5 Plus [[id number]] Resulted: 09/26/23 1410     Updated: 09/26/23 1410    Narrative:      This  procedure was auto-finalized with no dictation required.    XR Chest 2 View [[id number]] Collected: 09/26/23 1310     Updated: 09/26/23 1312    Narrative:      XR CHEST 2 VW    HISTORY: soa    FINDINGS: Left lower lobe/retrocardiac opacities potentially representing  atelectasis versus inflammatory/infectious process.  No pleural effusion.  No  pneumothorax..  The cardiomediastinal silhouette and upper abdomen are  unremarkable. No acute osseous abnormality.                  Assessment:    Active Hospital Problems    Diagnosis     **Elevated troponin     NSTEMI (non-ST elevated myocardial infarction)              Plan:63 years old female patient with cough, shortness of breath, fever, CTA  chest with RLL infiltrates, suggestive of pneumonia. Besides with chest pain and elevated troponin, abnormal EKG. Already started management for NSTEMI, cardiology on board.  1.COPD exacerbation, chronic hypercapnic respiratory failure, bipap at night  2.RLL pneumonia, IV antibiotics  3.class 3 obesity  4.elevated troponin, delta  -94, EKG changes ST depression in inferior lead, heparin drip, aspirin, crestor, carvedilol  5.elevated transaminases, secondary to CHF  6.HFpEF  7.aortic dilatation        Plan  Admit to stepdown unit  Bipap at night  IV levaquin  Cardiology consult  Blood cultures  Heparin drip  Aspirin  Statin  IV lasix    Medical Decision Making  Number and Complexity of problems: 2  Differential Diagnosis: COPD exacerbation, pneumonia    Conditions and Status:        Condition is unchanged.     [facility] Data  External documents reviewed: previous medical records  My EKG interpretation: sinus rhythm, RBBB  My CT interpretation: RLL pneumonia  Tests considered but not ordered: none     Decision rules/scores evaluated (example [id number], Wells, etc): n/a     Discussed with: the patient     Treatment Plan  See above    Care Planning  Shared decision making: patient  Code status and discussions: full  code    Disposition  Social Determinants of Health that impact treatment or disposition: none  I expect the patient to be discharged to home in 7 days.      I confirmed that the patient's Advance Care Plan is present, code status is documented, or surrogate decision maker is listed in the patient's medical record.    I discussed the patient's findings and my recommendations with: the patient      This document has been electronically signed by Toby Edwards MD on September 26, 2023 15:23 CDT

## 2023-09-26 NOTE — CONSULTS
[facility] Cardiology Consult    Referring Provider: Brando Barnes MD    Reason for Consultation: Elevated troponin CHF    Patient Care Team:  Danielle Vaughn APRN as PCP - General (Family Medicine)  Armando Sanon APRN (Family Medicine)  Davide Marrufo MD as Surgeon (Orthopedic Surgery)  Danielle Vaughn APRN (Family Medicine)  Ian Childs MD as Consulting Physician (Hematology and Oncology)    Chief complaint   Chief Complaint   Patient presents with    Shortness of Breath       Subjective .     History of present illness:     Stephanie Bradshaw is a 63-year-old  female with medical history of hypertension, hyperlipidemia, type 2 diabetes, obesity, tobacco use, COPD, peripheral edema/venous stasis, diastolic dysfunction, DVT PE.   She was admitted to the hospital in May 2023 for acute hypoxic respiratory failure, diastolic heart failure, pneumonia.  She was aggressively diuresed. CTA of chest showed enlargement of main pulmonary artery at 4.4 cm, pulmonary hypertension, mild enlargement of right heart.    Patient presented to the ER with symptoms of shortness of breath with exertion, orthopnea, edema, chest pain, weight gain over the past 2 weeks.  She reports 20 to 30 pounds of fluid.  She has been taking her diuretics as prescribed.  She does not wear oxygen at home.  She admits to 2 to 3-day history of centralized chest pain described as burning, pressure.  Nonradiating.    Patient found to have PCO2 of 69, proBNP elevated at 3600, troponin 1600, chest x-ray showing atelectasis versus pneumonia.  CTA negative for PE. EKG showing incomplete right bundle branch block, nonspecific inferior lead changes.  Echocardiogram in 2022 showing LVEF preserved at 56 to 60%, right ventricle is mildly dilated, right atrial cavity is mild to moderately dilated.    The CVD Risk score (D'Agostino, et al., 2008) failed to calculate for the following reasons:    The patient has a prior MI, stroke,  CHF, or peripheral vascular disease diagnosis      Review of Systems  Review of Systems   Constitutional:  Negative for chills, fever and unexpected weight change.   HENT:  Negative for hearing loss, nosebleeds, tinnitus, trouble swallowing and voice change.    Eyes:  Negative for visual disturbance.   Respiratory:  Positive for cough and shortness of breath. Negative for apnea and wheezing.    Cardiovascular:  Positive for chest pain and leg swelling. Negative for palpitations.   Gastrointestinal:  Negative for abdominal distention, abdominal pain and blood in stool.   Endocrine: Negative for cold intolerance, heat intolerance, polydipsia, polyphagia and polyuria.   Genitourinary: Negative.    Musculoskeletal:  Negative for arthralgias and back pain.   Skin: Negative.    Allergic/Immunologic: Negative.    Neurological:  Positive for weakness. Negative for seizures, syncope, speech difficulty, numbness and headaches.   Hematological:  Negative for adenopathy. Does not bruise/bleed easily.   Psychiatric/Behavioral:  Negative for agitation, behavioral problems and suicidal ideas. The patient is not nervous/anxious.      History  Past Medical History:   Diagnosis Date    Controlled type 2 diabetes mellitus without complication, without long-term current use of insulin     COPD (chronic obstructive pulmonary disease)     DVT (deep venous thrombosis) 08/2022    left popliteal    Hyperlipemia, mixed     Pulmonary embolism 08/2022    right   ,   Past Surgical History:   Procedure Laterality Date    CYSTOSCOPY N/A 8/18/2022    Procedure: CYSTOSCOPY;  Surgeon: Teresa, Davide ESPINOZA MD;  [facility];  Service: Urology;  Laterality: N/A;    TUBAL ABDOMINAL LIGATION     ,   Family History   Problem Relation Age of Onset    Prostate cancer Father     Uterine cancer Mother     Lung cancer Mother     Throat cancer Paternal Grandfather     Liver cancer Maternal Grandmother     Alcohol abuse Maternal Grandmother     Lung cancer  "Maternal Aunt     Lung cancer Maternal Aunt     COPD Other     Heart disease Other     Hypertension Other     Diabetes Other     Breast cancer Neg Hx     Ovarian cancer Neg Hx     Colon cancer Neg Hx    ,   Social History     Tobacco Use    Smoking status: Every Day     Packs/day: 2.00     Years: 30.00     Pack years: 60.00     Types: Cigarettes     Passive exposure: Current    Smokeless tobacco: Never    Tobacco comments:     2 cigarettes daily   Vaping Use    Vaping Use: Never used   Substance Use Topics    Alcohol use: Yes     Comment: occasionally    Drug use: Never   , (Not in a hospital admission)     ALLERGIES  Penicillins and Penicillins    Objective     Vital Sign Min/Max for last 24 hours  No data recorded   BP  Min: 134/77  Max: 143/90   Pulse  Min: 85  Max: 94   Resp  Min: 18  Max: 18   SpO2  Min: 80 %  Max: 96 %   Flow (L/min)  Min: 2  Max: 4.5   Weight  Min: 122 kg (270 lb)  Max: 122 kg (270 lb)     Flowsheet Rows      Flowsheet Row First Filed Value   Admission Height 157.5 cm (62\") Documented at 09/26/2023 1102   Admission Weight 122 kg (270 lb) Documented at 09/26/2023 1102               Physical Exam:  Physical Exam  Vitals and nursing note reviewed.   Constitutional:       General: She is not in acute distress.     Appearance: She is morbidly obese. She is ill-appearing and toxic-appearing. She is not diaphoretic.      Interventions: Nasal cannula in place.   HENT:      Head: Normocephalic.      Right Ear: External ear normal.      Left Ear: External ear normal.   Eyes:      General: Lids are normal.      Pupils: Pupils are equal, round, and reactive to light.   Neck:      Thyroid: No thyromegaly.      Vascular: JVD present. No carotid bruit.      Trachea: No tracheal deviation.   Cardiovascular:      Rate and Rhythm: Normal rate and regular rhythm.      Heart sounds: Normal heart sounds. No murmur heard.    No friction rub. No gallop.   Pulmonary:      Effort: Pulmonary effort is normal. No " respiratory distress.      Breath sounds: No stridor. Decreased breath sounds, rhonchi and rales present. No wheezing.   Chest:      Chest wall: No tenderness.   Abdominal:      General: Bowel sounds are normal. There is no distension.      Palpations: Abdomen is soft.      Tenderness: There is no abdominal tenderness.   Musculoskeletal:      Right lower le+ Edema present.      Left lower le+ Edema present.   Skin:     General: Skin is warm and dry.      Findings: No erythema or rash.   Neurological:      Mental Status: She is alert and oriented to person, place, and time.      Cranial Nerves: No cranial nerve deficit.      Deep Tendon Reflexes: Reflexes are normal and symmetric. Reflexes normal.   Psychiatric:         Behavior: Behavior normal.         Thought Content: Thought content normal.         Judgment: Judgment normal.          Results Review:     Results from last 7 days   Lab Units 23  1238   SODIUM mmol/L 143   POTASSIUM mmol/L 4.8   CHLORIDE mmol/L 100   CO2 mmol/L 37.0*   BUN mg/dL 9   CREATININE mg/dL 0.77   CALCIUM mg/dL 8.3*   BILIRUBIN mg/dL 0.6   ALK PHOS U/L 109   ALT (SGPT) U/L 41*   AST (SGOT) U/L 195*   GLUCOSE mg/dL 159*       Estimated Creatinine Clearance: 95.6 mL/min (by C-G formula based on SCr of 0.77 mg/dL).          Results from last 7 days   Lab Units 23  1238   WBC 10*3/mm3 7.74   HEMOGLOBIN g/dL 12.6   PLATELETS 10*3/mm3 199       Results from last 7 days   Lab Units 23  1422   INR  1.14       Lab Results   Component Value Date    CKTOTAL 39 2022    TROPONINT 1,517 (C) 2023     Lab Results   Component Value Date    PROBNP 3,578.0 (H) 2023       No intake/output data recorded.    Cardiographics  ECG/EMG Results (last 24 hours)       Procedure Component Value Units Date/Time    ECG 12 Lead Dyspnea [[id number]] Collected: 23 1245     Updated: 23 1313     QT Interval 374 ms      QTC Interval 452 ms     Narrative:      Test Reason :  Dyspnea  Blood Pressure :   */*   mmHG  Vent. Rate :  88 BPM     Atrial Rate :  88 BPM     P-R Int : 140 ms          QRS Dur : 102 ms      QT Int : 374 ms       P-R-T Axes :  81 195 -30 degrees     QTc Int : 452 ms    ** Poor data quality, interpretation may be adversely affected  Sinus rhythm with occasional Premature ventricular complexes and Fusion complexes  Incomplete right bundle branch block  Possible Right ventricular hypertrophy  Septal infarct , age undetermined  Lateral infarct , age undetermined  Abnormal ECG  When compared with ECG of 25-MAY-2023 11:12,  Significant changes have occurred    Referred By: ERDR           Confirmed By:     ECG 12 Lead Dyspnea [[id number]] Collected: 09/26/23 1409     Updated: 09/26/23 1426     QT Interval 358 ms      QTC Interval 435 ms     Narrative:      Test Reason : Dyspnea  Blood Pressure :   */*   mmHG  Vent. Rate :  89 BPM     Atrial Rate :  89 BPM     P-R Int : 136 ms          QRS Dur : 106 ms      QT Int : 358 ms       P-R-T Axes :  75 260  26 degrees     QTc Int : 435 ms    Normal sinus rhythm  Incomplete right bundle branch block  Possible Right ventricular hypertrophy  Septal infarct (cited on or before 26-SEP-2023)  Lateral infarct (cited on or before 26-SEP-2023)  Abnormal ECG  When compared with ECG of 26-SEP-2023 12:45,  Fusion complexes are no longer Present  Premature ventricular complexes are no longer Present  Nonspecific T wave abnormality now evident in Inferior leads    Referred By: WINSTON           Confirmed By:           Results for orders placed during the hospital encounter of 08/09/22    Adult Transthoracic Echo Complete W/ Cont if Necessary Per Protocol    Interpretation Summary  · The right atrial cavity is mild to moderately dilated.  · The right ventricular cavity is mildly dilated.  · Left ventricular wall thickness is consistent with borderline concentric hypertrophy.  · Left ventricular ejection fraction appears to be 56 - 60%.        No  radiology results for the last 30 days.    Intake/Output       None              Assessment & Plan       Elevated troponin    NSTEMI (non-ST elevated myocardial infarction)    #1.  NSTEMI: Troponin elevation of 1600.  EKG showing incomplete right bundle branch block, nonspecific inferior lead changes.  Echocardiogram in 2022 showing LVEF preserved at 56 to 60%, right ventricle is mildly dilated, right atrial cavity is mild to moderately dilated.  Patient does have multiple cardiac risk factors as well noted to have intermittent chest pain for the past 2 to 3 days.  Currently she is chest pain-free and hemodynamically stable.  She is significantly volume overloaded from a CHF standpoint.  We will recommend optimization of heart failure and aggressive diuresis.  We will recommend cardiac catheterization once stabilized from a respiratory standpoint.  Currently she is unable to lay flat.  -IV heparin drip  -Start ASA 81mg  -Crestor 10 mg  -Coreg 3.125mg BID  -Echocardiogram to assess for LVEF, regional wall motion abnormalities    #2.  Acute on chronic heart failure with preserved ejection fraction: Previously noted to have preserved LVEF of 56 to 60%.  She appears significantly volume overloaded upon exam with adequate perfusion and hemodynamics.  We will recommend guideline directed medical therapy and aggressive diuresis.  -Valsartan 40 mg; Can consider changing to Entresto   -Start Coreg 3.125 mg twice daily  -80 mg Lasix x1 followed by 40 mg Lasix twice daily IV  -MRA with Aldactone indicated as well as SGLT-2 inhibitor.   Daily weight  Strict intake and output  Fluid restriction 1500 cc, sodium restriction less than 2 g  Repeat echocardiogram pending    #3. Right ventricular abnormality: mild RVC dilation. RVEF is normal. No evidence of PAH. Have recommended patient for SNEHA evaluation as an outpatient. PFT showed moderate restriction.      #4. Ascending aorta dilation: mild dilation of the proximal ascending  aorta at 3.9cm. She will continue with clinical surveillance.  She has been recommended for blood pressure control.    Stephanie Bradshaw  reports that she has been smoking cigarettes. She has a 60.00 pack-year smoking history. She has been exposed to tobacco smoke. She has never used smokeless tobacco.. I have educated her on the risk of diseases from using tobacco products such as cancer, COPD, and heart disease.     I advised her to quit and she is not willing to quit.    I spent 3  minutes counseling the patient.     Patient's (Body mass index is 49.38 kg/m².) indicates that they are morbidly obese (BMI > 40 or > 35 with obesity - related health condition) with health related conditions that include hypertension, diabetes mellitus, dyslipidemias, and GERD . Weight is worsening. BMI is is above average; BMI management plan is completed. We discussed portion control and increasing exercise.     I discussed the patient's findings and my recommendations with patient and Dr. Ch. Thank you for the consult. We will continue to follow.             This document has been electronically signed by ROSANNE Swanson on September 26, 2023 15:24 CDT     Electronically signed by ROSANNE Swanson, 09/26/23, 3:24 PM CDT.      I personally saw and examined Stephanie Bradshaw after the APRN.  I personally performed a history and physical examination of the patient.  I personally reviewed independent findings and plan of care.  I discussed management with the APRN.  I agree with the APRN's documentation.    63-year-old female history of hypertension, hyperlipidemia, diabetes, tobacco use, COPD, prior history of DVT/PE on chronic anticoagulation presented with hospital with shortness of breath and symptoms of fluid overload.  Has gained about 20 to 30 pounds.  She was noted to have hypercarbic hypoxic respiratory failure.  He was also noted to have significantly elevated troponins were consulted for further  evaluation.  He has been having intermittent chest pain for last several days.    Vitals:    09/26/23 1230 09/26/23 1338 09/26/23 1342 09/26/23 1415   BP: 143/90   139/86   BP Location:       Patient Position:       Pulse: 86 85 85 88   Resp:   18    TempSrc:       SpO2: 91% 94% 96% (!) 89%   Weight:       Height:         1.  NSTEMI:  Multiple risk factors of diabetes, hypertension, hyperlipidemia.  EKG showed right bundle branch block with nonspecific ST-T changes.  Initial troponin was significantly elevated which has subsequently trended down.  Denying any active chest pain.  Patient was started on proper medical therapy with heparin drip, aspirin/Crestor/Coreg we will get an echocardiogram.  Will need cardiac cath once optimized from heart failure standpoint.  CT chest without evidence of pulmonary embolism    2.  Congestive heart with preserved ejection fraction:  Previously noted to have diastolic heart failure.  Appears fluid overloaded.  Aggressive diuresis and guideline medical therapy.  Repeating echocardiogram.      Thank for the consult continue to follow.          Electronically signed by Louann Ch MD, 09/26/23, 4:24 PM CDT.

## 2023-09-26 NOTE — ED NOTES
Pt is sitting up in chair beside bed. Oxygen was not on at time this RN rounded on pt. Pt oxygen saturation 70-73% on RA. Pt placed back on 4L per NC. Primary provider/nurse made aware at this time.

## 2023-09-26 NOTE — ED NOTES
Nursing report ED to floor  Stephanie Bradshaw  63 y.o.  female    HPI:   Chief Complaint   Patient presents with    Shortness of Breath       Admitting doctor:   Toby Edwards MD    Consulting provider(s):  Consults       Date and Time Order Name Status Description    9/26/2023  2:27 PM Inpatient Cardiology Consult Completed              Admitting diagnosis:   The primary encounter diagnosis was Elevated troponin. Diagnoses of Hypoxia and SOB (shortness of breath) were also pertinent to this visit.    Code status:   Current Code Status       Date Active Code Status Order ID Comments User Context       9/26/2023 1546 CPR (Attempt to Resuscitate) [id number]  Toby Edwards MD ED        Question Answer    Code Status (Patient has no pulse and is not breathing) CPR (Attempt to Resuscitate)    Medical Interventions (Patient has pulse or is breathing) Full Support    Level Of Support Discussed With Patient                    Allergies:   Penicillins and Penicillins    Intake and Output  No intake or output data in the 24 hours ending 09/26/23 1601    Weight:       09/26/23  1102   Weight: 122 kg (270 lb)       Most recent vitals:   Vitals:    09/26/23 1230 09/26/23 1338 09/26/23 1342 09/26/23 1415   BP: 143/90   139/86   BP Location:       Patient Position:       Pulse: 86 85 85 88   Resp:   18    TempSrc:       SpO2: 91% 94% 96% (!) 89%   Weight:       Height:         Oxygen Therapy: 4L NC    Active LDAs/IV Access:   Lines, Drains & Airways       Active LDAs       Name Placement date Placement time Site Days    Midline Catheter - Double Lumen 09/26/23 Right Basilic 09/26/23  1348  -- less than 1                    Labs (abnormal labs have a star):   Labs Reviewed   COMPREHENSIVE METABOLIC PANEL - Abnormal; Notable for the following components:       Result Value    Glucose 159 (*)     CO2 37.0 (*)     Calcium 8.3 (*)     Albumin 3.2 (*)     ALT (SGPT) 41 (*)     AST (SGOT) 195 (*)     All other components within  normal limits    Narrative:     GFR Normal >60  Chronic Kidney Disease <60  Kidney Failure <15     CBC WITH AUTO DIFFERENTIAL - Abnormal; Notable for the following components:    MCH 26.1 (*)     MCHC 28.6 (*)     RDW 16.3 (*)     RDW-SD 54.8 (*)     Lymphocyte % 19.0 (*)     Monocyte % 15.5 (*)     Eosinophil % 0.1 (*)     Immature Grans % 0.6 (*)     Monocytes, Absolute 1.20 (*)     nRBC 0.6 (*)     All other components within normal limits   BNP (IN-HOUSE) - Abnormal; Notable for the following components:    proBNP 3,578.0 (*)     All other components within normal limits    Narrative:     This assay is used as an aid in the diagnosis of individuals suspected of having heart failure. It can be used as an aid in the diagnosis of acute decompensated heart failure (ADHF) in patients presenting with signs and symptoms of ADHF to the emergency department (ED). In addition, NT-proBNP of <300 pg/mL indicates ADHF is not likely.   SINGLE HSTROPONIN T - Abnormal; Notable for the following components:    HS Troponin T 1,611 (*)     All other components within normal limits    Narrative:     High Sensitive Troponin T Reference Range:  <10.0 ng/L- Negative Female for AMI  <15.0 ng/L- Negative Male for AMI  >=10 - Abnormal Female indicating possible myocardial injury.  >=15 - Abnormal Male indicating possible myocardial injury.   Clinicians would have to utilize clinical acumen, EKG, Troponin, and serial changes to determine if it is an Acute Myocardial Infarction or myocardial injury due to an underlying chronic condition.        BLOOD GAS, ARTERIAL - Abnormal; Notable for the following components:    pCO2, Arterial 69.1 (*)     HCO3, Arterial 39.1 (*)     Base Excess, Arterial 10.8 (*)     All other components within normal limits   HIGH SENSITIVITIY TROPONIN T 2HR - Abnormal; Notable for the following components:    HS Troponin T 1,517 (*)     Troponin T Delta -94 (*)     All other components within normal limits     Narrative:     High Sensitive Troponin T Reference Range:  <10.0 ng/L- Negative Female for AMI  <15.0 ng/L- Negative Male for AMI  >=10 - Abnormal Female indicating possible myocardial injury.  >=15 - Abnormal Male indicating possible myocardial injury.   Clinicians would have to utilize clinical acumen, EKG, Troponin, and serial changes to determine if it is an Acute Myocardial Infarction or myocardial injury due to an underlying chronic condition.        PROTIME-INR - Normal    Narrative:     Therapeutic range for most indications is 2.0-3.0 INR,  or 2.5-3.5 for mechanical heart valves.   APTT - Normal    Narrative:     The recommended Heparin therapeutic range is 68-97 seconds.   RAINBOW DRAW    Narrative:     The following orders were created for panel order [place] Draw.  Procedure                               Abnormality         Status                     ---------                               -----------         ------                     Green Top (Gel)[[id number]]                                                             Lavender Top[[id number]]                                                                Gold Top - SST[[id number]]                                                              Light Blue Top[[id number]]                                   Final result                 Please view results for these tests on the individual orders.   BLOOD GAS, ARTERIAL   CBC AND DIFFERENTIAL    Narrative:     The following orders were created for panel order CBC & Differential.  Procedure                               Abnormality         Status                     ---------                               -----------         ------                     CBC Auto Differential[[id number]]        Abnormal            Final result                 Please view results for these tests on the individual orders.   EXTRA TUBES    Narrative:     The following orders were created for panel order Extra Tubes.  Procedure                                Abnormality         Status                     ---------                               -----------         ------                     Gold Top - SST[[id number]]                                   Final result               Light Blue Top[[id number]]                                   Final result                 Please view results for these tests on the individual orders.   GOLD TOP - SST   LIGHT BLUE TOP   LIGHT BLUE TOP       Meds given in ED:   Medications   sodium chloride 0.9 % flush 10 mL (has no administration in time range)   heparin 34049 units/250 mL (100 units/mL) in 0.45 % NaCl infusion (8.2 Units/kg/hr × 122 kg Intravenous New Bag 9/26/23 1523)     And   heparin (porcine) 5000 UNIT/ML injection 4,000 Units (has no administration in time range)     And   heparin (porcine) 5000 UNIT/ML injection 3,700 Units (has no administration in time range)   furosemide (LASIX) injection 80 mg (has no administration in time range)   furosemide (LASIX) injection 40 mg (has no administration in time range)   nicotine (NICODERM CQ) 21 MG/24HR patch 1 patch (has no administration in time range)   pentoxifylline (TRENtal) CR tablet 400 mg (has no administration in time range)   rosuvastatin (CRESTOR) tablet 10 mg (has no administration in time range)   valsartan (DIOVAN) tablet 40 mg (has no administration in time range)   carvedilol (COREG) tablet 3.125 mg (has no administration in time range)   aspirin EC tablet 81 mg (has no administration in time range)   ipratropium-albuterol (DUO-NEB) nebulizer solution 6 mL (6 mL Nebulization Given 9/26/23 1342)   methylPREDNISolone sodium succinate (SOLU-Medrol) injection 125 mg (125 mg Intravenous Given 9/26/23 1435)   aspirin chewable tablet 324 mg (324 mg Oral Given 9/26/23 1434)   iopamidol (ISOVUE-370) 76 % injection 100 mL (60 mL Intravenous Given 9/26/23 1536)     heparin, 8.2 Units/kg/hr, Last Rate: 8.2 Units/kg/hr (09/26/23 1523)         NIH Stroke Scale:        Isolation/Infection(s):  No active isolations   No active infections     COVID Testing  Collected NA  Resulted NA    Nursing report ED to floor:  Mental status: A&O x4  Ambulatory status: Roolator  Precautions: None    ED nurse phone [phone]- [phone]

## 2023-09-26 NOTE — ED PROVIDER NOTES
"Subjective   History of Present Illness  63-year-old pleasant female comes to the ER chief complaint shortness of breath dyspnea over the last couple of days.  She has a history of COPD and heart failure.  She is unable to lay flat.  She endorses coughing and wheezing.  Her inhalers and not been helping at home.  She endorses her leg swelling as well.  She does not wear oxygen at home.  She denies other symptoms.    History provided by:  Patient   used: No      Review of Systems   Constitutional:  Positive for activity change and fatigue. Negative for chills and fever.   HENT:  Negative for drooling.    Eyes:  Negative for redness.   Respiratory:  Positive for cough, shortness of breath and wheezing. Negative for chest tightness.    Cardiovascular:  Positive for leg swelling. Negative for chest pain and palpitations.   Gastrointestinal:  Negative for abdominal pain, constipation, diarrhea, nausea and vomiting.   Genitourinary:  Negative for dysuria and flank pain.   Skin:  Negative for color change.   Neurological:  Negative for seizures.   Psychiatric/Behavioral:  Negative for confusion.      Past Medical History:   Diagnosis Date    Controlled type 2 diabetes mellitus without complication, without long-term current use of insulin     COPD (chronic obstructive pulmonary disease)     DVT (deep venous thrombosis) 08/2022    left popliteal    Hyperlipemia, mixed     Pulmonary embolism 08/2022    right       Allergies   Allergen Reactions    Penicillins Rash     Patient states she is allergic to all \"cillins\".    Penicillins Rash       Past Surgical History:   Procedure Laterality Date    CYSTOSCOPY N/A 8/18/2022    Procedure: CYSTOSCOPY;  Surgeon: Teresa, Davide ESPINOZA MD;  Location: [facility];  Service: Urology;  Laterality: N/A;    TUBAL ABDOMINAL LIGATION         Family History   Problem Relation Age of Onset    Prostate cancer Father     Uterine cancer Mother     Lung cancer Mother     Throat " cancer Paternal Grandfather     Liver cancer Maternal Grandmother     Alcohol abuse Maternal Grandmother     Lung cancer Maternal Aunt     Lung cancer Maternal Aunt     COPD Other     Heart disease Other     Hypertension Other     Diabetes Other     Breast cancer Neg Hx     Ovarian cancer Neg Hx     Colon cancer Neg Hx        Social History     Socioeconomic History    Marital status:    Tobacco Use    Smoking status: Every Day     Packs/day: 2.00     Years: 30.00     Pack years: 60.00     Types: Cigarettes     Passive exposure: Current    Smokeless tobacco: Never    Tobacco comments:     2 cigarettes daily   Vaping Use    Vaping Use: Never used   Substance and Sexual Activity    Alcohol use: Yes     Comment: occasionally    Drug use: Never    Sexual activity: Defer           Objective   Vitals:    09/26/23 1230 09/26/23 1338 09/26/23 1342 09/26/23 1415   BP: 143/90   139/86   BP Location:       Patient Position:       Pulse: 86 85 85 88   Resp:   18    TempSrc:       SpO2: 91% 94% 96% (!) 89%   Weight:       Height:           Physical Exam  Vitals and nursing note reviewed.   Constitutional:       General: She is not in acute distress.     Appearance: She is well-developed. She is obese. She is ill-appearing. She is not toxic-appearing or diaphoretic.   Eyes:      General: No scleral icterus.     Conjunctiva/sclera: Conjunctivae normal.   Pulmonary:      Effort: Pulmonary effort is normal. No accessory muscle usage or respiratory distress.   Chest:      Chest wall: No tenderness.   Abdominal:      Palpations: Abdomen is soft.      Tenderness: There is no abdominal tenderness (deep palpation). There is no guarding or rebound.   Musculoskeletal:      Right lower leg: Edema present.      Left lower leg: Edema present.   Skin:     General: Skin is warm and dry.      Capillary Refill: Capillary refill takes less than 2 seconds.   Neurological:      Mental Status: She is alert and oriented to person, place, and  time.       Procedures           ED Course  ED Course as of 09/26/23 1501   Tue Sep 26, 2023   1407 HS Troponin T(!!): 1,611  Spoke with Cardiology who recommend heparin at this time. [BH]      ED Course User Index  [BH] Brando Barnes MD      Results for orders placed or performed during the hospital encounter of 09/26/23   Comprehensive Metabolic Panel    Specimen: Blood   Result Value Ref Range    Glucose 159 (H) 65 - 99 mg/dL    BUN 9 8 - 23 mg/dL    Creatinine 0.77 0.57 - 1.00 mg/dL    Sodium 143 136 - 145 mmol/L    Potassium 4.8 3.5 - 5.2 mmol/L    Chloride 100 98 - 107 mmol/L    CO2 37.0 (H) 22.0 - 29.0 mmol/L    Calcium 8.3 (L) 8.6 - 10.5 mg/dL    Total Protein 6.2 6.0 - 8.5 g/dL    Albumin 3.2 (L) 3.5 - 5.2 g/dL    ALT (SGPT) 41 (H) 1 - 33 U/L    AST (SGOT) 195 (H) 1 - 32 U/L    Alkaline Phosphatase 109 39 - 117 U/L    Total Bilirubin 0.6 0.0 - 1.2 mg/dL    Globulin 3.0 gm/dL    A/G Ratio 1.1 g/dL    BUN/Creatinine Ratio 11.7 7.0 - 25.0    Anion Gap 6.0 5.0 - 15.0 mmol/L    eGFR 87.9 >60.0 mL/min/1.73   CBC Auto Differential    Specimen: Blood   Result Value Ref Range    WBC 7.74 3.40 - 10.80 10*3/mm3    RBC 4.83 3.77 - 5.28 10*6/mm3    Hemoglobin 12.6 12.0 - 15.9 g/dL    Hematocrit 44.0 34.0 - 46.6 %    MCV 91.1 79.0 - 97.0 fL    MCH 26.1 (L) 26.6 - 33.0 pg    MCHC 28.6 (L) 31.5 - 35.7 g/dL    RDW 16.3 (H) 12.3 - 15.4 %    RDW-SD 54.8 (H) 37.0 - 54.0 fl    MPV 10.5 6.0 - 12.0 fL    Platelets 199 140 - 450 10*3/mm3    Neutrophil % 64.2 42.7 - 76.0 %    Lymphocyte % 19.0 (L) 19.6 - 45.3 %    Monocyte % 15.5 (H) 5.0 - 12.0 %    Eosinophil % 0.1 (L) 0.3 - 6.2 %    Basophil % 0.6 0.0 - 1.5 %    Immature Grans % 0.6 (H) 0.0 - 0.5 %    Neutrophils, Absolute 4.96 1.70 - 7.00 10*3/mm3    Lymphocytes, Absolute 1.47 0.70 - 3.10 10*3/mm3    Monocytes, Absolute 1.20 (H) 0.10 - 0.90 10*3/mm3    Eosinophils, Absolute 0.01 0.00 - 0.40 10*3/mm3    Basophils, Absolute 0.05 0.00 - 0.20 10*3/mm3    Immature Grans, Absolute  0.05 0.00 - 0.05 10*3/mm3    nRBC 0.6 (H) 0.0 - 0.2 /100 WBC   BNP    Specimen: Blood   Result Value Ref Range    proBNP 3,578.0 (H) 0.0 - 900.0 pg/mL   Single High Sensitivity Troponin T    Specimen: Blood   Result Value Ref Range    HS Troponin T 1,611 (C) <10 ng/L   Blood Gas, Arterial -    Specimen: Arterial Blood   Result Value Ref Range    Site Left Radial     Manas's Test Positive     pH, Arterial 7.361 7.350 - 7.450 pH units    pCO2, Arterial 69.1 (C) 35.0 - 45.0 mm Hg    pO2, Arterial 89.6 83.0 - 108.0 mm Hg    HCO3, Arterial 39.1 (H) 20.0 - 26.0 mmol/L    Base Excess, Arterial 10.8 (H) 0.0 - 2.0 mmol/L    O2 Saturation, Arterial 98.1 94.0 - 99.0 %    Barometric Pressure for Blood Gas 750 mmHg    Modality Nasal Cannula     Flow Rate 4.5 lpm    Ventilator Mode NA     Collected by Jenny Villa    High Sensitivity Troponin T 2Hr    Specimen: Blood   Result Value Ref Range    HS Troponin T 1,517 (C) <10 ng/L    Troponin T Delta -94 (L) >=-4 - <+4 ng/L   Protime-INR    Specimen: Blood   Result Value Ref Range    Protime 14.5 11.1 - 15.3 Seconds    INR 1.14 0.80 - 1.20   aPTT    Specimen: Blood   Result Value Ref Range    PTT 26.3 20.0 - 40.3 seconds   ECG 12 Lead Dyspnea   Result Value Ref Range    QT Interval 374 ms    QTC Interval 452 ms   ECG 12 Lead Dyspnea   Result Value Ref Range    QT Interval 358 ms    QTC Interval 435 ms   Gold Top - SST   Result Value Ref Range    Extra Tube Hold for add-ons.    Light Blue Top   Result Value Ref Range    Extra Tube Hold for add-ons.      US Guided Vascular Access   Final Result      IR Insert Midline Without Port Pump 5 Plus   Final Result      XR Chest 2 View         CT Angiogram Chest    (Results Pending)                                            Medical Decision Making  Vital signs are stable, afebrile.  Patient satting well on several liters nasal cannula oxygen.  She does not wear it at home.  ABG PCO2 is 69.  BNP elevated at 3600.  Troponin 1600.  Renal  functions normal.  Chest x-ray shows opacities concerning for atelectasis versus pneumonia.  White count is normal.  Patient is a breathing treatments and Solu-Medrol.  It helped improve her wheezing.  Discussed with cardiology who recommended start the patient on a heparin drip for the elevated troponin.  Patient told cardiology that she has had intermittent chest pain for the last several days, but does not have chest pain right now.  Spoke with the on-call hospitalist Dr. Edwards who agrees to admit the patient.    Problems Addressed:  Elevated troponin: complicated acute illness or injury  Hypoxia: complicated acute illness or injury  SOB (shortness of breath): complicated acute illness or injury    Amount and/or Complexity of Data Reviewed  Labs: ordered. Decision-making details documented in ED Course.  Radiology: ordered.  ECG/medicine tests: ordered.    Risk  OTC drugs.  Prescription drug management.  Decision regarding hospitalization.    Critical Care  Total time providing critical care: 33 minutes      Final diagnoses:   Elevated troponin   Hypoxia   SOB (shortness of breath)       ED Disposition  ED Disposition       ED Disposition   Decision to Admit    Condition   --    Comment   Level of Care: Stepdown [25]   Diagnosis: Elevated troponin [[id number]]   Admitting Physician: SRINIVASA EDWARDS [[id number]]   Attending Physician: SRINIVASA EDWARDS [[id number]]                 No follow-up provider specified.       Medication List      No changes were made to your prescriptions during this visit.            Brando Barnes MD  09/26/23 3431

## 2023-09-26 NOTE — PLAN OF CARE
Goal Outcome Evaluation:  Plan of Care Reviewed With: patient        Progress: no change  Outcome Evaluation: VSS

## 2023-09-26 NOTE — PROGRESS NOTES
TWO PATIENT IDENTIFIERS WERE USED. THE PATIENT WAS DRAPED WITH A FULL BODY DRAPE AND THE PATIENT'S RIGHT ARM WAS PREPPED WITH CHLORA PREP. ULTRASOUND WAS USED TO LOCALIZE THERIGHT BASILIC VEIN. SUBCUTANEOUS TISSUE AT THE CATHETER SITE WAS INFILTRATED WITH 2% LIDOCAINE. UNDER ULTRASOUND GUIDANCE, THE VEIN WAS ACCESSED WITH A 21 GAUGE  NEEDLE. AN 0.018 WIRE WAS THEN THREADED THROUGH THE NEEDLE. THE 21 GAUGE NEEDLE WAS REMOVED AND A 4 [place] SHEATH WAS PLACED OVER THE WIRE INTO THE VEIN.THE MIDLINE CATHETER WAS TRIMMED TO 20CM. THE MIDLINE CATHETER WAS THEN PLACED OVER THE WIRE INTO THE VEIN, THE SHEATH WAS PEELED AWAY, WIRE WAS REMOVED. CATHETER WAS FLUSHED WITH NORMAL SALINE AND CATHETER TIP APPLIED. BIOPATCH PLACED. CATHETER SECURED WITH STAT LOCK AND TEGADERM. PATIENT TOLERATED PROCEDURE WELL. THIS WAS DONE AT BEDSIDE      IMPRESSION:SUCCESSFUL PLACEMENT OF DUAL LUMEN MIDLINE.           Kat Moura  9/26/2023  14:09 CDT

## 2023-09-26 NOTE — Clinical Note
Level of Care: Stepdown [25]   Diagnosis: Elevated troponin [[id number]]   Admitting Physician: SRINIVASA ROBIN [[id number]]   Attending Physician: SRINIVASA ROBIN [[id number]]

## 2023-09-27 ENCOUNTER — APPOINTMENT (OUTPATIENT)
Dept: CARDIOLOGY | Facility: HOSPITAL | Age: 63
End: 2023-09-27
Payer: COMMERCIAL

## 2023-09-27 LAB
ANION GAP SERPL CALCULATED.3IONS-SCNC: 5 MMOL/L (ref 5–15)
APTT PPP: 59.3 SECONDS (ref 20–40.3)
APTT PPP: 61.7 SECONDS (ref 20–40.3)
APTT PPP: 76.9 SECONDS (ref 20–40.3)
BASOPHILS # BLD AUTO: 0.01 10*3/MM3 (ref 0–0.2)
BASOPHILS NFR BLD AUTO: 0.1 % (ref 0–1.5)
BH CV ECHO MEAS - ACS: 2.19 CM
BH CV ECHO MEAS - AO MAX PG: 4.5 MMHG
BH CV ECHO MEAS - AO MEAN PG: 2 MMHG
BH CV ECHO MEAS - AO ROOT DIAM: 3.7 CM
BH CV ECHO MEAS - AO V2 MAX: 106 CM/SEC
BH CV ECHO MEAS - AO V2 VTI: 19 CM
BH CV ECHO MEAS - AVA(I,D): 3.5 CM2
BH CV ECHO MEAS - EDV(CUBED): 76.6 ML
BH CV ECHO MEAS - EDV(MOD-SP2): 75.4 ML
BH CV ECHO MEAS - EDV(MOD-SP4): 73.2 ML
BH CV ECHO MEAS - EF(MOD-BP): 55.8 %
BH CV ECHO MEAS - EF(MOD-SP2): 53.7 %
BH CV ECHO MEAS - EF(MOD-SP4): 56.1 %
BH CV ECHO MEAS - ESV(CUBED): 23.9 ML
BH CV ECHO MEAS - ESV(MOD-SP2): 34.9 ML
BH CV ECHO MEAS - ESV(MOD-SP4): 32.1 ML
BH CV ECHO MEAS - FS: 32.1 %
BH CV ECHO MEAS - IVS/LVPW: 1.16 CM
BH CV ECHO MEAS - IVSD: 1.71 CM
BH CV ECHO MEAS - LA DIMENSION: 3.8 CM
BH CV ECHO MEAS - LAT PEAK E' VEL: 8.1 CM/SEC
BH CV ECHO MEAS - LV DIASTOLIC VOL/BSA (35-75): 33.4 CM2
BH CV ECHO MEAS - LV MASS(C)D: 279 GRAMS
BH CV ECHO MEAS - LV MAX PG: 5.2 MMHG
BH CV ECHO MEAS - LV MEAN PG: 3 MMHG
BH CV ECHO MEAS - LV SYSTOLIC VOL/BSA (12-30): 14.6 CM2
BH CV ECHO MEAS - LV V1 MAX: 114 CM/SEC
BH CV ECHO MEAS - LV V1 VTI: 19.7 CM
BH CV ECHO MEAS - LVIDD: 4.2 CM
BH CV ECHO MEAS - LVIDS: 2.9 CM
BH CV ECHO MEAS - LVOT AREA: 3.4 CM2
BH CV ECHO MEAS - LVOT DIAM: 2.07 CM
BH CV ECHO MEAS - LVPWD: 1.48 CM
BH CV ECHO MEAS - MED PEAK E' VEL: 8.3 CM/SEC
BH CV ECHO MEAS - MV A MAX VEL: 62.1 CM/SEC
BH CV ECHO MEAS - MV DEC SLOPE: 639.4 CM/SEC2
BH CV ECHO MEAS - MV DEC TIME: 0.16 SEC
BH CV ECHO MEAS - MV E MAX VEL: 75 CM/SEC
BH CV ECHO MEAS - MV E/A: 1.21
BH CV ECHO MEAS - MV MAX PG: 3.3 MMHG
BH CV ECHO MEAS - MV MEAN PG: 1.87 MMHG
BH CV ECHO MEAS - MV P1/2T: 42.8 MSEC
BH CV ECHO MEAS - MV V2 VTI: 22.2 CM
BH CV ECHO MEAS - MVA(P1/2T): 5.1 CM2
BH CV ECHO MEAS - MVA(VTI): 3 CM2
BH CV ECHO MEAS - PA V2 MAX: 88.1 CM/SEC
BH CV ECHO MEAS - RAP SYSTOLE: 8 MMHG
BH CV ECHO MEAS - RVDD: 3.2 CM
BH CV ECHO MEAS - RVSP: 35.6 MMHG
BH CV ECHO MEAS - SI(MOD-SP2): 18.5 ML/M2
BH CV ECHO MEAS - SI(MOD-SP4): 18.8 ML/M2
BH CV ECHO MEAS - SV(LVOT): 66.2 ML
BH CV ECHO MEAS - SV(MOD-SP2): 40.5 ML
BH CV ECHO MEAS - SV(MOD-SP4): 41.1 ML
BH CV ECHO MEAS - TR MAX PG: 27.6 MMHG
BH CV ECHO MEAS - TR MAX VEL: 262.8 CM/SEC
BH CV ECHO MEASUREMENTS AVERAGE E/E' RATIO: 9.15
BUN SERPL-MCNC: 12 MG/DL (ref 8–23)
BUN/CREAT SERPL: 14.5 (ref 7–25)
CALCIUM SPEC-SCNC: 8.3 MG/DL (ref 8.6–10.5)
CHLORIDE SERPL-SCNC: 94 MMOL/L (ref 98–107)
CO2 SERPL-SCNC: 39 MMOL/L (ref 22–29)
CREAT SERPL-MCNC: 0.83 MG/DL (ref 0.57–1)
D-LACTATE SERPL-SCNC: 1.8 MMOL/L (ref 0.5–2)
D-LACTATE SERPL-SCNC: 2.5 MMOL/L (ref 0.5–2)
D-LACTATE SERPL-SCNC: 3.5 MMOL/L (ref 0.5–2)
DEPRECATED RDW RBC AUTO: 52.8 FL (ref 37–54)
EGFRCR SERPLBLD CKD-EPI 2021: 80.3 ML/MIN/1.73
EOSINOPHIL # BLD AUTO: 0 10*3/MM3 (ref 0–0.4)
EOSINOPHIL NFR BLD AUTO: 0 % (ref 0.3–6.2)
ERYTHROCYTE [DISTWIDTH] IN BLOOD BY AUTOMATED COUNT: 16 % (ref 12.3–15.4)
GLUCOSE SERPL-MCNC: 153 MG/DL (ref 65–99)
HCT VFR BLD AUTO: 42.2 % (ref 34–46.6)
HGB BLD-MCNC: 12.1 G/DL (ref 12–15.9)
IMM GRANULOCYTES # BLD AUTO: 0.05 10*3/MM3 (ref 0–0.05)
IMM GRANULOCYTES NFR BLD AUTO: 0.6 % (ref 0–0.5)
INR PPP: 1.17 (ref 0.8–1.2)
LYMPHOCYTES # BLD AUTO: 1.04 10*3/MM3 (ref 0.7–3.1)
LYMPHOCYTES NFR BLD AUTO: 12.2 % (ref 19.6–45.3)
MAGNESIUM SERPL-MCNC: 2.1 MG/DL (ref 1.6–2.4)
MCH RBC QN AUTO: 25.9 PG (ref 26.6–33)
MCHC RBC AUTO-ENTMCNC: 28.7 G/DL (ref 31.5–35.7)
MCV RBC AUTO: 90.2 FL (ref 79–97)
MONOCYTES # BLD AUTO: 0.87 10*3/MM3 (ref 0.1–0.9)
MONOCYTES NFR BLD AUTO: 10.2 % (ref 5–12)
NEUTROPHILS NFR BLD AUTO: 6.56 10*3/MM3 (ref 1.7–7)
NEUTROPHILS NFR BLD AUTO: 76.9 % (ref 42.7–76)
NRBC BLD AUTO-RTO: 0.4 /100 WBC (ref 0–0.2)
PHOSPHATE SERPL-MCNC: 3.2 MG/DL (ref 2.5–4.5)
PLATELET # BLD AUTO: 210 10*3/MM3 (ref 140–450)
PMV BLD AUTO: 12.4 FL (ref 6–12)
POTASSIUM SERPL-SCNC: 4.5 MMOL/L (ref 3.5–5.2)
PROTHROMBIN TIME: 14.8 SECONDS (ref 11.1–15.3)
RBC # BLD AUTO: 4.68 10*6/MM3 (ref 3.77–5.28)
SODIUM SERPL-SCNC: 138 MMOL/L (ref 136–145)
WBC NRBC COR # BLD: 8.53 10*3/MM3 (ref 3.4–10.8)

## 2023-09-27 PROCEDURE — 80048 BASIC METABOLIC PNL TOTAL CA: CPT

## 2023-09-27 PROCEDURE — 85730 THROMBOPLASTIN TIME PARTIAL: CPT

## 2023-09-27 PROCEDURE — 83605 ASSAY OF LACTIC ACID: CPT

## 2023-09-27 PROCEDURE — 85025 COMPLETE CBC W/AUTO DIFF WBC: CPT

## 2023-09-27 PROCEDURE — 25010000002 HEPARIN (PORCINE) PER 1000 UNITS

## 2023-09-27 PROCEDURE — 25010000002 LEVOFLOXACIN PER 250 MG

## 2023-09-27 PROCEDURE — 94799 UNLISTED PULMONARY SVC/PX: CPT

## 2023-09-27 PROCEDURE — 93306 TTE W/DOPPLER COMPLETE: CPT

## 2023-09-27 PROCEDURE — 93306 TTE W/DOPPLER COMPLETE: CPT | Performed by: INTERNAL MEDICINE

## 2023-09-27 PROCEDURE — 25510000001 PERFLUTREN (DEFINITY) 8.476 MG IN SODIUM CHLORIDE (PF) 0.9 % 10 ML INJECTION

## 2023-09-27 PROCEDURE — 83735 ASSAY OF MAGNESIUM: CPT

## 2023-09-27 PROCEDURE — 99232 SBSQ HOSP IP/OBS MODERATE 35: CPT | Performed by: INTERNAL MEDICINE

## 2023-09-27 PROCEDURE — 84100 ASSAY OF PHOSPHORUS: CPT

## 2023-09-27 PROCEDURE — 25010000002 FUROSEMIDE PER 20 MG: Performed by: NURSE PRACTITIONER

## 2023-09-27 PROCEDURE — 85610 PROTHROMBIN TIME: CPT

## 2023-09-27 PROCEDURE — 25010000002 FUROSEMIDE PER 20 MG

## 2023-09-27 RX ORDER — FUROSEMIDE 10 MG/ML
40 INJECTION INTRAMUSCULAR; INTRAVENOUS ONCE
Status: COMPLETED | OUTPATIENT
Start: 2023-09-27 | End: 2023-09-27

## 2023-09-27 RX ADMIN — FUROSEMIDE 40 MG: 40 INJECTION, SOLUTION INTRAMUSCULAR; INTRAVENOUS at 09:02

## 2023-09-27 RX ADMIN — FUROSEMIDE 40 MG: 40 INJECTION, SOLUTION INTRAMUSCULAR; INTRAVENOUS at 12:07

## 2023-09-27 RX ADMIN — GABAPENTIN 300 MG: 300 CAPSULE ORAL at 16:52

## 2023-09-27 RX ADMIN — CARVEDILOL 3.12 MG: 3.12 TABLET, FILM COATED ORAL at 08:56

## 2023-09-27 RX ADMIN — HEPARIN SODIUM 3700 UNITS: 5000 INJECTION INTRAVENOUS; SUBCUTANEOUS at 21:25

## 2023-09-27 RX ADMIN — FUROSEMIDE 40 MG: 40 INJECTION, SOLUTION INTRAMUSCULAR; INTRAVENOUS at 19:50

## 2023-09-27 RX ADMIN — ROSUVASTATIN CALCIUM 10 MG: 10 TABLET, FILM COATED ORAL at 20:03

## 2023-09-27 RX ADMIN — ALBUTEROL SULFATE 2 PUFF: 90 AEROSOL, METERED RESPIRATORY (INHALATION) at 19:34

## 2023-09-27 RX ADMIN — NICOTINE 1 PATCH: 21 PATCH, EXTENDED RELEASE TRANSDERMAL at 18:28

## 2023-09-27 RX ADMIN — HEPARIN SODIUM 3700 UNITS: 5000 INJECTION INTRAVENOUS; SUBCUTANEOUS at 06:09

## 2023-09-27 RX ADMIN — PENTOXIFYLLINE 400 MG: 400 TABLET, EXTENDED RELEASE ORAL at 20:03

## 2023-09-27 RX ADMIN — HEPARIN SODIUM 12.2 UNITS/KG/HR: 10000 INJECTION, SOLUTION INTRAVENOUS at 14:15

## 2023-09-27 RX ADMIN — ASPIRIN 81 MG: 81 TABLET, COATED ORAL at 08:56

## 2023-09-27 RX ADMIN — CARVEDILOL 3.12 MG: 3.12 TABLET, FILM COATED ORAL at 18:28

## 2023-09-27 RX ADMIN — GABAPENTIN 300 MG: 300 CAPSULE ORAL at 20:03

## 2023-09-27 RX ADMIN — PENTOXIFYLLINE 400 MG: 400 TABLET, EXTENDED RELEASE ORAL at 08:56

## 2023-09-27 RX ADMIN — Medication 10 ML: at 09:02

## 2023-09-27 RX ADMIN — VALSARTAN 40 MG: 40 TABLET, FILM COATED ORAL at 20:03

## 2023-09-27 RX ADMIN — ALBUTEROL SULFATE 2 PUFF: 90 AEROSOL, METERED RESPIRATORY (INHALATION) at 12:30

## 2023-09-27 RX ADMIN — GABAPENTIN 300 MG: 300 CAPSULE ORAL at 08:59

## 2023-09-27 RX ADMIN — Medication 10 ML: at 20:04

## 2023-09-27 RX ADMIN — ALBUTEROL SULFATE 2 PUFF: 90 AEROSOL, METERED RESPIRATORY (INHALATION) at 08:15

## 2023-09-27 RX ADMIN — LEVOFLOXACIN 750 MG: 5 INJECTION, SOLUTION INTRAVENOUS at 19:50

## 2023-09-27 RX ADMIN — PERFLUTREN 1.5 ML: 6.52 INJECTION, SUSPENSION INTRAVENOUS at 11:55

## 2023-09-27 NOTE — PAYOR COMM NOTE
"[facility]  Case Managment Extender   Maine Guzmán  (P) [phone]  (F) [phone]        [employer] Provider ID# [id number]      Stephanie Munoz (63 y.o. Female)       Date of Birth   1960    Social Security Number       Address   [address] [place] [address]    Home Phone   [phone]    MRN   [id number]       Mosque   None    Marital Status                               Admission Date   9/26/23    Admission Type   Emergency    Admitting Provider   Toby Edwards MD    Attending Provider   Toby Edwards MD    Department, Room/Bed   [facility] CRITICAL CARE STEPDOWN, 20/A       Discharge Date       Discharge Disposition       Discharge Destination                                 Attending Provider: Toby Edwards MD    Allergies: Penicillins, Penicillins    Isolation: None   Infection: None   Code Status: CPR    Ht: 157.5 cm (62\")   Wt: 125 kg (276 lb 7.3 oz)    Admission Cmt: None   Principal Problem: Elevated troponin [R77.8]                   Active Insurance as of 9/26/2023       Primary Coverage       Payor Plan Insurance Group Employer/Plan Group    [employer] [place] [employer] [place]ICA[place]        Payor Plan Address Payor Plan Phone Number Payor Plan Fax Number Effective Dates    [address] [address] [phone]  8/9/2022 - None Entered    [facility] [id number]         Subscriber Name Subscriber Birth Date Member ID       STEPHANIE MUNOZ 1960 [id number]                     Emergency Contacts        (Rel.) Home Phone Work Phone Mobile Phone    Sonia Correia (Friend) [phone] -- [phone]    Hieu Neumann (Son) [phone] -- [phone]    Hieu Crump (Son) [phone] -- --                 History & Physical        Toby Edwards MD at 09/26/23 1523                [facility] Medicine Admission      Date of Admission: " 9/26/2023      Primary Care Physician: Danielle Vaughn APRN      Chief Complaint: cough, shortness and leg edema    HPI:63-year-old pleasant female comes to the ER chief complaint shortness of breath dyspnea over the last couple of weeks, leg edema, yellow sputum, fever. She has a history of COPD and heart failure. She is unable to lay flat. She endorses coughing and wheezing. Her inhalers and not been helping at home. She endorses her leg swelling as well. She does not wear oxygen at home. She denies other symptoms. with past medical history of hyperlipidemia, type 2 diabetes, obesity, current tobacco use, COPD, peripheral edema/venous stasis. Patient is following with CT vascular surgery ROSANNE Barrett for leg swelling. Echocardiogram showing normal biventricular function, grade 1 DD, right ventricular cavity is mildly dilated, trace MR, trace TR, there is mild dilation of the proximal ascending aorta at 3.9cm. Patient reports hx of asthma and COPD. She is on ventolin inhaler. Likely has SNEHA which is undiagnosed. She does have hypersomnia and snoring. DVT / PE. CTA chest showing enlargement of the main pulmonary artery at 4.4 cm, suggest pulmonary hypertension, probably also mild enlargement of the right heart. CTA was negative for PE on 5/25/23.  She was admitted to the hospital in May 2023 for acute hypoxic respiratory failure and diastolic heart failure.        Past Medical History:   Diagnosis Date    Controlled type 2 diabetes mellitus without complication, without long-term current use of insulin     COPD (chronic obstructive pulmonary disease)     DVT (deep venous thrombosis) 08/2022    left popliteal    Hyperlipemia, mixed     Pulmonary embolism 08/2022    right      Past Surgical History:   Procedure Laterality Date    CYSTOSCOPY N/A 8/18/2022    Procedure: CYSTOSCOPY;  Surgeon: Calhoun, Davide ESPINOZA MD;  Location: [facility];  Service: Urology;  Laterality: N/A;    TUBAL ABDOMINAL LIGATION        Social  "History     Socioeconomic History    Marital status:    Tobacco Use    Smoking status: Every Day     Packs/day: 2.00     Years: 30.00     Pack years: 60.00     Types: Cigarettes     Passive exposure: Current    Smokeless tobacco: Never    Tobacco comments:     2 cigarettes daily   Vaping Use    Vaping Use: Never used   Substance and Sexual Activity    Alcohol use: Yes     Comment: occasionally    Drug use: Never    Sexual activity: Defer      Allergies   Allergen Reactions    Penicillins Rash     Patient states she is allergic to all \"cillins\".    Penicillins Rash        Concurrent Medical History:  has a past medical history of Controlled type 2 diabetes mellitus without complication, without long-term current use of insulin, COPD (chronic obstructive pulmonary disease), DVT (deep venous thrombosis) (08/2022), Hyperlipemia, mixed, and Pulmonary embolism (08/2022).    Past Surgical History:  has a past surgical history that includes Tubal ligation and Cystoscopy (N/A, 8/18/2022).    Family History: family history includes Alcohol abuse in her maternal grandmother; COPD in an other family member; Diabetes in an other family member; Heart disease in an other family member; Hypertension in an other family member; Liver cancer in her maternal grandmother; Lung cancer in her maternal aunt, maternal aunt, and mother; Prostate cancer in her father; Throat cancer in her paternal grandfather; Uterine cancer in her mother.     Social History:  reports that she has been smoking cigarettes. She has a 60.00 pack-year smoking history. She has been exposed to tobacco smoke. She has never used smokeless tobacco. She reports current alcohol use. She reports that she does not use drugs.    Allergies:   Allergies   Allergen Reactions    Penicillins Rash     Patient states she is allergic to all \"cillins\".    Penicillins Rash       Medications:   Prior to Admission medications    Medication Sig Start Date End Date Taking? " Authorizing Provider   apixaban (ELIQUIS) 2.5 MG tablet tablet Take 1 tablet by mouth 2 (Two) Times a Day. 8/4/23   Rafael Cisse APRN   furosemide (LASIX) 20 MG tablet Take 1 tablet by mouth 2 (Two) Times a Day. 7/27/23   Mavis Avelar APRN   gabapentin (NEURONTIN) 300 MG capsule Take 1 capsule by mouth 3 (Three) Times a Day.    Janiya Montenegro MD   nicotine (NICODERM CQ) 21 MG/24HR patch Place 1 patch on the skin as directed by provider Daily. 9/7/23   Ian Childs MD   pentoxifylline (TRENtal) 400 MG CR tablet Take 1 tablet by mouth 2 (Two) Times a Day. 8/29/22   Rafael Cisse APRN   rosuvastatin (CRESTOR) 10 MG tablet Take 1 tablet by mouth Daily. 8/25/22   Janiya Montenegro MD   tiotropium bromide-olodaterol (STIOLTO RESPIMAT) 2.5-2.5 MCG/ACT aerosol solution inhaler Inhale 2 puffs Daily. 8/23/22   Mimi Kern APRN   traMADol (ULTRAM) 50 MG tablet Take 1 tablet by mouth Every 6 (Six) Hours As Needed for Moderate Pain. 8/25/22   Janiya Montenegro MD   valsartan (DIOVAN) 40 MG tablet Take 1 tablet by mouth Daily. 7/27/23   Mavis Avelar APRN   VENTOLIN  (90 BASE) MCG/ACT inhaler  1/16/17   Janiya Montenegro MD     I have utilized all available immediate resources to obtain, update, or review the patient's current medications (including all prescriptions, over-the-counter products, herbals, cannabis/cannabidiol products, and vitamin/mineral/dietary (nutritional) supplements).      Review of Systems:  Review of Systems   Constitutional:  Negative for activity change, appetite change, chills, diaphoresis and fatigue.   HENT:  Negative for congestion, ear discharge, hearing loss, rhinorrhea, sinus pain, sneezing and sore throat.    Eyes:  Negative for photophobia, discharge and visual disturbance.   Respiratory:  Negative for cough, chest tightness, shortness of breath and wheezing.    Cardiovascular:  Negative for chest pain and palpitations.   Gastrointestinal:   Negative for abdominal distention, abdominal pain, blood in stool, diarrhea, nausea and vomiting.   Endocrine: Negative for cold intolerance, heat intolerance, polydipsia, polyphagia and polyuria.   Genitourinary:  Negative for dysuria, flank pain, hematuria and urgency.   Musculoskeletal:  Negative for arthralgias, joint swelling and myalgias.   Skin:  Negative for color change.   Allergic/Immunologic: Negative for food allergies.   Neurological:  Negative for dizziness, seizures, syncope, speech difficulty, weakness and headaches.   Hematological:  Negative for adenopathy. Does not bruise/bleed easily.   Psychiatric/Behavioral:  Negative for confusion, hallucinations, self-injury and suicidal ideas. The patient is not nervous/anxious.     Otherwise complete ROS is negative except as mentioned above.    Physical Exam:   Heart Rate:  [85-94] 88  Resp:  [18] 18  BP: (134-143)/(77-90) 139/86  Physical Exam  Constitutional:       Appearance: Normal appearance.   HENT:      Head: Normocephalic and atraumatic.      Right Ear: Tympanic membrane normal.      Left Ear: Tympanic membrane normal.      Nose: Nose normal.      Mouth/Throat:      Mouth: Mucous membranes are moist.   Eyes:      Pupils: Pupils are equal, round, and reactive to light.   Cardiovascular:      Rate and Rhythm: Normal rate and regular rhythm.      Pulses: Normal pulses.      Heart sounds: Normal heart sounds.   Pulmonary:      Effort: Pulmonary effort is normal.      Breath sounds: Rales present.   Abdominal:      General: Abdomen is flat. Bowel sounds are normal.      Palpations: Abdomen is soft.   Musculoskeletal:         General: Normal range of motion.      Cervical back: Normal range of motion and neck supple.      Right lower leg: Edema present.      Left lower leg: Edema present.   Skin:     General: Skin is warm and dry.      Capillary Refill: Capillary refill takes less than 2 seconds.   Neurological:      General: No focal deficit present.       Mental Status: She is alert and oriented to person, place, and time.   Psychiatric:         Mood and Affect: Mood normal.         Behavior: Behavior normal.         Thought Content: Thought content normal.         Judgment: Judgment normal.         Results Reviewed:  I have personally reviewed current lab, radiology, and data and agree with results.  Lab Results (last 24 hours)       Procedure Component Value Units Date/Time    High Sensitivity Troponin T 2Hr [[id number]]  (Abnormal) Collected: 09/26/23 1422    Specimen: Blood Updated: 09/26/23 1454     HS Troponin T 1,517 ng/L      Troponin T Delta -94 ng/L     Narrative:      High Sensitive Troponin T Reference Range:  <10.0 ng/L- Negative Female for AMI  <15.0 ng/L- Negative Male for AMI  >=10 - Abnormal Female indicating possible myocardial injury.  >=15 - Abnormal Male indicating possible myocardial injury.   Clinicians would have to utilize clinical acumen, EKG, Troponin, and serial changes to determine if it is an Acute Myocardial Infarction or myocardial injury due to an underlying chronic condition.         aPTT [[id number]]  (Normal) Collected: 09/26/23 1422    Specimen: Blood Updated: 09/26/23 1450     PTT 26.3 seconds     Narrative:      The recommended Heparin therapeutic range is 68-97 seconds.    Protime-INR [[id number]]  (Normal) Collected: 09/26/23 1422    Specimen: Blood Updated: 09/26/23 1450     Protime 14.5 Seconds      INR 1.14    Narrative:      Therapeutic range for most indications is 2.0-3.0 INR,  or 2.5-3.5 for mechanical heart valves.    [place] Draw [[id number]] Collected: 09/26/23 1422    Specimen: Blood Updated: 09/26/23 1430    Narrative:      The following orders were created for panel order [place] Draw.  Procedure                               Abnormality         Status                     ---------                               -----------         ------                     Green Top (Gel)[[id number]]                                                              Lavender Top[[id number]]                                                                Gold Top - SST[[id number]]                                                              Light Blue Top[[id number]]                                   In process                   Please view results for these tests on the individual orders.    Light Blue Top [[id number]] Collected: 09/26/23 1422    Specimen: Blood Updated: 09/26/23 1427    Single High Sensitivity Troponin T [[id number]]  (Abnormal) Collected: 09/26/23 1238    Specimen: Blood Updated: 09/26/23 1403     HS Troponin T 1,611 ng/L     Narrative:      High Sensitive Troponin T Reference Range:  <10.0 ng/L- Negative Female for AMI  <15.0 ng/L- Negative Male for AMI  >=10 - Abnormal Female indicating possible myocardial injury.  >=15 - Abnormal Male indicating possible myocardial injury.   Clinicians would have to utilize clinical acumen, EKG, Troponin, and serial changes to determine if it is an Acute Myocardial Infarction or myocardial injury due to an underlying chronic condition.         Blood Gas, Arterial - [[id number]]  (Abnormal) Collected: 09/26/23 1406    Specimen: Arterial Blood Updated: 09/26/23 1401     Site Left Radial     Manas's Test Positive     pH, Arterial 7.361 pH units      pCO2, Arterial 69.1 mm Hg      Comment: 86 Value above critical limit        pO2, Arterial 89.6 mm Hg      HCO3, Arterial 39.1 mmol/L      Comment: 83 Value above reference range        Base Excess, Arterial 10.8 mmol/L      Comment: 83 Value above reference range        O2 Saturation, Arterial 98.1 %      Barometric Pressure for Blood Gas 750 mmHg      Modality Nasal Cannula     Flow Rate 4.5 lpm      Ventilator Mode NA     Collected by Jenny Villa     Comment: Meter: [id number]     :  [id number]       Extra Tubes [[id number]] Collected: 09/26/23 1245    Specimen: Blood, Venous Line Updated: 09/26/23 1400    Narrative:      The following orders  were created for panel order Extra Tubes.  Procedure                               Abnormality         Status                     ---------                               -----------         ------                     Gold Top - SST[[id number]]                                   Final result               Light Blue Top[[id number]]                                   Final result                 Please view results for these tests on the individual orders.    Gold Top - SST [[id number]] Collected: 09/26/23 1245    Specimen: Blood Updated: 09/26/23 1400     Extra Tube Hold for add-ons.     Comment: Auto resulted.       Light Blue Top [[id number]] Collected: 09/26/23 1245    Specimen: Blood Updated: 09/26/23 1400     Extra Tube Hold for add-ons.     Comment: Auto resulted       BNP [[id number]]  (Abnormal) Collected: 09/26/23 1238    Specimen: Blood Updated: 09/26/23 1356     proBNP 3,578.0 pg/mL     Narrative:      This assay is used as an aid in the diagnosis of individuals suspected of having heart failure. It can be used as an aid in the diagnosis of acute decompensated heart failure (ADHF) in patients presenting with signs and symptoms of ADHF to the emergency department (ED). In addition, NT-proBNP of <300 pg/mL indicates ADHF is not likely.    Comprehensive Metabolic Panel [[id number]]  (Abnormal) Collected: 09/26/23 1238    Specimen: Blood Updated: 09/26/23 1320     Glucose 159 mg/dL      BUN 9 mg/dL      Creatinine 0.77 mg/dL      Sodium 143 mmol/L      Potassium 4.8 mmol/L      Chloride 100 mmol/L      CO2 37.0 mmol/L      Calcium 8.3 mg/dL      Total Protein 6.2 g/dL      Albumin 3.2 g/dL      ALT (SGPT) 41 U/L      AST (SGOT) 195 U/L      Alkaline Phosphatase 109 U/L      Total Bilirubin 0.6 mg/dL      Globulin 3.0 gm/dL      A/G Ratio 1.1 g/dL      BUN/Creatinine Ratio 11.7     Anion Gap 6.0 mmol/L      eGFR 87.9 mL/min/1.73     Narrative:      GFR Normal >60  Chronic Kidney Disease <60  Kidney Failure <15      CBC  & Differential [[id number]]  (Abnormal) Collected: 09/26/23 1238    Specimen: Blood Updated: 09/26/23 1249    Narrative:      The following orders were created for panel order CBC & Differential.  Procedure                               Abnormality         Status                     ---------                               -----------         ------                     CBC Auto Differential[[id number]]        Abnormal            Final result                 Please view results for these tests on the individual orders.    CBC Auto Differential [[id number]]  (Abnormal) Collected: 09/26/23 1238    Specimen: Blood Updated: 09/26/23 1249     WBC 7.74 10*3/mm3      RBC 4.83 10*6/mm3      Hemoglobin 12.6 g/dL      Hematocrit 44.0 %      MCV 91.1 fL      MCH 26.1 pg      MCHC 28.6 g/dL      RDW 16.3 %      RDW-SD 54.8 fl      MPV 10.5 fL      Platelets 199 10*3/mm3      Neutrophil % 64.2 %      Lymphocyte % 19.0 %      Monocyte % 15.5 %      Eosinophil % 0.1 %      Basophil % 0.6 %      Immature Grans % 0.6 %      Neutrophils, Absolute 4.96 10*3/mm3      Lymphocytes, Absolute 1.47 10*3/mm3      Monocytes, Absolute 1.20 10*3/mm3      Eosinophils, Absolute 0.01 10*3/mm3      Basophils, Absolute 0.05 10*3/mm3      Immature Grans, Absolute 0.05 10*3/mm3      nRBC 0.6 /100 WBC           Imaging Results (Last 24 Hours)       Procedure Component Value Units Date/Time    US Guided Vascular Access [[id number]] Collected: 09/26/23 1423     Updated: 09/26/23 1427    Narrative:      The right  arm was utilized for vascular access.  Images demonstrate patency of  the basilic.  The vessel was accessed under direct ultrasound guidance.  Permanent images were saved in the chart/PACS system.    IR Insert Midline Without Port Pump 5 Plus [[id number]] Resulted: 09/26/23 1410     Updated: 09/26/23 1410    Narrative:      This procedure was auto-finalized with no dictation required.    XR Chest 2 View [[id number]] Collected: 09/26/23 1310     Updated:  09/26/23 1312    Narrative:      XR CHEST 2 VW    HISTORY: soa    FINDINGS: Left lower lobe/retrocardiac opacities potentially representing  atelectasis versus inflammatory/infectious process.  No pleural effusion.  No  pneumothorax..  The cardiomediastinal silhouette and upper abdomen are  unremarkable. No acute osseous abnormality.                  Assessment:    Active Hospital Problems    Diagnosis     **Elevated troponin     NSTEMI (non-ST elevated myocardial infarction)              Plan:63 years old female patient with cough, shortness of breath, fever, CTA  chest with RLL infiltrates, suggestive of pneumonia. Besides with chest pain and elevated troponin, abnormal EKG. Already started management for NSTEMI, cardiology on board.  1.COPD exacerbation, chronic hypercapnic respiratory failure, bipap at night  2.RLL pneumonia, IV antibiotics  3.class 3 obesity  4.elevated troponin, delta  -94, EKG changes ST depression in inferior lead, heparin drip, aspirin, crestor, carvedilol  5.elevated transaminases, secondary to CHF  6.HFpEF  7.aortic dilatation        Plan  Admit to stepdown unit  Bipap at night  IV levaquin  Cardiology consult  Blood cultures  Heparin drip  Aspirin  Statin  IV lasix    Medical Decision Making  Number and Complexity of problems: 2  Differential Diagnosis: COPD exacerbation, pneumonia    Conditions and Status:        Condition is unchanged.     [facility] Data  External documents reviewed: previous medical records  My EKG interpretation: sinus rhythm, RBBB  My CT interpretation: RLL pneumonia  Tests considered but not ordered: none     Decision rules/scores evaluated (example [id number], Wells, etc): n/a     Discussed with: the patient     Treatment Plan  See above    Care Planning  Shared decision making: patient  Code status and discussions: full code    Disposition  Social Determinants of Health that impact treatment or disposition: none  I expect the patient to be discharged to home in 7  days.      I confirmed that the patient's Advance Care Plan is present, code status is documented, or surrogate decision maker is listed in the patient's medical record.    I discussed the patient's findings and my recommendations with: the patient      This document has been electronically signed by Toby Edwards MD on September 26, 2023 15:23 CDT                  Electronically signed by Toby Edwards MD at 09/26/23 1604          Emergency Department Notes        Tal Lomeli, RN at 09/26/23 1601          Nursing report ED to floor  Stephanie Bradshaw  63 y.o.  female    HPI:   Chief Complaint   Patient presents with    Shortness of Breath       Admitting doctor:   Toby Edwards MD    Consulting provider(s):  Consults       Date and Time Order Name Status Description    9/26/2023  2:27 PM Inpatient Cardiology Consult Completed              Admitting diagnosis:   The primary encounter diagnosis was Elevated troponin. Diagnoses of Hypoxia and SOB (shortness of breath) were also pertinent to this visit.    Code status:   Current Code Status       Date Active Code Status Order ID Comments User Context       9/26/2023 1546 CPR (Attempt to Resuscitate) [id number]  Toby Edwards MD ED        Question Answer    Code Status (Patient has no pulse and is not breathing) CPR (Attempt to Resuscitate)    Medical Interventions (Patient has pulse or is breathing) Full Support    Level Of Support Discussed With Patient                    Allergies:   Penicillins and Penicillins    Intake and Output  No intake or output data in the 24 hours ending 09/26/23 1601    Weight:       09/26/23  1102   Weight: 122 kg (270 lb)       Most recent vitals:   Vitals:    09/26/23 1230 09/26/23 1338 09/26/23 1342 09/26/23 1415   BP: 143/90   139/86   BP Location:       Patient Position:       Pulse: 86 85 85 88   Resp:   18    TempSrc:       SpO2: 91% 94% 96% (!) 89%   Weight:       Height:         Oxygen Therapy: 4L NC    Active LDAs/IV  Access:   Lines, Drains & Airways       Active LDAs       Name Placement date Placement time Site Days    Midline Catheter - Double Lumen 09/26/23 Right Basilic 09/26/23  1348  -- less than 1                    Labs (abnormal labs have a star):   Labs Reviewed   COMPREHENSIVE METABOLIC PANEL - Abnormal; Notable for the following components:       Result Value    Glucose 159 (*)     CO2 37.0 (*)     Calcium 8.3 (*)     Albumin 3.2 (*)     ALT (SGPT) 41 (*)     AST (SGOT) 195 (*)     All other components within normal limits    Narrative:     GFR Normal >60  Chronic Kidney Disease <60  Kidney Failure <15     CBC WITH AUTO DIFFERENTIAL - Abnormal; Notable for the following components:    MCH 26.1 (*)     MCHC 28.6 (*)     RDW 16.3 (*)     RDW-SD 54.8 (*)     Lymphocyte % 19.0 (*)     Monocyte % 15.5 (*)     Eosinophil % 0.1 (*)     Immature Grans % 0.6 (*)     Monocytes, Absolute 1.20 (*)     nRBC 0.6 (*)     All other components within normal limits   BNP (IN-HOUSE) - Abnormal; Notable for the following components:    proBNP 3,578.0 (*)     All other components within normal limits    Narrative:     This assay is used as an aid in the diagnosis of individuals suspected of having heart failure. It can be used as an aid in the diagnosis of acute decompensated heart failure (ADHF) in patients presenting with signs and symptoms of ADHF to the emergency department (ED). In addition, NT-proBNP of <300 pg/mL indicates ADHF is not likely.   SINGLE HSTROPONIN T - Abnormal; Notable for the following components:    HS Troponin T 1,611 (*)     All other components within normal limits    Narrative:     High Sensitive Troponin T Reference Range:  <10.0 ng/L- Negative Female for AMI  <15.0 ng/L- Negative Male for AMI  >=10 - Abnormal Female indicating possible myocardial injury.  >=15 - Abnormal Male indicating possible myocardial injury.   Clinicians would have to utilize clinical acumen, EKG, Troponin, and serial changes to  determine if it is an Acute Myocardial Infarction or myocardial injury due to an underlying chronic condition.        BLOOD GAS, ARTERIAL - Abnormal; Notable for the following components:    pCO2, Arterial 69.1 (*)     HCO3, Arterial 39.1 (*)     Base Excess, Arterial 10.8 (*)     All other components within normal limits   HIGH SENSITIVITIY TROPONIN T 2HR - Abnormal; Notable for the following components:    HS Troponin T 1,517 (*)     Troponin T Delta -94 (*)     All other components within normal limits    Narrative:     High Sensitive Troponin T Reference Range:  <10.0 ng/L- Negative Female for AMI  <15.0 ng/L- Negative Male for AMI  >=10 - Abnormal Female indicating possible myocardial injury.  >=15 - Abnormal Male indicating possible myocardial injury.   Clinicians would have to utilize clinical acumen, EKG, Troponin, and serial changes to determine if it is an Acute Myocardial Infarction or myocardial injury due to an underlying chronic condition.        PROTIME-INR - Normal    Narrative:     Therapeutic range for most indications is 2.0-3.0 INR,  or 2.5-3.5 for mechanical heart valves.   APTT - Normal    Narrative:     The recommended Heparin therapeutic range is 68-97 seconds.   RAINBOW DRAW    Narrative:     The following orders were created for panel order [place] Draw.  Procedure                               Abnormality         Status                     ---------                               -----------         ------                     Green Top (Gel)[[id number]]                                                             Lavender Top[[id number]]                                                                Gold Top - SST[[id number]]                                                              Light Blue Top[[id number]]                                   Final result                 Please view results for these tests on the individual orders.   BLOOD GAS, ARTERIAL   CBC AND DIFFERENTIAL    Narrative:      The following orders were created for panel order CBC & Differential.  Procedure                               Abnormality         Status                     ---------                               -----------         ------                     CBC Auto Differential[[id number]]        Abnormal            Final result                 Please view results for these tests on the individual orders.   EXTRA TUBES    Narrative:     The following orders were created for panel order Extra Tubes.  Procedure                               Abnormality         Status                     ---------                               -----------         ------                     Gold Top - SST[[id number]]                                   Final result               Light Blue Top[[id number]]                                   Final result                 Please view results for these tests on the individual orders.   GOLD TOP - SST   LIGHT BLUE TOP   LIGHT BLUE TOP       Meds given in ED:   Medications   sodium chloride 0.9 % flush 10 mL (has no administration in time range)   heparin 02133 units/250 mL (100 units/mL) in 0.45 % NaCl infusion (8.2 Units/kg/hr × 122 kg Intravenous New Bag 9/26/23 1523)     And   heparin (porcine) 5000 UNIT/ML injection 4,000 Units (has no administration in time range)     And   heparin (porcine) 5000 UNIT/ML injection 3,700 Units (has no administration in time range)   furosemide (LASIX) injection 80 mg (has no administration in time range)   furosemide (LASIX) injection 40 mg (has no administration in time range)   nicotine (NICODERM CQ) 21 MG/24HR patch 1 patch (has no administration in time range)   pentoxifylline (TRENtal) CR tablet 400 mg (has no administration in time range)   rosuvastatin (CRESTOR) tablet 10 mg (has no administration in time range)   valsartan (DIOVAN) tablet 40 mg (has no administration in time range)   carvedilol (COREG) tablet 3.125 mg (has no administration in time range)    aspirin EC tablet 81 mg (has no administration in time range)   ipratropium-albuterol (DUO-NEB) nebulizer solution 6 mL (6 mL Nebulization Given 9/26/23 1342)   methylPREDNISolone sodium succinate (SOLU-Medrol) injection 125 mg (125 mg Intravenous Given 9/26/23 1435)   aspirin chewable tablet 324 mg (324 mg Oral Given 9/26/23 1434)   iopamidol (ISOVUE-370) 76 % injection 100 mL (60 mL Intravenous Given 9/26/23 1536)     heparin, 8.2 Units/kg/hr, Last Rate: 8.2 Units/kg/hr (09/26/23 1523)         NIH Stroke Scale:       Isolation/Infection(s):  No active isolations   No active infections     COVID Testing  Collected NA  Resulted NA    Nursing report ED to floor:  Mental status: A&O x4  Ambulatory status: [place]  Precautions: None    ED nurse phone [phone]    Electronically signed by Tal Lomeli RN at 09/26/23 1602       Yelena Lewis RN at 09/26/23 1325          Pt is sitting up in chair beside bed. Oxygen was not on at time this RN rounded on pt. Pt oxygen saturation 70-73% on RA. Pt placed back on 4L per NC. Primary provider/nurse made aware at this time.    Electronically signed by Yelena Lewis RN at 09/26/23 1326       Brando Barnes MD at 09/26/23 1308          Subjective   History of Present Illness  63-year-old pleasant female comes to the ER chief complaint shortness of breath dyspnea over the last couple of days.  She has a history of COPD and heart failure.  She is unable to lay flat.  She endorses coughing and wheezing.  Her inhalers and not been helping at home.  She endorses her leg swelling as well.  She does not wear oxygen at home.  She denies other symptoms.    History provided by:  Patient   used: No      Review of Systems   Constitutional:  Positive for activity change and fatigue. Negative for chills and fever.   HENT:  Negative for drooling.    Eyes:  Negative for redness.   Respiratory:  Positive for cough, shortness of breath and wheezing. Negative  "for chest tightness.    Cardiovascular:  Positive for leg swelling. Negative for chest pain and palpitations.   Gastrointestinal:  Negative for abdominal pain, constipation, diarrhea, nausea and vomiting.   Genitourinary:  Negative for dysuria and flank pain.   Skin:  Negative for color change.   Neurological:  Negative for seizures.   Psychiatric/Behavioral:  Negative for confusion.      Past Medical History:   Diagnosis Date    Controlled type 2 diabetes mellitus without complication, without long-term current use of insulin     COPD (chronic obstructive pulmonary disease)     DVT (deep venous thrombosis) 08/2022    left popliteal    Hyperlipemia, mixed     Pulmonary embolism 08/2022    right       Allergies   Allergen Reactions    Penicillins Rash     Patient states she is allergic to all \"cillins\".    Penicillins Rash       Past Surgical History:   Procedure Laterality Date    CYSTOSCOPY N/A 8/18/2022    Procedure: CYSTOSCOPY;  Surgeon: Swoope, Davide ESPINOZA MD;  Location: [facility];  Service: Urology;  Laterality: N/A;    TUBAL ABDOMINAL LIGATION         Family History   Problem Relation Age of Onset    Prostate cancer Father     Uterine cancer Mother     Lung cancer Mother     Throat cancer Paternal Grandfather     Liver cancer Maternal Grandmother     Alcohol abuse Maternal Grandmother     Lung cancer Maternal Aunt     Lung cancer Maternal Aunt     COPD Other     Heart disease Other     Hypertension Other     Diabetes Other     Breast cancer Neg Hx     Ovarian cancer Neg Hx     Colon cancer Neg Hx        Social History     Socioeconomic History    Marital status:    Tobacco Use    Smoking status: Every Day     Packs/day: 2.00     Years: 30.00     Pack years: 60.00     Types: Cigarettes     Passive exposure: Current    Smokeless tobacco: Never    Tobacco comments:     2 cigarettes daily   Vaping Use    Vaping Use: Never used   Substance and Sexual Activity    Alcohol use: Yes     Comment: occasionally    " Drug use: Never    Sexual activity: Defer           Objective   Vitals:    09/26/23 1230 09/26/23 1338 09/26/23 1342 09/26/23 1415   BP: 143/90   139/86   BP Location:       Patient Position:       Pulse: 86 85 85 88   Resp:   18    TempSrc:       SpO2: 91% 94% 96% (!) 89%   Weight:       Height:           Physical Exam  Vitals and nursing note reviewed.   Constitutional:       General: She is not in acute distress.     Appearance: She is well-developed. She is obese. She is ill-appearing. She is not toxic-appearing or diaphoretic.   Eyes:      General: No scleral icterus.     Conjunctiva/sclera: Conjunctivae normal.   Pulmonary:      Effort: Pulmonary effort is normal. No accessory muscle usage or respiratory distress.   Chest:      Chest wall: No tenderness.   Abdominal:      Palpations: Abdomen is soft.      Tenderness: There is no abdominal tenderness (deep palpation). There is no guarding or rebound.   Musculoskeletal:      Right lower leg: Edema present.      Left lower leg: Edema present.   Skin:     General: Skin is warm and dry.      Capillary Refill: Capillary refill takes less than 2 seconds.   Neurological:      Mental Status: She is alert and oriented to person, place, and time.       Procedures          ED Course  ED Course as of 09/26/23 1501   Tue Sep 26, 2023   1407 HS Troponin T(!!): 1,611  Spoke with Cardiology who recommend heparin at this time. [BH]      ED Course User Index  [BH] Brando Barnes MD      Results for orders placed or performed during the hospital encounter of 09/26/23   Comprehensive Metabolic Panel    Specimen: Blood   Result Value Ref Range    Glucose 159 (H) 65 - 99 mg/dL    BUN 9 8 - 23 mg/dL    Creatinine 0.77 0.57 - 1.00 mg/dL    Sodium 143 136 - 145 mmol/L    Potassium 4.8 3.5 - 5.2 mmol/L    Chloride 100 98 - 107 mmol/L    CO2 37.0 (H) 22.0 - 29.0 mmol/L    Calcium 8.3 (L) 8.6 - 10.5 mg/dL    Total Protein 6.2 6.0 - 8.5 g/dL    Albumin 3.2 (L) 3.5 - 5.2 g/dL    ALT  (SGPT) 41 (H) 1 - 33 U/L    AST (SGOT) 195 (H) 1 - 32 U/L    Alkaline Phosphatase 109 39 - 117 U/L    Total Bilirubin 0.6 0.0 - 1.2 mg/dL    Globulin 3.0 gm/dL    A/G Ratio 1.1 g/dL    BUN/Creatinine Ratio 11.7 7.0 - 25.0    Anion Gap 6.0 5.0 - 15.0 mmol/L    eGFR 87.9 >60.0 mL/min/1.73   CBC Auto Differential    Specimen: Blood   Result Value Ref Range    WBC 7.74 3.40 - 10.80 10*3/mm3    RBC 4.83 3.77 - 5.28 10*6/mm3    Hemoglobin 12.6 12.0 - 15.9 g/dL    Hematocrit 44.0 34.0 - 46.6 %    MCV 91.1 79.0 - 97.0 fL    MCH 26.1 (L) 26.6 - 33.0 pg    MCHC 28.6 (L) 31.5 - 35.7 g/dL    RDW 16.3 (H) 12.3 - 15.4 %    RDW-SD 54.8 (H) 37.0 - 54.0 fl    MPV 10.5 6.0 - 12.0 fL    Platelets 199 140 - 450 10*3/mm3    Neutrophil % 64.2 42.7 - 76.0 %    Lymphocyte % 19.0 (L) 19.6 - 45.3 %    Monocyte % 15.5 (H) 5.0 - 12.0 %    Eosinophil % 0.1 (L) 0.3 - 6.2 %    Basophil % 0.6 0.0 - 1.5 %    Immature Grans % 0.6 (H) 0.0 - 0.5 %    Neutrophils, Absolute 4.96 1.70 - 7.00 10*3/mm3    Lymphocytes, Absolute 1.47 0.70 - 3.10 10*3/mm3    Monocytes, Absolute 1.20 (H) 0.10 - 0.90 10*3/mm3    Eosinophils, Absolute 0.01 0.00 - 0.40 10*3/mm3    Basophils, Absolute 0.05 0.00 - 0.20 10*3/mm3    Immature Grans, Absolute 0.05 0.00 - 0.05 10*3/mm3    nRBC 0.6 (H) 0.0 - 0.2 /100 WBC   BNP    Specimen: Blood   Result Value Ref Range    proBNP 3,578.0 (H) 0.0 - 900.0 pg/mL   Single High Sensitivity Troponin T    Specimen: Blood   Result Value Ref Range    HS Troponin T 1,611 (C) <10 ng/L   Blood Gas, Arterial -    Specimen: Arterial Blood   Result Value Ref Range    Site Left Radial     Manas's Test Positive     pH, Arterial 7.361 7.350 - 7.450 pH units    pCO2, Arterial 69.1 (C) 35.0 - 45.0 mm Hg    pO2, Arterial 89.6 83.0 - 108.0 mm Hg    HCO3, Arterial 39.1 (H) 20.0 - 26.0 mmol/L    Base Excess, Arterial 10.8 (H) 0.0 - 2.0 mmol/L    O2 Saturation, Arterial 98.1 94.0 - 99.0 %    Barometric Pressure for Blood Gas 750 mmHg    Modality Nasal Cannula      Flow Rate 4.5 lpm    Ventilator Mode NA     Collected by Jenny Villa    High Sensitivity Troponin T 2Hr    Specimen: Blood   Result Value Ref Range    HS Troponin T 1,517 (C) <10 ng/L    Troponin T Delta -94 (L) >=-4 - <+4 ng/L   Protime-INR    Specimen: Blood   Result Value Ref Range    Protime 14.5 11.1 - 15.3 Seconds    INR 1.14 0.80 - 1.20   aPTT    Specimen: Blood   Result Value Ref Range    PTT 26.3 20.0 - 40.3 seconds   ECG 12 Lead Dyspnea   Result Value Ref Range    QT Interval 374 ms    QTC Interval 452 ms   ECG 12 Lead Dyspnea   Result Value Ref Range    QT Interval 358 ms    QTC Interval 435 ms   Gold Top - SST   Result Value Ref Range    Extra Tube Hold for add-ons.    Light Blue Top   Result Value Ref Range    Extra Tube Hold for add-ons.      US Guided Vascular Access   Final Result      IR Insert Midline Without Port Pump 5 Plus   Final Result      XR Chest 2 View         CT Angiogram Chest    (Results Pending)                                            Medical Decision Making  Vital signs are stable, afebrile.  Patient satting well on several liters nasal cannula oxygen.  She does not wear it at home.  ABG PCO2 is 69.  BNP elevated at 3600.  Troponin 1600.  Renal functions normal.  Chest x-ray shows opacities concerning for atelectasis versus pneumonia.  White count is normal.  Patient is a breathing treatments and Solu-Medrol.  It helped improve her wheezing.  Discussed with cardiology who recommended start the patient on a heparin drip for the elevated troponin.  Patient told cardiology that she has had intermittent chest pain for the last several days, but does not have chest pain right now.  Spoke with the on-call hospitalist Dr. Edwards who agrees to admit the patient.    Problems Addressed:  Elevated troponin: complicated acute illness or injury  Hypoxia: complicated acute illness or injury  SOB (shortness of breath): complicated acute illness or injury    Amount and/or Complexity of  Data Reviewed  Labs: ordered. Decision-making details documented in ED Course.  Radiology: ordered.  ECG/medicine tests: ordered.    Risk  OTC drugs.  Prescription drug management.  Decision regarding hospitalization.    Critical Care  Total time providing critical care: 33 minutes      Final diagnoses:   Elevated troponin   Hypoxia   SOB (shortness of breath)       ED Disposition  ED Disposition       ED Disposition   Decision to Admit    Condition   --    Comment   Level of Care: Stepdown [25]   Diagnosis: Elevated troponin [278580]   Admitting Physician: SRINIVASA ROBIN [[id number]]   Attending Physician: SRINIVASA ROBIN [[id number]]                 No follow-up provider specified.       Medication List      No changes were made to your prescriptions during this visit.            Brando Barnes MD  09/26/23 1501      Electronically signed by Brando Barnes MD at 09/26/23 1501       Tal Lomeli, RN at 09/26/23 1133          Pt states hx of COPD. Pt c/o sob that started a week ago. Pt does not use home O2 at this time. Pt is requesting a PICC line due to hard stick from last hospital stay.    Electronically signed by Tal Lomeli, RN at 09/26/23 1135       Lab Results (last 24 hours)       Procedure Component Value Units Date/Time    aPTT [[id number]]  (Abnormal) Collected: 09/27/23 0502    Specimen: Blood Updated: 09/27/23 0533     PTT 61.7 seconds     Narrative:      The recommended Heparin therapeutic range is 68-97 seconds.    Protime-INR [[id number]]  (Normal) Collected: 09/27/23 0502    Specimen: Blood Updated: 09/27/23 0533     Protime 14.8 Seconds      INR 1.17    Narrative:      Therapeutic range for most indications is 2.0-3.0 INR,  or 2.5-3.5 for mechanical heart valves.    STAT Lactic Acid, Reflex [[id number]]  (Abnormal) Collected: 09/27/23 0502    Specimen: Blood Updated: 09/27/23 0533     Lactate 2.5 mmol/L     Magnesium [[id number]]  (Normal) Collected: 09/27/23 0502    Specimen: Blood Updated: 09/27/23  0533     Magnesium 2.1 mg/dL     Basic Metabolic Panel [[id number]]  (Abnormal) Collected: 09/27/23 0502    Specimen: Blood Updated: 09/27/23 0533     Glucose 153 mg/dL      BUN 12 mg/dL      Creatinine 0.83 mg/dL      Sodium 138 mmol/L      Potassium 4.5 mmol/L      Chloride 94 mmol/L      CO2 39.0 mmol/L      Calcium 8.3 mg/dL      BUN/Creatinine Ratio 14.5     Anion Gap 5.0 mmol/L      eGFR 80.3 mL/min/1.73     Narrative:      GFR Normal >60  Chronic Kidney Disease <60  Kidney Failure <15      Phosphorus [[id number]]  (Normal) Collected: 09/27/23 0502    Specimen: Blood Updated: 09/27/23 0533     Phosphorus 3.2 mg/dL     CBC & Differential [[id number]]  (Abnormal) Collected: 09/27/23 0502    Specimen: Blood Updated: 09/27/23 0520    Narrative:      The following orders were created for panel order CBC & Differential.  Procedure                               Abnormality         Status                     ---------                               -----------         ------                     CBC Auto Differential[[id number]]        Abnormal            Final result                 Please view results for these tests on the individual orders.    CBC Auto Differential [[id number]]  (Abnormal) Collected: 09/27/23 0502    Specimen: Blood Updated: 09/27/23 0520     WBC 8.53 10*3/mm3      RBC 4.68 10*6/mm3      Hemoglobin 12.1 g/dL      Hematocrit 42.2 %      MCV 90.2 fL      MCH 25.9 pg      MCHC 28.7 g/dL      RDW 16.0 %      RDW-SD 52.8 fl      MPV 12.4 fL      Platelets 210 10*3/mm3      Neutrophil % 76.9 %      Lymphocyte % 12.2 %      Monocyte % 10.2 %      Eosinophil % 0.0 %      Basophil % 0.1 %      Immature Grans % 0.6 %      Neutrophils, Absolute 6.56 10*3/mm3      Lymphocytes, Absolute 1.04 10*3/mm3      Monocytes, Absolute 0.87 10*3/mm3      Eosinophils, Absolute 0.00 10*3/mm3      Basophils, Absolute 0.01 10*3/mm3      Immature Grans, Absolute 0.05 10*3/mm3      nRBC 0.4 /100 WBC     STAT Lactic Acid, Reflex  [[id number]]  (Abnormal) Collected: 09/27/23 0151    Specimen: Blood Updated: 09/27/23 0218     Lactate 3.5 mmol/L     aPTT [[id number]]  (Normal) Collected: 09/26/23 2255    Specimen: Blood Updated: 09/26/23 2330     PTT 35.6 seconds     Narrative:      The recommended Heparin therapeutic range is 68-97 seconds.    STAT Lactic Acid, Reflex [[id number]]  (Abnormal) Collected: 09/26/23 2255    Specimen: Blood Updated: 09/26/23 2328     Lactate 4.8 mmol/L     Lactic Acid, Plasma [[id number]]  (Abnormal) Collected: 09/26/23 2054    Specimen: Blood Updated: 09/26/23 2115     Lactate 3.3 mmol/L     Blood Culture - Blood, Arm, Right [[id number]] Collected: 09/26/23 2054    Specimen: Blood from Arm, Right Updated: 09/26/23 2054    Blood Culture - Blood, Hand, Left [[id number]] Collected: 09/26/23 2054    Specimen: Blood from Hand, Left Updated: 09/26/23 2054    Magnesium [[id number]]  (Normal) Collected: 09/26/23 1422    Specimen: Blood Updated: 09/26/23 2027     Magnesium 2.0 mg/dL     Phosphorus [[id number]]  (Normal) Collected: 09/26/23 1422    Specimen: Blood Updated: 09/26/23 2027     Phosphorus 3.1 mg/dL     [place] Draw [[id number]] Collected: 09/26/23 1422    Specimen: Blood Updated: 09/26/23 1531    Narrative:      The following orders were created for panel order [place] Draw.  Procedure                               Abnormality         Status                     ---------                               -----------         ------                     Green Top (Gel)[[id number]]                                                             Lavender Top[[id number]]                                                                Gold Top - SST[[id number]]                                                              Light Blue Top[[id number]]                                   Final result                 Please view results for these tests on the individual orders.    Light Blue Top [[id number]] Collected: 09/26/23 1422    Specimen:  Blood Updated: 09/26/23 1531     Extra Tube Hold for add-ons.     Comment: Auto resulted       High Sensitivity Troponin T 2Hr [[id number]]  (Abnormal) Collected: 09/26/23 1422    Specimen: Blood Updated: 09/26/23 1454     HS Troponin T 1,517 ng/L      Troponin T Delta -94 ng/L     Narrative:      High Sensitive Troponin T Reference Range:  <10.0 ng/L- Negative Female for AMI  <15.0 ng/L- Negative Male for AMI  >=10 - Abnormal Female indicating possible myocardial injury.  >=15 - Abnormal Male indicating possible myocardial injury.   Clinicians would have to utilize clinical acumen, EKG, Troponin, and serial changes to determine if it is an Acute Myocardial Infarction or myocardial injury due to an underlying chronic condition.         aPTT [[id number]]  (Normal) Collected: 09/26/23 1422    Specimen: Blood Updated: 09/26/23 1450     PTT 26.3 seconds     Narrative:      The recommended Heparin therapeutic range is 68-97 seconds.    Protime-INR [[id number]]  (Normal) Collected: 09/26/23 1422    Specimen: Blood Updated: 09/26/23 1450     Protime 14.5 Seconds      INR 1.14    Narrative:      Therapeutic range for most indications is 2.0-3.0 INR,  or 2.5-3.5 for mechanical heart valves.    Single High Sensitivity Troponin T [[id number]]  (Abnormal) Collected: 09/26/23 1238    Specimen: Blood Updated: 09/26/23 1403     HS Troponin T 1,611 ng/L     Narrative:      High Sensitive Troponin T Reference Range:  <10.0 ng/L- Negative Female for AMI  <15.0 ng/L- Negative Male for AMI  >=10 - Abnormal Female indicating possible myocardial injury.  >=15 - Abnormal Male indicating possible myocardial injury.   Clinicians would have to utilize clinical acumen, EKG, Troponin, and serial changes to determine if it is an Acute Myocardial Infarction or myocardial injury due to an underlying chronic condition.         Blood Gas, Arterial - [[id number]]  (Abnormal) Collected: 09/26/23 1406    Specimen: Arterial Blood Updated: 09/26/23 1401      Site Left Radial     Manas's Test Positive     pH, Arterial 7.361 pH units      pCO2, Arterial 69.1 mm Hg      Comment: 86 Value above critical limit        pO2, Arterial 89.6 mm Hg      HCO3, Arterial 39.1 mmol/L      Comment: 83 Value above reference range        Base Excess, Arterial 10.8 mmol/L      Comment: 83 Value above reference range        O2 Saturation, Arterial 98.1 %      Barometric Pressure for Blood Gas 750 mmHg      Modality Nasal Cannula     Flow Rate 4.5 lpm      Ventilator Mode NA     Collected by Jenny Villa     Comment: Meter: [id number]     :  [id number]       Extra Tubes [[id number]] Collected: 09/26/23 1245    Specimen: Blood, Venous Line Updated: 09/26/23 1400    Narrative:      The following orders were created for panel order Extra Tubes.  Procedure                               Abnormality         Status                     ---------                               -----------         ------                     Gold Top - SST[[id number]]                                   Final result               Light Blue Top[[id number]]                                   Final result                 Please view results for these tests on the individual orders.    Gold Top - SST [[id number]] Collected: 09/26/23 1245    Specimen: Blood Updated: 09/26/23 1400     Extra Tube Hold for add-ons.     Comment: Auto resulted.       Light Blue Top [[id number]] Collected: 09/26/23 1245    Specimen: Blood Updated: 09/26/23 1400     Extra Tube Hold for add-ons.     Comment: Auto resulted       BNP [[id number]]  (Abnormal) Collected: 09/26/23 1238    Specimen: Blood Updated: 09/26/23 1356     proBNP 3,578.0 pg/mL     Narrative:      This assay is used as an aid in the diagnosis of individuals suspected of having heart failure. It can be used as an aid in the diagnosis of acute decompensated heart failure (ADHF) in patients presenting with signs and symptoms of ADHF to the emergency department (ED). In  addition, NT-proBNP of <300 pg/mL indicates ADHF is not likely.    Comprehensive Metabolic Panel [[id number]]  (Abnormal) Collected: 09/26/23 1238    Specimen: Blood Updated: 09/26/23 1320     Glucose 159 mg/dL      BUN 9 mg/dL      Creatinine 0.77 mg/dL      Sodium 143 mmol/L      Potassium 4.8 mmol/L      Chloride 100 mmol/L      CO2 37.0 mmol/L      Calcium 8.3 mg/dL      Total Protein 6.2 g/dL      Albumin 3.2 g/dL      ALT (SGPT) 41 U/L      AST (SGOT) 195 U/L      Alkaline Phosphatase 109 U/L      Total Bilirubin 0.6 mg/dL      Globulin 3.0 gm/dL      A/G Ratio 1.1 g/dL      BUN/Creatinine Ratio 11.7     Anion Gap 6.0 mmol/L      eGFR 87.9 mL/min/1.73     Narrative:      GFR Normal >60  Chronic Kidney Disease <60  Kidney Failure <15      CBC & Differential [[id number]]  (Abnormal) Collected: 09/26/23 1238    Specimen: Blood Updated: 09/26/23 1249    Narrative:      The following orders were created for panel order CBC & Differential.  Procedure                               Abnormality         Status                     ---------                               -----------         ------                     CBC Auto Differential[[id number]]        Abnormal            Final result                 Please view results for these tests on the individual orders.    CBC Auto Differential [[id number]]  (Abnormal) Collected: 09/26/23 1238    Specimen: Blood Updated: 09/26/23 1249     WBC 7.74 10*3/mm3      RBC 4.83 10*6/mm3      Hemoglobin 12.6 g/dL      Hematocrit 44.0 %      MCV 91.1 fL      MCH 26.1 pg      MCHC 28.6 g/dL      RDW 16.3 %      RDW-SD 54.8 fl      MPV 10.5 fL      Platelets 199 10*3/mm3      Neutrophil % 64.2 %      Lymphocyte % 19.0 %      Monocyte % 15.5 %      Eosinophil % 0.1 %      Basophil % 0.6 %      Immature Grans % 0.6 %      Neutrophils, Absolute 4.96 10*3/mm3      Lymphocytes, Absolute 1.47 10*3/mm3      Monocytes, Absolute 1.20 10*3/mm3      Eosinophils, Absolute 0.01 10*3/mm3      Basophils,  Absolute 0.05 10*3/mm3      Immature Grans, Absolute 0.05 10*3/mm3      nRBC 0.6 /100 WBC           Imaging Results (Last 24 Hours)       Procedure Component Value Units Date/Time    CT Angiogram Chest [[id number]] Collected: 09/26/23 1538     Updated: 09/26/23 1904    Narrative:      INDICATION:  Elevated troponins.    TECHNIQUE:  IV contrast was administered and axial images from the thoracic inlet through  the diaphragms were performed.  3D multiplanar reformats of the thoracic aorta  were completed on a separate workstation under concurrent supervision.    FINDINGS:  Study demonstrates no evidence of pulmonary embolus.  There is a trace right  pleural effusion with patchy basilar infiltrate.  Left lung zone clear.  Cardiomegaly.    SUMMARY:  Patchy right basilar infiltrate with trace effusion.  No evidence of pulmonary  embolus.    XR Chest 2 View [[id number]] Collected: 09/26/23 1310     Updated: 09/26/23 1651    Narrative:      XR CHEST 2 VW    HISTORY: soa    FINDINGS: Left lower lobe/retrocardiac opacities potentially representing  atelectasis versus inflammatory/infectious process.  No pleural effusion.  No  pneumothorax..  The cardiomediastinal silhouette and upper abdomen are  unremarkable. No acute osseous abnormality.        US Guided Vascular Access [[id number]] Collected: 09/26/23 1423     Updated: 09/26/23 1427    Narrative:      The right  arm was utilized for vascular access.  Images demonstrate patency of  the basilic.  The vessel was accessed under direct ultrasound guidance.  Permanent images were saved in the chart/PACS system.    IR Insert Midline Without Port Pump 5 Plus [[id number]] Resulted: 09/26/23 1410     Updated: 09/26/23 1410    Narrative:      This procedure was auto-finalized with no dictation required.          ECG/EMG Results (last 24 hours)       Procedure Component Value Units Date/Time    ECG 12 Lead Dyspnea [[id number]] Collected: 09/26/23 1245     Updated: 09/26/23 1313     QT  Interval 374 ms      QTC Interval 452 ms     Narrative:      Test Reason : Dyspnea  Blood Pressure :   */*   mmHG  Vent. Rate :  88 BPM     Atrial Rate :  88 BPM     P-R Int : 140 ms          QRS Dur : 102 ms      QT Int : 374 ms       P-R-T Axes :  81 195 -30 degrees     QTc Int : 452 ms    ** Poor data quality, interpretation may be adversely affected  Sinus rhythm with occasional Premature ventricular complexes and Fusion complexes  Incomplete right bundle branch block  Possible Right ventricular hypertrophy  Septal infarct , age undetermined  Lateral infarct , age undetermined  Abnormal ECG  When compared with ECG of 25-MAY-2023 11:12,  Significant changes have occurred    Referred By: JAMESONR           Confirmed By:     ECG 12 Lead Dyspnea [[id number]] Collected: 09/26/23 1409     Updated: 09/26/23 1426     QT Interval 358 ms      QTC Interval 435 ms     Narrative:      Test Reason : Dyspnea  Blood Pressure :   */*   mmHG  Vent. Rate :  89 BPM     Atrial Rate :  89 BPM     P-R Int : 136 ms          QRS Dur : 106 ms      QT Int : 358 ms       P-R-T Axes :  75 260  26 degrees     QTc Int : 435 ms    Normal sinus rhythm  Incomplete right bundle branch block  Possible Right ventricular hypertrophy  Septal infarct (cited on or before 26-SEP-2023)  Lateral infarct (cited on or before 26-SEP-2023)  Abnormal ECG  When compared with ECG of 26-SEP-2023 12:45,  Fusion complexes are no longer Present  Premature ventricular complexes are no longer Present  Nonspecific T wave abnormality now evident in Inferior leads    Referred By: WINSTON           Confirmed By:     SCANNED EKG [[id number]] Resulted: 09/26/23     Updated: 09/26/23 2124    SCANNED EKG [[id number]] Resulted: 09/26/23     Updated: 09/26/23 2124             Physician Progress Notes (last 24 hours)        Mavis Avelar APRN at 09/27/23 0903          [facility] [place] [facility] [place]   LOS: 1 day   Patient Care Team:  Danielle Vaughn APRN as PCP - General (Family  "Medicine)  Armando Sanon APRN (Family Medicine)  Davide Marrufo MD as Surgeon (Orthopedic Surgery)  Danielle Vaughn APRN (Family Medicine)  Ian Childs MD as Consulting Physician (Hematology and Oncology)    Chief Complaint:    Chief Complaint   Patient presents with    Shortness of Breath        Subjective     Interval History:   Patient Denies: chest pain, palpitations, dizziness, and syncope  Patient Complaints: shortness of air, orthopnea, and edema  History taken from: patient chart RN    Patient reports mild improvement in SOA and edema today. Reports good urine output. Still having significant orthopnea.  Vital signs stable.  We will continue with aggressive diuresis. Still up 29 lbs.       Objective     Vital Sign Min/Max for last 24 hours  Temp  Min: 98.1 °F (36.7 °C)  Max: 98.8 °F (37.1 °C)   BP  Min: 105/67  Max: 143/90   Pulse  Min: 80  Max: 95   Resp  Min: 18  Max: 18   SpO2  Min: 80 %  Max: 96 %   Flow (L/min)  Min: 2  Max: 4.5   Weight  Min: 122 kg (270 lb)  Max: 125 kg (276 lb 7.3 oz)     Flowsheet Rows      Flowsheet Row First Filed Value   Admission Height 157.5 cm (62\") Documented at 09/26/2023 1102   Admission Weight 122 kg (270 lb) Documented at 09/26/2023 1102              09/26/23  1102 09/27/23  0500   Weight: 122 kg (270 lb) 125 kg (276 lb 7.3 oz)       Physical Exam:  Physical Exam  Vitals and nursing note reviewed.   Constitutional:       General: She is not in acute distress.     Appearance: She is morbidly obese. She is ill-appearing. She is not diaphoretic.      Interventions: Nasal cannula in place.   HENT:      Head: Normocephalic.      Right Ear: External ear normal.      Left Ear: External ear normal.   Eyes:      General: Lids are normal.      Pupils: Pupils are equal, round, and reactive to light.   Neck:      Thyroid: No thyromegaly.      Vascular: JVD present. No carotid bruit.      Trachea: No tracheal deviation.   Cardiovascular:      Rate and Rhythm: " Normal rate and regular rhythm.      Heart sounds: Normal heart sounds. No murmur heard.    No friction rub. No gallop.   Pulmonary:      Effort: Pulmonary effort is normal. No respiratory distress.      Breath sounds: No stridor. Rhonchi and rales present. No wheezing.   Chest:      Chest wall: No tenderness.   Abdominal:      General: Bowel sounds are normal. There is no distension.      Palpations: Abdomen is soft.      Tenderness: There is no abdominal tenderness.   Musculoskeletal:      Right lower le+ Edema present.      Left lower le+ Edema present.   Skin:     General: Skin is warm and dry.      Findings: No erythema or rash.   Neurological:      Mental Status: She is alert and oriented to person, place, and time.      Cranial Nerves: No cranial nerve deficit.      Deep Tendon Reflexes: Reflexes are normal and symmetric. Reflexes normal.   Psychiatric:         Behavior: Behavior normal.         Thought Content: Thought content normal.         Judgment: Judgment normal.        Results Review:     Results from last 7 days   Lab Units 23  0502 23  1238   SODIUM mmol/L 138 143   POTASSIUM mmol/L 4.5 4.8   CHLORIDE mmol/L 94* 100   CO2 mmol/L 39.0* 37.0*   BUN mg/dL 12 9   CREATININE mg/dL 0.83 0.77   CALCIUM mg/dL 8.3* 8.3*   BILIRUBIN mg/dL  --  0.6   ALK PHOS U/L  --  109   ALT (SGPT) U/L  --  41*   AST (SGOT) U/L  --  195*   GLUCOSE mg/dL 153* 159*       Estimated Creatinine Clearance: 90 mL/min (by C-G formula based on SCr of 0.83 mg/dL).    Results from last 7 days   Lab Units 23  0502 23  1422   MAGNESIUM mg/dL 2.1 2.0   PHOSPHORUS mg/dL 3.2 3.1             Results from last 7 days   Lab Units 23  0502 23  1238   WBC 10*3/mm3 8.53 7.74   HEMOGLOBIN g/dL 12.1 12.6   PLATELETS 10*3/mm3 210 199       Results from last 7 days   Lab Units 23  0502 23  1422   INR  1.17 1.14     Lab Results   Component Value Date    PROBNP 3,578.0 (H) 2023       I/O  last 3 completed shifts:  In: 779.4 [P.O.:480; I.V.:149.4; IV Piggyback:150]  Out: 2600 [Urine:2600]    Cardiographics:  ECG/EMG Results (last 24 hours)       Procedure Component Value Units Date/Time    ECG 12 Lead Dyspnea [[id number]] Collected: 09/26/23 1245     Updated: 09/26/23 1313     QT Interval 374 ms      QTC Interval 452 ms     Narrative:      Test Reason : Dyspnea  Blood Pressure :   */*   mmHG  Vent. Rate :  88 BPM     Atrial Rate :  88 BPM     P-R Int : 140 ms          QRS Dur : 102 ms      QT Int : 374 ms       P-R-T Axes :  81 195 -30 degrees     QTc Int : 452 ms    ** Poor data quality, interpretation may be adversely affected  Sinus rhythm with occasional Premature ventricular complexes and Fusion complexes  Incomplete right bundle branch block  Possible Right ventricular hypertrophy  Septal infarct , age undetermined  Lateral infarct , age undetermined  Abnormal ECG  When compared with ECG of 25-MAY-2023 11:12,  Significant changes have occurred    Referred By: ERDR           Confirmed By:     ECG 12 Lead Dyspnea [[id number]] Collected: 09/26/23 1409     Updated: 09/26/23 1426     QT Interval 358 ms      QTC Interval 435 ms     Narrative:      Test Reason : Dyspnea  Blood Pressure :   */*   mmHG  Vent. Rate :  89 BPM     Atrial Rate :  89 BPM     P-R Int : 136 ms          QRS Dur : 106 ms      QT Int : 358 ms       P-R-T Axes :  75 260  26 degrees     QTc Int : 435 ms    Normal sinus rhythm  Incomplete right bundle branch block  Possible Right ventricular hypertrophy  Septal infarct (cited on or before 26-SEP-2023)  Lateral infarct (cited on or before 26-SEP-2023)  Abnormal ECG  When compared with ECG of 26-SEP-2023 12:45,  Fusion complexes are no longer Present  Premature ventricular complexes are no longer Present  Nonspecific T wave abnormality now evident in Inferior leads    Referred By: ERDR           Confirmed By:     SCANNED EKG [[id number]] Resulted: 09/26/23     Updated: 09/26/23 2124     SCANNED EKG [[id number]] Resulted: 09/26/23     Updated: 09/26/23 2124          Results for orders placed during the hospital encounter of 08/09/22    Adult Transthoracic Echo Complete W/ Cont if Necessary Per Protocol    Interpretation Summary  · The right atrial cavity is mild to moderately dilated.  · The right ventricular cavity is mildly dilated.  · Left ventricular wall thickness is consistent with borderline concentric hypertrophy.  · Left ventricular ejection fraction appears to be 56 - 60%.      Imaging Results (Most Recent)       Procedure Component Value Units Date/Time    CT Angiogram Chest [[id number]] Collected: 09/26/23 1538     Updated: 09/26/23 1904    Narrative:      INDICATION:  Elevated troponins.    TECHNIQUE:  IV contrast was administered and axial images from the thoracic inlet through  the diaphragms were performed.  3D multiplanar reformats of the thoracic aorta  were completed on a separate workstation under concurrent supervision.    FINDINGS:  Study demonstrates no evidence of pulmonary embolus.  There is a trace right  pleural effusion with patchy basilar infiltrate.  Left lung zone clear.  Cardiomegaly.    SUMMARY:  Patchy right basilar infiltrate with trace effusion.  No evidence of pulmonary  embolus.    XR Chest 2 View [[id number]] Collected: 09/26/23 1310     Updated: 09/26/23 1651    Narrative:      XR CHEST 2 VW    HISTORY: soa    FINDINGS: Left lower lobe/retrocardiac opacities potentially representing  atelectasis versus inflammatory/infectious process.  No pleural effusion.  No  pneumothorax..  The cardiomediastinal silhouette and upper abdomen are  unremarkable. No acute osseous abnormality.        US Guided Vascular Access [[id number]] Collected: 09/26/23 1423     Updated: 09/26/23 1427    Narrative:      The right  arm was utilized for vascular access.  Images demonstrate patency of  the basilic.  The vessel was accessed under direct ultrasound guidance.  Permanent images were  saved in the chart/PACS system.    IR Insert Midline Without Port Pump 5 Plus [[id number]] Resulted: 09/26/23 1410     Updated: 09/26/23 1410    Narrative:      This procedure was auto-finalized with no dictation required.            No radiology results for the last 30 days.    Medication Review:     Current Facility-Administered Medications:     albuterol sulfate HFA (PROVENTIL HFA;VENTOLIN HFA;PROAIR HFA) inhaler 2 puff, 2 puff, Inhalation, 4x Daily - RT, Toby Edwards MD    aspirin EC tablet 81 mg, 81 mg, Oral, Daily, Mavis Avelar, APRN, 81 mg at 09/27/23 0856    sennosides-docusate (PERICOLACE) 8.6-50 MG per tablet 2 tablet, 2 tablet, Oral, BID, 2 tablet at 09/26/23 2129 **AND** polyethylene glycol (MIRALAX) packet 17 g, 17 g, Oral, Daily PRN **AND** bisacodyl (DULCOLAX) EC tablet 5 mg, 5 mg, Oral, Daily PRN **AND** bisacodyl (DULCOLAX) suppository 10 mg, 10 mg, Rectal, Daily PRN, Toby Edwards MD    Calcium Replacement - Follow Nurse / BPA Driven Protocol, , Does not apply, PRN, Toby Edwards MD    carvedilol (COREG) tablet 3.125 mg, 3.125 mg, Oral, BID With Meals, Mavis Avelar, APRN, 3.125 mg at 09/27/23 0856    furosemide (LASIX) injection 40 mg, 40 mg, Intravenous, Q12H, Toby Edwards MD, 40 mg at 09/27/23 0902    gabapentin (NEURONTIN) capsule 300 mg, 300 mg, Oral, TID, Toby Edwards MD, 300 mg at 09/27/23 0859    heparin 47277 units/250 mL (100 units/mL) in 0.45 % NaCl infusion, 12.2 Units/kg/hr, Intravenous, Titrated, Last Rate: 14.78 mL/hr at 09/27/23 0557, 12.12 Units/kg/hr at 09/27/23 0557 **AND** heparin (porcine) 5000 UNIT/ML injection 4,000 Units, 4,000 Units, Intravenous, PRN **AND** heparin (porcine) 5000 UNIT/ML injection 3,700 Units, 30 Units/kg, Intravenous, PRN, Toby Edwards MD, 3,700 Units at 09/27/23 0609    levoFLOXacin (LEVAQUIN) 750 mg/150 mL D5W (premix) (LEVAQUIN) 750 mg, 750 mg, Intravenous, Q24H, Toby Edwards MD, Last Rate: 100 mL/hr at 09/26/23 2144, 750  mg at 09/26/23 2144    Magnesium Standard Dose Replacement - Follow Nurse / BPA Driven Protocol, , Does not apply, PRN, Toby Edwards MD    nicotine (NICODERM CQ) 21 MG/24HR patch 1 patch, 1 patch, Transdermal, Q24H, Toby Edwards MD, 1 patch at 09/26/23 2130    nitroglycerin (NITROSTAT) SL tablet 0.4 mg, 0.4 mg, Sublingual, Q5 Min PRN, Toby Edwards MD    ondansetron (ZOFRAN) tablet 4 mg, 4 mg, Oral, Q6H PRN **OR** ondansetron (ZOFRAN) injection 4 mg, 4 mg, Intravenous, Q6H PRN, Toby Edwards MD    pentoxifylline (TRENtal) CR tablet 400 mg, 400 mg, Oral, BID, Toby Edwards MD, 400 mg at 09/27/23 0856    Phosphorus Replacement - Follow Nurse / BPA Driven Protocol, , Does not apply, PRN, Toby Edwards MD    Potassium Replacement - Follow Nurse / BPA Driven Protocol, , Does not apply, PRN, Toby Edwards MD    rosuvastatin (CRESTOR) tablet 10 mg, 10 mg, Oral, Nightly, Toby Edwards MD, 10 mg at 09/26/23 2129    sodium chloride 0.9 % flush 10 mL, 10 mL, Intravenous, PRN, Toby Edwards MD    sodium chloride 0.9 % flush 10 mL, 10 mL, Intravenous, Q12H, Toby Edwards MD, 10 mL at 09/27/23 0902    sodium chloride 0.9 % flush 10 mL, 10 mL, Intravenous, PRN, Toby Edwards MD    sodium chloride 0.9 % infusion 40 mL, 40 mL, Intravenous, PRN, Toby Edwards MD    traMADol (ULTRAM) tablet 50 mg, 50 mg, Oral, Q6H PRN, Toby Edwards MD    valsartan (DIOVAN) tablet 40 mg, 40 mg, Oral, Daily, Toby Edwards MD    Assessment & Plan       Elevated troponin    NSTEMI (non-ST elevated myocardial infarction)    #1.  NSTEMI: Troponin elevation of 1600.  EKG showing incomplete right bundle branch block, nonspecific inferior lead changes.  Echocardiogram in 2022 showing LVEF preserved at 56 to 60%, right ventricle is mildly dilated, right atrial cavity is mild to moderately dilated.  Patient does have multiple cardiac risk factors as well noted to have intermittent chest pain for 2 to 3 days prior to arrival.   Currently she is chest pain-free and hemodynamically stable.  She is significantly volume overloaded from a CHF standpoint.  We will recommend optimization of heart failure and aggressive diuresis.  We will recommend cardiac catheterization once stabilized from a respiratory standpoint.  Currently she is unable to lay flat.  -IV heparin drip  -ASA 81mg  -Crestor 10 mg  -Coreg 3.125mg BID  -Echocardiogram to assess for LVEF, regional wall motion abnormalities     #2.  Acute on chronic heart failure with preserved ejection fraction: Previously noted to have preserved LVEF of 56 to 60%.  She appears significantly volume overloaded upon exam with adequate perfusion and hemodynamics.  We will recommend guideline directed medical therapy and aggressive diuresis.  -Valsartan 40 mg; Can consider changing to Entresto   -Coreg 3.125 mg twice daily  -40 mg Lasix twice daily IV; Extra 40mg around 1200. We will follow response.   -MRA with Aldactone indicated as well as SGLT-2 inhibitor.   Daily weights  Strict intake and output  Fluid restriction 1500 cc, sodium restriction less than 2 g  Repeat echocardiogram pending     #3. Right ventricular abnormality: mild RVC dilation. RVEF is normal. No evidence of PAH. Have recommended patient for SNEHA evaluation as an outpatient. PFT showed moderate restriction.      #4. Ascending aorta dilation: mild dilation of the proximal ascending aorta at 3.9cm. She will continue with clinical surveillance.  She has been recommended for blood pressure control.     Stephanie Bradshaw  reports that she has been smoking cigarettes. She has a 60.00 pack-year smoking history. She has been exposed to tobacco smoke. She has never used smokeless tobacco.. I have educated her on the risk of diseases from using tobacco products such as cancer, COPD, and heart disease.      I advised her to quit and she is not willing to quit.     I spent 3  minutes counseling the patient.     Patient's (Body mass index is  50.56 kg/m².) indicates that they are morbidly obese (BMI > 40 or > 35 with obesity - related health condition) with health related conditions that include hypertension, dyslipidemias, osteoarthritis, and lower extremity venous stasis disease . Weight is unchanged. BMI is is above average; BMI management plan is completed.     Plan for disposition: Continue CCU. We will continue to follow.             This document has been electronically signed by ROSANNE Swanson on September 27, 2023 09:03 CDT      Electronically signed by ROSANNE Swanson, 09/27/23, 9:03 AM CDT.      Electronically signed by Mavis Avelar APRN at 09/27/23 0911       Medical Student Notes (last 24 hours)  Notes from 09/26/23 1044 through 09/27/23 1044   No notes of this type exist for this encounter.          Consult Notes (last 24 hours)        Louann Ch MD at 09/26/23 1524        Consult Orders    1. Inpatient Cardiology Consult [[id number]] ordered by Brando Barnes MD at 09/26/23 1427                     [facility] [place] [facility]    Referring Provider: Brando Barnes MD    Reason for Consultation: Elevated troponin CHF    Patient Care Team:  Danielle Vaughn APRN as PCP - General (Family Medicine)  Armando Sanon APRN (Family Medicine)  Davide Marrufo MD as Surgeon (Orthopedic Surgery)  Danielle Vaughn APRN (Family Medicine)  Ian Childs MD as Consulting Physician (Hematology and Oncology)    Chief complaint   Chief Complaint   Patient presents with    Shortness of Breath       Subjective .     History of present illness:     Stephanie Bradshaw is a 63-year-old  female with medical history of hypertension, hyperlipidemia, type 2 diabetes, obesity, tobacco use, COPD, peripheral edema/venous stasis, diastolic dysfunction, DVT PE.   She was admitted to the hospital in May 2023 for acute hypoxic respiratory failure, diastolic heart failure, pneumonia.  She was aggressively diuresed. CTA of  chest showed enlargement of main pulmonary artery at 4.4 cm, pulmonary hypertension, mild enlargement of right heart.    Patient presented to the ER with symptoms of shortness of breath with exertion, orthopnea, edema, chest pain, weight gain over the past 2 weeks.  She reports 20 to 30 pounds of fluid.  She has been taking her diuretics as prescribed.  She does not wear oxygen at home.  She admits to 2 to 3-day history of centralized chest pain described as burning, pressure.  Nonradiating.    Patient found to have PCO2 of 69, proBNP elevated at 3600, troponin 1600, chest x-ray showing atelectasis versus pneumonia.  CTA negative for PE. EKG showing incomplete right bundle branch block, nonspecific inferior lead changes.  Echocardiogram in 2022 showing LVEF preserved at 56 to 60%, right ventricle is mildly dilated, right atrial cavity is mild to moderately dilated.    The CVD Risk score (BONG'Agostino, et al., 2008) failed to calculate for the following reasons:    The patient has a prior MI, stroke, CHF, or peripheral vascular disease diagnosis      Review of Systems  Review of Systems   Constitutional:  Negative for chills, fever and unexpected weight change.   HENT:  Negative for hearing loss, nosebleeds, tinnitus, trouble swallowing and voice change.    Eyes:  Negative for visual disturbance.   Respiratory:  Positive for cough and shortness of breath. Negative for apnea and wheezing.    Cardiovascular:  Positive for chest pain and leg swelling. Negative for palpitations.   Gastrointestinal:  Negative for abdominal distention, abdominal pain and blood in stool.   Endocrine: Negative for cold intolerance, heat intolerance, polydipsia, polyphagia and polyuria.   Genitourinary: Negative.    Musculoskeletal:  Negative for arthralgias and back pain.   Skin: Negative.    Allergic/Immunologic: Negative.    Neurological:  Positive for weakness. Negative for seizures, syncope, speech difficulty, numbness and headaches.    Hematological:  Negative for adenopathy. Does not bruise/bleed easily.   Psychiatric/Behavioral:  Negative for agitation, behavioral problems and suicidal ideas. The patient is not nervous/anxious.      History  Past Medical History:   Diagnosis Date    Controlled type 2 diabetes mellitus without complication, without long-term current use of insulin     COPD (chronic obstructive pulmonary disease)     DVT (deep venous thrombosis) 08/2022    left popliteal    Hyperlipemia, mixed     Pulmonary embolism 08/2022    right   ,   Past Surgical History:   Procedure Laterality Date    CYSTOSCOPY N/A 8/18/2022    Procedure: CYSTOSCOPY;  Surgeon: [place], Davide ESPINOZA MD;  Location: [facility];  Service: Urology;  Laterality: N/A;    TUBAL ABDOMINAL LIGATION     ,   Family History   Problem Relation Age of Onset    Prostate cancer Father     Uterine cancer Mother     Lung cancer Mother     Throat cancer Paternal Grandfather     Liver cancer Maternal Grandmother     Alcohol abuse Maternal Grandmother     Lung cancer Maternal Aunt     Lung cancer Maternal Aunt     COPD Other     Heart disease Other     Hypertension Other     Diabetes Other     Breast cancer Neg Hx     Ovarian cancer Neg Hx     Colon cancer Neg Hx    ,   Social History     Tobacco Use    Smoking status: Every Day     Packs/day: 2.00     Years: 30.00     Pack years: 60.00     Types: Cigarettes     Passive exposure: Current    Smokeless tobacco: Never    Tobacco comments:     2 cigarettes daily   Vaping Use    Vaping Use: Never used   Substance Use Topics    Alcohol use: Yes     Comment: occasionally    Drug use: Never   , (Not in a hospital admission)     ALLERGIES  Penicillins and Penicillins    Objective     Vital Sign Min/Max for last 24 hours  No data recorded   BP  Min: 134/77  Max: 143/90   Pulse  Min: 85  Max: 94   Resp  Min: 18  Max: 18   SpO2  Min: 80 %  Max: 96 %   Flow (L/min)  Min: 2  Max: 4.5   Weight  Min: 122 kg (270 lb)  Max: 122 kg (270 lb)  "    Flowsheet Rows      Flowsheet Row First Filed Value   Admission Height 157.5 cm (62\") Documented at 2023 1102   Admission Weight 122 kg (270 lb) Documented at 2023 1102               Physical Exam:  Physical Exam  Vitals and nursing note reviewed.   Constitutional:       General: She is not in acute distress.     Appearance: She is morbidly obese. She is ill-appearing and toxic-appearing. She is not diaphoretic.      Interventions: Nasal cannula in place.   HENT:      Head: Normocephalic.      Right Ear: External ear normal.      Left Ear: External ear normal.   Eyes:      General: Lids are normal.      Pupils: Pupils are equal, round, and reactive to light.   Neck:      Thyroid: No thyromegaly.      Vascular: JVD present. No carotid bruit.      Trachea: No tracheal deviation.   Cardiovascular:      Rate and Rhythm: Normal rate and regular rhythm.      Heart sounds: Normal heart sounds. No murmur heard.    No friction rub. No gallop.   Pulmonary:      Effort: Pulmonary effort is normal. No respiratory distress.      Breath sounds: No stridor. Decreased breath sounds, rhonchi and rales present. No wheezing.   Chest:      Chest wall: No tenderness.   Abdominal:      General: Bowel sounds are normal. There is no distension.      Palpations: Abdomen is soft.      Tenderness: There is no abdominal tenderness.   Musculoskeletal:      Right lower le+ Edema present.      Left lower le+ Edema present.   Skin:     General: Skin is warm and dry.      Findings: No erythema or rash.   Neurological:      Mental Status: She is alert and oriented to person, place, and time.      Cranial Nerves: No cranial nerve deficit.      Deep Tendon Reflexes: Reflexes are normal and symmetric. Reflexes normal.   Psychiatric:         Behavior: Behavior normal.         Thought Content: Thought content normal.         Judgment: Judgment normal.          Results Review:     Results from last 7 days   Lab Units 23  1238 "   SODIUM mmol/L 143   POTASSIUM mmol/L 4.8   CHLORIDE mmol/L 100   CO2 mmol/L 37.0*   BUN mg/dL 9   CREATININE mg/dL 0.77   CALCIUM mg/dL 8.3*   BILIRUBIN mg/dL 0.6   ALK PHOS U/L 109   ALT (SGPT) U/L 41*   AST (SGOT) U/L 195*   GLUCOSE mg/dL 159*       Estimated Creatinine Clearance: 95.6 mL/min (by C-G formula based on SCr of 0.77 mg/dL).          Results from last 7 days   Lab Units 09/26/23  1238   WBC 10*3/mm3 7.74   HEMOGLOBIN g/dL 12.6   PLATELETS 10*3/mm3 199       Results from last 7 days   Lab Units 09/26/23  1422   INR  1.14       Lab Results   Component Value Date    CKTOTAL 39 08/09/2022    TROPONINT 1,517 (C) 09/26/2023     Lab Results   Component Value Date    PROBNP 3,578.0 (H) 09/26/2023       No intake/output data recorded.    Cardiographics  ECG/EMG Results (last 24 hours)       Procedure Component Value Units Date/Time    ECG 12 Lead Dyspnea [[id number]] Collected: 09/26/23 1245     Updated: 09/26/23 1313     QT Interval 374 ms      QTC Interval 452 ms     Narrative:      Test Reason : Dyspnea  Blood Pressure :   */*   mmHG  Vent. Rate :  88 BPM     Atrial Rate :  88 BPM     P-R Int : 140 ms          QRS Dur : 102 ms      QT Int : 374 ms       P-R-T Axes :  81 195 -30 degrees     QTc Int : 452 ms    ** Poor data quality, interpretation may be adversely affected  Sinus rhythm with occasional Premature ventricular complexes and Fusion complexes  Incomplete right bundle branch block  Possible Right ventricular hypertrophy  Septal infarct , age undetermined  Lateral infarct , age undetermined  Abnormal ECG  When compared with ECG of 25-MAY-2023 11:12,  Significant changes have occurred    Referred By: ERDR           Confirmed By:     ECG 12 Lead Dyspnea [[id number]] Collected: 09/26/23 1409     Updated: 09/26/23 1426     QT Interval 358 ms      QTC Interval 435 ms     Narrative:      Test Reason : Dyspnea  Blood Pressure :   */*   mmHG  Vent. Rate :  89 BPM     Atrial Rate :  89 BPM     P-R Int : 136  ms          QRS Dur : 106 ms      QT Int : 358 ms       P-R-T Axes :  75 260  26 degrees     QTc Int : 435 ms    Normal sinus rhythm  Incomplete right bundle branch block  Possible Right ventricular hypertrophy  Septal infarct (cited on or before 26-SEP-2023)  Lateral infarct (cited on or before 26-SEP-2023)  Abnormal ECG  When compared with ECG of 26-SEP-2023 12:45,  Fusion complexes are no longer Present  Premature ventricular complexes are no longer Present  Nonspecific T wave abnormality now evident in Inferior leads    Referred By: ERDR           Confirmed By:           Results for orders placed during the hospital encounter of 08/09/22    Adult Transthoracic Echo Complete W/ Cont if Necessary Per Protocol    Interpretation Summary  · The right atrial cavity is mild to moderately dilated.  · The right ventricular cavity is mildly dilated.  · Left ventricular wall thickness is consistent with borderline concentric hypertrophy.  · Left ventricular ejection fraction appears to be 56 - 60%.        No radiology results for the last 30 days.    Intake/Output       None              Assessment & Plan       Elevated troponin    NSTEMI (non-ST elevated myocardial infarction)    #1.  NSTEMI: Troponin elevation of 1600.  EKG showing incomplete right bundle branch block, nonspecific inferior lead changes.  Echocardiogram in 2022 showing LVEF preserved at 56 to 60%, right ventricle is mildly dilated, right atrial cavity is mild to moderately dilated.  Patient does have multiple cardiac risk factors as well noted to have intermittent chest pain for the past 2 to 3 days.  Currently she is chest pain-free and hemodynamically stable.  She is significantly volume overloaded from a CHF standpoint.  We will recommend optimization of heart failure and aggressive diuresis.  We will recommend cardiac catheterization once stabilized from a respiratory standpoint.  Currently she is unable to lay flat.  -IV heparin drip  -Start ASA  81mg  -Crestor 10 mg  -Coreg 3.125mg BID  -Echocardiogram to assess for LVEF, regional wall motion abnormalities    #2.  Acute on chronic heart failure with preserved ejection fraction: Previously noted to have preserved LVEF of 56 to 60%.  She appears significantly volume overloaded upon exam with adequate perfusion and hemodynamics.  We will recommend guideline directed medical therapy and aggressive diuresis.  -Valsartan 40 mg; Can consider changing to Entresto   -Start Coreg 3.125 mg twice daily  -80 mg Lasix x1 followed by 40 mg Lasix twice daily IV  -MRA with Aldactone indicated as well as SGLT-2 inhibitor.   Daily weight  Strict intake and output  Fluid restriction 1500 cc, sodium restriction less than 2 g  Repeat echocardiogram pending    #3. Right ventricular abnormality: mild RVC dilation. RVEF is normal. No evidence of PAH. Have recommended patient for SNEHA evaluation as an outpatient. PFT showed moderate restriction.      #4. Ascending aorta dilation: mild dilation of the proximal ascending aorta at 3.9cm. She will continue with clinical surveillance.  She has been recommended for blood pressure control.    Stephanie Bradshaw  reports that she has been smoking cigarettes. She has a 60.00 pack-year smoking history. She has been exposed to tobacco smoke. She has never used smokeless tobacco.. I have educated her on the risk of diseases from using tobacco products such as cancer, COPD, and heart disease.     I advised her to quit and she is not willing to quit.    I spent 3  minutes counseling the patient.     Patient's (Body mass index is 49.38 kg/m².) indicates that they are morbidly obese (BMI > 40 or > 35 with obesity - related health condition) with health related conditions that include hypertension, diabetes mellitus, dyslipidemias, and GERD . Weight is worsening. BMI is is above average; BMI management plan is completed. We discussed portion control and increasing exercise.     I discussed the  patient's findings and my recommendations with patient and Dr. Ch. Thank you for the consult. We will continue to follow.             This document has been electronically signed by ROSANNE Swanson on September 26, 2023 15:24 CDT     Electronically signed by ROSANNE Swanson, 09/26/23, 3:24 PM CDT.      I personally saw and examined Stephanie Bradshaw after the APRN.  I personally performed a history and physical examination of the patient.  I personally reviewed independent findings and plan of care.  I discussed management with the APRN.  I agree with the APRN's documentation.    63-year-old female history of hypertension, hyperlipidemia, diabetes, tobacco use, COPD, prior history of DVT/PE on chronic anticoagulation presented with hospital with shortness of breath and symptoms of fluid overload.  Has gained about 20 to 30 pounds.  She was noted to have hypercarbic hypoxic respiratory failure.  He was also noted to have significantly elevated troponins were consulted for further evaluation.  He has been having intermittent chest pain for last several days.    Vitals:    09/26/23 1230 09/26/23 1338 09/26/23 1342 09/26/23 1415   BP: 143/90   139/86   BP Location:       Patient Position:       Pulse: 86 85 85 88   Resp:   18    TempSrc:       SpO2: 91% 94% 96% (!) 89%   Weight:       Height:         1.  NSTEMI:  Multiple risk factors of diabetes, hypertension, hyperlipidemia.  EKG showed right bundle branch block with nonspecific ST-T changes.  Initial troponin was significantly elevated which has subsequently trended down.  Denying any active chest pain.  Patient was started on proper medical therapy with heparin drip, aspirin/Crestor/Coreg we will get an echocardiogram.  Will need cardiac cath once optimized from heart failure standpoint.  CT chest without evidence of pulmonary embolism    2.  Congestive heart with preserved ejection fraction:  Previously noted to have diastolic heart failure.   Appears fluid overloaded.  Aggressive diuresis and guideline medical therapy.  Repeating echocardiogram.      Thank for the consult continue to follow.          Electronically signed by Louann Ch MD, 09/26/23, 4:24 PM CDT.             Electronically signed by Louann Ch MD at 09/26/23 [id number]

## 2023-09-27 NOTE — PLAN OF CARE
Goal Outcome Evaluation:  Plan of Care Reviewed With: patient        Progress: improving  Outcome Evaluation: VSS. UOP adequate. Pt on 4.5 liters O2 sats ranging from 90-92%. Pt refused CPap machine, continues to have SOB with exertion.

## 2023-09-27 NOTE — PROGRESS NOTES
"[facility] Progress Note   LOS: 1 day   Patient Care Team:  Danielle Vaughn APRN as PCP - General (Family Medicine)  Armando Sanon APRN (Family Medicine)  Davide Marrufo MD as Surgeon (Orthopedic Surgery)  Danielle Vaughn APRN (Family Medicine)  Ian Childs MD as Consulting Physician (Hematology and Oncology)    Chief Complaint:    Chief Complaint   Patient presents with    Shortness of Breath        Subjective     Interval History:   Patient Denies: chest pain, palpitations, dizziness, and syncope  Patient Complaints: shortness of air, orthopnea, and edema  History taken from: patient chart RN    Patient reports mild improvement in SOA and edema today. Reports good urine output. Still having significant orthopnea.  Vital signs stable.  We will continue with aggressive diuresis. Still up 29 lbs.       Objective     Vital Sign Min/Max for last 24 hours  Temp  Min: 98.1 °F (36.7 °C)  Max: 98.8 °F (37.1 °C)   BP  Min: 105/67  Max: 143/90   Pulse  Min: 80  Max: 95   Resp  Min: 18  Max: 18   SpO2  Min: 80 %  Max: 96 %   Flow (L/min)  Min: 2  Max: 4.5   Weight  Min: 122 kg (270 lb)  Max: 125 kg (276 lb 7.3 oz)     Flowsheet Rows      Flowsheet Row First Filed Value   Admission Height 157.5 cm (62\") Documented at 09/26/2023 1102   Admission Weight 122 kg (270 lb) Documented at 09/26/2023 1102              09/26/23  1102 09/27/23  0500   Weight: 122 kg (270 lb) 125 kg (276 lb 7.3 oz)       Physical Exam:  Physical Exam  Vitals and nursing note reviewed.   Constitutional:       General: She is not in acute distress.     Appearance: She is morbidly obese. She is ill-appearing. She is not diaphoretic.      Interventions: Nasal cannula in place.   HENT:      Head: Normocephalic.      Right Ear: External ear normal.      Left Ear: External ear normal.   Eyes:      General: Lids are normal.      Pupils: Pupils are equal, round, and reactive to light.   Neck:      Thyroid: No thyromegaly.      " Vascular: JVD present. No carotid bruit.      Trachea: No tracheal deviation.   Cardiovascular:      Rate and Rhythm: Normal rate and regular rhythm.      Heart sounds: Normal heart sounds. No murmur heard.    No friction rub. No gallop.   Pulmonary:      Effort: Pulmonary effort is normal. No respiratory distress.      Breath sounds: No stridor. Rhonchi and rales present. No wheezing.   Chest:      Chest wall: No tenderness.   Abdominal:      General: Bowel sounds are normal. There is no distension.      Palpations: Abdomen is soft.      Tenderness: There is no abdominal tenderness.   Musculoskeletal:      Right lower le+ Edema present.      Left lower le+ Edema present.   Skin:     General: Skin is warm and dry.      Findings: No erythema or rash.   Neurological:      Mental Status: She is alert and oriented to person, place, and time.      Cranial Nerves: No cranial nerve deficit.      Deep Tendon Reflexes: Reflexes are normal and symmetric. Reflexes normal.   Psychiatric:         Behavior: Behavior normal.         Thought Content: Thought content normal.         Judgment: Judgment normal.        Results Review:     Results from last 7 days   Lab Units 23  0502 23  1238   SODIUM mmol/L 138 143   POTASSIUM mmol/L 4.5 4.8   CHLORIDE mmol/L 94* 100   CO2 mmol/L 39.0* 37.0*   BUN mg/dL 12 9   CREATININE mg/dL 0.83 0.77   CALCIUM mg/dL 8.3* 8.3*   BILIRUBIN mg/dL  --  0.6   ALK PHOS U/L  --  109   ALT (SGPT) U/L  --  41*   AST (SGOT) U/L  --  195*   GLUCOSE mg/dL 153* 159*       Estimated Creatinine Clearance: 90 mL/min (by C-G formula based on SCr of 0.83 mg/dL).    Results from last 7 days   Lab Units 23  0502 23  1422   MAGNESIUM mg/dL 2.1 2.0   PHOSPHORUS mg/dL 3.2 3.1             Results from last 7 days   Lab Units 23  0502 23  1238   WBC 10*3/mm3 8.53 7.74   HEMOGLOBIN g/dL 12.1 12.6   PLATELETS 10*3/mm3 210 199       Results from last 7 days   Lab Units  09/27/23  0502 09/26/23  1422   INR  1.17 1.14     Lab Results   Component Value Date    PROBNP 3,578.0 (H) 09/26/2023       I/O last 3 completed shifts:  In: 779.4 [P.O.:480; I.V.:149.4; IV Piggyback:150]  Out: 2600 [Urine:2600]    Cardiographics:  ECG/EMG Results (last 24 hours)       Procedure Component Value Units Date/Time    ECG 12 Lead Dyspnea [[id number]] Collected: 09/26/23 1245     Updated: 09/26/23 1313     QT Interval 374 ms      QTC Interval 452 ms     Narrative:      Test Reason : Dyspnea  Blood Pressure :   */*   mmHG  Vent. Rate :  88 BPM     Atrial Rate :  88 BPM     P-R Int : 140 ms          QRS Dur : 102 ms      QT Int : 374 ms       P-R-T Axes :  81 195 -30 degrees     QTc Int : 452 ms    ** Poor data quality, interpretation may be adversely affected  Sinus rhythm with occasional Premature ventricular complexes and Fusion complexes  Incomplete right bundle branch block  Possible Right ventricular hypertrophy  Septal infarct , age undetermined  Lateral infarct , age undetermined  Abnormal ECG  When compared with ECG of 25-MAY-2023 11:12,  Significant changes have occurred    Referred By: ERDR           Confirmed By:     ECG 12 Lead Dyspnea [[id number]] Collected: 09/26/23 1409     Updated: 09/26/23 1426     QT Interval 358 ms      QTC Interval 435 ms     Narrative:      Test Reason : Dyspnea  Blood Pressure :   */*   mmHG  Vent. Rate :  89 BPM     Atrial Rate :  89 BPM     P-R Int : 136 ms          QRS Dur : 106 ms      QT Int : 358 ms       P-R-T Axes :  75 260  26 degrees     QTc Int : 435 ms    Normal sinus rhythm  Incomplete right bundle branch block  Possible Right ventricular hypertrophy  Septal infarct (cited on or before 26-SEP-2023)  Lateral infarct (cited on or before 26-SEP-2023)  Abnormal ECG  When compared with ECG of 26-SEP-2023 12:45,  Fusion complexes are no longer Present  Premature ventricular complexes are no longer Present  Nonspecific T wave abnormality now evident in Inferior  leads    Referred By: ERDR           Confirmed By:     SCANNED EKG [[id number]] Resulted: 09/26/23     Updated: 09/26/23 2124    SCANNED EKG [[id number]] Resulted: 09/26/23     Updated: 09/26/23 2124          Results for orders placed during the hospital encounter of 08/09/22    Adult Transthoracic Echo Complete W/ Cont if Necessary Per Protocol    Interpretation Summary  · The right atrial cavity is mild to moderately dilated.  · The right ventricular cavity is mildly dilated.  · Left ventricular wall thickness is consistent with borderline concentric hypertrophy.  · Left ventricular ejection fraction appears to be 56 - 60%.      Imaging Results (Most Recent)       Procedure Component Value Units Date/Time    CT Angiogram Chest [[id number]] Collected: 09/26/23 1538     Updated: 09/26/23 1904    Narrative:      INDICATION:  Elevated troponins.    TECHNIQUE:  IV contrast was administered and axial images from the thoracic inlet through  the diaphragms were performed.  3D multiplanar reformats of the thoracic aorta  were completed on a separate workstation under concurrent supervision.    FINDINGS:  Study demonstrates no evidence of pulmonary embolus.  There is a trace right  pleural effusion with patchy basilar infiltrate.  Left lung zone clear.  Cardiomegaly.    SUMMARY:  Patchy right basilar infiltrate with trace effusion.  No evidence of pulmonary  embolus.    XR Chest 2 View [[id number]] Collected: 09/26/23 1310     Updated: 09/26/23 1651    Narrative:      XR CHEST 2 VW    HISTORY: soa    FINDINGS: Left lower lobe/retrocardiac opacities potentially representing  atelectasis versus inflammatory/infectious process.  No pleural effusion.  No  pneumothorax..  The cardiomediastinal silhouette and upper abdomen are  unremarkable. No acute osseous abnormality.        US Guided Vascular Access [[id number]] Collected: 09/26/23 1423     Updated: 09/26/23 1427    Narrative:      The right  arm was utilized for vascular access.   Images demonstrate patency of  the basilic.  The vessel was accessed under direct ultrasound guidance.  Permanent images were saved in the chart/PACS system.    IR Insert Midline Without Port Pump 5 Plus [[id number]] Resulted: 09/26/23 1410     Updated: 09/26/23 1410    Narrative:      This procedure was auto-finalized with no dictation required.            No radiology results for the last 30 days.    Medication Review:     Current Facility-Administered Medications:     albuterol sulfate HFA (PROVENTIL HFA;VENTOLIN HFA;PROAIR HFA) inhaler 2 puff, 2 puff, Inhalation, 4x Daily - RT, Toby Edwards MD    aspirin EC tablet 81 mg, 81 mg, Oral, Daily, Mavis Avelar APRN, 81 mg at 09/27/23 0856    sennosides-docusate (PERICOLACE) 8.6-50 MG per tablet 2 tablet, 2 tablet, Oral, BID, 2 tablet at 09/26/23 2129 **AND** polyethylene glycol (MIRALAX) packet 17 g, 17 g, Oral, Daily PRN **AND** bisacodyl (DULCOLAX) EC tablet 5 mg, 5 mg, Oral, Daily PRN **AND** bisacodyl (DULCOLAX) suppository 10 mg, 10 mg, Rectal, Daily PRN, Toby Edwards MD    Calcium Replacement - Follow Nurse / BPA Driven Protocol, , Does not apply, PRN, Toby Edwards MD    carvedilol (COREG) tablet 3.125 mg, 3.125 mg, Oral, BID With Meals, Mavis Avelar APRN, 3.125 mg at 09/27/23 0856    furosemide (LASIX) injection 40 mg, 40 mg, Intravenous, Q12H, Toby Edwards MD, 40 mg at 09/27/23 0902    gabapentin (NEURONTIN) capsule 300 mg, 300 mg, Oral, TID, Toby Edwards MD, 300 mg at 09/27/23 0859    heparin 07099 units/250 mL (100 units/mL) in 0.45 % NaCl infusion, 12.2 Units/kg/hr, Intravenous, Titrated, Last Rate: 14.78 mL/hr at 09/27/23 0557, 12.12 Units/kg/hr at 09/27/23 0557 **AND** heparin (porcine) 5000 UNIT/ML injection 4,000 Units, 4,000 Units, Intravenous, PRN **AND** heparin (porcine) 5000 UNIT/ML injection 3,700 Units, 30 Units/kg, Intravenous, PRN, Toby Edwards MD, 3,700 Units at 09/27/23 0609    levoFLOXacin (LEVAQUIN) 750 mg/150  mL D5W (premix) (LEVAQUIN) 750 mg, 750 mg, Intravenous, Q24H, Toby Edwards MD, Last Rate: 100 mL/hr at 09/26/23 2144, 750 mg at 09/26/23 2144    Magnesium Standard Dose Replacement - Follow Nurse / BPA Driven Protocol, , Does not apply, PRNJerry Tomas, MD    nicotine (NICODERM CQ) 21 MG/24HR patch 1 patch, 1 patch, Transdermal, Q24H, Toby Edwards MD, 1 patch at 09/26/23 2130    nitroglycerin (NITROSTAT) SL tablet 0.4 mg, 0.4 mg, Sublingual, Q5 Min PRN, Toby Edwards MD    ondansetron (ZOFRAN) tablet 4 mg, 4 mg, Oral, Q6H PRN **OR** ondansetron (ZOFRAN) injection 4 mg, 4 mg, Intravenous, Q6H PRN, Toby Edwards MD    pentoxifylline (TRENtal) CR tablet 400 mg, 400 mg, Oral, BID, Toby Edwards MD, 400 mg at 09/27/23 0856    Phosphorus Replacement - Follow Nurse / BPA Driven Protocol, , Does not apply, PRN, Toby Edwards MD    Potassium Replacement - Follow Nurse / BPA Driven Protocol, , Does not apply, PRN, Toby Edwards MD    rosuvastatin (CRESTOR) tablet 10 mg, 10 mg, Oral, Nightly, Toby Edwards MD, 10 mg at 09/26/23 2129    sodium chloride 0.9 % flush 10 mL, 10 mL, Intravenous, PRN, Toby Edwards MD    sodium chloride 0.9 % flush 10 mL, 10 mL, Intravenous, Q12H, Toby Edwards MD, 10 mL at 09/27/23 0902    sodium chloride 0.9 % flush 10 mL, 10 mL, Intravenous, PRN, Toby Edwards MD    sodium chloride 0.9 % infusion 40 mL, 40 mL, Intravenous, PRN, Toby Edwards MD    traMADol (ULTRAM) tablet 50 mg, 50 mg, Oral, Q6H PRN, Toby Edwards MD    valsartan (DIOVAN) tablet 40 mg, 40 mg, Oral, Daily, Toby Edwards MD    Assessment & Plan       Elevated troponin    NSTEMI (non-ST elevated myocardial infarction)    #1.  NSTEMI: Troponin elevation of 1600.  EKG showing incomplete right bundle branch block, nonspecific inferior lead changes.  Echocardiogram in 2022 showing LVEF preserved at 56 to 60%, right ventricle is mildly dilated, right atrial cavity is mild to moderately dilated.  Patient  does have multiple cardiac risk factors as well noted to have intermittent chest pain for 2 to 3 days prior to arrival.  Currently she is chest pain-free and hemodynamically stable.  She is significantly volume overloaded from a CHF standpoint.  We will recommend optimization of heart failure and aggressive diuresis.  We will recommend cardiac catheterization once stabilized from a respiratory standpoint.  Currently she is unable to lay flat.  -IV heparin drip  -ASA 81mg  -Crestor 10 mg  -Coreg 3.125mg BID  -Echocardiogram to assess for LVEF, regional wall motion abnormalities     #2.  Acute on chronic heart failure with preserved ejection fraction: Previously noted to have preserved LVEF of 56 to 60%.  She appears significantly volume overloaded upon exam with adequate perfusion and hemodynamics.  We will recommend guideline directed medical therapy and aggressive diuresis.  -Valsartan 40 mg; Can consider changing to Entresto   -Coreg 3.125 mg twice daily  -40 mg Lasix twice daily IV; Extra 40mg around 1200. We will follow response.   -MRA with Aldactone indicated as well as SGLT-2 inhibitor.   Daily weights  Strict intake and output  Fluid restriction 1500 cc, sodium restriction less than 2 g  Repeat echocardiogram pending     #3. Right ventricular abnormality: mild RVC dilation. RVEF is normal. No evidence of PAH. Have recommended patient for SNEHA evaluation as an outpatient. PFT showed moderate restriction.      #4. Ascending aorta dilation: mild dilation of the proximal ascending aorta at 3.9cm. She will continue with clinical surveillance.  She has been recommended for blood pressure control.     Stephanie Bradshaw  reports that she has been smoking cigarettes. She has a 60.00 pack-year smoking history. She has been exposed to tobacco smoke. She has never used smokeless tobacco.. I have educated her on the risk of diseases from using tobacco products such as cancer, COPD, and heart disease.      I advised her  "to quit and she is not willing to quit.     I spent 3  minutes counseling the patient.     Patient's (Body mass index is 50.56 kg/m².) indicates that they are morbidly obese (BMI > 40 or > 35 with obesity - related health condition) with health related conditions that include hypertension, dyslipidemias, osteoarthritis, and lower extremity venous stasis disease . Weight is unchanged. BMI is is above average; BMI management plan is completed.     Plan for disposition: Continue CCU. We will continue to follow.             This document has been electronically signed by ROSANNE Swanson on September 27, 2023 09:03 CDT      Electronically signed by ROSANNE Swanson, 09/27/23, 9:03 AM CDT.      I personally saw and examined Stephanie Bradshaw after the APRN.  I personally performed a history and physical examination of the patient.  I personally reviewed independent findings and plan of care.  I discussed management with the APRN.  I agree with the APRN's documentation.    63-year-old female history of hypertension, hyperlipidemia, diabetes, tobacco use, COPD, prior history of DVT/PE on chronic anticoagulation presented with hospital with shortness of breath and symptoms of fluid overload. Has gained about 20 to 30 pounds. She was noted to have hypercarbic hypoxic respiratory failure. He was also noted to have significantly elevated troponins were consulted for further evaluation. He has been having intermittent chest pain for last several days.     Subjective:  Still has orthopnea and shortness of breath.    Vitals:    09/27/23 1000 09/27/23 1100 09/27/23 1150 09/27/23 1200   BP: 106/66 113/66  103/63   BP Location:    Right arm   Patient Position:    Lying   Pulse: 80 82  87   Resp:    22   Temp:    98.2 °F (36.8 °C)   TempSrc:    Oral   SpO2: 90% (!) 89%  91%   Weight:   125 kg (275 lb 9.2 oz)    Height:   157.5 cm (62.01\")      1.  NSTEMI:  Multiple risk factors of diabetes, hypertension, " hyperlipidemia.  EKG showed right bundle branch block with nonspecific ST-T changes.  Initial troponin was significantly elevated which has subsequently trended down.  Denying any active chest pain.  Patient was started on proper medical therapy with heparin drip, aspirin/Crestor/Coreg we will get an echocardiogram.  Will need cardiac cath once optimized from heart failure standpoint.  CT chest without evidence of pulmonary embolism     2.  Congestive heart with preserved ejection fraction:  Previously noted to have diastolic heart failure.  Appears fluid overloaded.  Aggressive diuresis and guideline medical therapy.  Repeating echocardiogram.        Thank for the consult continue to follow        Electronically signed by Louann Ch MD, 09/27/23, 2:00 PM CDT.

## 2023-09-27 NOTE — PROGRESS NOTES
[facility] Medicine Services  INPATIENT PROGRESS NOTE    Length of Stay: 1  Date of Admission: 9/26/2023  Primary Care Physician: Danielle Vaughn APRN    Subjective   Chief Complaint: none  HPI:  :63-year-old pleasant female comes to the ER chief complaint shortness of breath dyspnea over the last couple of weeks, leg edema, yellow sputum, fever. She has a history of COPD and heart failure. She is unable to lay flat. She endorses coughing and wheezing. Her inhalers and not been helping at home. She endorses her leg swelling as well. She does not wear oxygen at home. She denies other symptoms. with past medical history of hyperlipidemia, type 2 diabetes, obesity, current tobacco use, COPD, peripheral edema/venous stasis. Patient is following with CT vascular surgery ROSANNE Barrett for leg swelling. Echocardiogram showing normal biventricular function, grade 1 DD, right ventricular cavity is mildly dilated, trace MR, trace TR, there is mild dilation of the proximal ascending aorta at 3.9cm. Patient reports hx of asthma and COPD. She is on ventolin inhaler. Likely has SNEHA which is undiagnosed. She does have hypersomnia and snoring. DVT / PE. CTA chest showing enlargement of the main pulmonary artery at 4.4 cm, suggest pulmonary hypertension, probably also mild enlargement of the right heart. CTA was negative for PE on 5/25/23.  She was admitted to the hospital in May 2023 for acute hypoxic respiratory failure and diastolic heart failure.     As of today, 09/27/23  Review of Systems   Constitutional:  Negative for activity change, appetite change, chills, diaphoresis and fatigue.   HENT:  Negative for congestion, ear discharge, hearing loss, rhinorrhea, sinus pain, sneezing and sore throat.    Eyes:  Negative for photophobia, discharge and visual disturbance.   Respiratory:  Negative for cough, chest tightness, shortness of breath and wheezing.    Cardiovascular:   Negative for chest pain and palpitations.   Gastrointestinal:  Negative for abdominal distention, abdominal pain, blood in stool, diarrhea, nausea and vomiting.   Endocrine: Negative for cold intolerance, heat intolerance, polydipsia, polyphagia and polyuria.   Genitourinary:  Negative for dysuria, flank pain, hematuria and urgency.   Musculoskeletal:  Negative for arthralgias, joint swelling and myalgias.   Skin:  Negative for color change.   Allergic/Immunologic: Negative for food allergies.   Neurological:  Negative for dizziness, seizures, syncope, speech difficulty, weakness and headaches.   Hematological:  Negative for adenopathy. Does not bruise/bleed easily.   Psychiatric/Behavioral:  Negative for confusion, hallucinations, self-injury and suicidal ideas. The patient is not nervous/anxious.       All pertinent negatives and positives are as above. All other systems have been reviewed and are negative unless otherwise stated.    Objective    Temp:  [98 °F (36.7 °C)-98.2 °F (36.8 °C)] 98.2 °F (36.8 °C)  Heart Rate:  [77-95] 79  Resp:  [18-22] 22  BP: (103-136)/(57-86) 108/73         As of today, 09/27/23  Physical Exam  Constitutional:       Appearance: Normal appearance.   HENT:      Head: Normocephalic and atraumatic.      Right Ear: Tympanic membrane normal.      Left Ear: Tympanic membrane normal.      Nose: Nose normal.      Mouth/Throat:      Mouth: Mucous membranes are moist.   Eyes:      Pupils: Pupils are equal, round, and reactive to light.   Cardiovascular:      Rate and Rhythm: Normal rate and regular rhythm.      Pulses: Normal pulses.      Heart sounds: Normal heart sounds.   Pulmonary:      Effort: Pulmonary effort is normal.      Breath sounds: Normal breath sounds.   Abdominal:      General: Abdomen is flat. Bowel sounds are normal.      Palpations: Abdomen is soft.   Musculoskeletal:         General: Normal range of motion.      Cervical back: Normal range of motion and neck supple.   Skin:      General: Skin is warm and dry.      Capillary Refill: Capillary refill takes less than 2 seconds.   Neurological:      General: No focal deficit present.      Mental Status: She is alert and oriented to person, place, and time.   Psychiatric:         Mood and Affect: Mood normal.         Behavior: Behavior normal.         Thought Content: Thought content normal.         Judgment: Judgment normal.         Results Review:  I have reviewed the labs, radiology results, and diagnostic studies.    Laboratory Data:   Results from last 7 days   Lab Units 09/27/23  0502 09/26/23  1238   SODIUM mmol/L 138 143   POTASSIUM mmol/L 4.5 4.8   CHLORIDE mmol/L 94* 100   CO2 mmol/L 39.0* 37.0*   BUN mg/dL 12 9   CREATININE mg/dL 0.83 0.77   GLUCOSE mg/dL 153* 159*   CALCIUM mg/dL 8.3* 8.3*   BILIRUBIN mg/dL  --  0.6   ALK PHOS U/L  --  109   ALT (SGPT) U/L  --  41*   AST (SGOT) U/L  --  195*   ANION GAP mmol/L 5.0 6.0     Estimated Creatinine Clearance: 90 mL/min (by C-G formula based on SCr of 0.83 mg/dL).  Results from last 7 days   Lab Units 09/27/23  0502 09/26/23  1422   MAGNESIUM mg/dL 2.1 2.0   PHOSPHORUS mg/dL 3.2 3.1         Results from last 7 days   Lab Units 09/27/23  0502 09/26/23  1238   WBC 10*3/mm3 8.53 7.74   HEMOGLOBIN g/dL 12.1 12.6   HEMATOCRIT % 42.2 44.0   PLATELETS 10*3/mm3 210 199     Results from last 7 days   Lab Units 09/27/23  0502 09/26/23  1422   INR  1.17 1.14       Culture Data:   No results found for: BLOODCX  No results found for: URINECX  No results found for: RESPCX  No results found for: WOUNDCX  No results found for: STOOLCX  No components found for: BODYFLD    Radiology Data:   Imaging Results (Last 24 Hours)       Procedure Component Value Units Date/Time    CT Angiogram Chest [[id number]] Collected: 09/26/23 1538     Updated: 09/26/23 1904    Narrative:      INDICATION:  Elevated troponins.    TECHNIQUE:  IV contrast was administered and axial images from the thoracic inlet through  the  diaphragms were performed.  3D multiplanar reformats of the thoracic aorta  were completed on a separate workstation under concurrent supervision.    FINDINGS:  Study demonstrates no evidence of pulmonary embolus.  There is a trace right  pleural effusion with patchy basilar infiltrate.  Left lung zone clear.  Cardiomegaly.    SUMMARY:  Patchy right basilar infiltrate with trace effusion.  No evidence of pulmonary  embolus.            I have utilized all available immediate resources to obtain, update, or review the patient's current medications (including all prescriptions, over-the-counter products, herbals, cannabis/cannabidiol products, and vitamin/mineral/dietary (nutritional) supplements).       Assessment/Plan     Active Hospital Problems    Diagnosis     **Elevated troponin     NSTEMI (non-ST elevated myocardial infarction)        Treatment and  Plan:63 years old female patient with cough, shortness of breath, fever, CTA  chest without PE or  pneumonia. Besides with chest pain and elevated troponin, abnormal EKG. Already started management for NSTEMI, cardiology on board. Dr Ch considers to perform cardia catheterization once more stable.  1.COPD exacerbation, chronic hypercapnic respiratory failure, bipap at night, discussed with respiratory therapist  2.NSTEMI, elevated troponin, delta  -94, EKG changes ST depression in inferior lead, echocardiogram with ischemic cardiomyopathy, left ventricular wall segments are hypokinetic: apical anterior and apical septal, pending cardiac cath, continue medical management. Aspirin, coreg, crestor, heparin drip.  3.HFpEF, LVEF 51 - 55%, acute decompensation, continue diuretic, valsartan  4.class 3 obesity  5.elevated transaminases, secondary to CHF  6.aortic dilatation      Medical Decision Making  Number and Complexity of problems: 6  Differential Diagnosis: NSTEMI, CHF, COPD exacerbation    Conditions and Status:        Condition is unchanged.     MDM  Data  External documents reviewed: previous medical records  My EKG interpretation: sinus rhythm RBBB  My CT interpretation: RLL pneumonia  Tests considered but not ordered: none     Decision rules/scores evaluated (example [id number], Wells, etc): n/a     Discussed with: patient, respiratory therapist     Treatment Plan  See above    Care Planning  Shared decision making: patient  Code status and discussions: full code    Disposition  Social Determinants of Health that impact treatment or disposition: none  I expect the patient to be discharged to home in 5 days.       I confirmed that the patient's Advance Care Plan is present, code status is documented, or surrogate decision maker is listed in the patient's medical record.      This document has been electronically signed by Toby Edwards MD on September 27, 2023 17:09 CDT

## 2023-09-27 NOTE — PROGRESS NOTES
"Patient states she can't tolerate cpap mask as she \" wore that mask before and can't take it\".  Patient refused cpap at this time.  "

## 2023-09-28 ENCOUNTER — APPOINTMENT (OUTPATIENT)
Dept: GENERAL RADIOLOGY | Facility: HOSPITAL | Age: 63
End: 2023-09-28
Payer: COMMERCIAL

## 2023-09-28 LAB
ALBUMIN SERPL-MCNC: 2.7 G/DL (ref 3.5–5.2)
ALBUMIN/GLOB SERPL: 0.9 G/DL
ALP SERPL-CCNC: 82 U/L (ref 39–117)
ALT SERPL W P-5'-P-CCNC: 35 U/L (ref 1–33)
ANION GAP SERPL CALCULATED.3IONS-SCNC: 3 MMOL/L (ref 5–15)
APTT PPP: 106.3 SECONDS (ref 20–40.3)
APTT PPP: 59.8 SECONDS (ref 20–40.3)
APTT PPP: 66.6 SECONDS (ref 20–40.3)
ARTERIAL PATENCY WRIST A: ABNORMAL
ARTERIAL PATENCY WRIST A: POSITIVE
AST SERPL-CCNC: 62 U/L (ref 1–32)
ATMOSPHERIC PRESS: 749 MMHG
ATMOSPHERIC PRESS: 750 MMHG
BASE EXCESS BLDA CALC-SCNC: 18.6 MMOL/L (ref 0–2)
BASE EXCESS BLDA CALC-SCNC: 19.3 MMOL/L (ref 0–2)
BASOPHILS # BLD AUTO: 0.03 10*3/MM3 (ref 0–0.2)
BASOPHILS NFR BLD AUTO: 0.3 % (ref 0–1.5)
BDY SITE: ABNORMAL
BDY SITE: ABNORMAL
BILIRUB SERPL-MCNC: 0.4 MG/DL (ref 0–1.2)
BUN SERPL-MCNC: 16 MG/DL (ref 8–23)
BUN/CREAT SERPL: 18.2 (ref 7–25)
CALCIUM SPEC-SCNC: 7.9 MG/DL (ref 8.6–10.5)
CHLORIDE SERPL-SCNC: 92 MMOL/L (ref 98–107)
CO2 SERPL-SCNC: 42 MMOL/L (ref 22–29)
CREAT SERPL-MCNC: 0.88 MG/DL (ref 0.57–1)
D-LACTATE SERPL-SCNC: 1.6 MMOL/L (ref 0.5–2)
DEPRECATED RDW RBC AUTO: 54.2 FL (ref 37–54)
EGFRCR SERPLBLD CKD-EPI 2021: 74.9 ML/MIN/1.73
EOSINOPHIL # BLD AUTO: 0.04 10*3/MM3 (ref 0–0.4)
EOSINOPHIL NFR BLD AUTO: 0.5 % (ref 0.3–6.2)
ERYTHROCYTE [DISTWIDTH] IN BLOOD BY AUTOMATED COUNT: 16.3 % (ref 12.3–15.4)
GAS FLOW AIRWAY: 5 LPM
GLOBULIN UR ELPH-MCNC: 3 GM/DL
GLUCOSE SERPL-MCNC: 121 MG/DL (ref 65–99)
HCO3 BLDA-SCNC: 47.6 MMOL/L (ref 20–26)
HCO3 BLDA-SCNC: 48.7 MMOL/L (ref 20–26)
HCT VFR BLD AUTO: 40 % (ref 34–46.6)
HGB BLD-MCNC: 11.6 G/DL (ref 12–15.9)
IMM GRANULOCYTES # BLD AUTO: 0.03 10*3/MM3 (ref 0–0.05)
IMM GRANULOCYTES NFR BLD AUTO: 0.3 % (ref 0–0.5)
INHALED O2 CONCENTRATION: 80 %
INR PPP: 1.11 (ref 0.8–1.2)
LYMPHOCYTES # BLD AUTO: 1.81 10*3/MM3 (ref 0.7–3.1)
LYMPHOCYTES NFR BLD AUTO: 20.8 % (ref 19.6–45.3)
Lab: ABNORMAL
Lab: ABNORMAL
MAGNESIUM SERPL-MCNC: 2 MG/DL (ref 1.6–2.4)
MCH RBC QN AUTO: 26.4 PG (ref 26.6–33)
MCHC RBC AUTO-ENTMCNC: 29 G/DL (ref 31.5–35.7)
MCV RBC AUTO: 90.9 FL (ref 79–97)
MODALITY: ABNORMAL
MODALITY: ABNORMAL
MONOCYTES # BLD AUTO: 0.95 10*3/MM3 (ref 0.1–0.9)
MONOCYTES NFR BLD AUTO: 10.9 % (ref 5–12)
NEUTROPHILS NFR BLD AUTO: 5.85 10*3/MM3 (ref 1.7–7)
NEUTROPHILS NFR BLD AUTO: 67.2 % (ref 42.7–76)
NRBC BLD AUTO-RTO: 0.3 /100 WBC (ref 0–0.2)
PCO2 BLDA: 72.7 MM HG (ref 35–45)
PCO2 BLDA: 88.9 MM HG (ref 35–45)
PEEP RESPIRATORY: 5 CM[H2O]
PH BLDA: 7.35 PH UNITS (ref 7.35–7.45)
PH BLDA: 7.42 PH UNITS (ref 7.35–7.45)
PHOSPHATE SERPL-MCNC: 3 MG/DL (ref 2.5–4.5)
PLATELET # BLD AUTO: 242 10*3/MM3 (ref 140–450)
PMV BLD AUTO: 10.5 FL (ref 6–12)
PO2 BLDA: 229 MM HG (ref 83–108)
PO2 BLDA: 57.8 MM HG (ref 83–108)
POTASSIUM SERPL-SCNC: 4.2 MMOL/L (ref 3.5–5.2)
PROT SERPL-MCNC: 5.7 G/DL (ref 6–8.5)
PROTHROMBIN TIME: 14.3 SECONDS (ref 11.1–15.3)
QT INTERVAL: 444 MS
QTC INTERVAL: 472 MS
RBC # BLD AUTO: 4.4 10*6/MM3 (ref 3.77–5.28)
SAO2 % BLDCOA: 89.4 % (ref 94–99)
SAO2 % BLDCOA: >100 % (ref 94–99)
SET MECH RESP RATE: 22
SODIUM SERPL-SCNC: 137 MMOL/L (ref 136–145)
VENTILATOR MODE: ABNORMAL
VENTILATOR MODE: AC
VT ON VENT VENT: 450 ML
WBC NRBC COR # BLD: 8.71 10*3/MM3 (ref 3.4–10.8)

## 2023-09-28 PROCEDURE — 25010000002 PROPOFOL 10 MG/ML EMULSION

## 2023-09-28 PROCEDURE — 93005 ELECTROCARDIOGRAM TRACING: CPT | Performed by: INTERNAL MEDICINE

## 2023-09-28 PROCEDURE — 85730 THROMBOPLASTIN TIME PARTIAL: CPT

## 2023-09-28 PROCEDURE — 71045 X-RAY EXAM CHEST 1 VIEW: CPT

## 2023-09-28 PROCEDURE — 94799 UNLISTED PULMONARY SVC/PX: CPT

## 2023-09-28 PROCEDURE — 80053 COMPREHEN METABOLIC PANEL: CPT | Performed by: NURSE PRACTITIONER

## 2023-09-28 PROCEDURE — 25010000002 ONDANSETRON PER 1 MG

## 2023-09-28 PROCEDURE — 85025 COMPLETE CBC W/AUTO DIFF WBC: CPT

## 2023-09-28 PROCEDURE — 25010000002 LEVOFLOXACIN PER 250 MG

## 2023-09-28 PROCEDURE — 25010000002 PHENYLEPHRINE 10 MG/ML SOLUTION: Performed by: INTERNAL MEDICINE

## 2023-09-28 PROCEDURE — 94664 DEMO&/EVAL PT USE INHALER: CPT

## 2023-09-28 PROCEDURE — 93010 ELECTROCARDIOGRAM REPORT: CPT | Performed by: INTERNAL MEDICINE

## 2023-09-28 PROCEDURE — 87070 CULTURE OTHR SPECIMN AEROBIC: CPT

## 2023-09-28 PROCEDURE — 25010000002 HEPARIN (PORCINE) PER 1000 UNITS

## 2023-09-28 PROCEDURE — 83735 ASSAY OF MAGNESIUM: CPT

## 2023-09-28 PROCEDURE — 36600 WITHDRAWAL OF ARTERIAL BLOOD: CPT

## 2023-09-28 PROCEDURE — 25010000002 FUROSEMIDE PER 20 MG

## 2023-09-28 PROCEDURE — 94761 N-INVAS EAR/PLS OXIMETRY MLT: CPT

## 2023-09-28 PROCEDURE — 83605 ASSAY OF LACTIC ACID: CPT

## 2023-09-28 PROCEDURE — 25010000002 FENTANYL CITRATE (PF) 50 MCG/ML SOLUTION

## 2023-09-28 PROCEDURE — 31500 INSERT EMERGENCY AIRWAY: CPT

## 2023-09-28 PROCEDURE — 94002 VENT MGMT INPAT INIT DAY: CPT

## 2023-09-28 PROCEDURE — 84100 ASSAY OF PHOSPHORUS: CPT

## 2023-09-28 PROCEDURE — 85610 PROTHROMBIN TIME: CPT

## 2023-09-28 PROCEDURE — 25010000002 FUROSEMIDE PER 20 MG: Performed by: NURSE PRACTITIONER

## 2023-09-28 PROCEDURE — 25010000002 SUCCINYLCHOLINE PER 20 MG

## 2023-09-28 PROCEDURE — 87205 SMEAR GRAM STAIN: CPT

## 2023-09-28 PROCEDURE — 99233 SBSQ HOSP IP/OBS HIGH 50: CPT | Performed by: INTERNAL MEDICINE

## 2023-09-28 PROCEDURE — 82803 BLOOD GASES ANY COMBINATION: CPT

## 2023-09-28 RX ORDER — NOREPINEPHRINE BITARTRATE 0.03 MG/ML
.02-.3 INJECTION, SOLUTION INTRAVENOUS
Status: DISCONTINUED | OUTPATIENT
Start: 2023-09-28 | End: 2023-10-01 | Stop reason: HOSPADM

## 2023-09-28 RX ORDER — SUCCINYLCHOLINE CHLORIDE 20 MG/ML
100 INJECTION INTRAMUSCULAR; INTRAVENOUS ONCE
Status: COMPLETED | OUTPATIENT
Start: 2023-09-28 | End: 2023-09-28

## 2023-09-28 RX ORDER — PHENYLEPHRINE HCL IN 0.9% NACL 0.5 MG/5ML
.5-3 SYRINGE (ML) INTRAVENOUS
Status: DISCONTINUED | OUTPATIENT
Start: 2023-09-28 | End: 2023-09-28

## 2023-09-28 RX ORDER — PROPOFOL 10 MG/ML
VIAL (ML) INTRAVENOUS
Status: COMPLETED
Start: 2023-09-28 | End: 2023-09-28

## 2023-09-28 RX ORDER — ETOMIDATE 2 MG/ML
20 INJECTION INTRAVENOUS ONCE
Status: COMPLETED | OUTPATIENT
Start: 2023-09-28 | End: 2023-09-28

## 2023-09-28 RX ORDER — SCOLOPAMINE TRANSDERMAL SYSTEM 1 MG/1
1 PATCH, EXTENDED RELEASE TRANSDERMAL
Status: DISCONTINUED | OUTPATIENT
Start: 2023-09-29 | End: 2023-10-01 | Stop reason: HOSPADM

## 2023-09-28 RX ORDER — FENTANYL CITRATE 50 UG/ML
50 INJECTION, SOLUTION INTRAMUSCULAR; INTRAVENOUS
Status: DISCONTINUED | OUTPATIENT
Start: 2023-09-28 | End: 2023-10-01 | Stop reason: HOSPADM

## 2023-09-28 RX ADMIN — ASPIRIN 81 MG: 81 TABLET, COATED ORAL at 08:14

## 2023-09-28 RX ADMIN — PROPOFOL 50 MCG/KG/MIN: 10 INJECTION, EMULSION INTRAVENOUS at 23:05

## 2023-09-28 RX ADMIN — PROPOFOL 45 MCG/KG/MIN: 10 INJECTION, EMULSION INTRAVENOUS at 20:31

## 2023-09-28 RX ADMIN — NICOTINE 1 PATCH: 21 PATCH, EXTENDED RELEASE TRANSDERMAL at 16:44

## 2023-09-28 RX ADMIN — SUCCINYLCHOLINE CHLORIDE 100 MG: 20 INJECTION, SOLUTION INTRAMUSCULAR; INTRAVENOUS at 17:58

## 2023-09-28 RX ADMIN — ONDANSETRON 4 MG: 2 INJECTION INTRAMUSCULAR; INTRAVENOUS at 02:29

## 2023-09-28 RX ADMIN — ROSUVASTATIN CALCIUM 10 MG: 10 TABLET, FILM COATED ORAL at 20:49

## 2023-09-28 RX ADMIN — HEPARIN SODIUM 12.2 UNITS/KG/HR: 10000 INJECTION, SOLUTION INTRAVENOUS at 07:31

## 2023-09-28 RX ADMIN — ALBUTEROL SULFATE 2 PUFF: 90 AEROSOL, METERED RESPIRATORY (INHALATION) at 07:41

## 2023-09-28 RX ADMIN — GABAPENTIN 300 MG: 300 CAPSULE ORAL at 08:14

## 2023-09-28 RX ADMIN — PHENYLEPHRINE HYDROCHLORIDE 0.5 MCG/KG/MIN: 10 INJECTION INTRAVENOUS at 04:51

## 2023-09-28 RX ADMIN — NOREPINEPHRINE BITARTRATE 0.02 MCG/KG/MIN: 1 INJECTION, SOLUTION, CONCENTRATE INTRAVENOUS at 12:04

## 2023-09-28 RX ADMIN — PROPOFOL INJECTABLE EMULSION 5 MCG/KG/MIN: 10 INJECTION, EMULSION INTRAVENOUS at 17:59

## 2023-09-28 RX ADMIN — FUROSEMIDE 40 MG: 40 INJECTION, SOLUTION INTRAMUSCULAR; INTRAVENOUS at 08:15

## 2023-09-28 RX ADMIN — DOCUSATE SODIUM 50 MG AND SENNOSIDES 8.6 MG 2 TABLET: 8.6; 5 TABLET, FILM COATED ORAL at 20:49

## 2023-09-28 RX ADMIN — ETOMIDATE 20 MG: 2 INJECTION INTRAVENOUS at 17:58

## 2023-09-28 RX ADMIN — ALBUTEROL SULFATE 2 PUFF: 90 AEROSOL, METERED RESPIRATORY (INHALATION) at 14:49

## 2023-09-28 RX ADMIN — FUROSEMIDE 5 MG/HR: 10 INJECTION, SOLUTION INTRAMUSCULAR; INTRAVENOUS at 12:35

## 2023-09-28 RX ADMIN — GABAPENTIN 300 MG: 300 CAPSULE ORAL at 16:45

## 2023-09-28 RX ADMIN — Medication 10 ML: at 08:15

## 2023-09-28 RX ADMIN — LEVOFLOXACIN 750 MG: 5 INJECTION, SOLUTION INTRAVENOUS at 20:50

## 2023-09-28 RX ADMIN — PENTOXIFYLLINE 400 MG: 400 TABLET, EXTENDED RELEASE ORAL at 08:15

## 2023-09-28 RX ADMIN — PROPOFOL 5 MCG/KG/MIN: 10 INJECTION, EMULSION INTRAVENOUS at 17:59

## 2023-09-28 RX ADMIN — SODIUM CHLORIDE 250 ML: 9 INJECTION, SOLUTION INTRAVENOUS at 03:33

## 2023-09-28 RX ADMIN — Medication 10 ML: at 20:51

## 2023-09-28 RX ADMIN — GABAPENTIN 300 MG: 300 CAPSULE ORAL at 20:50

## 2023-09-28 RX ADMIN — FENTANYL CITRATE 50 MCG: 50 INJECTION, SOLUTION INTRAMUSCULAR; INTRAVENOUS at 23:49

## 2023-09-28 NOTE — PROGRESS NOTES
[facility] Medicine Services  INPATIENT PROGRESS NOTE    Length of Stay: 2  Date of Admission: 9/26/2023  Primary Care Physician: Danielle Vaughn APRN    Subjective   Chief Complaint: shortness of breath  HPI:  63-year-old pleasant female comes to the ER chief complaint shortness of breath dyspnea over the last couple of weeks, leg edema, yellow sputum, fever. She has a history of COPD and heart failure. She is unable to lay flat. She endorses coughing and wheezing. Her inhalers and not been helping at home. She endorses her leg swelling as well. She does not wear oxygen at home. She denies other symptoms. with past medical history of hyperlipidemia, type 2 diabetes, obesity, current tobacco use, COPD, peripheral edema/venous stasis. Patient is following with CT vascular surgery ROSANNE Barrett for leg swelling. Echocardiogram showing normal biventricular function, grade 1 DD, right ventricular cavity is mildly dilated, trace MR, trace TR, there is mild dilation of the proximal ascending aorta at 3.9cm. Patient reports hx of asthma and COPD. She is on ventolin inhaler. Likely has SNEHA which is undiagnosed. She does have hypersomnia and snoring. DVT / PE. CTA chest showing enlargement of the main pulmonary artery at 4.4 cm, suggest pulmonary hypertension, probably also mild enlargement of the right heart. CTA was negative for PE on 5/25/23.  She was admitted to the hospital in May 2023 for acute hypoxic respiratory failure and diastolic heart failure.  This time acute decompensated CHF, NSTEMI, COPD exacerbation and now hypotensive on vasopressor and heparin drip, levophed drip and furosemide drip, no improvement.    As of today, 09/28/23  Review of Systems   Constitutional:  Negative for activity change, appetite change, chills, diaphoresis and fatigue.   HENT:  Negative for congestion, ear discharge, hearing loss, rhinorrhea, sinus pain, sneezing and sore  throat.    Eyes:  Negative for photophobia, discharge and visual disturbance.   Respiratory:  Negative for cough, chest tightness, shortness of breath and wheezing.    Cardiovascular:  Negative for chest pain and palpitations.   Gastrointestinal:  Negative for abdominal distention, abdominal pain, blood in stool, diarrhea, nausea and vomiting.   Endocrine: Negative for cold intolerance, heat intolerance, polydipsia, polyphagia and polyuria.   Genitourinary:  Negative for dysuria, flank pain, hematuria and urgency.   Musculoskeletal:  Negative for arthralgias, joint swelling and myalgias.   Skin:  Negative for color change.   Allergic/Immunologic: Negative for food allergies.   Neurological:  Negative for dizziness, seizures, syncope, speech difficulty, weakness and headaches.   Hematological:  Negative for adenopathy. Does not bruise/bleed easily.   Psychiatric/Behavioral:  Negative for confusion, hallucinations, self-injury and suicidal ideas. The patient is not nervous/anxious.       All pertinent negatives and positives are as above. All other systems have been reviewed and are negative unless otherwise stated.    Objective    Temp:  [97.4 °F (36.3 °C)-98.9 °F (37.2 °C)] 98.9 °F (37.2 °C)  Heart Rate:  [60-80] 69  Resp:  [18-20] 18  BP: ()/(35-84) 90/54  Patient Vitals for the past 24 hrs:   BP Temp Temp src Pulse Resp SpO2   09/28/23 1515 90/54 -- -- 69 -- 93 %   09/28/23 1449 -- -- -- -- 18 --   09/28/23 1445 (!) 81/52 -- -- 77 -- (!) 89 %   09/28/23 1430 (!) 85/53 -- -- 74 -- 93 %   09/28/23 1415 (!) 88/56 -- -- 72 -- 94 %   09/28/23 1400 (!) 81/49 -- -- 72 -- 95 %   09/28/23 1345 (!) 87/50 -- -- 74 -- 95 %   09/28/23 1330 102/57 -- -- 73 -- 96 %   09/28/23 1315 104/56 -- -- 72 -- 95 %   09/28/23 1300 100/59 -- -- 71 -- 93 %   09/28/23 1245 98/60 -- -- 71 -- 95 %   09/28/23 1230 100/57 -- -- 74 -- 96 %   09/28/23 1215 99/57 -- -- 68 -- 94 %   09/28/23 1203 117/60 -- -- 65 -- 94 %   09/28/23 1130 115/78  -- -- 66 -- 93 %   09/28/23 1115 106/75 -- -- 62 -- 92 %   09/28/23 1100 98/47 -- -- 67 -- (!) 84 %   09/28/23 1045 106/54 -- -- 65 -- 97 %   09/28/23 1030 110/56 -- -- 63 -- 95 %   09/28/23 1015 106/55 -- -- 64 -- 99 %   09/28/23 1000 115/62 -- -- 62 -- 99 %   09/28/23 0945 114/59 -- -- 61 -- 99 %   09/28/23 0930 106/59 -- -- 62 -- 97 %   09/28/23 0925 107/58 -- -- 61 -- 96 %   09/28/23 0915 (!) 81/35 -- -- 62 -- 91 %   09/28/23 0901 (!) 73/36 -- -- 63 -- 94 %   09/28/23 0833 105/52 -- -- 61 -- 97 %   09/28/23 0800 108/54 98.9 °F (37.2 °C) Oral 60 -- 93 %   09/28/23 0745 98/57 -- -- 61 -- 100 %   09/28/23 0741 -- -- -- -- 18 --   09/28/23 0730 99/60 -- -- 62 -- 98 %   09/28/23 0715 (!) 83/57 -- -- 64 -- 94 %   09/28/23 0700 (!) 73/56 -- -- 65 -- 94 %   09/28/23 0649 (!) 70/51 -- -- 71 -- --   09/28/23 0645 (!) 67/37 -- -- 68 -- (!) 81 %   09/28/23 0630 98/56 -- -- 67 -- 96 %   09/28/23 0615 99/56 -- -- 67 -- 96 %   09/28/23 0600 109/59 -- -- 65 -- 95 %   09/28/23 0545 115/66 -- -- 67 -- 96 %   09/28/23 0530 116/65 -- -- 65 -- 94 %   09/28/23 0515 111/67 -- -- 66 -- 98 %   09/28/23 0500 93/56 -- -- 63 -- 93 %   09/28/23 0451 (!) 81/50 -- -- 68 -- 94 %   09/28/23 0400 (!) 82/52 97.8 °F (36.6 °C) Oral 66 20 93 %   09/28/23 0322 (!) 80/50 -- -- 67 -- 93 %   09/28/23 0300 (!) 76/52 -- -- 68 -- 100 %   09/28/23 0230 (!) 71/44 -- -- 72 -- 99 %   09/28/23 0221 (!) 77/50 -- -- -- -- --   09/28/23 0100 (!) 88/55 -- -- 69 -- 92 %   09/28/23 0048 -- 97.4 °F (36.3 °C) Axillary -- 18 --   09/28/23 0000 (!) 82/55 -- -- 69 -- (!) 87 %   09/27/23 2300 (!) 79/51 -- -- 72 -- 92 %   09/27/23 2200 108/79 -- -- 76 -- 95 %   09/27/23 2100 103/62 -- -- 78 -- 97 %   09/27/23 2000 106/67 -- -- 78 -- 98 %   09/27/23 1934 -- 97.6 °F (36.4 °C) Oral 78 18 96 %   09/27/23 1900 116/70 -- -- 80 -- 100 %   09/27/23 1803 -- -- -- 79 -- 90 %   09/27/23 1800 125/77 -- -- 79 -- --   09/27/23 1700 119/84 -- -- 80 -- 93 %           As of today,  09/28/23  Physical Exam  Constitutional:       Appearance: Normal appearance. She is obese.   HENT:      Head: Normocephalic and atraumatic.      Right Ear: Tympanic membrane normal.      Left Ear: Tympanic membrane normal.      Nose: Nose normal.      Mouth/Throat:      Mouth: Mucous membranes are moist.   Eyes:      Pupils: Pupils are equal, round, and reactive to light.   Cardiovascular:      Rate and Rhythm: Normal rate and regular rhythm.      Pulses: Normal pulses.      Heart sounds: Normal heart sounds.   Pulmonary:      Effort: Pulmonary effort is normal.      Breath sounds: Wheezing and rales present.   Abdominal:      General: Abdomen is flat. Bowel sounds are normal.      Palpations: Abdomen is soft.   Musculoskeletal:         General: Normal range of motion.      Cervical back: Normal range of motion and neck supple.      Right lower leg: Edema present.      Left lower leg: Edema present.   Skin:     General: Skin is warm and dry.      Capillary Refill: Capillary refill takes less than 2 seconds.   Neurological:      General: No focal deficit present.      Mental Status: She is alert and oriented to person, place, and time.   Psychiatric:         Mood and Affect: Mood normal.         Behavior: Behavior normal.         Thought Content: Thought content normal.         Judgment: Judgment normal.         Results Review:  I have reviewed the labs, radiology results, and diagnostic studies.    Laboratory Data:   Results from last 7 days   Lab Units 09/28/23  0427 09/27/23  0502 09/26/23  1238   SODIUM mmol/L 137 138 143   POTASSIUM mmol/L 4.2 4.5 4.8   CHLORIDE mmol/L 92* 94* 100   CO2 mmol/L 42.0* 39.0* 37.0*   BUN mg/dL 16 12 9   CREATININE mg/dL 0.88 0.83 0.77   GLUCOSE mg/dL 121* 153* 159*   CALCIUM mg/dL 7.9* 8.3* 8.3*   BILIRUBIN mg/dL 0.4  --  0.6   ALK PHOS U/L 82  --  109   ALT (SGPT) U/L 35*  --  41*   AST (SGOT) U/L 62*  --  195*   ANION GAP mmol/L 3.0* 5.0 6.0     Estimated Creatinine Clearance:  84.9 mL/min (by C-G formula based on SCr of 0.88 mg/dL).  Results from last 7 days   Lab Units 09/28/23  0427 09/27/23  0502 09/26/23  1422   MAGNESIUM mg/dL 2.0 2.1 2.0   PHOSPHORUS mg/dL 3.0 3.2 3.1         Results from last 7 days   Lab Units 09/28/23  0427 09/27/23  0502 09/26/23  1238   WBC 10*3/mm3 8.71 8.53 7.74   HEMOGLOBIN g/dL 11.6* 12.1 12.6   HEMATOCRIT % 40.0 42.2 44.0   PLATELETS 10*3/mm3 242 210 199     Results from last 7 days   Lab Units 09/28/23  0427 09/27/23  0502 09/26/23  1422   INR  1.11 1.17 1.14       Culture Data:   Blood Culture   Date Value Ref Range Status   09/26/2023 No growth at 24 hours  Preliminary   09/26/2023 No growth at 24 hours  Preliminary     No results found for: URINECX  No results found for: RESPCX  No results found for: WOUNDCX  No results found for: STOOLCX  No components found for: BODYFLD    Radiology Data:   Imaging Results (Last 24 Hours)       Procedure Component Value Units Date/Time    XR Chest 1 View [[id number]] Collected: 09/28/23 1225     Updated: 09/28/23 1229    Narrative:      Single AP portable view of the chest, compared to study from 9/26/2023, again  shows cardiomegaly and mild pulmonary vascular congestion without definite  pulmonary edema.  There is new volume loss at the left base with crowding of  ribs likely from left lower lobe atelectasis.  No definite pleural effusion is  seen.            I have utilized all available immediate resources to obtain, update, or review the patient's current medications (including all prescriptions, over-the-counter products, herbals, cannabis/cannabidiol products, and vitamin/mineral/dietary (nutritional) supplements).       Assessment/Plan     Active Hospital Problems    Diagnosis     **Elevated troponin     NSTEMI (non-ST elevated myocardial infarction)          Treatment and  Plan:63 years old female patient with cough, shortness of breath, fever, CTA  chest without PE suggestive pneumonia on iv antibiotics.  Besides with chest pain and elevated troponin, abnormal EKG. Already started management for NSTEMI, cardiology on board. Dr Ch considers to perform cardiac catheterization once more stable. BP in the low side already with vasopressor.  1.COPD exacerbation, chronic hypercapnic respiratory failure, bipap at night, discussed with respiratory therapist, new ABGs ordered now.  2.NSTEMI, elevated troponin, delta  -94, EKG changes ST depression in inferior lead, echocardiogram with ischemic cardiomyopathy, left ventricular wall segments are hypokinetic: apical anterior and apical septal, pending cardiac cath, continue medical management. Aspirin, crestor, heparin drip. Once more stable cardiac cath  3.HFpEF, LVEF 51 - 55%, acute decompensation, left pleural effusion worsened, continue diuretic, dc valsartan and coreg because of hypotension, on levophed drip, on furosemide drip 5mg/h. Appreciate cardiology consult  4.class 3 obesity  5.elevated transaminases, secondary to CHF  6.aortic dilatation to follow       Medical Decision Making  Number and Complexity of problems: 6  Differential Diagnosis: NSTEMI, CHF, COPD exacerbation    Conditions and Status:        Condition is unchanged.     [facility] Data  External documents reviewed: previous medical records  My EKG interpretation: NSR, RBBB  My CT interpretation: RLL pneumonia  Tests considered but not ordered: none     Decision rules/scores evaluated (example [id number], Wells, etc): n/a     Discussed with: patient     Treatment Plan  Pending cardiac cath  Levophed drip  Heparin drip  Furosemide drip  Am labs  Stat abgs  Continue CCU    Care Planning  Shared decision making: patient  Code status and discussions: full code    Disposition  Social Determinants of Health that impact treatment or disposition: none  I expect the patient to be discharged to home in 7 days.       I confirmed that the patient's Advance Care Plan is present, code status is documented, or surrogate  decision maker is listed in the patient's medical record.      This document has been electronically signed by Toby Edwards MD on September 28, 2023 16:05 CDT

## 2023-09-28 NOTE — PROGRESS NOTES
"[facility] [place] [facility] Progress Note   LOS: 2 days   Patient Care Team:  Danielle Vaughn APRN as PCP - General (Family Medicine)  Armando Sanon APRN (Family Medicine)  Davide Marrufo MD as Surgeon (Orthopedic Surgery)  Danielle Vaughn APRN (Family Medicine)  Ian Childs MD as Consulting Physician (Hematology and Oncology)    Chief Complaint:    Chief Complaint   Patient presents with    Shortness of Breath        Subjective     Interval History:   Patient Denies: chest pain, palpitations, dizziness, and syncope  Patient Complaints: shortness of air, orthopnea, and edema  History taken from: patient chart RN    Patient continues to note shortness of breath, orthopnea and edema.  Good urine output however weight only down 1 pound.  Blood pressure noted to be hypotensive over the night.  High acuity gave to 250 cc fluid bolus followed by initiation of Eric gtt. We will change to IV lasix gtt. Stop Eric and changed to Levophed.       Objective     Vital Sign Min/Max for last 24 hours  Temp  Min: 97.4 °F (36.3 °C)  Max: 98.9 °F (37.2 °C)   BP  Min: 67/37  Max: 125/77   Pulse  Min: 60  Max: 87   Resp  Min: 18  Max: 22   SpO2  Min: 81 %  Max: 100 %   Flow (L/min)  Min: 4  Max: 5   Weight  Min: 125 kg (275 lb 9.2 oz)  Max: 125 kg (275 lb 9.2 oz)     Flowsheet Rows      Flowsheet Row First Filed Value   Admission Height 157.5 cm (62\") Documented at 09/26/2023 1102   Admission Weight 122 kg (270 lb) Documented at 09/26/2023 1102              09/26/23  1102 09/27/23  0500 09/27/23  1150   Weight: 122 kg (270 lb) 125 kg (276 lb 7.3 oz) 125 kg (275 lb 9.2 oz)       Physical Exam:  Physical Exam  Vitals and nursing note reviewed.   Constitutional:       General: She is not in acute distress.     Appearance: She is morbidly obese. She is ill-appearing. She is not diaphoretic.      Interventions: Nasal cannula in place.   HENT:      Head: Normocephalic.      Right Ear: External ear normal.      Left Ear: External ear " normal.   Eyes:      General: Lids are normal.      Pupils: Pupils are equal, round, and reactive to light.   Neck:      Thyroid: No thyromegaly.      Vascular: JVD present. No carotid bruit.      Trachea: No tracheal deviation.   Cardiovascular:      Rate and Rhythm: Normal rate and regular rhythm.      Heart sounds: Normal heart sounds. No murmur heard.    No friction rub. No gallop.   Pulmonary:      Effort: Pulmonary effort is normal. No respiratory distress.      Breath sounds: No stridor. Rhonchi and rales present. No wheezing.   Chest:      Chest wall: No tenderness.   Abdominal:      General: Bowel sounds are normal. There is no distension.      Palpations: Abdomen is soft.      Tenderness: There is no abdominal tenderness.   Musculoskeletal:      Right lower le+ Edema present.      Left lower le+ Edema present.   Skin:     General: Skin is warm and dry.      Findings: No erythema or rash.   Neurological:      Mental Status: She is alert and oriented to person, place, and time.      Cranial Nerves: No cranial nerve deficit.      Deep Tendon Reflexes: Reflexes are normal and symmetric. Reflexes normal.   Psychiatric:         Behavior: Behavior normal.         Thought Content: Thought content normal.         Judgment: Judgment normal.        Results Review:     Results from last 7 days   Lab Units 23  0502 23  1238   SODIUM mmol/L 137 138 143   POTASSIUM mmol/L 4.2 4.5 4.8   CHLORIDE mmol/L 92* 94* 100   CO2 mmol/L 42.0* 39.0* 37.0*   BUN mg/dL 16 12 9   CREATININE mg/dL 0.88 0.83 0.77   CALCIUM mg/dL 7.9* 8.3* 8.3*   BILIRUBIN mg/dL 0.4  --  0.6   ALK PHOS U/L 82  --  109   ALT (SGPT) U/L 35*  --  41*   AST (SGOT) U/L 62*  --  195*   GLUCOSE mg/dL 121* 153* 159*         Estimated Creatinine Clearance: 84.9 mL/min (by C-G formula based on SCr of 0.88 mg/dL).    Results from last 7 days   Lab Units 23  0502 23  1422   MAGNESIUM mg/dL 2.0 2.1 2.0    PHOSPHORUS mg/dL 3.0 3.2 3.1               Results from last 7 days   Lab Units 09/28/23  0427 09/27/23  0502 09/26/23  1238   WBC 10*3/mm3 8.71 8.53 7.74   HEMOGLOBIN g/dL 11.6* 12.1 12.6   PLATELETS 10*3/mm3 242 210 199         Results from last 7 days   Lab Units 09/28/23  0427 09/27/23  0502 09/26/23  1422   INR  1.11 1.17 1.14       Lab Results   Component Value Date    PROBNP 3,578.0 (H) 09/26/2023       I/O last 3 completed shifts:  In: 1674.9 [P.O.:1200; I.V.:324.9; IV Piggyback:150]  Out: 4750 [Urine:4750]    Cardiographics:  ECG/EMG Results (last 24 hours)       Procedure Component Value Units Date/Time    ECG 12 Lead Dyspnea [[id number]] Collected: 09/26/23 1245     Updated: 09/26/23 1313     QT Interval 374 ms      QTC Interval 452 ms     Narrative:      Test Reason : Dyspnea  Blood Pressure :   */*   mmHG  Vent. Rate :  88 BPM     Atrial Rate :  88 BPM     P-R Int : 140 ms          QRS Dur : 102 ms      QT Int : 374 ms       P-R-T Axes :  81 195 -30 degrees     QTc Int : 452 ms    ** Poor data quality, interpretation may be adversely affected  Sinus rhythm with occasional Premature ventricular complexes and Fusion complexes  Incomplete right bundle branch block  Possible Right ventricular hypertrophy  Septal infarct , age undetermined  Lateral infarct , age undetermined  Abnormal ECG  When compared with ECG of 25-MAY-2023 11:12,  Significant changes have occurred    Referred By: ERDR           Confirmed By:     ECG 12 Lead Dyspnea [[id number]] Collected: 09/26/23 1409     Updated: 09/26/23 1426     QT Interval 358 ms      QTC Interval 435 ms     Narrative:      Test Reason : Dyspnea  Blood Pressure :   */*   mmHG  Vent. Rate :  89 BPM     Atrial Rate :  89 BPM     P-R Int : 136 ms          QRS Dur : 106 ms      QT Int : 358 ms       P-R-T Axes :  75 260  26 degrees     QTc Int : 435 ms    Normal sinus rhythm  Incomplete right bundle branch block  Possible Right ventricular hypertrophy  Septal infarct  (cited on or before 26-SEP-2023)  Lateral infarct (cited on or before 26-SEP-2023)  Abnormal ECG  When compared with ECG of 26-SEP-2023 12:45,  Fusion complexes are no longer Present  Premature ventricular complexes are no longer Present  Nonspecific T wave abnormality now evident in Inferior leads    Referred By: JAMESONR           Confirmed By:     SCANNED EKG [[id number]] Resulted: 09/26/23     Updated: 09/26/23 2124    SCANNED EKG [[id number]] Resulted: 09/26/23     Updated: 09/26/23 2124          Results for orders placed during the hospital encounter of 09/26/23    Adult Transthoracic Echo Complete w/ Color, Spectral and Contrast if Necessary Per Protocol    Interpretation Summary    Left ventricular systolic function is normal. Left ventricular ejection fraction appears to be 51 - 55%.    Left ventricular wall thickness is consistent with moderate concentric hypertrophy.    The following left ventricular wall segments are hypokinetic: apical anterior and apical septal.    Left ventricular diastolic function was normal.    Estimated right ventricular systolic pressure from tricuspid regurgitation is mildly elevated (35-45 mmHg).      Imaging Results (Most Recent)       Procedure Component Value Units Date/Time    CT Angiogram Chest [[id number]] Collected: 09/26/23 1538     Updated: 09/26/23 1904    Narrative:      INDICATION:  Elevated troponins.    TECHNIQUE:  IV contrast was administered and axial images from the thoracic inlet through  the diaphragms were performed.  3D multiplanar reformats of the thoracic aorta  were completed on a separate workstation under concurrent supervision.    FINDINGS:  Study demonstrates no evidence of pulmonary embolus.  There is a trace right  pleural effusion with patchy basilar infiltrate.  Left lung zone clear.  Cardiomegaly.    SUMMARY:  Patchy right basilar infiltrate with trace effusion.  No evidence of pulmonary  embolus.    XR Chest 2 View [[id number]] Collected: 09/26/23 1310      Updated: 09/26/23 1651    Narrative:      XR CHEST 2 VW    HISTORY: soa    FINDINGS: Left lower lobe/retrocardiac opacities potentially representing  atelectasis versus inflammatory/infectious process.  No pleural effusion.  No  pneumothorax..  The cardiomediastinal silhouette and upper abdomen are  unremarkable. No acute osseous abnormality.        US Guided Vascular Access [[id number]] Collected: 09/26/23 1423     Updated: 09/26/23 1427    Narrative:      The right  arm was utilized for vascular access.  Images demonstrate patency of  the basilic.  The vessel was accessed under direct ultrasound guidance.  Permanent images were saved in the chart/PACS system.    IR Insert Midline Without Port Pump 5 Plus [[id number]] Resulted: 09/26/23 1410     Updated: 09/26/23 1410    Narrative:      This procedure was auto-finalized with no dictation required.            No radiology results for the last 30 days.    Medication Review:     Current Facility-Administered Medications:     albuterol sulfate HFA (PROVENTIL HFA;VENTOLIN HFA;PROAIR HFA) inhaler 2 puff, 2 puff, Inhalation, 4x Daily - RT, Toby Edwards MD, 2 puff at 09/28/23 0741    aspirin EC tablet 81 mg, 81 mg, Oral, Daily, Mavis Avelar APRN, 81 mg at 09/28/23 0814    sennosides-docusate (PERICOLACE) 8.6-50 MG per tablet 2 tablet, 2 tablet, Oral, BID, 2 tablet at 09/26/23 2129 **AND** polyethylene glycol (MIRALAX) packet 17 g, 17 g, Oral, Daily PRN **AND** bisacodyl (DULCOLAX) EC tablet 5 mg, 5 mg, Oral, Daily PRN **AND** bisacodyl (DULCOLAX) suppository 10 mg, 10 mg, Rectal, Daily PRN, Toby Edwards MD    Calcium Replacement - Follow Nurse / BPA Driven Protocol, , Does not apply, PRN, Toby Edwards MD    furosemide (LASIX) 500 mg in 50mL infusion, 5 mg/hr, Intravenous, Continuous, Mavis Avelar APRN    gabapentin (NEURONTIN) capsule 300 mg, 300 mg, Oral, TID, Toby Edwards MD, 300 mg at 09/28/23 0814    heparin 46928 units/250 mL (100 units/mL)  in 0.45 % NaCl infusion, 12.2 Units/kg/hr, Intravenous, Titrated, Last Rate: 14.88 mL/hr at 09/28/23 0731, 12.2 Units/kg/hr at 09/28/23 0731 **AND** heparin (porcine) 5000 UNIT/ML injection 4,000 Units, 4,000 Units, Intravenous, PRN **AND** heparin (porcine) 5000 UNIT/ML injection 3,700 Units, 30 Units/kg, Intravenous, PRN, Toby Edwards MD, 3,700 Units at 09/27/23 2125    levoFLOXacin (LEVAQUIN) 750 mg/150 mL D5W (premix) (LEVAQUIN) 750 mg, 750 mg, Intravenous, Q24H, Toby Edwards MD, Last Rate: 100 mL/hr at 09/27/23 1950, 750 mg at 09/27/23 1950    Magnesium Standard Dose Replacement - Follow Nurse / BPA Driven Protocol, , Does not apply, PRSAMRA, Toby Edwards MD    nicotine (NICODERM CQ) 21 MG/24HR patch 1 patch, 1 patch, Transdermal, Q24H, Toby Edwards MD, 1 patch at 09/27/23 1828    nitroglycerin (NITROSTAT) SL tablet 0.4 mg, 0.4 mg, Sublingual, Q5 Min PRN, Toby Edwards MD    norepinephrine (LEVOPHED) 8 mg in 250 mL NS infusion (premix), 0.02-0.3 mcg/kg/min, Intravenous, Titrated, Mavis Avelar, ROSANNE    ondansetron (ZOFRAN) tablet 4 mg, 4 mg, Oral, Q6H PRN **OR** ondansetron (ZOFRAN) injection 4 mg, 4 mg, Intravenous, Q6H PRN, Toby Edwards MD, 4 mg at 09/28/23 0229    pentoxifylline (TRENtal) CR tablet 400 mg, 400 mg, Oral, BID, Toby Edwards MD, 400 mg at 09/28/23 0815    Phosphorus Replacement - Follow Nurse / BPA Driven Protocol, , Does not apply, PRJerry CABRALES Tomas, MD    Potassium Replacement - Follow Nurse / BPA Driven Protocol, , Does not apply, PRN, Toby Edwards MD    rosuvastatin (CRESTOR) tablet 10 mg, 10 mg, Oral, Nightly, Toby Edwards MD, 10 mg at 09/27/23 2003    sodium chloride 0.9 % flush 10 mL, 10 mL, Intravenous, PRN, Toby Edwards MD    sodium chloride 0.9 % flush 10 mL, 10 mL, Intravenous, Q12H, Toby Edwards MD, 10 mL at 09/28/23 0815    sodium chloride 0.9 % flush 10 mL, 10 mL, Intravenous, PRN, Toby Edwards MD    sodium chloride 0.9 % infusion 40 mL, 40 mL,  Intravenous, PRN, Toby Edwards MD    traMADol (ULTRAM) tablet 50 mg, 50 mg, Oral, Q6H PRN, Toby Edwards MD    Assessment & Plan       Elevated troponin    NSTEMI (non-ST elevated myocardial infarction)    #1.  NSTEMI: Troponin elevation of 1600.  EKG showing incomplete right bundle branch block, nonspecific inferior lead changes.  Echocardiogram in 2022 showing LVEF preserved at 56 to 60%, right ventricle is mildly dilated, right atrial cavity is mild to moderately dilated.  Patient does have multiple cardiac risk factors as well noted to have intermittent chest pain for 2 to 3 days prior to arrival.  Currently she is chest pain-free and hemodynamically stable.  She is significantly volume overloaded from a CHF standpoint.  We will recommend optimization of heart failure and aggressive diuresis.  We will recommend cardiac catheterization once stabilized from a respiratory standpoint.  Currently she is unable to lay flat.  -IV heparin drip  -ASA 81mg  -Crestor 10 mg  -Beta-blocker stopped due to hypotension  -Echocardiogram showing normal LVEF of 51 to 55%, moderate LVH, hypokinesis to apical anterior and apical septal     #2.  Acute on chronic heart failure with preserved ejection fraction: Previously noted to have preserved 51-55%. She appears significantly volume overloaded upon exam with adequate perfusion and hemodynamics.  We will recommend guideline directed medical therapy and aggressive diuresis.  -Stop valsartan for now and Coreg due to hypotension.  Vasopressor change from Eric to Levophed.  Titrate and wean off if condition will allow  -Change lasix to gtt at 5mg/hr  -MRA with Aldactone indicated as well as SGLT-2 inhibitor.   Daily weights  Strict intake and output  Fluid restriction 1500 cc, sodium restriction less than 2 g    #3. Right ventricular abnormality: mild RVC dilation. RVEF is normal. No evidence of PAH. Have recommended patient for SNEHA evaluation as an outpatient. PFT showed moderate  restriction.      #4. Ascending aorta dilation: mild dilation of the proximal ascending aorta at 3.9cm. She will continue with clinical surveillance.  She has been recommended for blood pressure control.     Stephanie Bradshaw  reports that she has been smoking cigarettes. She has a 60.00 pack-year smoking history. She has been exposed to tobacco smoke. She has never used smokeless tobacco.. I have educated her on the risk of diseases from using tobacco products such as cancer, COPD, and heart disease.      I advised her to quit and she is not willing to quit.     I spent 3  minutes counseling the patient.     Patient's (Body mass index is 50.39 kg/m².) indicates that they are morbidly obese (BMI > 40 or > 35 with obesity - related health condition) with health related conditions that include hypertension, dyslipidemias, osteoarthritis, and lower extremity venous stasis disease . Weight is unchanged. BMI is is above average; BMI management plan is completed.     55 minutes of critical care provided. This time excludes other billable procedures. Time does include preparation of documents, medical consultations, review of old records, and direct bedside care. Patient is at high risk for life-threatening deterioration due to CHF, NSTEMI.     Plan for disposition: Continue CCU. We will continue to follow.             This document has been electronically signed by ROSANNE Swanson on September 28, 2023 11:32 CDT      Electronically signed by ROSANNE Swanson, 09/28/23, 11:32 AM CDT.      I personally saw and examined Stephanie Bradshaw after the APRN.  I personally performed a history and physical examination of the patient.  I personally reviewed independent findings and plan of care.  I discussed management with the APRN.  I agree with the APRN's documentation.    Subjective: Overnight had hypotension, was given fluids back and was started on phenylephrine.  This morning she is sitting in the chair,  sleepy but arousable.  Denies any chest pain.  Shortness of breath is improved.    Vitals:    09/28/23 1100 09/28/23 1115 09/28/23 1130 09/28/23 1203   BP: 98/47 106/75 115/78 117/60   BP Location:       Patient Position:       Pulse: 67 62 66 65   Resp:       Temp:       TempSrc:       SpO2: (!) 84% 92% 93% 94%   Weight:       Height:         1.  NSTEMI:  Multiple risk factors of diabetes, hypertension, hyperlipidemia.  EKG showed right bundle branch block with nonspecific ST-T changes.  Initial troponin was significantly elevated which has subsequently trended down.  Denying any active chest pain.  Patient was started on proper medical therapy with heparin drip, aspirin/Crestor/Coreg.  Holding Coreg for soft blood pressure.  Echocardiogram showed overall preserved LV systolic function with subtle wall motion normalities in the apical anterior and septal wall.  Will need cardiac cath once optimized from heart failure standpoint.  CT chest without evidence of pulmonary embolism     2.  Congestive heart with preserved ejection fraction:  Previously noted to have diastolic heart failure.  Appears fluid overloaded.  Aggressive diuresis and guideline medical therapy.  Repeat echocardiac showed preserved LV systolic function.  Getting diuresis    We will repeat limited echocardiogram.        Thank for the consult continue to follow          Electronically signed by Louann Ch MD, 09/28/23, 12:21 PM CDT.

## 2023-09-28 NOTE — PROGRESS NOTES
[facility] Physician - Brief Progress Note  PERMANENT  09/28/2023 03:32    [facility] - CCU/SD - 20 - [place] ([facility])    RAQUEL MUNOZ    Date of Service 09/28/2023 03:32    HPI/Events of Note [facility] Intervention:    Called regarding hypotensive in a patient w/ an NSTEMI.     67 96% on 4L, 71/44   97.4  No CP, mentating well.  On Coreg (as well as Asa and Heparin), also has been agressively diuresed. Cardiology has been trying to dry her out so as to facilitate her lying flat for a catheterization.   RN reports rhonchi but not crackles.  Making urine    ECG: Sinus, borderline RBBB     Will give a gentle bolus IVF, perhaps has been over diuresed.  If refractory, will consider pressors. BP now 80/50.        _Unable to connect___   Video Assessment performed  __x___   Most recent labs reviewed  __x___   Vital Signs reviewed  _x___      Spoke with bedside RN  __x__       Orders written  Please update [facility] with any needs or concerns.      Interventions Major-Hypotension - evaluation and management        Electronically Signed by: Zach Hernandez) on 09/28/2023 03:32

## 2023-09-28 NOTE — PLAN OF CARE
Goal Outcome Evaluation:  Plan of Care Reviewed With: patient        Progress: declining  Outcome Evaluation: Patient A&O all of shift. At 1750 patient found slumped over in bed unresponsive. Patient pulled up into bed. unresponsive and foaming at mouth. Dr. Edwards notifed and patient intubated at 1808. Patient is now sedated with propofol on the vent. restrained. patient is on a lasix gtt, heparin gtt, on levo and propofol. vss. stable

## 2023-09-28 NOTE — PROGRESS NOTES
Patient known by me, was called, after cough desaturation and sleepiness, hypoxemic respiratory failure, probable from pulmonary edema, vs aspiration, proceeded to intubation, sp intubation /60, o2 saturation 100%, mechanical ventilation /22/100%/5.

## 2023-09-28 NOTE — PROCEDURES
"Intubation    Date/Time: 9/28/2023 6:08 PM  Performed by: Toby Edwards MD  Authorized by: Toby Edwards MD   Consent: The procedure was performed in an emergent situation. Verbal consent not obtained. Written consent not obtained.  Risks and benefits: risks, benefits and alternatives were discussed  Consent given by: patient  Patient understanding: patient states understanding of the procedure being performed  Patient consent: the patient's understanding of the procedure matches consent given  Procedure consent: procedure consent matches procedure scheduled  Relevant documents: relevant documents present and verified  Test results: test results available and properly labeled  Site marked: the operative site was marked  Imaging studies: imaging studies available  Patient identity confirmed: arm band  Time out: Immediately prior to procedure a \"time out\" was called to verify the correct patient, procedure, equipment, support staff and site/side marked as required.  Indications: respiratory distress, respiratory failure, airway protection and hypoxemia  Intubation method: video-assisted  Patient status: paralyzed (RSI)  Preoxygenation: BVM  Sedatives: etomidate  Paralytic: succinylcholine  Laryngoscope size: Mac 4  Tube size: 7.5 mm  Tube type: cuffed  Number of attempts: 1  Cricoid pressure: no  Cords visualized: yes  Post-procedure assessment: chest rise and ETCO2 monitor  Breath sounds: equal  Cuff inflated: yes  ETT to lip: 23 cm  Tube secured with: ETT puente  Chest x-ray interpreted by me.  Chest x-ray findings: endotracheal tube in appropriate position      "

## 2023-09-29 ENCOUNTER — APPOINTMENT (OUTPATIENT)
Dept: CARDIOLOGY | Facility: HOSPITAL | Age: 63
End: 2023-09-29
Payer: COMMERCIAL

## 2023-09-29 LAB
ALBUMIN SERPL-MCNC: 3 G/DL (ref 3.5–5.2)
ALBUMIN/GLOB SERPL: 1 G/DL
ALP SERPL-CCNC: 86 U/L (ref 39–117)
ALT SERPL W P-5'-P-CCNC: 30 U/L (ref 1–33)
ANION GAP SERPL CALCULATED.3IONS-SCNC: 9 MMOL/L (ref 5–15)
APTT PPP: 118.9 SECONDS (ref 20–40.3)
APTT PPP: 42.2 SECONDS (ref 20–40.3)
APTT PPP: 56.9 SECONDS (ref 20–40.3)
APTT PPP: 76.4 SECONDS (ref 20–40.3)
AST SERPL-CCNC: 43 U/L (ref 1–32)
BASOPHILS # BLD AUTO: 0.05 10*3/MM3 (ref 0–0.2)
BASOPHILS NFR BLD AUTO: 0.4 % (ref 0–1.5)
BH CV ECHO MEAS - ACS: 2.09 CM
BH CV ECHO MEAS - AO MAX PG: 4.5 MMHG
BH CV ECHO MEAS - AO MEAN PG: 2.8 MMHG
BH CV ECHO MEAS - AO ROOT DIAM: 3.6 CM
BH CV ECHO MEAS - AO V2 MAX: 105.6 CM/SEC
BH CV ECHO MEAS - AO V2 VTI: 17 CM
BH CV ECHO MEAS - EDV(CUBED): 85.9 ML
BH CV ECHO MEAS - ESV(CUBED): 34.5 ML
BH CV ECHO MEAS - FS: 26.2 %
BH CV ECHO MEAS - IVS/LVPW: 1.15 CM
BH CV ECHO MEAS - IVSD: 1.41 CM
BH CV ECHO MEAS - LA DIMENSION: 3.6 CM
BH CV ECHO MEAS - LAT PEAK E' VEL: 6.7 CM/SEC
BH CV ECHO MEAS - LV MASS(C)D: 221.7 GRAMS
BH CV ECHO MEAS - LVIDD: 4.4 CM
BH CV ECHO MEAS - LVIDS: 3.3 CM
BH CV ECHO MEAS - LVPWD: 1.23 CM
BH CV ECHO MEAS - MED PEAK E' VEL: 5.1 CM/SEC
BH CV ECHO MEAS - MR MAX PG: 74.9 MMHG
BH CV ECHO MEAS - MR MAX VEL: 432.9 CM/SEC
BH CV ECHO MEAS - PA V2 MAX: 69.2 CM/SEC
BH CV ECHO MEAS - RAP SYSTOLE: 10 MMHG
BH CV ECHO MEAS - RVDD: 3.6 CM
BH CV ECHO MEAS - RVSP: 40.5 MMHG
BH CV ECHO MEAS - TR MAX PG: 30.5 MMHG
BH CV ECHO MEAS - TR MAX VEL: 276 CM/SEC
BILIRUB SERPL-MCNC: 0.7 MG/DL (ref 0–1.2)
BUN SERPL-MCNC: 19 MG/DL (ref 8–23)
BUN/CREAT SERPL: 20.2 (ref 7–25)
BURR CELLS BLD QL SMEAR: NORMAL
CALCIUM SPEC-SCNC: 8.9 MG/DL (ref 8.6–10.5)
CHLORIDE SERPL-SCNC: 91 MMOL/L (ref 98–107)
CO2 SERPL-SCNC: 40 MMOL/L (ref 22–29)
CREAT SERPL-MCNC: 0.94 MG/DL (ref 0.57–1)
DEPRECATED RDW RBC AUTO: 51.6 FL (ref 37–54)
EGFRCR SERPLBLD CKD-EPI 2021: 69.2 ML/MIN/1.73
EOSINOPHIL # BLD AUTO: 0.09 10*3/MM3 (ref 0–0.4)
EOSINOPHIL NFR BLD AUTO: 0.8 % (ref 0.3–6.2)
ERYTHROCYTE [DISTWIDTH] IN BLOOD BY AUTOMATED COUNT: 16.5 % (ref 12.3–15.4)
GEN 5 2HR TROPONIN T REFLEX: 1883 NG/L
GLOBULIN UR ELPH-MCNC: 3 GM/DL
GLUCOSE SERPL-MCNC: 163 MG/DL (ref 65–99)
HCT VFR BLD AUTO: 42.5 % (ref 34–46.6)
HGB BLD-MCNC: 13 G/DL (ref 12–15.9)
IMM GRANULOCYTES # BLD AUTO: 0.08 10*3/MM3 (ref 0–0.05)
IMM GRANULOCYTES NFR BLD AUTO: 0.7 % (ref 0–0.5)
LARGE PLATELETS: NORMAL
LYMPHOCYTES # BLD AUTO: 2.28 10*3/MM3 (ref 0.7–3.1)
LYMPHOCYTES NFR BLD AUTO: 19.1 % (ref 19.6–45.3)
MCH RBC QN AUTO: 26.3 PG (ref 26.6–33)
MCHC RBC AUTO-ENTMCNC: 30.6 G/DL (ref 31.5–35.7)
MCV RBC AUTO: 85.9 FL (ref 79–97)
MONOCYTES # BLD AUTO: 1.36 10*3/MM3 (ref 0.1–0.9)
MONOCYTES NFR BLD AUTO: 11.4 % (ref 5–12)
NEUTROPHILS NFR BLD AUTO: 67.6 % (ref 42.7–76)
NEUTROPHILS NFR BLD AUTO: 8.07 10*3/MM3 (ref 1.7–7)
NRBC BLD AUTO-RTO: 0.3 /100 WBC (ref 0–0.2)
PLATELET # BLD AUTO: 161 10*3/MM3 (ref 140–450)
PMV BLD AUTO: 12.3 FL (ref 6–12)
POTASSIUM SERPL-SCNC: 3.5 MMOL/L (ref 3.5–5.2)
POTASSIUM SERPL-SCNC: 4.5 MMOL/L (ref 3.5–5.2)
PROT SERPL-MCNC: 6 G/DL (ref 6–8.5)
QT INTERVAL: 358 MS
QT INTERVAL: 374 MS
QTC INTERVAL: 435 MS
QTC INTERVAL: 452 MS
RBC # BLD AUTO: 4.95 10*6/MM3 (ref 3.77–5.28)
SMALL PLATELETS BLD QL SMEAR: ADEQUATE
SODIUM SERPL-SCNC: 140 MMOL/L (ref 136–145)
TROPONIN T DELTA: 113 NG/L
TROPONIN T SERPL HS-MCNC: 1770 NG/L
WBC MORPH BLD: NORMAL
WBC NRBC COR # BLD: 11.93 10*3/MM3 (ref 3.4–10.8)
WHOLE BLOOD HOLD SPECIMEN: NORMAL

## 2023-09-29 PROCEDURE — 85730 THROMBOPLASTIN TIME PARTIAL: CPT | Performed by: HOSPITALIST

## 2023-09-29 PROCEDURE — 99291 CRITICAL CARE FIRST HOUR: CPT | Performed by: INTERNAL MEDICINE

## 2023-09-29 PROCEDURE — 85025 COMPLETE CBC W/AUTO DIFF WBC: CPT

## 2023-09-29 PROCEDURE — 93321 DOPPLER ECHO F-UP/LMTD STD: CPT | Performed by: INTERNAL MEDICINE

## 2023-09-29 PROCEDURE — 94003 VENT MGMT INPAT SUBQ DAY: CPT

## 2023-09-29 PROCEDURE — 25010000002 PROPOFOL 10 MG/ML EMULSION

## 2023-09-29 PROCEDURE — 94761 N-INVAS EAR/PLS OXIMETRY MLT: CPT

## 2023-09-29 PROCEDURE — 85007 BL SMEAR W/DIFF WBC COUNT: CPT

## 2023-09-29 PROCEDURE — 25510000001 PERFLUTREN (DEFINITY) 8.476 MG IN SODIUM CHLORIDE (PF) 0.9 % 10 ML INJECTION

## 2023-09-29 PROCEDURE — 25010000002 HEPARIN (PORCINE) PER 1000 UNITS

## 2023-09-29 PROCEDURE — 85730 THROMBOPLASTIN TIME PARTIAL: CPT | Performed by: INTERNAL MEDICINE

## 2023-09-29 PROCEDURE — 85730 THROMBOPLASTIN TIME PARTIAL: CPT

## 2023-09-29 PROCEDURE — 84484 ASSAY OF TROPONIN QUANT: CPT | Performed by: NURSE PRACTITIONER

## 2023-09-29 PROCEDURE — 84132 ASSAY OF SERUM POTASSIUM: CPT

## 2023-09-29 PROCEDURE — 25010000002 HEPARIN (PORCINE) PER 1000 UNITS: Performed by: INTERNAL MEDICINE

## 2023-09-29 PROCEDURE — 25010000002 LEVOFLOXACIN PER 250 MG

## 2023-09-29 PROCEDURE — P9047 ALBUMIN (HUMAN), 25%, 50ML: HCPCS | Performed by: NURSE PRACTITIONER

## 2023-09-29 PROCEDURE — 93321 DOPPLER ECHO F-UP/LMTD STD: CPT

## 2023-09-29 PROCEDURE — 25010000002 ALBUMIN HUMAN 25% PER 50 ML: Performed by: NURSE PRACTITIONER

## 2023-09-29 PROCEDURE — 94799 UNLISTED PULMONARY SVC/PX: CPT

## 2023-09-29 PROCEDURE — 93325 DOPPLER ECHO COLOR FLOW MAPG: CPT | Performed by: INTERNAL MEDICINE

## 2023-09-29 PROCEDURE — 94760 N-INVAS EAR/PLS OXIMETRY 1: CPT

## 2023-09-29 PROCEDURE — 93308 TTE F-UP OR LMTD: CPT | Performed by: INTERNAL MEDICINE

## 2023-09-29 PROCEDURE — 80053 COMPREHEN METABOLIC PANEL: CPT | Performed by: NURSE PRACTITIONER

## 2023-09-29 PROCEDURE — 93308 TTE F-UP OR LMTD: CPT

## 2023-09-29 PROCEDURE — 25010000002 HEPARIN (PORCINE) PER 1000 UNITS: Performed by: HOSPITALIST

## 2023-09-29 PROCEDURE — 93325 DOPPLER ECHO COLOR FLOW MAPG: CPT

## 2023-09-29 RX ORDER — SODIUM CHLORIDE 9 MG/ML
40 INJECTION, SOLUTION INTRAVENOUS AS NEEDED
Status: DISCONTINUED | OUTPATIENT
Start: 2023-09-29 | End: 2023-10-01 | Stop reason: HOSPADM

## 2023-09-29 RX ORDER — POTASSIUM CHLORIDE 1.5 G/1.58G
40 POWDER, FOR SOLUTION ORAL EVERY 4 HOURS
Status: COMPLETED | OUTPATIENT
Start: 2023-09-29 | End: 2023-09-29

## 2023-09-29 RX ORDER — ALBUTEROL SULFATE 2.5 MG/3ML
2.5 SOLUTION RESPIRATORY (INHALATION)
Status: DISCONTINUED | OUTPATIENT
Start: 2023-09-29 | End: 2023-10-01 | Stop reason: HOSPADM

## 2023-09-29 RX ORDER — ALBUMIN (HUMAN) 12.5 G/50ML
25 SOLUTION INTRAVENOUS ONCE
Status: COMPLETED | OUTPATIENT
Start: 2023-09-29 | End: 2023-09-29

## 2023-09-29 RX ORDER — SODIUM CHLORIDE 0.9 % (FLUSH) 0.9 %
10 SYRINGE (ML) INJECTION AS NEEDED
Status: DISCONTINUED | OUTPATIENT
Start: 2023-09-29 | End: 2023-10-01 | Stop reason: HOSPADM

## 2023-09-29 RX ORDER — SODIUM CHLORIDE 0.9 % (FLUSH) 0.9 %
10 SYRINGE (ML) INJECTION EVERY 12 HOURS SCHEDULED
Status: DISCONTINUED | OUTPATIENT
Start: 2023-09-29 | End: 2023-10-01 | Stop reason: HOSPADM

## 2023-09-29 RX ADMIN — NOREPINEPHRINE BITARTRATE 0.02 MCG/KG/MIN: 1 INJECTION, SOLUTION, CONCENTRATE INTRAVENOUS at 16:30

## 2023-09-29 RX ADMIN — PROPOFOL 45 MCG/KG/MIN: 10 INJECTION, EMULSION INTRAVENOUS at 12:42

## 2023-09-29 RX ADMIN — PERFLUTREN 1.5 ML: 6.52 INJECTION, SUSPENSION INTRAVENOUS at 13:46

## 2023-09-29 RX ADMIN — ALBUTEROL SULFATE 2.5 MG: 2.5 SOLUTION RESPIRATORY (INHALATION) at 21:31

## 2023-09-29 RX ADMIN — HEPARIN SODIUM 3700 UNITS: 5000 INJECTION INTRAVENOUS; SUBCUTANEOUS at 09:40

## 2023-09-29 RX ADMIN — DOCUSATE SODIUM 50 MG AND SENNOSIDES 8.6 MG 2 TABLET: 8.6; 5 TABLET, FILM COATED ORAL at 21:52

## 2023-09-29 RX ADMIN — DOCUSATE SODIUM 50 MG AND SENNOSIDES 8.6 MG 2 TABLET: 8.6; 5 TABLET, FILM COATED ORAL at 08:46

## 2023-09-29 RX ADMIN — Medication 10 ML: at 21:53

## 2023-09-29 RX ADMIN — GABAPENTIN 300 MG: 300 CAPSULE ORAL at 08:46

## 2023-09-29 RX ADMIN — ASPIRIN 81 MG: 81 TABLET, COATED ORAL at 08:46

## 2023-09-29 RX ADMIN — POTASSIUM CHLORIDE 40 MEQ: 1.5 POWDER, FOR SOLUTION ORAL at 05:57

## 2023-09-29 RX ADMIN — PROPOFOL 50 MCG/KG/MIN: 10 INJECTION, EMULSION INTRAVENOUS at 09:26

## 2023-09-29 RX ADMIN — PROPOFOL 45 MCG/KG/MIN: 10 INJECTION, EMULSION INTRAVENOUS at 17:56

## 2023-09-29 RX ADMIN — PROPOFOL 50 MCG/KG/MIN: 10 INJECTION, EMULSION INTRAVENOUS at 01:31

## 2023-09-29 RX ADMIN — SCOLOPAMINE TRANSDERMAL SYSTEM 1 PATCH: 1 PATCH, EXTENDED RELEASE TRANSDERMAL at 00:46

## 2023-09-29 RX ADMIN — GABAPENTIN 300 MG: 300 CAPSULE ORAL at 21:53

## 2023-09-29 RX ADMIN — Medication 10 ML: at 09:54

## 2023-09-29 RX ADMIN — ALBUMIN (HUMAN) 25 G: 0.25 INJECTION, SOLUTION INTRAVENOUS at 08:58

## 2023-09-29 RX ADMIN — LEVOFLOXACIN 750 MG: 5 INJECTION, SOLUTION INTRAVENOUS at 21:44

## 2023-09-29 RX ADMIN — HEPARIN SODIUM 14.2 UNITS/KG/HR: 10000 INJECTION, SOLUTION INTRAVENOUS at 03:50

## 2023-09-29 RX ADMIN — HEPARIN SODIUM 3700 UNITS: 5000 INJECTION INTRAVENOUS; SUBCUTANEOUS at 03:01

## 2023-09-29 RX ADMIN — PROPOFOL 50 MCG/KG/MIN: 10 INJECTION, EMULSION INTRAVENOUS at 04:58

## 2023-09-29 RX ADMIN — PROPOFOL 45 MCG/KG/MIN: 10 INJECTION, EMULSION INTRAVENOUS at 23:30

## 2023-09-29 RX ADMIN — HEPARIN SODIUM 13.2 UNITS/KG/HR: 10000 INJECTION, SOLUTION INTRAVENOUS at 19:38

## 2023-09-29 RX ADMIN — ROSUVASTATIN CALCIUM 10 MG: 10 TABLET, FILM COATED ORAL at 21:52

## 2023-09-29 RX ADMIN — GABAPENTIN 300 MG: 300 CAPSULE ORAL at 16:29

## 2023-09-29 RX ADMIN — NICOTINE 1 PATCH: 21 PATCH, EXTENDED RELEASE TRANSDERMAL at 16:30

## 2023-09-29 RX ADMIN — POTASSIUM CHLORIDE 40 MEQ: 1.5 POWDER, FOR SOLUTION ORAL at 10:39

## 2023-09-29 RX ADMIN — PROPOFOL 45 MCG/KG/MIN: 10 INJECTION, EMULSION INTRAVENOUS at 15:14

## 2023-09-29 NOTE — PROGRESS NOTES
"[facility] [place]   LOS: 3 days   Patient Care Team:  Danielle Vaughn APRN as PCP - General (Family Medicine)  Armando Sanon APRN (Family Medicine)  Davide Marrufo MD as Surgeon (Orthopedic Surgery)  Danielle aVughn APRN (Family Medicine)  Ian Childs MD as Consulting Physician (Hematology and Oncology)    Chief Complaint:    Chief Complaint   Patient presents with    Shortness of Breath        Subjective     Interval History:   History taken from:chart RN    Patient now intubated sedated since around 1900 yesterday. She is on 75% fi02. BP still labile requiring low dose levophed. She has diuresed very well on IV lasix gtt. Significant weight loss and 5 L urine noted yesterday. We will continue with IV lasix. Wean Levophed as condition allows.       Objective     Vital Sign Min/Max for last 24 hours  Temp  Min: 97.7 °F (36.5 °C)  Max: 98 °F (36.7 °C)   BP  Min: 69/34  Max: 152/88   Pulse  Min: 57  Max: 79   Resp  Min: 18  Max: 22   SpO2  Min: 84 %  Max: 100 %   Flow (L/min)  Min: 4  Max: 5   Weight  Min: 112 kg (246 lb 14.6 oz)  Max: 112 kg (246 lb 14.6 oz)     Flowsheet Rows      Flowsheet Row First Filed Value   Admission Height 157.5 cm (62\") Documented at 09/26/2023 1102   Admission Weight 122 kg (270 lb) Documented at 09/26/2023 1102              09/27/23  0500 09/27/23  1150 09/29/23  0500   Weight: 125 kg (276 lb 7.3 oz) 125 kg (275 lb 9.2 oz) 112 kg (246 lb 14.6 oz)       Physical Exam:  Physical Exam  Vitals and nursing note reviewed.   Constitutional:       General: She is not in acute distress.     Appearance: She is morbidly obese. She is ill-appearing. She is not diaphoretic.      Interventions: She is sedated and intubated.   HENT:      Head: Normocephalic.      Right Ear: External ear normal.      Left Ear: External ear normal.   Eyes:      General: Lids are normal.      Pupils: Pupils are equal, round, and reactive to light.   Neck:      Thyroid: No thyromegaly.    "   Vascular: JVD present. No carotid bruit.      Trachea: No tracheal deviation.   Cardiovascular:      Rate and Rhythm: Regular rhythm. Bradycardia present.      Heart sounds: Normal heart sounds. No murmur heard.    No friction rub. No gallop.   Pulmonary:      Effort: She is intubated.      Breath sounds: No stridor. No decreased breath sounds, wheezing, rhonchi or rales.   Chest:      Chest wall: No tenderness.   Abdominal:      General: Bowel sounds are normal. There is no distension.      Palpations: Abdomen is soft.      Tenderness: There is no abdominal tenderness.   Musculoskeletal:      Right lower le+ Edema present.      Left lower le+ Edema present.   Skin:     General: Skin is warm and dry.      Findings: No erythema or rash.        Results Review:     Results from last 7 days   Lab Units 23  05023  1238   SODIUM mmol/L 140 137 138 143   POTASSIUM mmol/L 3.5 4.2 4.5 4.8   CHLORIDE mmol/L 91* 92* 94* 100   CO2 mmol/L 40.0* 42.0* 39.0* 37.0*   BUN mg/dL 19 16 12 9   CREATININE mg/dL 0.94 0.88 0.83 0.77   CALCIUM mg/dL 8.9 7.9* 8.3* 8.3*   BILIRUBIN mg/dL 0.7 0.4  --  0.6   ALK PHOS U/L 86 82  --  109   ALT (SGPT) U/L 30 35*  --  41*   AST (SGOT) U/L 43* 62*  --  195*   GLUCOSE mg/dL 163* 121* 153* 159*         Estimated Creatinine Clearance: 74.3 mL/min (by C-G formula based on SCr of 0.94 mg/dL).    Results from last 7 days   Lab Units 23  05023  1422   MAGNESIUM mg/dL 2.0 2.1 2.0   PHOSPHORUS mg/dL 3.0 3.2 3.1               Results from last 7 days   Lab Units 23  05023  1238   WBC 10*3/mm3 11.93* 8.71 8.53 7.74   HEMOGLOBIN g/dL 13.0 11.6* 12.1 12.6   PLATELETS 10*3/mm3 161 242 210 199         Results from last 7 days   Lab Units 23  0427 23  0502 23  1422   INR  1.11 1.17 1.14       Lab Results   Component Value Date    PROBNP 3,578.0 (H) 2023       I/O  last 3 completed shifts:  In: 2193.4 [P.O.:220; I.V.:1973.4]  Out: 5750 [Urine:5600; Emesis/NG output:150]    Cardiographics:  ECG/EMG Results (last 24 hours)       Procedure Component Value Units Date/Time    ECG 12 Lead Dyspnea [[id number]] Collected: 09/26/23 1245     Updated: 09/26/23 1313     QT Interval 374 ms      QTC Interval 452 ms     Narrative:      Test Reason : Dyspnea  Blood Pressure :   */*   mmHG  Vent. Rate :  88 BPM     Atrial Rate :  88 BPM     P-R Int : 140 ms          QRS Dur : 102 ms      QT Int : 374 ms       P-R-T Axes :  81 195 -30 degrees     QTc Int : 452 ms    ** Poor data quality, interpretation may be adversely affected  Sinus rhythm with occasional Premature ventricular complexes and Fusion complexes  Incomplete right bundle branch block  Possible Right ventricular hypertrophy  Septal infarct , age undetermined  Lateral infarct , age undetermined  Abnormal ECG  When compared with ECG of 25-MAY-2023 11:12,  Significant changes have occurred    Referred By: ERDR           Confirmed By:     ECG 12 Lead Dyspnea [[id number]] Collected: 09/26/23 1409     Updated: 09/26/23 1426     QT Interval 358 ms      QTC Interval 435 ms     Narrative:      Test Reason : Dyspnea  Blood Pressure :   */*   mmHG  Vent. Rate :  89 BPM     Atrial Rate :  89 BPM     P-R Int : 136 ms          QRS Dur : 106 ms      QT Int : 358 ms       P-R-T Axes :  75 260  26 degrees     QTc Int : 435 ms    Normal sinus rhythm  Incomplete right bundle branch block  Possible Right ventricular hypertrophy  Septal infarct (cited on or before 26-SEP-2023)  Lateral infarct (cited on or before 26-SEP-2023)  Abnormal ECG  When compared with ECG of 26-SEP-2023 12:45,  Fusion complexes are no longer Present  Premature ventricular complexes are no longer Present  Nonspecific T wave abnormality now evident in Inferior leads    Referred By: ERDR           Confirmed By:     SCANNED EKG [[id number]] Resulted: 09/26/23     Updated: 09/26/23 2124     SCANNED EKG [[id number]] Resulted: 09/26/23     Updated: 09/26/23 2124          Results for orders placed during the hospital encounter of 09/26/23    Adult Transthoracic Echo Complete w/ Color, Spectral and Contrast if Necessary Per Protocol    Interpretation Summary    Left ventricular systolic function is normal. Left ventricular ejection fraction appears to be 51 - 55%.    Left ventricular wall thickness is consistent with moderate concentric hypertrophy.    The following left ventricular wall segments are hypokinetic: apical anterior and apical septal.    Left ventricular diastolic function was normal.    Estimated right ventricular systolic pressure from tricuspid regurgitation is mildly elevated (35-45 mmHg).      Imaging Results (Most Recent)       Procedure Component Value Units Date/Time    XR Chest 1 View [[id number]] Collected: 09/28/23 1819     Updated: 09/28/23 1822    Narrative:      XR CHEST 1 VIEW    HISTORY: intubation    COMPARISON: AP chest 9/20/2023.    FINDINGS:  Endotracheal tube is in appropriate position terminating several centimeters  above the quique.    Stable cardiomegaly.    Consolidation throughout the lungs bilaterally with moderate left-sided pleural  effusion.    The osseous structures are age-appropriate.          XR Chest 1 View [[id number]] Collected: 09/28/23 1225     Updated: 09/28/23 1229    Narrative:      Single AP portable view of the chest, compared to study from 9/26/2023, again  shows cardiomegaly and mild pulmonary vascular congestion without definite  pulmonary edema.  There is new volume loss at the left base with crowding of  ribs likely from left lower lobe atelectasis.  No definite pleural effusion is  seen.    CT Angiogram Chest [[id number]] Collected: 09/26/23 1538     Updated: 09/26/23 1904    Narrative:      INDICATION:  Elevated troponins.    TECHNIQUE:  IV contrast was administered and axial images from the thoracic inlet through  the diaphragms were  performed.  3D multiplanar reformats of the thoracic aorta  were completed on a separate workstation under concurrent supervision.    FINDINGS:  Study demonstrates no evidence of pulmonary embolus.  There is a trace right  pleural effusion with patchy basilar infiltrate.  Left lung zone clear.  Cardiomegaly.    SUMMARY:  Patchy right basilar infiltrate with trace effusion.  No evidence of pulmonary  embolus.    XR Chest 2 View [[id number]] Collected: 09/26/23 1310     Updated: 09/26/23 1651    Narrative:      XR CHEST 2 VW    HISTORY: soa    FINDINGS: Left lower lobe/retrocardiac opacities potentially representing  atelectasis versus inflammatory/infectious process.  No pleural effusion.  No  pneumothorax..  The cardiomediastinal silhouette and upper abdomen are  unremarkable. No acute osseous abnormality.        US Guided Vascular Access [[id number]] Collected: 09/26/23 1423     Updated: 09/26/23 1427    Narrative:      The right  arm was utilized for vascular access.  Images demonstrate patency of  the basilic.  The vessel was accessed under direct ultrasound guidance.  Permanent images were saved in the chart/PACS system.    IR Insert Midline Without Port Pump 5 Plus [[id number]] Resulted: 09/26/23 1410     Updated: 09/26/23 1410    Narrative:      This procedure was auto-finalized with no dictation required.            No radiology results for the last 30 days.    Medication Review:     Current Facility-Administered Medications:     albuterol sulfate HFA (PROVENTIL HFA;VENTOLIN HFA;PROAIR HFA) inhaler 2 puff, 2 puff, Inhalation, 4x Daily - RT, Toby Edwards MD, 2 puff at 09/28/23 1449    aspirin EC tablet 81 mg, 81 mg, Oral, Daily, Mavis Avelar APRN, 81 mg at 09/28/23 0814    sennosides-docusate (PERICOLACE) 8.6-50 MG per tablet 2 tablet, 2 tablet, Oral, BID, 2 tablet at 09/28/23 2049 **AND** polyethylene glycol (MIRALAX) packet 17 g, 17 g, Oral, Daily PRN **AND** bisacodyl (DULCOLAX) EC tablet 5 mg, 5 mg,  Oral, Daily PRN **AND** bisacodyl (DULCOLAX) suppository 10 mg, 10 mg, Rectal, Daily PRN, Toby Edwards MD    Calcium Replacement - Follow Nurse / BPA Driven Protocol, , Does not apply, PRN, Toby Edwards MD    fentaNYL citrate (PF) (SUBLIMAZE) injection 50 mcg, 50 mcg, Intravenous, Q30 Min PRN, Toby Edwards MD, 50 mcg at 09/28/23 2349    furosemide (LASIX) 500 mg in 50mL infusion, 5 mg/hr, Intravenous, Continuous, Mavis Avelar, APRN, Last Rate: 0.5 mL/hr at 09/28/23 1235, 5 mg/hr at 09/28/23 1235    gabapentin (NEURONTIN) capsule 300 mg, 300 mg, Oral, TID, Toby Edwards MD, 300 mg at 09/28/23 2050    heparin 17023 units/250 mL (100 units/mL) in 0.45 % NaCl infusion, 14.2 Units/kg/hr, Intravenous, Titrated, Last Rate: 17.32 mL/hr at 09/29/23 0350, 14.2 Units/kg/hr at 09/29/23 0350 **AND** heparin (porcine) 5000 UNIT/ML injection 4,000 Units, 4,000 Units, Intravenous, PRN **AND** heparin (porcine) 5000 UNIT/ML injection 3,700 Units, 30 Units/kg, Intravenous, PRN, Louann Ch MD, 3,700 Units at 09/29/23 0301    levoFLOXacin (LEVAQUIN) 750 mg/150 mL D5W (premix) (LEVAQUIN) 750 mg, 750 mg, Intravenous, Q24H, Toby Edwards MD, Last Rate: 100 mL/hr at 09/28/23 2050, 750 mg at 09/28/23 2050    Magnesium Standard Dose Replacement - Follow Nurse / BPA Driven Protocol, , Does not apply, PRN, Toby Edwards MD    nicotine (NICODERM CQ) 21 MG/24HR patch 1 patch, 1 patch, Transdermal, Q24H, Toby Edwards MD, 1 patch at 09/28/23 1644    nitroglycerin (NITROSTAT) SL tablet 0.4 mg, 0.4 mg, Sublingual, Q5 Min PRN, Toby Edwards MD    norepinephrine (LEVOPHED) 8 mg in 250 mL NS infusion (premix), 0.02-0.3 mcg/kg/min, Intravenous, Titrated, Mavis Avelar APRN, Last Rate: 9.38 mL/hr at 09/29/23 0530, 0.04 mcg/kg/min at 09/29/23 0530    ondansetron (ZOFRAN) tablet 4 mg, 4 mg, Oral, Q6H PRN **OR** ondansetron (ZOFRAN) injection 4 mg, 4 mg, Intravenous, Q6H PRN, Toby Edwards MD, 4 mg at 09/28/23 0522     pentoxifylline (TRENtal) CR tablet 400 mg, 400 mg, Oral, BID, Toby Edwards MD, 400 mg at 09/28/23 0815    Phosphorus Replacement - Follow Nurse / BPA Driven Protocol, , Does not apply, Jerry PATEL Tomas, MD    potassium chloride (KLOR-CON) packet 40 mEq, 40 mEq, Oral, Q4H, Toby Edwards MD, 40 mEq at 09/29/23 0557    Potassium Replacement - Follow Nurse / BPA Driven Protocol, , Does not apply, PRNJerry Tomas, MD    propofol (DIPRIVAN) infusion 10 mg/mL 100 mL, 5-50 mcg/kg/min, Intravenous, Titrated, Toby Edwards MD, Last Rate: 37.5 mL/hr at 09/29/23 0458, 50 mcg/kg/min at 09/29/23 0458    rosuvastatin (CRESTOR) tablet 10 mg, 10 mg, Oral, Nightly, Toby Edwards MD, 10 mg at 09/28/23 2049    scopolamine patch 1 mg/72 hr, 1 patch, Transdermal, Q72H, Amandeep Bowen MD, 1 patch at 09/29/23 0046    sodium chloride 0.9 % flush 10 mL, 10 mL, Intravenous, PRN, Toby Edwards MD    sodium chloride 0.9 % flush 10 mL, 10 mL, Intravenous, Q12H, Toby Edwards MD, 10 mL at 09/28/23 2051    sodium chloride 0.9 % flush 10 mL, 10 mL, Intravenous, PRN, Toby Edwards MD    sodium chloride 0.9 % flush 10 mL, 10 mL, Intravenous, Q12H, Toby Edwards MD    sodium chloride 0.9 % flush 10 mL, 10 mL, Intravenous, Q12H, Toby Edwards MD    sodium chloride 0.9 % flush 10 mL, 10 mL, Intravenous, PRNJerry Tomas, MD    sodium chloride 0.9 % infusion 40 mL, 40 mL, Intravenous, PRNJerry Tomas, MD    sodium chloride 0.9 % infusion 40 mL, 40 mL, Intravenous, PRN, Toby Edwards MD    traMADol (ULTRAM) tablet 50 mg, 50 mg, Oral, Q6H PRN, Toby Edwards MD    Assessment & Plan       Elevated troponin    NSTEMI (non-ST elevated myocardial infarction)    #1.  NSTEMI: Troponin elevation of 1600.  EKG showing incomplete right bundle branch block, nonspecific inferior lead changes.  Echocardiogram in 2022 showing LVEF preserved at 56 to 60%, right ventricle is mildly dilated, right atrial cavity is mild to  moderately dilated.  Patient does have multiple cardiac risk factors as well noted to have intermittent chest pain for 2 to 3 days prior to arrival.  Currently she is chest pain-free and hemodynamically stable.  She is significantly volume overloaded from a CHF standpoint.  We will recommend optimization of heart failure and aggressive diuresis.  We will recommend cardiac catheterization once stabilized from a respiratory standpoint.  We will plan for LHC and RHC early next week.   -IV heparin drip  -ASA 81mg  -Crestor 10 mg  -Beta-blocker stopped due to hypotension  -Echocardiogram showing normal LVEF of 51 to 55%, moderate LVH, hypokinesis to apical anterior and apical septal  -Check HS troponin  -Limited echocardiogram     #2.  Acute on chronic heart failure with preserved ejection fraction: Previously noted to have preserved 51-55%. She appears significantly volume overloaded upon exam with adequate perfusion and hemodynamics.  We will recommend guideline directed medical therapy and aggressive diuresis.  -Stop valsartan for now and Coreg due to hypotension.  She remains on low dose Levophed. We will try to wean off today.   -Continue lasix to gtt at 5mg/hr  -MRA with Aldactone indicated as well as SGLT-2 inhibitor.   Daily weights  Strict intake and output  Fluid restriction 1500 cc, sodium restriction less than 2 g    #3. Right ventricular abnormality: mild RVC dilation. RVEF is normal. No evidence of PAH. Have recommended patient for SNEHA evaluation as an outpatient. PFT showed moderate restriction.      #4. Ascending aorta dilation: mild dilation of the proximal ascending aorta at 3.9cm. She will continue with clinical surveillance.  She has been recommended for blood pressure control.    Stephanie S Leeann  reports that she has been smoking cigarettes. She has a 60.00 pack-year smoking history. She has been exposed to tobacco smoke. She has never used smokeless tobacco.. I have educated her on the risk of  diseases from using tobacco products such as cancer, COPD, and heart disease.      I advised her to quit and she is not willing to quit.     I spent 3  minutes counseling the patient.     Patient's (Body mass index is 45.15 kg/m².) indicates that they are morbidly obese (BMI > 40 or > 35 with obesity - related health condition) with health related conditions that include hypertension, dyslipidemias, osteoarthritis, and lower extremity venous stasis disease . Weight is unchanged. BMI is is above average; BMI management plan is completed.     50 minutes of critical care provided. This time excludes other billable procedures. Time does include preparation of documents, medical consultations, review of old records, and direct bedside care. Patient is at high risk for life-threatening deterioration due to CHF, NSTEMI.     Plan for disposition: Continue CCU. Dr. Bal to provide weekend coverage.             This document has been electronically signed by ROSANNE Swanson on September 29, 2023 08:18 CDT      Electronically signed by ROSANNE Swanson, 09/29/23, 8:18 AM CDT.      I personally saw and examined Stephanie Bradshaw after the APRN.  I personally performed a history and physical examination of the patient.  I personally reviewed independent findings and plan of care.  I discussed management with the APRN.  I agree with the APRN's documentation.    Subjective: Worsening hypercarbic hypoxic respiratory failure yesterday requiring intubation.    Vitals:    09/29/23 1000 09/29/23 1013 09/29/23 1030 09/29/23 1100   BP: 104/60  95/61 99/64   BP Location:       Patient Position:       Pulse: 57 56 56 58   Resp:  22     Temp:       TempSrc:       SpO2: 93% 93% 94% 94%   Weight:       Height:         1.  NSTEMI:  Multiple risk factors of diabetes, hypertension, hyperlipidemia.  EKG showed right bundle branch block with nonspecific ST-T changes.  Initial troponin was significantly elevated which has  subsequently trended down.  Denying any active chest pain at the time of initial admission..  Patient was started on proper medical therapy with heparin drip, aspirin/Crestor/Coreg. Echocardiogram showed overall preserved LV systolic function with subtle wall motion normalities in the apical anterior and septal wall.  Will need cardiac cath once optimized from heart failure standpoint.  CT chest without evidence of pulmonary embolism     Hypoxic hypercarbic respiratory failure with potential sleep apnea.  On the ventilator now.  Continue aggressive diuresis.  Plan for cardiac cath once optimized from pulmonary standpoint.        Thank for the consult Dr. Bal will resume care over the weekend.        Electronically signed by Louann Ch MD, 09/29/23, 11:29 AM CDT.

## 2023-09-29 NOTE — CONSULTS
Adult Nutrition  Assessment/PES    Patient Name:  Stephanie Bradshaw  YOB: 1960  MRN: [id number]  Admit Date:  9/26/2023    Assessment Date:  9/29/2023    Comments:  RD staff seeing d/t intubation.  Pt was intubated yesterday and is sedated.  When appropirate, would recommend Peptamen VHP start @ 20 ml/hr adv 10 ml every 8 hours to goal of 30 ml/hr.  Pt is currently getting 990 kcal from propofol rate of 37.5 ml/hr.  Total pt will get 1710 kcal, 66 g pro, 1205 ml free water.  Pt is on low dose of levo and getting lasix with lots of urinary output per staff.  # and admit wt 276#.  Difference in wt is likely change in bed scale and fluid loss. Edema noted as dependent 3, gen 3, arms 2, legs 3, ankles 3, feet 3.  Unknown last BM.      Active Hospital Problems:  Active Hospital Problems    Diagnosis  POA    **Elevated troponin [R77.8]  Yes    NSTEMI (non-ST elevated myocardial infarction) [I21.4]  Yes      Resolved Hospital Problems   No resolved problems to display.     Past Medical History:   Diagnosis Date    Controlled type 2 diabetes mellitus without complication, without long-term current use of insulin     COPD (chronic obstructive pulmonary disease)     DVT (deep venous thrombosis) 08/2022    left popliteal    Hyperlipemia, mixed     Pulmonary embolism 08/2022    right        Reason for Assessment       Row Name 09/29/23 1102          Reason for Assessment    Reason For Assessment identified at risk by screening criteria  vent                    Nutrition/Diet History       Row Name 09/29/23 1102          Nutrition/Diet History    Typical Intake (Food/Fluid/EN/PN) unsure typical intake, pt intubated, sedated, and NPO.                    Labs/Tests/Procedures/Meds       Row Name 09/29/23 1103          Labs/Procedures/Meds    Lab Results Reviewed reviewed, pertinent     Lab Results Comments Gl 91, Glu 163, alb 3.0        Diagnostic Tests/Procedures    Diagnostic Test/Procedure Reviewed  reviewed, pertinent        Medications    Pertinent Medications Reviewed reviewed, pertinent     Pertinent Medications Comments crestor, pericolace, lasix, levo @ 0.02 mcg/kg/min, propofol                      Estimated/Assessed Needs - Anthropometrics       Row Name 09/29/23 1106 09/29/23 0500       Anthropometrics    Weight -- 112 kg (246 lb 14.6 oz)    Weight for Calculation 112 kg (246 lb) --       Estimated/Assessed Needs    Additional Documentation KCAL/KG (Group);Protein Requirements (Group);Fluid Requirements (Group) --       KCAL/KG    KCAL/KG 15 Kcal/Kg (kcal) --    15 Kcal/Kg (kcal) 1673.775 --       Protein Requirements    Weight Used For Protein Calculations 112 kg (246 lb) --    Est Protein Requirement Amount (gms/kg) 0.6 gm protein --    Estimated Protein Requirements (gms/day) 66.95 --       Fluid Requirements    Fluid Requirements (mL/day) 1650 --    RDA Method (mL) 1650 --                   Nutrition Prescription Ordered       Row Name 09/29/23 1107          Nutrition Prescription PO    Current PO Diet NPO        Propofol Considerations    Propofol (mL/hr) 37.5 mL/hr     Propofol (Kcal/day) 990 Kcal/day                         Problem/Interventions:   Problem 1       Row Name 09/29/23 1108          Nutrition Diagnoses Problem 1    Problem 1 Needs Alternate Route     Etiology (related to) Medical Diagnosis     Pulmonary/Critical Care Ventilator     Signs/Symptoms (evidenced by) NPO                          Intervention Goal       Row Name 09/29/23 1109          Intervention Goal    General Maintain nutrition;Meet nutritional needs for age/condition     Weight Appropriate weight loss  probable wt loss with lasix                    Nutrition Intervention       Row Name 09/29/23 1109          Nutrition Intervention    RD/Tech Action Follow Tx progress;Recommend/ordered     Recommended/Ordered EN                    Nutrition Prescription       Row Name 09/29/23 1109          Nutrition Prescription EN     Enteral Prescription Enteral begin/change     Enteral Route ND     Product Peptamen Intense VHP     TF Delivery Method Continuous     Continuous TF Goal Rate (mL/hr) 30 mL/hr     Continuous TF Starting Rate (mL/hr) 20 mL/hr     Water flush (mL)  25 mL     Water Flush Frequency Per hour     New EN Prescription Ordered? No, recommended                    Education/Evaluation       Row Name 09/29/23 1112          Education    Education Education not appropriate at this time     Please explain Patient confusion;Patient sedation status        Monitor/Evaluation    Monitor Per protocol;Pertinent labs;Weight                     Electronically signed by:  Jodee Irby RD  09/29/23 11:12 CDT

## 2023-09-29 NOTE — PLAN OF CARE
Problem: Adult Inpatient Plan of Care  Goal: Plan of Care Review  Outcome: Met  Goal: Patient-Specific Goal (Individualized)  Outcome: Met  Goal: Absence of Hospital-Acquired Illness or Injury  Outcome: Met  Goal: Optimal Comfort and Wellbeing  Outcome: Met  Goal: Readiness for Transition of Care  Outcome: Met     Problem: Fall Injury Risk  Goal: Absence of Fall and Fall-Related Injury  Outcome: Met     Problem: Skin Injury Risk Increased  Goal: Skin Health and Integrity  Outcome: Met     Problem: Chest Pain  Goal: Resolution of Chest Pain Symptoms  Outcome: Met     Problem: COPD (Chronic Obstructive Pulmonary Disease) Comorbidity  Goal: Maintenance of COPD Symptom Control  Outcome: Met     Problem: Diabetes Comorbidity  Goal: Blood Glucose Level Within Targeted Range  Outcome: Met     Problem: Hypertension Comorbidity  Goal: Blood Pressure in Desired Range  Outcome: Met     Problem: Restraint, Nonviolent  Goal: Absence of Harm or Injury  Outcome: Met     Problem: Communication Impairment (Mechanical Ventilation, Invasive)  Goal: Effective Communication  Outcome: Met  Goal: Effective Communication  Outcome: Met     Problem: Device-Related Complication Risk (Mechanical Ventilation, Invasive)  Goal: Optimal Device Function  Outcome: Met  Goal: Optimal Device Function  Outcome: Met     Problem: Inability to Wean (Mechanical Ventilation, Invasive)  Goal: Mechanical Ventilation Liberation  Outcome: Met  Goal: Mechanical Ventilation Liberation  Outcome: Met     Problem: Nutrition Impairment (Mechanical Ventilation, Invasive)  Goal: Optimal Nutrition Delivery  Outcome: Met  Goal: Optimal Nutrition Delivery  Outcome: Met     Problem: Skin and Tissue Injury (Mechanical Ventilation, Invasive)  Goal: Absence of Device-Related Skin and Tissue Injury  Outcome: Met  Goal: Absence of Device-Related Skin and Tissue Injury  Outcome: Met     Problem: Ventilator-Induced Lung Injury (Mechanical Ventilation, Invasive)  Goal: Absence  of Ventilator-Induced Lung Injury  Outcome: Met  Goal: Absence of Ventilator-Induced Lung Injury  Outcome: Met     Problem: Fluid Volume Excess  Goal: Fluid Balance  Outcome: Met     Problem: Dysrhythmia  Goal: Normalized Cardiac Rhythm  Outcome: Met     Problem: Fluid Imbalance (Pneumonia)  Goal: Fluid Balance  Outcome: Met     Problem: Infection (Pneumonia)  Goal: Resolution of Infection Signs and Symptoms  Outcome: Met     Problem: Respiratory Compromise (Pneumonia)  Goal: Effective Oxygenation and Ventilation  Outcome: Met     Problem: Adjustment to Illness COPD (Chronic Obstructive Pulmonary Disease)  Goal: Optimal Chronic Illness Coping  Outcome: Met     Problem: Functional Ability Impaired COPD (Chronic Obstructive Pulmonary Disease)  Goal: Optimal Level of Functional [place]  Outcome: Met     Problem: Infection COPD (Chronic Obstructive Pulmonary Disease)  Goal: Absence of Infection Signs and Symptoms  Outcome: Met     Problem: Oral Intake Inadequate COPD (Chronic Obstructive Pulmonary Disease)  Goal: Improved Nutrition Intake  Outcome: Met     Problem: Respiratory Compromise COPD (Chronic Obstructive Pulmonary Disease)  Goal: Effective Oxygenation and Ventilation  Outcome: Met   Goal Outcome Evaluation:    Pt stable on current vent settings. Was able to wean fio2 down to 55% after desaturation episode this morning. Troponin has continued to increase since admission.

## 2023-09-29 NOTE — PLAN OF CARE
Goal Outcome Evaluation:      RD staff seeing d/t intubation.  Pt was intubated yesterday and is sedated.  When appropirate, would recommend Peptamen VHP start @ 20 ml/hr adv 10 ml every 8 hours to goal of 30 ml/hr.  Pt is currently getting 990 kcal from propofol rate of 37.5 ml/hr.  Total pt will get 1710 kcal, 66 g pro, 1205 ml free water.  Pt is on low dose of levo and getting lasix with lots of urinary output per staff.  # and admit wt 276#.  Difference in wt is likely change in bed scale and fluid loss. Edema noted as dependent 3, gen 3, arms 2, legs 3, ankles 3, feet 3.  Unknown last BM.

## 2023-09-29 NOTE — PLAN OF CARE
Goal Outcome Evaluation:  Plan of Care Reviewed With: patient        Progress: improving  Outcome Evaluation: VSS. Propofol, Levophed, Heparin, and Lasix gtt currently infusing. Adequate UOP.

## 2023-09-29 NOTE — PROGRESS NOTES
[facility] Medicine   INPATIENT PROGRESS NOTE      Patient Name: Stepahnie Bradshaw  Date of Admission: 9/26/2023  Today's Date: 09/29/23  Length of Stay: 3  Primary Care Physician: Danielle Vaughn APRN    Subjective   Chief Complaint:   HPI   Overnight course reviewed, intubated yesterday evening, still on the vent and getting diuresed.     Review of Systems   All pertinent negatives and positives are as above. All other systems have been reviewed and are negative unless otherwise stated.     Objective    Temp:  [97.4 °F (36.3 °C)-98 °F (36.7 °C)] 97.4 °F (36.3 °C)  Heart Rate:  [57-79] 60  Resp:  [18-22] 22  BP: ()/(23-88) 95/63  FiO2 (%):  [50 %-100 %] 65 %  Physical Exam  Assist control  PEEP 5.0   ml  Ppeak 23  rate 23/min  FiO2 75%   Propofol 50 mcg/kg/min  lasix 5 mg/hr  levophed 0.02 mcg/kg/min  heparin drip  ETT  NG  bilateral wrist restrains  martinez   Laying in bed, NAD  Eyes closed  Breathing unlabored on vent  Heart rate controlled, normotensive  Soft, NT, ND  Fragile skin, mild leg edema. Venous stasis bilaterally  Morbidly obese as before    Results Review:  I have reviewed the labs, radiology results, and diagnostic studies.    Laboratory Data:   Results from last 7 days   Lab Units 09/29/23 0428 09/28/23 0427 09/27/23  0502   WBC 10*3/mm3 11.93* 8.71 8.53   HEMOGLOBIN g/dL 13.0 11.6* 12.1   HEMATOCRIT % 42.5 40.0 42.2   PLATELETS 10*3/mm3 161 242 210        Results from last 7 days   Lab Units 09/29/23  0428 09/28/23  0427 09/27/23  0502 09/26/23  1238   SODIUM mmol/L 140 137 138 143   POTASSIUM mmol/L 3.5 4.2 4.5 4.8   CHLORIDE mmol/L 91* 92* 94* 100   CO2 mmol/L 40.0* 42.0* 39.0* 37.0*   BUN mg/dL 19 16 12 9   CREATININE mg/dL 0.94 0.88 0.83 0.77   CALCIUM mg/dL 8.9 7.9* 8.3* 8.3*   BILIRUBIN mg/dL 0.7 0.4  --  0.6   ALK PHOS U/L 86 82  --  109   ALT (SGPT) U/L 30 35*  --  41*   AST (SGOT) U/L 43* 62*  --  195*    GLUCOSE mg/dL 163* 121* 153* 159*       Culture Data:   Blood Culture   Date Value Ref Range Status   09/26/2023 No growth at 2 days  Preliminary   09/26/2023 No growth at 2 days  Preliminary     No results found for: URINECX  Respiratory Culture   Date Value Ref Range Status   09/28/2023 No growth  Preliminary     No results found for: WOUNDCX  No results found for: STOOLCX  No components found for: BODYFLD    Radiology Data:   Imaging Results (Last 24 Hours)       Procedure Component Value Units Date/Time    XR Chest 1 View [[id number]] Collected: 09/28/23 1819     Updated: 09/28/23 1822    Narrative:      XR CHEST 1 VIEW    HISTORY: intubation    COMPARISON: AP chest 9/20/2023.    FINDINGS:  Endotracheal tube is in appropriate position terminating several centimeters  above the quique.    Stable cardiomegaly.    Consolidation throughout the lungs bilaterally with moderate left-sided pleural  effusion.    The osseous structures are age-appropriate.          XR Chest 1 View [[id number]] Collected: 09/28/23 1225     Updated: 09/28/23 1229    Narrative:      Single AP portable view of the chest, compared to study from 9/26/2023, again  shows cardiomegaly and mild pulmonary vascular congestion without definite  pulmonary edema.  There is new volume loss at the left base with crowding of  ribs likely from left lower lobe atelectasis.  No definite pleural effusion is  seen.        I have reviewed the patient's current medications.   Assessment/Plan     Active Hospital Problems     Diagnosis      NSTEMI (non-ST elevated myocardial infarction) Continue medical management with existing regimen and heparin drip. Cardiology on board. Procedures next week but dependent on overall condition    Acute on chronic respiratory failure with hypoxia and hypercapnia Continue ventilation for now, try to wean off if possible    Acute on chronic diastolic heart failure Continue lasix drip and the rest of the regimen. Needs time to  improve    Mixed hyperlipidemia Statin continued    Morbid obesity with BMI of 45.0-49.9, adult Complicates all aspects of care    Hypokalemia Replace per protocol   Stay in the ICU today  Electronically signed by Louann Downey MD, 09/29/23, 09:14 CDT.

## 2023-09-29 NOTE — PLAN OF CARE
Goal Outcome Evaluation:      Sedation maintained with propofol. Levophed throughout the day to maintain BP, currently on hold. VSS. Continues on heparin and lasix gtt. Urine output adequate.

## 2023-09-30 ENCOUNTER — APPOINTMENT (OUTPATIENT)
Dept: GENERAL RADIOLOGY | Facility: HOSPITAL | Age: 63
End: 2023-09-30
Payer: COMMERCIAL

## 2023-09-30 VITALS
HEIGHT: 62 IN | TEMPERATURE: 98.1 F | HEART RATE: 54 BPM | SYSTOLIC BLOOD PRESSURE: 100 MMHG | OXYGEN SATURATION: 92 % | RESPIRATION RATE: 22 BRPM | WEIGHT: 245 LBS | DIASTOLIC BLOOD PRESSURE: 63 MMHG | BODY MASS INDEX: 45.08 KG/M2

## 2023-09-30 LAB
ALBUMIN SERPL-MCNC: 3 G/DL (ref 3.5–5.2)
ALBUMIN/GLOB SERPL: 1.3 G/DL
ALP SERPL-CCNC: 75 U/L (ref 39–117)
ALT SERPL W P-5'-P-CCNC: 21 U/L (ref 1–33)
ANION GAP SERPL CALCULATED.3IONS-SCNC: 9 MMOL/L (ref 5–15)
APTT PPP: 64.2 SECONDS (ref 20–40.3)
APTT PPP: 65 SECONDS (ref 20–40.3)
APTT PPP: 86.4 SECONDS (ref 20–40.3)
AST SERPL-CCNC: 29 U/L (ref 1–32)
BASOPHILS # BLD AUTO: 0.03 10*3/MM3 (ref 0–0.2)
BASOPHILS NFR BLD AUTO: 0.4 % (ref 0–1.5)
BILIRUB SERPL-MCNC: 0.7 MG/DL (ref 0–1.2)
BUN SERPL-MCNC: 20 MG/DL (ref 8–23)
BUN/CREAT SERPL: 15.6 (ref 7–25)
CALCIUM SPEC-SCNC: 8.9 MG/DL (ref 8.6–10.5)
CHLORIDE SERPL-SCNC: 94 MMOL/L (ref 98–107)
CO2 SERPL-SCNC: 42 MMOL/L (ref 22–29)
CREAT SERPL-MCNC: 1.28 MG/DL (ref 0.57–1)
DEPRECATED RDW RBC AUTO: 53.3 FL (ref 37–54)
EGFRCR SERPLBLD CKD-EPI 2021: 47.8 ML/MIN/1.73
EOSINOPHIL # BLD AUTO: 0.07 10*3/MM3 (ref 0–0.4)
EOSINOPHIL NFR BLD AUTO: 1 % (ref 0.3–6.2)
ERYTHROCYTE [DISTWIDTH] IN BLOOD BY AUTOMATED COUNT: 16.5 % (ref 12.3–15.4)
GLOBULIN UR ELPH-MCNC: 2.4 GM/DL
GLUCOSE SERPL-MCNC: 116 MG/DL (ref 65–99)
HCT VFR BLD AUTO: 42 % (ref 34–46.6)
HGB BLD-MCNC: 12.3 G/DL (ref 12–15.9)
HOLD SPECIMEN: NORMAL
IMM GRANULOCYTES # BLD AUTO: 0.03 10*3/MM3 (ref 0–0.05)
IMM GRANULOCYTES NFR BLD AUTO: 0.4 % (ref 0–0.5)
LYMPHOCYTES # BLD AUTO: 2.02 10*3/MM3 (ref 0.7–3.1)
LYMPHOCYTES NFR BLD AUTO: 27.4 % (ref 19.6–45.3)
MCH RBC QN AUTO: 25.8 PG (ref 26.6–33)
MCHC RBC AUTO-ENTMCNC: 29.3 G/DL (ref 31.5–35.7)
MCV RBC AUTO: 88.2 FL (ref 79–97)
MONOCYTES # BLD AUTO: 0.86 10*3/MM3 (ref 0.1–0.9)
MONOCYTES NFR BLD AUTO: 11.7 % (ref 5–12)
NEUTROPHILS NFR BLD AUTO: 4.35 10*3/MM3 (ref 1.7–7)
NEUTROPHILS NFR BLD AUTO: 59.1 % (ref 42.7–76)
NRBC BLD AUTO-RTO: 0 /100 WBC (ref 0–0.2)
PLATELET # BLD AUTO: 257 10*3/MM3 (ref 140–450)
PMV BLD AUTO: 11.3 FL (ref 6–12)
POTASSIUM SERPL-SCNC: 3.2 MMOL/L (ref 3.5–5.2)
POTASSIUM SERPL-SCNC: 3.5 MMOL/L (ref 3.5–5.2)
PROT SERPL-MCNC: 5.4 G/DL (ref 6–8.5)
RBC # BLD AUTO: 4.76 10*6/MM3 (ref 3.77–5.28)
SODIUM SERPL-SCNC: 145 MMOL/L (ref 136–145)
WBC NRBC COR # BLD: 7.36 10*3/MM3 (ref 3.4–10.8)
WHOLE BLOOD HOLD SPECIMEN: NORMAL

## 2023-09-30 PROCEDURE — 94003 VENT MGMT INPAT SUBQ DAY: CPT

## 2023-09-30 PROCEDURE — 94760 N-INVAS EAR/PLS OXIMETRY 1: CPT

## 2023-09-30 PROCEDURE — 94799 UNLISTED PULMONARY SVC/PX: CPT

## 2023-09-30 PROCEDURE — 85025 COMPLETE CBC W/AUTO DIFF WBC: CPT

## 2023-09-30 PROCEDURE — 94761 N-INVAS EAR/PLS OXIMETRY MLT: CPT

## 2023-09-30 PROCEDURE — 80053 COMPREHEN METABOLIC PANEL: CPT | Performed by: NURSE PRACTITIONER

## 2023-09-30 PROCEDURE — 85730 THROMBOPLASTIN TIME PARTIAL: CPT

## 2023-09-30 PROCEDURE — 25010000002 PROPOFOL 10 MG/ML EMULSION

## 2023-09-30 PROCEDURE — 25010000002 HEPARIN (PORCINE) PER 1000 UNITS: Performed by: HOSPITALIST

## 2023-09-30 PROCEDURE — 25010000002 FUROSEMIDE PER 20 MG: Performed by: NURSE PRACTITIONER

## 2023-09-30 PROCEDURE — 25010000002 LEVOFLOXACIN PER 250 MG

## 2023-09-30 PROCEDURE — 85730 THROMBOPLASTIN TIME PARTIAL: CPT | Performed by: HOSPITALIST

## 2023-09-30 PROCEDURE — 71045 X-RAY EXAM CHEST 1 VIEW: CPT

## 2023-09-30 PROCEDURE — 84132 ASSAY OF SERUM POTASSIUM: CPT | Performed by: HOSPITALIST

## 2023-09-30 PROCEDURE — 25010000002 HEPARIN (PORCINE) PER 1000 UNITS

## 2023-09-30 RX ORDER — POTASSIUM CHLORIDE 1.5 G/1.58G
40 POWDER, FOR SOLUTION ORAL EVERY 4 HOURS
Status: DISCONTINUED | OUTPATIENT
Start: 2023-09-30 | End: 2023-10-01 | Stop reason: HOSPADM

## 2023-09-30 RX ORDER — POTASSIUM CHLORIDE 1.5 G/1.58G
40 POWDER, FOR SOLUTION ORAL EVERY 4 HOURS
Status: COMPLETED | OUTPATIENT
Start: 2023-09-30 | End: 2023-09-30

## 2023-09-30 RX ADMIN — Medication 10 ML: at 08:23

## 2023-09-30 RX ADMIN — DOCUSATE SODIUM 50 MG AND SENNOSIDES 8.6 MG 2 TABLET: 8.6; 5 TABLET, FILM COATED ORAL at 08:22

## 2023-09-30 RX ADMIN — GABAPENTIN 300 MG: 300 CAPSULE ORAL at 16:04

## 2023-09-30 RX ADMIN — ALBUTEROL SULFATE 2.5 MG: 2.5 SOLUTION RESPIRATORY (INHALATION) at 19:12

## 2023-09-30 RX ADMIN — ALBUTEROL SULFATE 2.5 MG: 2.5 SOLUTION RESPIRATORY (INHALATION) at 07:39

## 2023-09-30 RX ADMIN — NICOTINE 1 PATCH: 21 PATCH, EXTENDED RELEASE TRANSDERMAL at 16:05

## 2023-09-30 RX ADMIN — Medication 10 ML: at 22:11

## 2023-09-30 RX ADMIN — FUROSEMIDE 5 MG/HR: 10 INJECTION, SOLUTION INTRAMUSCULAR; INTRAVENOUS at 07:50

## 2023-09-30 RX ADMIN — PROPOFOL 45 MCG/KG/MIN: 10 INJECTION, EMULSION INTRAVENOUS at 01:49

## 2023-09-30 RX ADMIN — GABAPENTIN 300 MG: 300 CAPSULE ORAL at 20:39

## 2023-09-30 RX ADMIN — ALBUTEROL SULFATE 2.5 MG: 2.5 SOLUTION RESPIRATORY (INHALATION) at 16:30

## 2023-09-30 RX ADMIN — HEPARIN SODIUM 3700 UNITS: 5000 INJECTION INTRAVENOUS; SUBCUTANEOUS at 21:52

## 2023-09-30 RX ADMIN — LEVOFLOXACIN 750 MG: 5 INJECTION, SOLUTION INTRAVENOUS at 20:38

## 2023-09-30 RX ADMIN — PROPOFOL 45 MCG/KG/MIN: 10 INJECTION, EMULSION INTRAVENOUS at 04:18

## 2023-09-30 RX ADMIN — ASPIRIN 81 MG: 81 TABLET, COATED ORAL at 08:22

## 2023-09-30 RX ADMIN — DOCUSATE SODIUM 50 MG AND SENNOSIDES 8.6 MG 2 TABLET: 8.6; 5 TABLET, FILM COATED ORAL at 20:39

## 2023-09-30 RX ADMIN — PROPOFOL 45 MCG/KG/MIN: 10 INJECTION, EMULSION INTRAVENOUS at 10:53

## 2023-09-30 RX ADMIN — POTASSIUM CHLORIDE 40 MEQ: 1.5 POWDER, FOR SOLUTION ORAL at 13:09

## 2023-09-30 RX ADMIN — POTASSIUM CHLORIDE 40 MEQ: 1.5 POWDER, FOR SOLUTION ORAL at 07:48

## 2023-09-30 RX ADMIN — HEPARIN SODIUM 11.2 UNITS/KG/HR: 10000 INJECTION, SOLUTION INTRAVENOUS at 18:13

## 2023-09-30 RX ADMIN — ROSUVASTATIN CALCIUM 10 MG: 10 TABLET, FILM COATED ORAL at 20:39

## 2023-09-30 RX ADMIN — PROPOFOL 45 MCG/KG/MIN: 10 INJECTION, EMULSION INTRAVENOUS at 13:37

## 2023-09-30 RX ADMIN — POTASSIUM CHLORIDE 40 MEQ: 1.5 POWDER, FOR SOLUTION ORAL at 22:11

## 2023-09-30 RX ADMIN — ALBUTEROL SULFATE 2.5 MG: 2.5 SOLUTION RESPIRATORY (INHALATION) at 10:27

## 2023-09-30 RX ADMIN — PROPOFOL 45 MCG/KG/MIN: 10 INJECTION, EMULSION INTRAVENOUS at 16:47

## 2023-09-30 RX ADMIN — PROPOFOL 45 MCG/KG/MIN: 10 INJECTION, EMULSION INTRAVENOUS at 07:47

## 2023-09-30 RX ADMIN — PROPOFOL 45 MCG/KG/MIN: 10 INJECTION, EMULSION INTRAVENOUS at 20:39

## 2023-09-30 RX ADMIN — GABAPENTIN 300 MG: 300 CAPSULE ORAL at 08:22

## 2023-09-30 NOTE — PLAN OF CARE
Goal Outcome Evaluation:   Light sedation maintained with propofol. Light vaginal bleeding noted this afternoon; MD aware. Lasix and heparin gtt continue. Urine output adequate.

## 2023-09-30 NOTE — PROGRESS NOTES
[facility] Medicine   INPATIENT PROGRESS NOTE      Patient Name: Stephanie Bradshaw  Date of Admission: 9/26/2023  Today's Date: 09/30/23  Length of Stay: 4  Primary Care Physician: Danielle Vaughn APRN    Subjective   Chief Complaint:   HPI   Overnight course reviewed, still intubated and sedated. Her FiO2 was weaned down to 40% but went back to 65% this morning. Still on a lasix drip    Review of Systems   All pertinent negatives and positives are as above. All other systems have been reviewed and are negative unless otherwise stated.     Objective    Temp:  [97.9 °F (36.6 °C)-98.3 °F (36.8 °C)] 98.3 °F (36.8 °C)  Heart Rate:  [50-64] 58  Resp:  [22] 22  BP: ()/(51-75) 116/60  FiO2 (%):  [40 %-70 %] 40 %  Physical Exam  Assist control  PEEP 5.0   ml  Ppeak 23  rate 16/min  FiO2 65%   Propofol 45 mcg/kg/min  lasix 5 mg/hr  levophed 0.02 mcg/kg/min  heparin drip  ETT  NG  bilateral wrist restrains  martinez   Laying in bed, NAD  Eyes closed  Tubes in place  Breathing unlabored  Borderline bradycardia, normotensive  Soft, NT, ND  Fragile skin, mild leg edema. Venous stasis as before    Results Review:  I have reviewed the labs, radiology results, and diagnostic studies.    Laboratory Data:   Results from last 7 days   Lab Units 09/30/23  0415 09/29/23 0428 09/28/23 0427   WBC 10*3/mm3 7.36 11.93* 8.71   HEMOGLOBIN g/dL 12.3 13.0 11.6*   HEMATOCRIT % 42.0 42.5 40.0   PLATELETS 10*3/mm3 257 161 242        Results from last 7 days   Lab Units 09/30/23  0415 09/29/23  1444 09/29/23 0428 09/28/23 0427   SODIUM mmol/L 145  --  140 137   POTASSIUM mmol/L 3.2* 4.5 3.5 4.2   CHLORIDE mmol/L 94*  --  91* 92*   CO2 mmol/L 42.0*  --  40.0* 42.0*   BUN mg/dL 20  --  19 16   CREATININE mg/dL 1.28*  --  0.94 0.88   CALCIUM mg/dL 8.9  --  8.9 7.9*   BILIRUBIN mg/dL 0.7  --  0.7 0.4   ALK PHOS U/L 75  --  86 82   ALT (SGPT) U/L 21  --  30 35*   AST  (SGOT) U/L 29  --  43* 62*   GLUCOSE mg/dL 116*  --  163* 121*         Culture Data:   No results found for: BLOODCX    No results found for: URINECX  Respiratory Culture   Date Value Ref Range Status   09/28/2023 No growth  Preliminary     No results found for: WOUNDCX  No results found for: STOOLCX  No components found for: BODYFLD    Radiology Data:   Imaging Results (Last 24 Hours)       ** No results found for the last 24 hours. **            I have reviewed the patient's current medications.     Assessment/Plan     Active Hospital Problems    Diagnosis     NSTEMI (non-ST elevated myocardial infarction) Continue medical management with heparin drip. Cardiology on board. Procedures next week    Acute on chronic respiratory failure with hypoxia and hypercapnia Continue ventilation for now, try to wean off if possible    Acute on chronic diastolic heart failure Continue lasix drip and the rest of the regimen. Needs time to improve    Mixed hyperlipidemia Statin as before    Morbid obesity with BMI of 45.0-49.9, adult Complicates all aspects of care    Hypokalemia Replace per protocol   Stay in the ICU today  Electronically signed by Louann Downey MD, 09/30/23, 09:06 CDT.

## 2023-09-30 NOTE — PROGRESS NOTES
LOS: 4 days   Patient Care Team:  Danielle Vaughn APRN as PCP - General (Family Medicine)  Armando Sanon APRN (Family Medicine)  Davide Marrufo MD as Surgeon (Orthopedic Surgery)  Danielle Vaughn APRN (Family Medicine)  Ian Childs MD as Consulting Physician (Hematology and Oncology)    Chief Complaint: Patient examined medical record was reviewed patient remained intubated with marginal hemodynamic maintaining sinus    63 years old patient with background history of COPD DVT pulm embolism on chronic oral anticoagulation, hypertension hyperlipidemia diabetes who hospitalized for management of hypoxic respiratory failure with diagnosis NSTEMI with markedly elevated high sensitive troponin.  Initial echocardiogram preserved left ventricle systolic function repeat limited echo had mildly reduced EF.  The patient requiring inotropic support to maintain hemodynamic.  She remained intubated.  Unable to provide information.  Again 20 to 30 pound weight and clinically have congestive heart failure on the basis of systolic diastolic dysfunction awaiting cardiac catheterization early next week    High sensitivity troponin 1883    Limited echo September 29, 2023    Left ventricular wall thickness is consistent with mild concentric hypertrophy.    The right ventricular cavity is moderately dilated.    Estimated right ventricular systolic pressure from tricuspid regurgitation is mildly elevated (35-45 mmHg).    There is a moderate sized left pleural effusion.    LV is not well visualized to comment on wall motion normalities. LV systolic function appears to be mildly reduced compared to before.    Review of Systems:     Unable to obtain the review of system as the patient is intubated  Objective     Current Facility-Administered Medications   Medication Dose Route Frequency Provider Last Rate Last Admin    albuterol (PROVENTIL) nebulizer solution 0.083% 2.5 mg/3mL  2.5 mg Nebulization 4x Daily - RT  Toby Edwards MD   2.5 mg at 09/30/23 1027    aspirin EC tablet 81 mg  81 mg Oral Daily Mavis Avelar, APRN   81 mg at 09/30/23 0822    sennosides-docusate (PERICOLACE) 8.6-50 MG per tablet 2 tablet  2 tablet Oral BID Toby Edwards MD   2 tablet at 09/30/23 0822    And    polyethylene glycol (MIRALAX) packet 17 g  17 g Oral Daily PRN Toby Edwards MD        And    bisacodyl (DULCOLAX) EC tablet 5 mg  5 mg Oral Daily PRN Toby Edwards MD        And    bisacodyl (DULCOLAX) suppository 10 mg  10 mg Rectal Daily PRN Toby Edwards MD        Calcium Replacement - Follow Nurse / BPA Driven Protocol   Does not apply PRN Toby Edwards MD        fentaNYL citrate (PF) (SUBLIMAZE) injection 50 mcg  50 mcg Intravenous Q30 Min PRN Toby Edwards MD   50 mcg at 09/28/23 2349    furosemide (LASIX) 500 mg in 50mL infusion  5 mg/hr Intravenous Continuous Mavis Avelar, APRN 0.5 mL/hr at 09/30/23 0750 5 mg/hr at 09/30/23 0750    gabapentin (NEURONTIN) capsule 300 mg  300 mg Oral TID Toby Edwards MD   300 mg at 09/30/23 0822    heparin 63690 units/250 mL (100 units/mL) in 0.45 % NaCl infusion  11.2 Units/kg/hr Intravenous Titrated Louann Downey MD 13.66 mL/hr at 09/30/23 0754 11.2 Units/kg/hr at 09/30/23 0754    And    heparin (porcine) 5000 UNIT/ML injection 4,000 Units  4,000 Units Intravenous PRN Louann Downey MD        And    heparin (porcine) 5000 UNIT/ML injection 3,700 Units  30 Units/kg Intravenous PRN Louann Downey MD   3,700 Units at 09/29/23 0940    levoFLOXacin (LEVAQUIN) 750 mg/150 mL D5W (premix) (LEVAQUIN) 750 mg  750 mg Intravenous Q24H Toby Edwards  mL/hr at 09/29/23 2144 750 mg at 09/29/23 2144    Magnesium Standard Dose Replacement - Follow Nurse / BPA Driven Protocol   Does not apply PRN Toby Edwards MD        nicotine (NICODERM CQ) 21 MG/24HR patch 1 patch  1 patch Transdermal Q24H Toby Edwards MD   1 patch at 09/29/23 1630    nitroglycerin (NITROSTAT) SL tablet 0.4 mg  0.4  mg Sublingual Q5 Min PRN Toby Edwards MD        norepinephrine (LEVOPHED) 8 mg in 250 mL NS infusion (premix)  0.02-0.3 mcg/kg/min Intravenous Titrated Mavis Avelar APRN 4.69 mL/hr at 09/30/23 1254 0.02 mcg/kg/min at 09/30/23 1254    ondansetron (ZOFRAN) tablet 4 mg  4 mg Oral Q6H PRN Toby Edwards MD        Or    ondansetron (ZOFRAN) injection 4 mg  4 mg Intravenous Q6H PRN Toby Edwards MD   4 mg at 09/28/23 0229    pentoxifylline (TRENtal) CR tablet 400 mg  400 mg Oral BID Toby Edwards MD   400 mg at 09/28/23 0815    Phosphorus Replacement - Follow Nurse / BPA Driven Protocol   Does not apply PRN Toby Edwards MD        Potassium Replacement - Follow Nurse / BPA Driven Protocol   Does not apply PRN Toby Edwards MD        propofol (DIPRIVAN) infusion 10 mg/mL 100 mL  5-50 mcg/kg/min Intravenous Titrated Toby Edwards MD 33.8 mL/hr at 09/30/23 1337 45 mcg/kg/min at 09/30/23 1337    rosuvastatin (CRESTOR) tablet 10 mg  10 mg Oral Nightly Toby Edwards MD   10 mg at 09/29/23 2152    scopolamine patch 1 mg/72 hr  1 patch Transdermal Q72H Amandeep Bowen MD   1 patch at 09/29/23 0046    sodium chloride 0.9 % flush 10 mL  10 mL Intravenous PRN Toby Edwards MD        sodium chloride 0.9 % flush 10 mL  10 mL Intravenous Q12H Toby Edwards MD   10 mL at 09/29/23 2153    sodium chloride 0.9 % flush 10 mL  10 mL Intravenous PRN Toby Edwards MD        sodium chloride 0.9 % flush 10 mL  10 mL Intravenous Q12H Toby Edwards MD   10 mL at 09/29/23 2153    sodium chloride 0.9 % flush 10 mL  10 mL Intravenous Q12H Toby Edwards MD   10 mL at 09/30/23 0823    sodium chloride 0.9 % flush 10 mL  10 mL Intravenous PRN Toby Edwards MD        sodium chloride 0.9 % infusion 40 mL  40 mL Intravenous PRN Toby Edwards MD        sodium chloride 0.9 % infusion 40 mL  40 mL Intravenous PRN Toby Edwards MD        traMADol (ULTRAM) tablet 50 mg  50 mg Oral Q6H PRN Toby Edwards MD      "      Vital Sign Min/Max for last 24 hours  Temp  Min: 97.4 °F (36.3 °C)  Max: 98.3 °F (36.8 °C)   BP  Min: 85/54  Max: 136/70   Pulse  Min: 50  Max: 63   Resp  Min: 21  Max: 22   SpO2  Min: 87 %  Max: 98 %   No data recorded   Weight  Min: 111 kg (245 lb)  Max: 111 kg (245 lb)     Flowsheet Rows      Flowsheet Row First Filed Value   Admission Height 157.5 cm (62\") Documented at 09/26/2023 1102   Admission Weight 122 kg (270 lb) Documented at 09/26/2023 1102              Intake/Output Summary (Last 24 hours) at 9/30/2023 1446  Last data filed at 9/30/2023 1200  Gross per 24 hour   Intake 2023.02 ml   Output 1500 ml   Net 523.02 ml       Physical Exam:  Neck: Supple.    Chest: Bilateral decreased air entry with rales    Cardiovascular system:  Regular rate and rhythm, no murmurs.  Abdomen: Soft, no tenderness, bowel sounds present  CNS: Patient is intubated  Musculoskeletal: No deformity of the back or spine.  Extremities: Positive edema  Pulses equal on both sides.     Results Review:   I reviewed the patient's new clinical results.  Lab Results   Component Value Date    WBC 7.36 09/30/2023    HGB 12.3 09/30/2023    HCT 42.0 09/30/2023    MCV 88.2 09/30/2023     09/30/2023     Lab Results   Component Value Date    GLUCOSE 116 (H) 09/30/2023    BUN 20 09/30/2023    CREATININE 1.28 (H) 09/30/2023    EGFRIFNONA 106 07/26/2021    BCR 15.6 09/30/2023    CO2 42.0 (H) 09/30/2023    CALCIUM 8.9 09/30/2023    ALBUMIN 3.0 (L) 09/30/2023    AST 29 09/30/2023    ALT 21 09/30/2023     Lab Results   Component Value Date    TSH 1.960 05/25/2023     Lab Results   Component Value Date    INR 1.11 09/28/2023    INR 1.17 09/27/2023    INR 1.14 09/26/2023    PROTIME 14.3 09/28/2023    PROTIME 14.8 09/27/2023    PROTIME 14.5 09/26/2023     Lab Results   Component Value Date    PTT 65.0 (H) 09/30/2023       EKG:     Telemetry:    Ejection Fraction  No results found for: EF    Echo EF Estimated  Lab Results   Component Value Date "    BINTAEST 57 09/23/2021         Present on Admission:   NSTEMI (non-ST elevated myocardial infarction)   Mixed hyperlipidemia   Leg edema   Acute on chronic respiratory failure with hypoxia and hypercapnia   Acute on chronic diastolic heart failure    Assessment & Plan   #1 NSTEMI    63 years old patient with multiple risk factors hospitalized for evaluation and management of hypoxic respiratory failure currently intubated with marginal hemodynamics requiring inotropic support.  Patient is on IV heparin aspirin.  Beta-blockers are placed on hold due to marginal hemodynamics  Guideline directed medical management such as ACE/ARB/Aldactone/Coreg can be initiated when hemodynamic allows  Waiting for cardiac catheterization next week based upon clinical condition /hemodynamic    #2 hypoxic respiratory failure management as per primary team    #3 congestive heart failure  Continue diuretic  We will start guideline directed medical management when hemodynamic/blood pressures/clinical condition allows             This document has been electronically signed by Clayton Bal MD on September 30, 2023 14:57 CDT          Clayton Bal MD  09/30/23  14:46 CDT      Part of this note may be an electronic transcription/translation of spoken language to printed text using the Dragon Dictation system.

## 2023-10-01 LAB
BACTERIA SPEC AEROBE CULT: NORMAL
BACTERIA SPEC AEROBE CULT: NORMAL
BACTERIA SPEC RESP CULT: NORMAL
GRAM STN SPEC: NORMAL

## 2023-10-26 ENCOUNTER — OUTSIDE FACILITY SERVICE (OUTPATIENT)
Dept: PULMONOLOGY | Facility: CLINIC | Age: 63
End: 2023-10-26
Payer: COMMERCIAL

## 2023-10-27 ENCOUNTER — OUTSIDE FACILITY SERVICE (OUTPATIENT)
Dept: PULMONOLOGY | Facility: CLINIC | Age: 63
End: 2023-10-27
Payer: COMMERCIAL

## 2023-10-28 ENCOUNTER — OUTSIDE FACILITY SERVICE (OUTPATIENT)
Dept: PULMONOLOGY | Facility: CLINIC | Age: 63
End: 2023-10-28
Payer: COMMERCIAL

## 2023-10-29 ENCOUNTER — OUTSIDE FACILITY SERVICE (OUTPATIENT)
Dept: PULMONOLOGY | Facility: CLINIC | Age: 63
End: 2023-10-29
Payer: COMMERCIAL

## 2023-10-30 ENCOUNTER — OUTSIDE FACILITY SERVICE (OUTPATIENT)
Dept: PULMONOLOGY | Facility: CLINIC | Age: 63
End: 2023-10-30
Payer: COMMERCIAL

## 2023-11-02 ENCOUNTER — OUTSIDE FACILITY SERVICE (OUTPATIENT)
Dept: PULMONOLOGY | Facility: CLINIC | Age: 63
End: 2023-11-02
Payer: COMMERCIAL

## 2023-11-03 ENCOUNTER — OUTSIDE FACILITY SERVICE (OUTPATIENT)
Dept: PULMONOLOGY | Facility: CLINIC | Age: 63
End: 2023-11-03
Payer: COMMERCIAL

## 2023-11-04 ENCOUNTER — OUTSIDE FACILITY SERVICE (OUTPATIENT)
Dept: PULMONOLOGY | Facility: CLINIC | Age: 63
End: 2023-11-04
Payer: COMMERCIAL

## 2023-11-05 ENCOUNTER — OUTSIDE FACILITY SERVICE (OUTPATIENT)
Dept: PULMONOLOGY | Facility: CLINIC | Age: 63
End: 2023-11-05
Payer: COMMERCIAL

## 2023-11-06 ENCOUNTER — OUTSIDE FACILITY SERVICE (OUTPATIENT)
Dept: PULMONOLOGY | Facility: CLINIC | Age: 63
End: 2023-11-06
Payer: COMMERCIAL

## 2023-11-07 ENCOUNTER — OUTSIDE FACILITY SERVICE (OUTPATIENT)
Dept: PULMONOLOGY | Facility: CLINIC | Age: 63
End: 2023-11-07
Payer: COMMERCIAL

## 2023-11-08 ENCOUNTER — OUTSIDE FACILITY SERVICE (OUTPATIENT)
Dept: PULMONOLOGY | Facility: CLINIC | Age: 63
End: 2023-11-08
Payer: COMMERCIAL

## 2023-11-09 ENCOUNTER — OUTSIDE FACILITY SERVICE (OUTPATIENT)
Dept: PULMONOLOGY | Facility: CLINIC | Age: 63
End: 2023-11-09
Payer: COMMERCIAL

## 2023-11-10 ENCOUNTER — OUTSIDE FACILITY SERVICE (OUTPATIENT)
Dept: PULMONOLOGY | Facility: CLINIC | Age: 63
End: 2023-11-10
Payer: COMMERCIAL

## 2023-11-11 ENCOUNTER — OUTSIDE FACILITY SERVICE (OUTPATIENT)
Dept: PULMONOLOGY | Facility: CLINIC | Age: 63
End: 2023-11-11
Payer: COMMERCIAL

## 2023-11-12 ENCOUNTER — OUTSIDE FACILITY SERVICE (OUTPATIENT)
Dept: PULMONOLOGY | Facility: CLINIC | Age: 63
End: 2023-11-12
Payer: COMMERCIAL

## 2023-11-13 ENCOUNTER — OUTSIDE FACILITY SERVICE (OUTPATIENT)
Dept: PULMONOLOGY | Facility: CLINIC | Age: 63
End: 2023-11-13
Payer: COMMERCIAL

## 2023-11-14 ENCOUNTER — OUTSIDE FACILITY SERVICE (OUTPATIENT)
Dept: PULMONOLOGY | Facility: CLINIC | Age: 63
End: 2023-11-14
Payer: COMMERCIAL

## 2023-11-15 ENCOUNTER — OUTSIDE FACILITY SERVICE (OUTPATIENT)
Dept: PULMONOLOGY | Facility: CLINIC | Age: 63
End: 2023-11-15
Payer: COMMERCIAL

## 2023-11-16 ENCOUNTER — OUTSIDE FACILITY SERVICE (OUTPATIENT)
Dept: PULMONOLOGY | Facility: CLINIC | Age: 63
End: 2023-11-16
Payer: COMMERCIAL

## 2023-11-17 ENCOUNTER — OUTSIDE FACILITY SERVICE (OUTPATIENT)
Dept: PULMONOLOGY | Facility: CLINIC | Age: 63
End: 2023-11-17
Payer: COMMERCIAL

## 2023-11-18 ENCOUNTER — OUTSIDE FACILITY SERVICE (OUTPATIENT)
Dept: PULMONOLOGY | Facility: CLINIC | Age: 63
End: 2023-11-18
Payer: COMMERCIAL

## 2023-11-19 ENCOUNTER — OUTSIDE FACILITY SERVICE (OUTPATIENT)
Dept: PULMONOLOGY | Facility: CLINIC | Age: 63
End: 2023-11-19
Payer: COMMERCIAL

## 2023-11-20 ENCOUNTER — OUTSIDE FACILITY SERVICE (OUTPATIENT)
Dept: PULMONOLOGY | Facility: CLINIC | Age: 63
End: 2023-11-20
Payer: COMMERCIAL

## 2023-11-21 ENCOUNTER — OUTSIDE FACILITY SERVICE (OUTPATIENT)
Dept: PULMONOLOGY | Facility: CLINIC | Age: 63
End: 2023-11-21
Payer: COMMERCIAL

## 2023-11-22 ENCOUNTER — OUTSIDE FACILITY SERVICE (OUTPATIENT)
Dept: PULMONOLOGY | Facility: CLINIC | Age: 63
End: 2023-11-22
Payer: COMMERCIAL

## 2023-11-23 ENCOUNTER — OUTSIDE FACILITY SERVICE (OUTPATIENT)
Dept: PULMONOLOGY | Facility: CLINIC | Age: 63
End: 2023-11-23
Payer: COMMERCIAL

## 2023-11-24 ENCOUNTER — OUTSIDE FACILITY SERVICE (OUTPATIENT)
Dept: PULMONOLOGY | Facility: CLINIC | Age: 63
End: 2023-11-24
Payer: COMMERCIAL

## 2023-11-25 ENCOUNTER — OUTSIDE FACILITY SERVICE (OUTPATIENT)
Dept: PULMONOLOGY | Facility: CLINIC | Age: 63
End: 2023-11-25
Payer: COMMERCIAL

## 2023-11-26 ENCOUNTER — OUTSIDE FACILITY SERVICE (OUTPATIENT)
Dept: PULMONOLOGY | Facility: CLINIC | Age: 63
End: 2023-11-26
Payer: COMMERCIAL

## 2023-11-27 ENCOUNTER — OUTSIDE FACILITY SERVICE (OUTPATIENT)
Dept: PULMONOLOGY | Facility: CLINIC | Age: 63
End: 2023-11-27
Payer: COMMERCIAL

## 2023-11-28 ENCOUNTER — OUTSIDE FACILITY SERVICE (OUTPATIENT)
Dept: PULMONOLOGY | Facility: CLINIC | Age: 63
End: 2023-11-28
Payer: COMMERCIAL

## 2023-11-29 ENCOUNTER — OUTSIDE FACILITY SERVICE (OUTPATIENT)
Dept: PULMONOLOGY | Facility: CLINIC | Age: 63
End: 2023-11-29
Payer: COMMERCIAL

## 2023-11-30 ENCOUNTER — OUTSIDE FACILITY SERVICE (OUTPATIENT)
Dept: PULMONOLOGY | Facility: CLINIC | Age: 63
End: 2023-11-30
Payer: COMMERCIAL

## 2023-12-01 ENCOUNTER — OUTSIDE FACILITY SERVICE (OUTPATIENT)
Dept: PULMONOLOGY | Facility: CLINIC | Age: 63
End: 2023-12-01
Payer: COMMERCIAL

## 2023-12-02 ENCOUNTER — OUTSIDE FACILITY SERVICE (OUTPATIENT)
Dept: PULMONOLOGY | Facility: CLINIC | Age: 63
End: 2023-12-02
Payer: COMMERCIAL

## 2023-12-03 ENCOUNTER — OUTSIDE FACILITY SERVICE (OUTPATIENT)
Dept: PULMONOLOGY | Facility: CLINIC | Age: 63
End: 2023-12-03
Payer: COMMERCIAL

## 2023-12-04 ENCOUNTER — OUTSIDE FACILITY SERVICE (OUTPATIENT)
Dept: PULMONOLOGY | Facility: CLINIC | Age: 63
End: 2023-12-04
Payer: COMMERCIAL
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
## (undated) DEVICE — SOL IRRG H2O PL/BG 1000ML STRL

## (undated) DEVICE — CYSTO/BLADDER IRRIGATION SET, REGULATING CLAMP

## (undated) DEVICE — GLV SURG SENSICARE PI ORTHO PF SZ7 LF STRL

## (undated) DEVICE — SOL PVPI SPRY BETADINE 3OZ

## (undated) DEVICE — SOL IRR H2O BTL 1000ML STRL

## (undated) DEVICE — ENDOSCOPIC SEAL URO 1 SIZE FITS ALL: Brand: ENDOSCOPIC SEAL

## (undated) DEVICE — STERILE POLYISOPRENE POWDER-FREE SURGICAL GLOVES: Brand: PROTEXIS

## (undated) DEVICE — WRAP LEG 31X48X6IN STRL

## (undated) DEVICE — GLV SURG NEOLON 2G PF LF 6.5 STRL